# Patient Record
Sex: FEMALE | Race: WHITE | NOT HISPANIC OR LATINO | URBAN - METROPOLITAN AREA
[De-identification: names, ages, dates, MRNs, and addresses within clinical notes are randomized per-mention and may not be internally consistent; named-entity substitution may affect disease eponyms.]

---

## 2017-04-27 ENCOUNTER — INPATIENT (INPATIENT)
Facility: HOSPITAL | Age: 52
LOS: 2 days | Discharge: ANOTHER HOSPITAL | End: 2017-04-30
Attending: ORTHOPAEDIC SURGERY | Admitting: ORTHOPAEDIC SURGERY
Payer: MEDICAID

## 2017-04-27 VITALS
OXYGEN SATURATION: 98 % | TEMPERATURE: 98 F | RESPIRATION RATE: 16 BRPM | HEART RATE: 81 BPM | DIASTOLIC BLOOD PRESSURE: 87 MMHG | HEIGHT: 68 IN | WEIGHT: 177.91 LBS | SYSTOLIC BLOOD PRESSURE: 153 MMHG

## 2017-04-27 DIAGNOSIS — S32.9XXA FRACTURE OF UNSPECIFIED PARTS OF LUMBOSACRAL SPINE AND PELVIS, INITIAL ENCOUNTER FOR CLOSED FRACTURE: Chronic | ICD-10-CM

## 2017-04-27 LAB
ABO RH CONFIRMATION: SIGNIFICANT CHANGE UP
ALBUMIN SERPL ELPH-MCNC: 3.7 G/DL — SIGNIFICANT CHANGE UP (ref 3.3–5)
ALP SERPL-CCNC: 96 U/L — SIGNIFICANT CHANGE UP (ref 40–120)
ALT FLD-CCNC: 19 U/L — SIGNIFICANT CHANGE UP (ref 12–78)
ANION GAP SERPL CALC-SCNC: 8 MMOL/L — SIGNIFICANT CHANGE UP (ref 5–17)
APTT BLD: 30.8 SEC — SIGNIFICANT CHANGE UP (ref 27.5–37.4)
AST SERPL-CCNC: 13 U/L — LOW (ref 15–37)
BILIRUB SERPL-MCNC: 0.2 MG/DL — SIGNIFICANT CHANGE UP (ref 0.2–1.2)
BLD GP AB SCN SERPL QL: SIGNIFICANT CHANGE UP
BUN SERPL-MCNC: 12 MG/DL — SIGNIFICANT CHANGE UP (ref 7–23)
CALCIUM SERPL-MCNC: 8.9 MG/DL — SIGNIFICANT CHANGE UP (ref 8.5–10.1)
CHLORIDE SERPL-SCNC: 106 MMOL/L — SIGNIFICANT CHANGE UP (ref 96–108)
CO2 SERPL-SCNC: 27 MMOL/L — SIGNIFICANT CHANGE UP (ref 22–31)
CREAT SERPL-MCNC: 0.84 MG/DL — SIGNIFICANT CHANGE UP (ref 0.5–1.3)
GLUCOSE SERPL-MCNC: 79 MG/DL — SIGNIFICANT CHANGE UP (ref 70–99)
HCG SERPL-ACNC: 1 MIU/ML — SIGNIFICANT CHANGE UP
HCT VFR BLD CALC: 43.8 % — SIGNIFICANT CHANGE UP (ref 34.5–45)
HGB BLD-MCNC: 14.4 G/DL — SIGNIFICANT CHANGE UP (ref 11.5–15.5)
INR BLD: 0.95 RATIO — SIGNIFICANT CHANGE UP (ref 0.88–1.16)
MCHC RBC-ENTMCNC: 31.3 PG — SIGNIFICANT CHANGE UP (ref 27–34)
MCHC RBC-ENTMCNC: 32.9 GM/DL — SIGNIFICANT CHANGE UP (ref 32–36)
MCV RBC AUTO: 95.1 FL — SIGNIFICANT CHANGE UP (ref 80–100)
PLATELET # BLD AUTO: 254 K/UL — SIGNIFICANT CHANGE UP (ref 150–400)
POTASSIUM SERPL-MCNC: 3.8 MMOL/L — SIGNIFICANT CHANGE UP (ref 3.5–5.3)
POTASSIUM SERPL-SCNC: 3.8 MMOL/L — SIGNIFICANT CHANGE UP (ref 3.5–5.3)
PROT SERPL-MCNC: 7.6 GM/DL — SIGNIFICANT CHANGE UP (ref 6–8.3)
PROTHROM AB SERPL-ACNC: 10.3 SEC — SIGNIFICANT CHANGE UP (ref 9.8–12.7)
RBC # BLD: 4.61 M/UL — SIGNIFICANT CHANGE UP (ref 3.8–5.2)
RBC # FLD: 14.3 % — SIGNIFICANT CHANGE UP (ref 10.3–14.5)
SODIUM SERPL-SCNC: 141 MMOL/L — SIGNIFICANT CHANGE UP (ref 135–145)
TYPE + AB SCN PNL BLD: SIGNIFICANT CHANGE UP
WBC # BLD: 9.9 K/UL — SIGNIFICANT CHANGE UP (ref 3.8–10.5)
WBC # FLD AUTO: 9.9 K/UL — SIGNIFICANT CHANGE UP (ref 3.8–10.5)

## 2017-04-27 PROCEDURE — 72192 CT PELVIS W/O DYE: CPT | Mod: 26

## 2017-04-27 PROCEDURE — 71010: CPT | Mod: 26

## 2017-04-27 PROCEDURE — 73552 X-RAY EXAM OF FEMUR 2/>: CPT | Mod: 26

## 2017-04-27 PROCEDURE — 76377 3D RENDER W/INTRP POSTPROCES: CPT | Mod: 26

## 2017-04-27 PROCEDURE — 73502 X-RAY EXAM HIP UNI 2-3 VIEWS: CPT | Mod: 26

## 2017-04-27 PROCEDURE — 73700 CT LOWER EXTREMITY W/O DYE: CPT | Mod: 26,LT

## 2017-04-27 PROCEDURE — 93010 ELECTROCARDIOGRAM REPORT: CPT

## 2017-04-27 PROCEDURE — 99285 EMERGENCY DEPT VISIT HI MDM: CPT

## 2017-04-27 RX ORDER — HEPARIN SODIUM 5000 [USP'U]/ML
5000 INJECTION INTRAVENOUS; SUBCUTANEOUS ONCE
Qty: 0 | Refills: 0 | Status: COMPLETED | OUTPATIENT
Start: 2017-04-27 | End: 2017-04-27

## 2017-04-27 RX ORDER — HYDROMORPHONE HYDROCHLORIDE 2 MG/ML
1 INJECTION INTRAMUSCULAR; INTRAVENOUS; SUBCUTANEOUS ONCE
Qty: 0 | Refills: 0 | Status: DISCONTINUED | OUTPATIENT
Start: 2017-04-27 | End: 2017-04-27

## 2017-04-27 RX ORDER — OXYCODONE HYDROCHLORIDE 5 MG/1
10 TABLET ORAL EVERY 4 HOURS
Qty: 0 | Refills: 0 | Status: DISCONTINUED | OUTPATIENT
Start: 2017-04-27 | End: 2017-04-27

## 2017-04-27 RX ORDER — HYDROMORPHONE HYDROCHLORIDE 2 MG/ML
1 INJECTION INTRAMUSCULAR; INTRAVENOUS; SUBCUTANEOUS
Qty: 0 | Refills: 0 | Status: DISCONTINUED | OUTPATIENT
Start: 2017-04-27 | End: 2017-04-28

## 2017-04-27 RX ORDER — NICOTINE POLACRILEX 2 MG
1 GUM BUCCAL DAILY
Qty: 0 | Refills: 0 | Status: DISCONTINUED | OUTPATIENT
Start: 2017-04-27 | End: 2017-04-28

## 2017-04-27 RX ORDER — OXYCODONE HYDROCHLORIDE 5 MG/1
30 TABLET ORAL EVERY 4 HOURS
Qty: 0 | Refills: 0 | Status: DISCONTINUED | OUTPATIENT
Start: 2017-04-27 | End: 2017-04-30

## 2017-04-27 RX ORDER — ACETAMINOPHEN 500 MG
650 TABLET ORAL EVERY 6 HOURS
Qty: 0 | Refills: 0 | Status: DISCONTINUED | OUTPATIENT
Start: 2017-04-27 | End: 2017-04-30

## 2017-04-27 RX ORDER — METOCLOPRAMIDE HCL 10 MG
10 TABLET ORAL EVERY 6 HOURS
Qty: 0 | Refills: 0 | Status: DISCONTINUED | OUTPATIENT
Start: 2017-04-27 | End: 2017-04-30

## 2017-04-27 RX ORDER — SODIUM CHLORIDE 9 MG/ML
1000 INJECTION INTRAMUSCULAR; INTRAVENOUS; SUBCUTANEOUS
Qty: 0 | Refills: 0 | Status: DISCONTINUED | OUTPATIENT
Start: 2017-04-27 | End: 2017-04-28

## 2017-04-27 RX ORDER — OXYCODONE HYDROCHLORIDE 5 MG/1
5 TABLET ORAL EVERY 4 HOURS
Qty: 0 | Refills: 0 | Status: DISCONTINUED | OUTPATIENT
Start: 2017-04-27 | End: 2017-04-27

## 2017-04-27 RX ORDER — DOCUSATE SODIUM 100 MG
100 CAPSULE ORAL THREE TIMES A DAY
Qty: 0 | Refills: 0 | Status: DISCONTINUED | OUTPATIENT
Start: 2017-04-27 | End: 2017-04-30

## 2017-04-27 RX ORDER — SODIUM CHLORIDE 9 MG/ML
3 INJECTION INTRAMUSCULAR; INTRAVENOUS; SUBCUTANEOUS EVERY 8 HOURS
Qty: 0 | Refills: 0 | Status: DISCONTINUED | OUTPATIENT
Start: 2017-04-27 | End: 2017-04-30

## 2017-04-27 RX ORDER — OXYCODONE HYDROCHLORIDE 5 MG/1
30 TABLET ORAL ONCE
Qty: 0 | Refills: 0 | Status: DISCONTINUED | OUTPATIENT
Start: 2017-04-27 | End: 2017-04-27

## 2017-04-27 RX ORDER — DIPHENHYDRAMINE HCL 50 MG
25 CAPSULE ORAL AT BEDTIME
Qty: 0 | Refills: 0 | Status: DISCONTINUED | OUTPATIENT
Start: 2017-04-27 | End: 2017-04-30

## 2017-04-27 RX ADMIN — HYDROMORPHONE HYDROCHLORIDE 1 MILLIGRAM(S): 2 INJECTION INTRAMUSCULAR; INTRAVENOUS; SUBCUTANEOUS at 16:19

## 2017-04-27 RX ADMIN — HYDROMORPHONE HYDROCHLORIDE 1 MILLIGRAM(S): 2 INJECTION INTRAMUSCULAR; INTRAVENOUS; SUBCUTANEOUS at 23:40

## 2017-04-27 RX ADMIN — HYDROMORPHONE HYDROCHLORIDE 1 MILLIGRAM(S): 2 INJECTION INTRAMUSCULAR; INTRAVENOUS; SUBCUTANEOUS at 18:57

## 2017-04-27 RX ADMIN — HYDROMORPHONE HYDROCHLORIDE 1 MILLIGRAM(S): 2 INJECTION INTRAMUSCULAR; INTRAVENOUS; SUBCUTANEOUS at 19:24

## 2017-04-27 RX ADMIN — HYDROMORPHONE HYDROCHLORIDE 1 MILLIGRAM(S): 2 INJECTION INTRAMUSCULAR; INTRAVENOUS; SUBCUTANEOUS at 17:33

## 2017-04-27 RX ADMIN — OXYCODONE HYDROCHLORIDE 30 MILLIGRAM(S): 5 TABLET ORAL at 21:04

## 2017-04-27 RX ADMIN — Medication 1 PATCH: at 22:47

## 2017-04-27 RX ADMIN — HYDROMORPHONE HYDROCHLORIDE 1 MILLIGRAM(S): 2 INJECTION INTRAMUSCULAR; INTRAVENOUS; SUBCUTANEOUS at 17:49

## 2017-04-27 RX ADMIN — HYDROMORPHONE HYDROCHLORIDE 1 MILLIGRAM(S): 2 INJECTION INTRAMUSCULAR; INTRAVENOUS; SUBCUTANEOUS at 15:40

## 2017-04-27 RX ADMIN — HYDROMORPHONE HYDROCHLORIDE 1 MILLIGRAM(S): 2 INJECTION INTRAMUSCULAR; INTRAVENOUS; SUBCUTANEOUS at 15:42

## 2017-04-27 RX ADMIN — SODIUM CHLORIDE 3 MILLILITER(S): 9 INJECTION INTRAMUSCULAR; INTRAVENOUS; SUBCUTANEOUS at 17:49

## 2017-04-27 RX ADMIN — HEPARIN SODIUM 5000 UNIT(S): 5000 INJECTION INTRAVENOUS; SUBCUTANEOUS at 22:29

## 2017-04-27 RX ADMIN — HYDROMORPHONE HYDROCHLORIDE 1 MILLIGRAM(S): 2 INJECTION INTRAMUSCULAR; INTRAVENOUS; SUBCUTANEOUS at 16:48
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
## 2017-04-27 NOTE — CONSULT NOTE ADULT - ASSESSMENT
51F with left periprosthetic femur fracture  _  NWB affected extremity  Pain control  Discussed with patient need for surgical intervention  Will consult joint specialist  No orthopedic contraindication to transfer  Will discuss with attending and advise if change

## 2017-04-27 NOTE — ED PROVIDER NOTE - OBJECTIVE STATEMENT
52 y/o F with hx of multiple pelvic factures and reconstructive surgeries presents to the ED c/o left hip pain. Pt states she was swinging her legs off of her bed and felt a click in left hip, believes hip may be dislocated. No other complaints at this time.

## 2017-04-27 NOTE — CONSULT NOTE ADULT - SUBJECTIVE AND OBJECTIVE BOX
HPI  51F complains of left thigh pain for 1 day status post transferring motion while swinging legs. Pt denies numbness, paresthesias, headstrike, loss of consciousness, or any other signs/symptoms at this time. Patient is a community ambulator with a crutch at baseline.    Allergies: NKDA  PMH: Denies  PSH: R IMN, L KONG ~1980 with revision in 2016  Social: +smoking  FH: Noncontributory  Imaging: CT/XR left femur/ap pelvis: left periprosthetic femur fracture    Physical exam  VS: Afebrile, vital signs stable  Gen: NAD  left LE: Skin intact. No erythema/ecchymosis/warmth. TTP thigh. No TTP bony prominences at ankle/foot.+EHL/FHL/TA/GS. SILT L3-S1, +DP pulse, capillary refill brisk. Compartments soft and nontender.  Secondary survey: No TTP bony prominences with full painless AROM at baseline per patient. SILT. Capillary refill brisk. Able to SLR with contralateral leg. No TTP axial spine.

## 2017-04-28 LAB — ERYTHROCYTE [SEDIMENTATION RATE] IN BLOOD: 13 MM/HR — SIGNIFICANT CHANGE UP (ref 0–20)

## 2017-04-28 RX ORDER — HYDROMORPHONE HYDROCHLORIDE 2 MG/ML
2 INJECTION INTRAMUSCULAR; INTRAVENOUS; SUBCUTANEOUS
Qty: 0 | Refills: 0 | Status: DISCONTINUED | OUTPATIENT
Start: 2017-04-28 | End: 2017-04-30

## 2017-04-28 RX ORDER — NICOTINE POLACRILEX 2 MG
1 GUM BUCCAL DAILY
Qty: 0 | Refills: 0 | Status: DISCONTINUED | OUTPATIENT
Start: 2017-04-28 | End: 2017-04-30

## 2017-04-28 RX ORDER — HYDROMORPHONE HYDROCHLORIDE 2 MG/ML
4 INJECTION INTRAMUSCULAR; INTRAVENOUS; SUBCUTANEOUS EVERY 4 HOURS
Qty: 0 | Refills: 0 | Status: DISCONTINUED | OUTPATIENT
Start: 2017-04-28 | End: 2017-04-30

## 2017-04-28 RX ORDER — HEPARIN SODIUM 5000 [USP'U]/ML
5000 INJECTION INTRAVENOUS; SUBCUTANEOUS EVERY 8 HOURS
Qty: 0 | Refills: 0 | Status: DISCONTINUED | OUTPATIENT
Start: 2017-04-28 | End: 2017-04-30

## 2017-04-28 RX ADMIN — HYDROMORPHONE HYDROCHLORIDE 2 MILLIGRAM(S): 2 INJECTION INTRAMUSCULAR; INTRAVENOUS; SUBCUTANEOUS at 17:52

## 2017-04-28 RX ADMIN — OXYCODONE HYDROCHLORIDE 30 MILLIGRAM(S): 5 TABLET ORAL at 06:00

## 2017-04-28 RX ADMIN — SODIUM CHLORIDE 3 MILLILITER(S): 9 INJECTION INTRAMUSCULAR; INTRAVENOUS; SUBCUTANEOUS at 04:47

## 2017-04-28 RX ADMIN — HYDROMORPHONE HYDROCHLORIDE 2 MILLIGRAM(S): 2 INJECTION INTRAMUSCULAR; INTRAVENOUS; SUBCUTANEOUS at 21:13

## 2017-04-28 RX ADMIN — Medication 1 PATCH: at 11:51

## 2017-04-28 RX ADMIN — HYDROMORPHONE HYDROCHLORIDE 2 MILLIGRAM(S): 2 INJECTION INTRAMUSCULAR; INTRAVENOUS; SUBCUTANEOUS at 01:37

## 2017-04-28 RX ADMIN — OXYCODONE HYDROCHLORIDE 30 MILLIGRAM(S): 5 TABLET ORAL at 22:30

## 2017-04-28 RX ADMIN — OXYCODONE HYDROCHLORIDE 30 MILLIGRAM(S): 5 TABLET ORAL at 05:19

## 2017-04-28 RX ADMIN — HYDROMORPHONE HYDROCHLORIDE 2 MILLIGRAM(S): 2 INJECTION INTRAMUSCULAR; INTRAVENOUS; SUBCUTANEOUS at 04:45

## 2017-04-28 RX ADMIN — SODIUM CHLORIDE 3 MILLILITER(S): 9 INJECTION INTRAMUSCULAR; INTRAVENOUS; SUBCUTANEOUS at 22:09

## 2017-04-28 RX ADMIN — HYDROMORPHONE HYDROCHLORIDE 2 MILLIGRAM(S): 2 INJECTION INTRAMUSCULAR; INTRAVENOUS; SUBCUTANEOUS at 07:53

## 2017-04-28 RX ADMIN — HYDROMORPHONE HYDROCHLORIDE 2 MILLIGRAM(S): 2 INJECTION INTRAMUSCULAR; INTRAVENOUS; SUBCUTANEOUS at 05:12

## 2017-04-28 RX ADMIN — HYDROMORPHONE HYDROCHLORIDE 2 MILLIGRAM(S): 2 INJECTION INTRAMUSCULAR; INTRAVENOUS; SUBCUTANEOUS at 11:46

## 2017-04-28 RX ADMIN — HYDROMORPHONE HYDROCHLORIDE 4 MILLIGRAM(S): 2 INJECTION INTRAMUSCULAR; INTRAVENOUS; SUBCUTANEOUS at 16:43

## 2017-04-28 RX ADMIN — HYDROMORPHONE HYDROCHLORIDE 4 MILLIGRAM(S): 2 INJECTION INTRAMUSCULAR; INTRAVENOUS; SUBCUTANEOUS at 12:54

## 2017-04-28 RX ADMIN — HYDROMORPHONE HYDROCHLORIDE 4 MILLIGRAM(S): 2 INJECTION INTRAMUSCULAR; INTRAVENOUS; SUBCUTANEOUS at 20:46

## 2017-04-28 RX ADMIN — Medication 100 MILLIGRAM(S): at 04:50

## 2017-04-28 RX ADMIN — Medication 100 MILLIGRAM(S): at 22:00

## 2017-04-28 RX ADMIN — Medication 100 MILLIGRAM(S): at 14:33

## 2017-04-28 RX ADMIN — OXYCODONE HYDROCHLORIDE 30 MILLIGRAM(S): 5 TABLET ORAL at 22:00

## 2017-04-28 RX ADMIN — HYDROMORPHONE HYDROCHLORIDE 2 MILLIGRAM(S): 2 INJECTION INTRAMUSCULAR; INTRAVENOUS; SUBCUTANEOUS at 09:52

## 2017-04-28 RX ADMIN — HEPARIN SODIUM 5000 UNIT(S): 5000 INJECTION INTRAVENOUS; SUBCUTANEOUS at 22:00

## 2017-04-28 RX ADMIN — HYDROMORPHONE HYDROCHLORIDE 2 MILLIGRAM(S): 2 INJECTION INTRAMUSCULAR; INTRAVENOUS; SUBCUTANEOUS at 12:54

## 2017-04-28 RX ADMIN — HYDROMORPHONE HYDROCHLORIDE 2 MILLIGRAM(S): 2 INJECTION INTRAMUSCULAR; INTRAVENOUS; SUBCUTANEOUS at 14:42

## 2017-04-28 RX ADMIN — SODIUM CHLORIDE 3 MILLILITER(S): 9 INJECTION INTRAMUSCULAR; INTRAVENOUS; SUBCUTANEOUS at 14:34

## 2017-04-28 RX ADMIN — OXYCODONE HYDROCHLORIDE 30 MILLIGRAM(S): 5 TABLET ORAL at 09:23

## 2017-04-28 RX ADMIN — HYDROMORPHONE HYDROCHLORIDE 2 MILLIGRAM(S): 2 INJECTION INTRAMUSCULAR; INTRAVENOUS; SUBCUTANEOUS at 17:53

## 2017-04-28 RX ADMIN — HYDROMORPHONE HYDROCHLORIDE 2 MILLIGRAM(S): 2 INJECTION INTRAMUSCULAR; INTRAVENOUS; SUBCUTANEOUS at 14:49

## 2017-04-28 RX ADMIN — HYDROMORPHONE HYDROCHLORIDE 2 MILLIGRAM(S): 2 INJECTION INTRAMUSCULAR; INTRAVENOUS; SUBCUTANEOUS at 02:00

## 2017-04-28 RX ADMIN — Medication 1 PATCH: at 14:34

## 2017-04-28 RX ADMIN — HYDROMORPHONE HYDROCHLORIDE 4 MILLIGRAM(S): 2 INJECTION INTRAMUSCULAR; INTRAVENOUS; SUBCUTANEOUS at 20:16

## 2017-04-28 RX ADMIN — OXYCODONE HYDROCHLORIDE 30 MILLIGRAM(S): 5 TABLET ORAL at 01:03

## 2017-04-28 RX ADMIN — HYDROMORPHONE HYDROCHLORIDE 2 MILLIGRAM(S): 2 INJECTION INTRAMUSCULAR; INTRAVENOUS; SUBCUTANEOUS at 21:43

## 2017-04-28 RX ADMIN — HYDROMORPHONE HYDROCHLORIDE 2 MILLIGRAM(S): 2 INJECTION INTRAMUSCULAR; INTRAVENOUS; SUBCUTANEOUS at 23:59

## 2017-04-28 RX ADMIN — OXYCODONE HYDROCHLORIDE 30 MILLIGRAM(S): 5 TABLET ORAL at 09:52

## 2017-04-28 RX ADMIN — OXYCODONE HYDROCHLORIDE 30 MILLIGRAM(S): 5 TABLET ORAL at 00:26

## 2017-04-28 RX ADMIN — HYDROMORPHONE HYDROCHLORIDE 4 MILLIGRAM(S): 2 INJECTION INTRAMUSCULAR; INTRAVENOUS; SUBCUTANEOUS at 12:48

## 2017-04-28 RX ADMIN — HYDROMORPHONE HYDROCHLORIDE 1 MILLIGRAM(S): 2 INJECTION INTRAMUSCULAR; INTRAVENOUS; SUBCUTANEOUS at 00:10

## 2017-04-28 NOTE — PROVIDER CONTACT NOTE (OTHER) - RECOMMENDATIONS
md bee stated he would increase the dilaudid iv but not po meds beacause pt stated different doses in the ed

## 2017-04-29 LAB
ANION GAP SERPL CALC-SCNC: 11 MMOL/L — SIGNIFICANT CHANGE UP (ref 5–17)
APPEARANCE UR: CLEAR — SIGNIFICANT CHANGE UP
APTT BLD: 30 SEC — SIGNIFICANT CHANGE UP (ref 27.5–37.4)
BACTERIA # UR AUTO: (no result)
BILIRUB UR-MCNC: NEGATIVE — SIGNIFICANT CHANGE UP
BUN SERPL-MCNC: 11 MG/DL — SIGNIFICANT CHANGE UP (ref 7–23)
CALCIUM SERPL-MCNC: 9.6 MG/DL — SIGNIFICANT CHANGE UP (ref 8.5–10.1)
CHLORIDE SERPL-SCNC: 105 MMOL/L — SIGNIFICANT CHANGE UP (ref 96–108)
CO2 SERPL-SCNC: 21 MMOL/L — LOW (ref 22–31)
COLOR SPEC: YELLOW — SIGNIFICANT CHANGE UP
CREAT SERPL-MCNC: 0.84 MG/DL — SIGNIFICANT CHANGE UP (ref 0.5–1.3)
CRP SERPL-MCNC: 0.37 MG/DL — SIGNIFICANT CHANGE UP (ref 0–0.4)
DIFF PNL FLD: NEGATIVE — SIGNIFICANT CHANGE UP
EPI CELLS # UR: SIGNIFICANT CHANGE UP
GLUCOSE SERPL-MCNC: 76 MG/DL — SIGNIFICANT CHANGE UP (ref 70–99)
GLUCOSE UR QL: NEGATIVE MG/DL — SIGNIFICANT CHANGE UP
HCG UR QL: NEGATIVE — SIGNIFICANT CHANGE UP
HCT VFR BLD CALC: 49.8 % — HIGH (ref 34.5–45)
HGB BLD-MCNC: 16.2 G/DL — HIGH (ref 11.5–15.5)
INR BLD: 1.02 RATIO — SIGNIFICANT CHANGE UP (ref 0.88–1.16)
KETONES UR-MCNC: NEGATIVE — SIGNIFICANT CHANGE UP
LEUKOCYTE ESTERASE UR-ACNC: (no result)
MCHC RBC-ENTMCNC: 30.6 PG — SIGNIFICANT CHANGE UP (ref 27–34)
MCHC RBC-ENTMCNC: 32.5 GM/DL — SIGNIFICANT CHANGE UP (ref 32–36)
MCV RBC AUTO: 94.2 FL — SIGNIFICANT CHANGE UP (ref 80–100)
NITRITE UR-MCNC: NEGATIVE — SIGNIFICANT CHANGE UP
PH UR: 6 — SIGNIFICANT CHANGE UP (ref 5–8)
PLATELET # BLD AUTO: 201 K/UL — SIGNIFICANT CHANGE UP (ref 150–400)
POTASSIUM SERPL-MCNC: 4.4 MMOL/L — SIGNIFICANT CHANGE UP (ref 3.5–5.3)
POTASSIUM SERPL-SCNC: 4.4 MMOL/L — SIGNIFICANT CHANGE UP (ref 3.5–5.3)
PROT UR-MCNC: NEGATIVE MG/DL — SIGNIFICANT CHANGE UP
PROTHROM AB SERPL-ACNC: 11 SEC — SIGNIFICANT CHANGE UP (ref 9.8–12.7)
RBC # BLD: 5.28 M/UL — HIGH (ref 3.8–5.2)
RBC # FLD: 13.8 % — SIGNIFICANT CHANGE UP (ref 10.3–14.5)
RBC CASTS # UR COMP ASSIST: SIGNIFICANT CHANGE UP /HPF (ref 0–4)
SODIUM SERPL-SCNC: 137 MMOL/L — SIGNIFICANT CHANGE UP (ref 135–145)
SP GR SPEC: 1.02 — SIGNIFICANT CHANGE UP (ref 1.01–1.02)
UROBILINOGEN FLD QL: NEGATIVE MG/DL — SIGNIFICANT CHANGE UP
WBC # BLD: 8.4 K/UL — SIGNIFICANT CHANGE UP (ref 3.8–10.5)
WBC # FLD AUTO: 8.4 K/UL — SIGNIFICANT CHANGE UP (ref 3.8–10.5)
WBC UR QL: SIGNIFICANT CHANGE UP

## 2017-04-29 RX ORDER — ACETAMINOPHEN 500 MG
1000 TABLET ORAL ONCE
Qty: 0 | Refills: 0 | Status: COMPLETED | OUTPATIENT
Start: 2017-04-29 | End: 2017-04-29

## 2017-04-29 RX ORDER — OXYCODONE HYDROCHLORIDE 5 MG/1
10 TABLET ORAL EVERY 12 HOURS
Qty: 0 | Refills: 0 | Status: DISCONTINUED | OUTPATIENT
Start: 2017-04-29 | End: 2017-04-30

## 2017-04-29 RX ADMIN — Medication 100 MILLIGRAM(S): at 21:26

## 2017-04-29 RX ADMIN — HYDROMORPHONE HYDROCHLORIDE 4 MILLIGRAM(S): 2 INJECTION INTRAMUSCULAR; INTRAVENOUS; SUBCUTANEOUS at 00:52

## 2017-04-29 RX ADMIN — HYDROMORPHONE HYDROCHLORIDE 4 MILLIGRAM(S): 2 INJECTION INTRAMUSCULAR; INTRAVENOUS; SUBCUTANEOUS at 10:22

## 2017-04-29 RX ADMIN — HYDROMORPHONE HYDROCHLORIDE 2 MILLIGRAM(S): 2 INJECTION INTRAMUSCULAR; INTRAVENOUS; SUBCUTANEOUS at 00:29

## 2017-04-29 RX ADMIN — Medication 1 PATCH: at 13:32

## 2017-04-29 RX ADMIN — Medication 400 MILLIGRAM(S): at 19:49

## 2017-04-29 RX ADMIN — HYDROMORPHONE HYDROCHLORIDE 2 MILLIGRAM(S): 2 INJECTION INTRAMUSCULAR; INTRAVENOUS; SUBCUTANEOUS at 09:08

## 2017-04-29 RX ADMIN — OXYCODONE HYDROCHLORIDE 30 MILLIGRAM(S): 5 TABLET ORAL at 02:02

## 2017-04-29 RX ADMIN — HEPARIN SODIUM 5000 UNIT(S): 5000 INJECTION INTRAVENOUS; SUBCUTANEOUS at 22:07

## 2017-04-29 RX ADMIN — SODIUM CHLORIDE 3 MILLILITER(S): 9 INJECTION INTRAMUSCULAR; INTRAVENOUS; SUBCUTANEOUS at 21:22

## 2017-04-29 RX ADMIN — OXYCODONE HYDROCHLORIDE 10 MILLIGRAM(S): 5 TABLET ORAL at 18:27

## 2017-04-29 RX ADMIN — OXYCODONE HYDROCHLORIDE 30 MILLIGRAM(S): 5 TABLET ORAL at 21:24

## 2017-04-29 RX ADMIN — HYDROMORPHONE HYDROCHLORIDE 2 MILLIGRAM(S): 2 INJECTION INTRAMUSCULAR; INTRAVENOUS; SUBCUTANEOUS at 04:37

## 2017-04-29 RX ADMIN — HYDROMORPHONE HYDROCHLORIDE 4 MILLIGRAM(S): 2 INJECTION INTRAMUSCULAR; INTRAVENOUS; SUBCUTANEOUS at 01:22

## 2017-04-29 RX ADMIN — SODIUM CHLORIDE 3 MILLILITER(S): 9 INJECTION INTRAMUSCULAR; INTRAVENOUS; SUBCUTANEOUS at 05:17

## 2017-04-29 RX ADMIN — HYDROMORPHONE HYDROCHLORIDE 4 MILLIGRAM(S): 2 INJECTION INTRAMUSCULAR; INTRAVENOUS; SUBCUTANEOUS at 09:22

## 2017-04-29 RX ADMIN — HEPARIN SODIUM 5000 UNIT(S): 5000 INJECTION INTRAVENOUS; SUBCUTANEOUS at 13:30

## 2017-04-29 RX ADMIN — HYDROMORPHONE HYDROCHLORIDE 2 MILLIGRAM(S): 2 INJECTION INTRAMUSCULAR; INTRAVENOUS; SUBCUTANEOUS at 12:09

## 2017-04-29 RX ADMIN — HYDROMORPHONE HYDROCHLORIDE 2 MILLIGRAM(S): 2 INJECTION INTRAMUSCULAR; INTRAVENOUS; SUBCUTANEOUS at 13:09

## 2017-04-29 RX ADMIN — HYDROMORPHONE HYDROCHLORIDE 4 MILLIGRAM(S): 2 INJECTION INTRAMUSCULAR; INTRAVENOUS; SUBCUTANEOUS at 20:20

## 2017-04-29 RX ADMIN — Medication 100 MILLIGRAM(S): at 13:31

## 2017-04-29 RX ADMIN — Medication 100 MILLIGRAM(S): at 05:16

## 2017-04-29 RX ADMIN — HYDROMORPHONE HYDROCHLORIDE 4 MILLIGRAM(S): 2 INJECTION INTRAMUSCULAR; INTRAVENOUS; SUBCUTANEOUS at 19:58

## 2017-04-29 RX ADMIN — HYDROMORPHONE HYDROCHLORIDE 2 MILLIGRAM(S): 2 INJECTION INTRAMUSCULAR; INTRAVENOUS; SUBCUTANEOUS at 19:05

## 2017-04-29 RX ADMIN — HYDROMORPHONE HYDROCHLORIDE 2 MILLIGRAM(S): 2 INJECTION INTRAMUSCULAR; INTRAVENOUS; SUBCUTANEOUS at 05:17

## 2017-04-29 RX ADMIN — OXYCODONE HYDROCHLORIDE 30 MILLIGRAM(S): 5 TABLET ORAL at 16:26

## 2017-04-29 RX ADMIN — HYDROMORPHONE HYDROCHLORIDE 4 MILLIGRAM(S): 2 INJECTION INTRAMUSCULAR; INTRAVENOUS; SUBCUTANEOUS at 09:26

## 2017-04-29 RX ADMIN — HEPARIN SODIUM 5000 UNIT(S): 5000 INJECTION INTRAVENOUS; SUBCUTANEOUS at 05:16

## 2017-04-29 RX ADMIN — Medication 1000 MILLIGRAM(S): at 20:20

## 2017-04-29 RX ADMIN — OXYCODONE HYDROCHLORIDE 10 MILLIGRAM(S): 5 TABLET ORAL at 17:40

## 2017-04-29 RX ADMIN — HYDROMORPHONE HYDROCHLORIDE 4 MILLIGRAM(S): 2 INJECTION INTRAMUSCULAR; INTRAVENOUS; SUBCUTANEOUS at 13:36

## 2017-04-29 RX ADMIN — OXYCODONE HYDROCHLORIDE 30 MILLIGRAM(S): 5 TABLET ORAL at 22:00

## 2017-04-29 RX ADMIN — HYDROMORPHONE HYDROCHLORIDE 2 MILLIGRAM(S): 2 INJECTION INTRAMUSCULAR; INTRAVENOUS; SUBCUTANEOUS at 22:02

## 2017-04-29 RX ADMIN — HYDROMORPHONE HYDROCHLORIDE 2 MILLIGRAM(S): 2 INJECTION INTRAMUSCULAR; INTRAVENOUS; SUBCUTANEOUS at 08:09

## 2017-04-29 RX ADMIN — Medication 1 PATCH: at 13:30

## 2017-04-29 RX ADMIN — OXYCODONE HYDROCHLORIDE 30 MILLIGRAM(S): 5 TABLET ORAL at 15:26

## 2017-04-29 RX ADMIN — OXYCODONE HYDROCHLORIDE 30 MILLIGRAM(S): 5 TABLET ORAL at 07:00

## 2017-04-29 RX ADMIN — HYDROMORPHONE HYDROCHLORIDE 4 MILLIGRAM(S): 2 INJECTION INTRAMUSCULAR; INTRAVENOUS; SUBCUTANEOUS at 14:36

## 2017-04-29 RX ADMIN — OXYCODONE HYDROCHLORIDE 30 MILLIGRAM(S): 5 TABLET ORAL at 10:45

## 2017-04-29 RX ADMIN — HYDROMORPHONE HYDROCHLORIDE 2 MILLIGRAM(S): 2 INJECTION INTRAMUSCULAR; INTRAVENOUS; SUBCUTANEOUS at 18:28

## 2017-04-29 RX ADMIN — HYDROMORPHONE HYDROCHLORIDE 4 MILLIGRAM(S): 2 INJECTION INTRAMUSCULAR; INTRAVENOUS; SUBCUTANEOUS at 05:16

## 2017-04-29 RX ADMIN — OXYCODONE HYDROCHLORIDE 30 MILLIGRAM(S): 5 TABLET ORAL at 12:12

## 2017-04-29 RX ADMIN — SODIUM CHLORIDE 3 MILLILITER(S): 9 INJECTION INTRAMUSCULAR; INTRAVENOUS; SUBCUTANEOUS at 13:33

## 2017-04-29 RX ADMIN — HYDROMORPHONE HYDROCHLORIDE 2 MILLIGRAM(S): 2 INJECTION INTRAMUSCULAR; INTRAVENOUS; SUBCUTANEOUS at 22:30

## 2017-04-29 RX ADMIN — OXYCODONE HYDROCHLORIDE 30 MILLIGRAM(S): 5 TABLET ORAL at 06:11

## 2017-04-29 RX ADMIN — OXYCODONE HYDROCHLORIDE 30 MILLIGRAM(S): 5 TABLET ORAL at 02:32

## 2017-04-29 NOTE — PROGRESS NOTE ADULT - SUBJECTIVE AND OBJECTIVE BOX
Pt seen and examined. pain controlled. No acute events overnight  PE:  left LE: Skin intact. No erythema/ecchymosis/warmth. TTP thigh. No TTP bony prominences at ankle/foot.+EHL/FHL/TA/GS. SILT L3-S1, +DP pulse, capillary refill brisk. Compartments soft and nontender.  Secondary survey: No TTP bony prominences with full painless AROM at baseline per patient. SILT. Capillary refill brisk. Able to SLR with contralateral leg. No TTP axial spine.    A/P: 51F with left periprosthetic femur fracture  NWB affected extremity  Pain control  Discussed with patient need for surgical intervention  Pt to be transferred to [facility] to Dr Calero. Dr Calero aware  No orthopedic contraindication to transfer  Will discuss with attending and advise if change

## 2017-04-30 ENCOUNTER — INPATIENT (INPATIENT)
Facility: HOSPITAL | Age: 52
LOS: 8 days | Discharge: HOME | End: 2017-05-09
Attending: ORTHOPAEDIC SURGERY | Admitting: ORTHOPAEDIC SURGERY
Payer: MEDICAID

## 2017-04-30 VITALS
TEMPERATURE: 99 F | DIASTOLIC BLOOD PRESSURE: 81 MMHG | RESPIRATION RATE: 16 BRPM | HEART RATE: 89 BPM | SYSTOLIC BLOOD PRESSURE: 135 MMHG

## 2017-04-30 VITALS
OXYGEN SATURATION: 99 % | HEART RATE: 100 BPM | SYSTOLIC BLOOD PRESSURE: 119 MMHG | HEIGHT: 67 IN | RESPIRATION RATE: 18 BRPM | WEIGHT: 178.35 LBS | DIASTOLIC BLOOD PRESSURE: 85 MMHG | TEMPERATURE: 98 F

## 2017-04-30 DIAGNOSIS — M97.02XA PERIPROSTHETIC FRACTURE AROUND INTERNAL PROSTHETIC LEFT HIP JOINT, INITIAL ENCOUNTER: ICD-10-CM

## 2017-04-30 DIAGNOSIS — S32.9XXA FRACTURE OF UNSPECIFIED PARTS OF LUMBOSACRAL SPINE AND PELVIS, INITIAL ENCOUNTER FOR CLOSED FRACTURE: Chronic | ICD-10-CM

## 2017-04-30 LAB
ANION GAP SERPL CALC-SCNC: 9 MMOL/L — SIGNIFICANT CHANGE UP (ref 5–17)
APPEARANCE UR: CLEAR — SIGNIFICANT CHANGE UP
APTT BLD: 28.2 SEC — SIGNIFICANT CHANGE UP (ref 27.5–37.4)
APTT BLD: 31.2 SEC — SIGNIFICANT CHANGE UP (ref 27.5–37.4)
BILIRUB UR-MCNC: NEGATIVE — SIGNIFICANT CHANGE UP
BLD GP AB SCN SERPL QL: NEGATIVE — SIGNIFICANT CHANGE UP
BLD GP AB SCN SERPL QL: SIGNIFICANT CHANGE UP
BLOOD UR QL VISUAL: NEGATIVE — SIGNIFICANT CHANGE UP
BUN SERPL-MCNC: 13 MG/DL — SIGNIFICANT CHANGE UP (ref 7–23)
BUN SERPL-MCNC: 21 MG/DL — SIGNIFICANT CHANGE UP (ref 7–23)
CALCIUM SERPL-MCNC: 10 MG/DL — SIGNIFICANT CHANGE UP (ref 8.4–10.5)
CALCIUM SERPL-MCNC: 9.7 MG/DL — SIGNIFICANT CHANGE UP (ref 8.5–10.1)
CHLORIDE SERPL-SCNC: 104 MMOL/L — SIGNIFICANT CHANGE UP (ref 96–108)
CHLORIDE SERPL-SCNC: 99 MMOL/L — SIGNIFICANT CHANGE UP (ref 98–107)
CO2 SERPL-SCNC: 23 MMOL/L — SIGNIFICANT CHANGE UP (ref 22–31)
CO2 SERPL-SCNC: 28 MMOL/L — SIGNIFICANT CHANGE UP (ref 22–31)
COLOR SPEC: YELLOW — SIGNIFICANT CHANGE UP
CREAT SERPL-MCNC: 0.87 MG/DL — SIGNIFICANT CHANGE UP (ref 0.5–1.3)
CREAT SERPL-MCNC: 0.87 MG/DL — SIGNIFICANT CHANGE UP (ref 0.5–1.3)
GLUCOSE SERPL-MCNC: 104 MG/DL — HIGH (ref 70–99)
GLUCOSE SERPL-MCNC: 95 MG/DL — SIGNIFICANT CHANGE UP (ref 70–99)
GLUCOSE UR-MCNC: NEGATIVE — SIGNIFICANT CHANGE UP
HCG UR-SCNC: NEGATIVE — SIGNIFICANT CHANGE UP
HCT VFR BLD CALC: 47.2 % — HIGH (ref 34.5–45)
HCT VFR BLD CALC: 50.4 % — HIGH (ref 34.5–45)
HGB BLD-MCNC: 15.6 G/DL — HIGH (ref 11.5–15.5)
HGB BLD-MCNC: 16 G/DL — HIGH (ref 11.5–15.5)
INR BLD: 1 — SIGNIFICANT CHANGE UP (ref 0.88–1.17)
INR BLD: 1.03 RATIO — SIGNIFICANT CHANGE UP (ref 0.88–1.16)
KETONES UR-MCNC: NEGATIVE — SIGNIFICANT CHANGE UP
LEUKOCYTE ESTERASE UR-ACNC: SIGNIFICANT CHANGE UP
MCHC RBC-ENTMCNC: 30.1 PG — SIGNIFICANT CHANGE UP (ref 27–34)
MCHC RBC-ENTMCNC: 31.2 PG — SIGNIFICANT CHANGE UP (ref 27–34)
MCHC RBC-ENTMCNC: 31.7 GM/DL — LOW (ref 32–36)
MCHC RBC-ENTMCNC: 33.1 % — SIGNIFICANT CHANGE UP (ref 32–36)
MCV RBC AUTO: 94.4 FL — SIGNIFICANT CHANGE UP (ref 80–100)
MCV RBC AUTO: 95.1 FL — SIGNIFICANT CHANGE UP (ref 80–100)
MUCOUS THREADS # UR AUTO: SIGNIFICANT CHANGE UP
NITRITE UR-MCNC: NEGATIVE — SIGNIFICANT CHANGE UP
PH UR: 6.5 — SIGNIFICANT CHANGE UP (ref 4.6–8)
PLATELET # BLD AUTO: 223 K/UL — SIGNIFICANT CHANGE UP (ref 150–400)
PLATELET # BLD AUTO: 274 K/UL — SIGNIFICANT CHANGE UP (ref 150–400)
PMV BLD: 11.5 FL — SIGNIFICANT CHANGE UP (ref 7–13)
POTASSIUM SERPL-MCNC: 4 MMOL/L — SIGNIFICANT CHANGE UP (ref 3.5–5.3)
POTASSIUM SERPL-MCNC: 4.1 MMOL/L — SIGNIFICANT CHANGE UP (ref 3.5–5.3)
POTASSIUM SERPL-SCNC: 4 MMOL/L — SIGNIFICANT CHANGE UP (ref 3.5–5.3)
POTASSIUM SERPL-SCNC: 4.1 MMOL/L — SIGNIFICANT CHANGE UP (ref 3.5–5.3)
PROT UR-MCNC: 10 — SIGNIFICANT CHANGE UP
PROTHROM AB SERPL-ACNC: 11.1 SEC — SIGNIFICANT CHANGE UP (ref 9.8–12.7)
PROTHROM AB SERPL-ACNC: 11.2 SEC — SIGNIFICANT CHANGE UP (ref 9.8–13.1)
RBC # BLD: 5 M/UL — SIGNIFICANT CHANGE UP (ref 3.8–5.2)
RBC # BLD: 5.31 M/UL — HIGH (ref 3.8–5.2)
RBC # FLD: 13.8 % — SIGNIFICANT CHANGE UP (ref 10.3–14.5)
RBC # FLD: 14.9 % — HIGH (ref 10.3–14.5)
RBC CASTS # UR COMP ASSIST: SIGNIFICANT CHANGE UP (ref 0–?)
RH IG SCN BLD-IMP: NEGATIVE — SIGNIFICANT CHANGE UP
SODIUM SERPL-SCNC: 139 MMOL/L — SIGNIFICANT CHANGE UP (ref 135–145)
SODIUM SERPL-SCNC: 141 MMOL/L — SIGNIFICANT CHANGE UP (ref 135–145)
SP GR SPEC: 1.03 — SIGNIFICANT CHANGE UP (ref 1–1.03)
SP GR UR: 1.02 — SIGNIFICANT CHANGE UP (ref 1–1.03)
SQUAMOUS # UR AUTO: SIGNIFICANT CHANGE UP
TYPE + AB SCN PNL BLD: SIGNIFICANT CHANGE UP
UROBILINOGEN FLD QL: NORMAL E.U. — SIGNIFICANT CHANGE UP (ref 0.1–0.2)
WBC # BLD: 12.01 K/UL — HIGH (ref 3.8–10.5)
WBC # BLD: 9.6 K/UL — SIGNIFICANT CHANGE UP (ref 3.8–10.5)
WBC # FLD AUTO: 12.01 K/UL — HIGH (ref 3.8–10.5)
WBC # FLD AUTO: 9.6 K/UL — SIGNIFICANT CHANGE UP (ref 3.8–10.5)
WBC UR QL: HIGH (ref 0–?)

## 2017-04-30 PROCEDURE — 71010: CPT | Mod: 26

## 2017-04-30 RX ORDER — HYDROMORPHONE HYDROCHLORIDE 2 MG/ML
1 INJECTION INTRAMUSCULAR; INTRAVENOUS; SUBCUTANEOUS EVERY 4 HOURS
Qty: 0 | Refills: 0 | Status: DISCONTINUED | OUTPATIENT
Start: 2017-04-30 | End: 2017-04-30

## 2017-04-30 RX ORDER — HEPARIN SODIUM 5000 [USP'U]/ML
5000 INJECTION INTRAVENOUS; SUBCUTANEOUS
Qty: 0 | Refills: 0 | COMMUNITY
Start: 2017-04-30

## 2017-04-30 RX ORDER — HYDROMORPHONE HYDROCHLORIDE 2 MG/ML
1 INJECTION INTRAMUSCULAR; INTRAVENOUS; SUBCUTANEOUS
Qty: 0 | Refills: 0 | COMMUNITY
Start: 2017-04-30

## 2017-04-30 RX ORDER — HYDROMORPHONE HYDROCHLORIDE 2 MG/ML
2 INJECTION INTRAMUSCULAR; INTRAVENOUS; SUBCUTANEOUS EVERY 4 HOURS
Qty: 0 | Refills: 0 | Status: DISCONTINUED | OUTPATIENT
Start: 2017-04-30 | End: 2017-05-02

## 2017-04-30 RX ORDER — DIAZEPAM 5 MG
1 TABLET ORAL
Qty: 0 | Refills: 0 | COMMUNITY
Start: 2017-04-30

## 2017-04-30 RX ORDER — OXYCODONE HYDROCHLORIDE 5 MG/1
1 TABLET ORAL
Qty: 0 | Refills: 0 | COMMUNITY
Start: 2017-04-30

## 2017-04-30 RX ORDER — HYDROMORPHONE HYDROCHLORIDE 2 MG/ML
1 INJECTION INTRAMUSCULAR; INTRAVENOUS; SUBCUTANEOUS ONCE
Qty: 0 | Refills: 0 | Status: DISCONTINUED | OUTPATIENT
Start: 2017-04-30 | End: 2017-04-30

## 2017-04-30 RX ORDER — OXYCODONE HYDROCHLORIDE 5 MG/1
15 TABLET ORAL EVERY 4 HOURS
Qty: 0 | Refills: 0 | Status: DISCONTINUED | OUTPATIENT
Start: 2017-04-30 | End: 2017-05-04

## 2017-04-30 RX ORDER — HEPARIN SODIUM 5000 [USP'U]/ML
5000 INJECTION INTRAVENOUS; SUBCUTANEOUS EVERY 8 HOURS
Qty: 0 | Refills: 0 | Status: DISCONTINUED | OUTPATIENT
Start: 2017-04-30 | End: 2017-05-02

## 2017-04-30 RX ORDER — HYDROMORPHONE HYDROCHLORIDE 2 MG/ML
4 INJECTION INTRAMUSCULAR; INTRAVENOUS; SUBCUTANEOUS EVERY 4 HOURS
Qty: 0 | Refills: 0 | Status: DISCONTINUED | OUTPATIENT
Start: 2017-04-30 | End: 2017-05-02

## 2017-04-30 RX ORDER — ACETAMINOPHEN 500 MG
2 TABLET ORAL
Qty: 0 | Refills: 0 | COMMUNITY
Start: 2017-04-30

## 2017-04-30 RX ORDER — OXYCODONE HYDROCHLORIDE 5 MG/1
10 TABLET ORAL EVERY 4 HOURS
Qty: 0 | Refills: 0 | Status: DISCONTINUED | OUTPATIENT
Start: 2017-04-30 | End: 2017-05-02

## 2017-04-30 RX ORDER — HYDROMORPHONE HYDROCHLORIDE 2 MG/ML
2 INJECTION INTRAMUSCULAR; INTRAVENOUS; SUBCUTANEOUS EVERY 4 HOURS
Qty: 0 | Refills: 0 | Status: DISCONTINUED | OUTPATIENT
Start: 2017-04-30 | End: 2017-04-30

## 2017-04-30 RX ORDER — MAGNESIUM HYDROXIDE 400 MG/1
30 TABLET, CHEWABLE ORAL DAILY
Qty: 0 | Refills: 0 | Status: DISCONTINUED | OUTPATIENT
Start: 2017-04-30 | End: 2017-05-09

## 2017-04-30 RX ORDER — DIAZEPAM 5 MG
5 TABLET ORAL EVERY 8 HOURS
Qty: 0 | Refills: 0 | Status: DISCONTINUED | OUTPATIENT
Start: 2017-04-30 | End: 2017-05-07

## 2017-04-30 RX ORDER — OXYCODONE HYDROCHLORIDE 5 MG/1
10 TABLET ORAL EVERY 12 HOURS
Qty: 0 | Refills: 0 | Status: DISCONTINUED | OUTPATIENT
Start: 2017-04-30 | End: 2017-04-30

## 2017-04-30 RX ORDER — DOCUSATE SODIUM 100 MG
100 CAPSULE ORAL THREE TIMES A DAY
Qty: 0 | Refills: 0 | Status: DISCONTINUED | OUTPATIENT
Start: 2017-04-30 | End: 2017-05-03

## 2017-04-30 RX ORDER — ACETAMINOPHEN 500 MG
650 TABLET ORAL EVERY 6 HOURS
Qty: 0 | Refills: 0 | Status: DISCONTINUED | OUTPATIENT
Start: 2017-04-30 | End: 2017-05-03

## 2017-04-30 RX ADMIN — HYDROMORPHONE HYDROCHLORIDE 2 MILLIGRAM(S): 2 INJECTION INTRAMUSCULAR; INTRAVENOUS; SUBCUTANEOUS at 15:56

## 2017-04-30 RX ADMIN — Medication 100 MILLIGRAM(S): at 13:18

## 2017-04-30 RX ADMIN — OXYCODONE HYDROCHLORIDE 10 MILLIGRAM(S): 5 TABLET ORAL at 05:43

## 2017-04-30 RX ADMIN — OXYCODONE HYDROCHLORIDE 30 MILLIGRAM(S): 5 TABLET ORAL at 07:05

## 2017-04-30 RX ADMIN — OXYCODONE HYDROCHLORIDE 30 MILLIGRAM(S): 5 TABLET ORAL at 11:49

## 2017-04-30 RX ADMIN — HYDROMORPHONE HYDROCHLORIDE 2 MILLIGRAM(S): 2 INJECTION INTRAMUSCULAR; INTRAVENOUS; SUBCUTANEOUS at 05:41

## 2017-04-30 RX ADMIN — HYDROMORPHONE HYDROCHLORIDE 4 MILLIGRAM(S): 2 INJECTION INTRAMUSCULAR; INTRAVENOUS; SUBCUTANEOUS at 00:28

## 2017-04-30 RX ADMIN — HYDROMORPHONE HYDROCHLORIDE 4 MILLIGRAM(S): 2 INJECTION INTRAMUSCULAR; INTRAVENOUS; SUBCUTANEOUS at 00:50

## 2017-04-30 RX ADMIN — HEPARIN SODIUM 5000 UNIT(S): 5000 INJECTION INTRAVENOUS; SUBCUTANEOUS at 13:19

## 2017-04-30 RX ADMIN — HYDROMORPHONE HYDROCHLORIDE 1 MILLIGRAM(S): 2 INJECTION INTRAMUSCULAR; INTRAVENOUS; SUBCUTANEOUS at 23:27

## 2017-04-30 RX ADMIN — HYDROMORPHONE HYDROCHLORIDE 4 MILLIGRAM(S): 2 INJECTION INTRAMUSCULAR; INTRAVENOUS; SUBCUTANEOUS at 07:54

## 2017-04-30 RX ADMIN — HYDROMORPHONE HYDROCHLORIDE 4 MILLIGRAM(S): 2 INJECTION INTRAMUSCULAR; INTRAVENOUS; SUBCUTANEOUS at 14:33

## 2017-04-30 RX ADMIN — HYDROMORPHONE HYDROCHLORIDE 1 MILLIGRAM(S): 2 INJECTION INTRAMUSCULAR; INTRAVENOUS; SUBCUTANEOUS at 20:25

## 2017-04-30 RX ADMIN — HYDROMORPHONE HYDROCHLORIDE 2 MILLIGRAM(S): 2 INJECTION INTRAMUSCULAR; INTRAVENOUS; SUBCUTANEOUS at 18:59

## 2017-04-30 RX ADMIN — HYDROMORPHONE HYDROCHLORIDE 2 MILLIGRAM(S): 2 INJECTION INTRAMUSCULAR; INTRAVENOUS; SUBCUTANEOUS at 12:50

## 2017-04-30 RX ADMIN — HEPARIN SODIUM 5000 UNIT(S): 5000 INJECTION INTRAVENOUS; SUBCUTANEOUS at 05:43

## 2017-04-30 RX ADMIN — HEPARIN SODIUM 5000 UNIT(S): 5000 INJECTION INTRAVENOUS; SUBCUTANEOUS at 21:22

## 2017-04-30 RX ADMIN — Medication 1 PATCH: at 11:47

## 2017-04-30 RX ADMIN — HYDROMORPHONE HYDROCHLORIDE 4 MILLIGRAM(S): 2 INJECTION INTRAMUSCULAR; INTRAVENOUS; SUBCUTANEOUS at 08:54

## 2017-04-30 RX ADMIN — HYDROMORPHONE HYDROCHLORIDE 2 MILLIGRAM(S): 2 INJECTION INTRAMUSCULAR; INTRAVENOUS; SUBCUTANEOUS at 10:09

## 2017-04-30 RX ADMIN — HYDROMORPHONE HYDROCHLORIDE 2 MILLIGRAM(S): 2 INJECTION INTRAMUSCULAR; INTRAVENOUS; SUBCUTANEOUS at 02:20

## 2017-04-30 RX ADMIN — HYDROMORPHONE HYDROCHLORIDE 2 MILLIGRAM(S): 2 INJECTION INTRAMUSCULAR; INTRAVENOUS; SUBCUTANEOUS at 13:50

## 2017-04-30 RX ADMIN — Medication 1 PATCH: at 11:54

## 2017-04-30 RX ADMIN — Medication 100 MILLIGRAM(S): at 05:43

## 2017-04-30 RX ADMIN — HYDROMORPHONE HYDROCHLORIDE 2 MILLIGRAM(S): 2 INJECTION INTRAMUSCULAR; INTRAVENOUS; SUBCUTANEOUS at 06:00

## 2017-04-30 RX ADMIN — HYDROMORPHONE HYDROCHLORIDE 2 MILLIGRAM(S): 2 INJECTION INTRAMUSCULAR; INTRAVENOUS; SUBCUTANEOUS at 09:09

## 2017-04-30 RX ADMIN — OXYCODONE HYDROCHLORIDE 30 MILLIGRAM(S): 5 TABLET ORAL at 07:29

## 2017-04-30 RX ADMIN — Medication 100 MILLIGRAM(S): at 21:22

## 2017-04-30 RX ADMIN — HYDROMORPHONE HYDROCHLORIDE 2 MILLIGRAM(S): 2 INJECTION INTRAMUSCULAR; INTRAVENOUS; SUBCUTANEOUS at 01:47

## 2017-04-30 RX ADMIN — HYDROMORPHONE HYDROCHLORIDE 1 MILLIGRAM(S): 2 INJECTION INTRAMUSCULAR; INTRAVENOUS; SUBCUTANEOUS at 22:57

## 2017-04-30 RX ADMIN — HYDROMORPHONE HYDROCHLORIDE 1 MILLIGRAM(S): 2 INJECTION INTRAMUSCULAR; INTRAVENOUS; SUBCUTANEOUS at 20:55

## 2017-04-30 RX ADMIN — OXYCODONE HYDROCHLORIDE 10 MILLIGRAM(S): 5 TABLET ORAL at 06:00

## 2017-04-30 RX ADMIN — SODIUM CHLORIDE 3 MILLILITER(S): 9 INJECTION INTRAMUSCULAR; INTRAVENOUS; SUBCUTANEOUS at 05:09

## 2017-04-30 RX ADMIN — SODIUM CHLORIDE 3 MILLILITER(S): 9 INJECTION INTRAMUSCULAR; INTRAVENOUS; SUBCUTANEOUS at 13:16

## 2017-04-30 RX ADMIN — HYDROMORPHONE HYDROCHLORIDE 2 MILLIGRAM(S): 2 INJECTION INTRAMUSCULAR; INTRAVENOUS; SUBCUTANEOUS at 20:25

## 2017-04-30 RX ADMIN — Medication 5 MILLIGRAM(S): at 19:11

## 2017-04-30 NOTE — PROGRESS NOTE ADULT - ASSESSMENT
A/P: 51F with L periprosthetic femur fracture  Analgesia  DVT ppx  nwb LLE  PT/OT  DC planning, plan on transfer today

## 2017-04-30 NOTE — DISCHARGE NOTE ADULT - CARE PLAN
Principal Discharge DX:	Femur fracture, left  Goal:	Return to baseline ADLs  Instructions for follow-up, activity and diet:	1.	Pain Control  2.	Non-Weight Bearing  left lower Extremity  3.	DVT Prophylaxis  4.	PT as needed

## 2017-04-30 NOTE — DISCHARGE NOTE ADULT - PLAN OF CARE
Return to baseline ADLs 1.	Pain Control  2.	Non-Weight Bearing  left lower Extremity  3.	DVT Prophylaxis  4.	PT as needed

## 2017-04-30 NOTE — DISCHARGE NOTE ADULT - HOSPITAL COURSE
The patient is a 51F with left periprosthetic femur fracture. Patient admitted to surgical floor.  Patient was placed on heparin for anticoagulation.  All home medications were continued.  Daily labs were followed.  The patient was transferred in stable condition.

## 2017-04-30 NOTE — DISCHARGE NOTE ADULT - CARE PROVIDERS DIRECT ADDRESSES
[email or web address]

## 2017-04-30 NOTE — DISCHARGE NOTE ADULT - MEDICATION SUMMARY - MEDICATIONS TO TAKE
I will START or STAY ON the medications listed below when I get home from the hospital:    acetaminophen 325 mg oral tablet  -- 2 tab(s) by mouth every 6 hours, As needed, For Temp greater than 38 C (100.4 F)  -- Indication: For FEMUR FRACTURE    oxyCODONE 30 mg oral tablet  -- 1 tab(s) by mouth every 4 hours, As needed, Breakthrough pain  -- Indication: For FEMUR FRACTURE    oxyCODONE 10 mg oral tablet, extended release  -- 1 tab(s) by mouth every 12 hours  -- Indication: For FEMUR FRACTURE    HYDROmorphone 100 mg/50 mL-D5% intravenous solution  -- 1 milliliter(s) intravenous every 3 hours, As needed, Severe Pain (7 - 10)  -- Indication: For FEMUR FRACTURE    HYDROmorphone 4 mg oral tablet  -- 1 tab(s) by mouth every 4 hours, As needed, Severe Pain (7 - 10)  -- Indication: For FEMUR FRACTURE    heparin  -- 5000 unit(s) subcutaneous every 8 hours  -- Indication: For FEMUR FRACTURE    diazePAM 5 mg oral tablet  -- 1 tab(s) by mouth every 8 hours, As needed, muscle spasm  -- Indication: For FEMUR FRACTURE

## 2017-04-30 NOTE — DISCHARGE NOTE ADULT - CARE PROVIDER_API CALL
Kevin Calero), Orthopaedic Surgery  [address] [place] [address]  Phone: [phone]  Fax: [phone]

## 2017-04-30 NOTE — DISCHARGE NOTE ADULT - PATIENT PORTAL LINK FT
“You can access the FollowHealth Patient Portal, offered by [facility], by registering with the following website: [email or web address]”

## 2017-04-30 NOTE — PROGRESS NOTE ADULT - SUBJECTIVE AND OBJECTIVE BOX
Pt S&E. Pain controlled. No acute events overnight  AVSS  Gen: NAD  Left Lower Extremity:  Dsg c/d/i  SILT L2-S1  +EHL/FHL/TA/Gastroc  DP+  Soft compartments, - calf ttp Pt S&E. Pain controlled. No acute events overnight  AVSS  Gen: NAD  Left Lower Extremity:  skin intact  SILT L2-S1  +EHL/FHL/TA/Gastroc  DP+  Soft compartments, - calf ttp

## 2017-05-01 PROCEDURE — 73552 X-RAY EXAM OF FEMUR 2/>: CPT | Mod: 26,RT

## 2017-05-01 PROCEDURE — 73700 CT LOWER EXTREMITY W/O DYE: CPT | Mod: 26,50

## 2017-05-01 PROCEDURE — 99223 1ST HOSP IP/OBS HIGH 75: CPT

## 2017-05-01 PROCEDURE — 73590 X-RAY EXAM OF LOWER LEG: CPT | Mod: 26,LT

## 2017-05-01 PROCEDURE — 93010 ELECTROCARDIOGRAM REPORT: CPT

## 2017-05-01 RX ORDER — HYDROMORPHONE HYDROCHLORIDE 2 MG/ML
1 INJECTION INTRAMUSCULAR; INTRAVENOUS; SUBCUTANEOUS ONCE
Qty: 0 | Refills: 0 | Status: DISCONTINUED | OUTPATIENT
Start: 2017-05-01 | End: 2017-05-02

## 2017-05-01 RX ORDER — NICOTINE POLACRILEX 2 MG
1 GUM BUCCAL DAILY
Qty: 0 | Refills: 0 | Status: DISCONTINUED | OUTPATIENT
Start: 2017-05-01 | End: 2017-05-09

## 2017-05-01 RX ORDER — HYDROMORPHONE HYDROCHLORIDE 2 MG/ML
1 INJECTION INTRAMUSCULAR; INTRAVENOUS; SUBCUTANEOUS ONCE
Qty: 0 | Refills: 0 | Status: DISCONTINUED | OUTPATIENT
Start: 2017-05-01 | End: 2017-05-01

## 2017-05-01 RX ADMIN — HYDROMORPHONE HYDROCHLORIDE 4 MILLIGRAM(S): 2 INJECTION INTRAMUSCULAR; INTRAVENOUS; SUBCUTANEOUS at 14:34

## 2017-05-01 RX ADMIN — HYDROMORPHONE HYDROCHLORIDE 2 MILLIGRAM(S): 2 INJECTION INTRAMUSCULAR; INTRAVENOUS; SUBCUTANEOUS at 14:10

## 2017-05-01 RX ADMIN — HYDROMORPHONE HYDROCHLORIDE 4 MILLIGRAM(S): 2 INJECTION INTRAMUSCULAR; INTRAVENOUS; SUBCUTANEOUS at 11:45

## 2017-05-01 RX ADMIN — HYDROMORPHONE HYDROCHLORIDE 2 MILLIGRAM(S): 2 INJECTION INTRAMUSCULAR; INTRAVENOUS; SUBCUTANEOUS at 22:30

## 2017-05-01 RX ADMIN — HYDROMORPHONE HYDROCHLORIDE 2 MILLIGRAM(S): 2 INJECTION INTRAMUSCULAR; INTRAVENOUS; SUBCUTANEOUS at 03:43

## 2017-05-01 RX ADMIN — HYDROMORPHONE HYDROCHLORIDE 4 MILLIGRAM(S): 2 INJECTION INTRAMUSCULAR; INTRAVENOUS; SUBCUTANEOUS at 19:00

## 2017-05-01 RX ADMIN — HYDROMORPHONE HYDROCHLORIDE 4 MILLIGRAM(S): 2 INJECTION INTRAMUSCULAR; INTRAVENOUS; SUBCUTANEOUS at 02:27

## 2017-05-01 RX ADMIN — HEPARIN SODIUM 5000 UNIT(S): 5000 INJECTION INTRAVENOUS; SUBCUTANEOUS at 06:35

## 2017-05-01 RX ADMIN — HYDROMORPHONE HYDROCHLORIDE 1 MILLIGRAM(S): 2 INJECTION INTRAMUSCULAR; INTRAVENOUS; SUBCUTANEOUS at 16:16

## 2017-05-01 RX ADMIN — HYDROMORPHONE HYDROCHLORIDE 4 MILLIGRAM(S): 2 INJECTION INTRAMUSCULAR; INTRAVENOUS; SUBCUTANEOUS at 02:57

## 2017-05-01 RX ADMIN — HYDROMORPHONE HYDROCHLORIDE 4 MILLIGRAM(S): 2 INJECTION INTRAMUSCULAR; INTRAVENOUS; SUBCUTANEOUS at 10:58

## 2017-05-01 RX ADMIN — HEPARIN SODIUM 5000 UNIT(S): 5000 INJECTION INTRAVENOUS; SUBCUTANEOUS at 21:00

## 2017-05-01 RX ADMIN — Medication 100 MILLIGRAM(S): at 21:00

## 2017-05-01 RX ADMIN — HYDROMORPHONE HYDROCHLORIDE 2 MILLIGRAM(S): 2 INJECTION INTRAMUSCULAR; INTRAVENOUS; SUBCUTANEOUS at 04:13

## 2017-05-01 RX ADMIN — Medication 100 MILLIGRAM(S): at 13:59

## 2017-05-01 RX ADMIN — HYDROMORPHONE HYDROCHLORIDE 4 MILLIGRAM(S): 2 INJECTION INTRAMUSCULAR; INTRAVENOUS; SUBCUTANEOUS at 07:04

## 2017-05-01 RX ADMIN — HYDROMORPHONE HYDROCHLORIDE 4 MILLIGRAM(S): 2 INJECTION INTRAMUSCULAR; INTRAVENOUS; SUBCUTANEOUS at 20:00

## 2017-05-01 RX ADMIN — HYDROMORPHONE HYDROCHLORIDE 4 MILLIGRAM(S): 2 INJECTION INTRAMUSCULAR; INTRAVENOUS; SUBCUTANEOUS at 15:15

## 2017-05-01 RX ADMIN — HYDROMORPHONE HYDROCHLORIDE 2 MILLIGRAM(S): 2 INJECTION INTRAMUSCULAR; INTRAVENOUS; SUBCUTANEOUS at 13:57

## 2017-05-01 RX ADMIN — HEPARIN SODIUM 5000 UNIT(S): 5000 INJECTION INTRAVENOUS; SUBCUTANEOUS at 13:59

## 2017-05-01 RX ADMIN — HYDROMORPHONE HYDROCHLORIDE 4 MILLIGRAM(S): 2 INJECTION INTRAMUSCULAR; INTRAVENOUS; SUBCUTANEOUS at 06:34

## 2017-05-01 RX ADMIN — Medication 1 PATCH: at 11:31

## 2017-05-01 RX ADMIN — HYDROMORPHONE HYDROCHLORIDE 2 MILLIGRAM(S): 2 INJECTION INTRAMUSCULAR; INTRAVENOUS; SUBCUTANEOUS at 08:08

## 2017-05-01 RX ADMIN — Medication 5 MILLIGRAM(S): at 03:08

## 2017-05-01 RX ADMIN — HYDROMORPHONE HYDROCHLORIDE 2 MILLIGRAM(S): 2 INJECTION INTRAMUSCULAR; INTRAVENOUS; SUBCUTANEOUS at 08:26

## 2017-05-01 RX ADMIN — Medication 5 MILLIGRAM(S): at 19:00

## 2017-05-01 RX ADMIN — Medication 100 MILLIGRAM(S): at 06:34

## 2017-05-01 RX ADMIN — HYDROMORPHONE HYDROCHLORIDE 2 MILLIGRAM(S): 2 INJECTION INTRAMUSCULAR; INTRAVENOUS; SUBCUTANEOUS at 18:15

## 2017-05-01 RX ADMIN — HYDROMORPHONE HYDROCHLORIDE 1 MILLIGRAM(S): 2 INJECTION INTRAMUSCULAR; INTRAVENOUS; SUBCUTANEOUS at 16:01

## 2017-05-01 RX ADMIN — HYDROMORPHONE HYDROCHLORIDE 2 MILLIGRAM(S): 2 INJECTION INTRAMUSCULAR; INTRAVENOUS; SUBCUTANEOUS at 22:29

## 2017-05-01 NOTE — H&P ADULT. - ADDITIONAL PE
50 yo F in NAD, AAOx3.  LLE: operative incisions well healed, c/d/i, SILT DP/SP/S, motor intact TA/GS/EHL, foot WWP, skin intact.  Deformity to thigh, pain with hip/knee ROM
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
## 2017-05-02 PROBLEM — Z00.00 ENCOUNTER FOR PREVENTIVE HEALTH EXAMINATION: Status: ACTIVE | Noted: 2017-05-02

## 2017-05-02 LAB
APTT BLD: 32 SEC — SIGNIFICANT CHANGE UP (ref 27.5–37.4)
BLD GP AB SCN SERPL QL: NEGATIVE — SIGNIFICANT CHANGE UP
BUN SERPL-MCNC: 17 MG/DL — SIGNIFICANT CHANGE UP (ref 7–23)
CALCIUM SERPL-MCNC: 10.2 MG/DL — SIGNIFICANT CHANGE UP (ref 8.4–10.5)
CHLORIDE SERPL-SCNC: 100 MMOL/L — SIGNIFICANT CHANGE UP (ref 98–107)
CO2 SERPL-SCNC: 22 MMOL/L — SIGNIFICANT CHANGE UP (ref 22–31)
CREAT SERPL-MCNC: 0.74 MG/DL — SIGNIFICANT CHANGE UP (ref 0.5–1.3)
GLUCOSE SERPL-MCNC: 95 MG/DL — SIGNIFICANT CHANGE UP (ref 70–99)
HCT VFR BLD CALC: 48.1 % — HIGH (ref 34.5–45)
HGB BLD-MCNC: 15.6 G/DL — HIGH (ref 11.5–15.5)
INR BLD: 0.99 — SIGNIFICANT CHANGE UP (ref 0.88–1.17)
MCHC RBC-ENTMCNC: 30.4 PG — SIGNIFICANT CHANGE UP (ref 27–34)
MCHC RBC-ENTMCNC: 32.4 % — SIGNIFICANT CHANGE UP (ref 32–36)
MCV RBC AUTO: 93.8 FL — SIGNIFICANT CHANGE UP (ref 80–100)
PLATELET # BLD AUTO: 226 K/UL — SIGNIFICANT CHANGE UP (ref 150–400)
PMV BLD: 11.6 FL — SIGNIFICANT CHANGE UP (ref 7–13)
POTASSIUM SERPL-MCNC: 4.1 MMOL/L — SIGNIFICANT CHANGE UP (ref 3.5–5.3)
POTASSIUM SERPL-SCNC: 4.1 MMOL/L — SIGNIFICANT CHANGE UP (ref 3.5–5.3)
PROTHROM AB SERPL-ACNC: 11.1 SEC — SIGNIFICANT CHANGE UP (ref 9.8–13.1)
RBC # BLD: 5.13 M/UL — SIGNIFICANT CHANGE UP (ref 3.8–5.2)
RBC # FLD: 14.7 % — HIGH (ref 10.3–14.5)
RH IG SCN BLD-IMP: NEGATIVE — SIGNIFICANT CHANGE UP
SODIUM SERPL-SCNC: 140 MMOL/L — SIGNIFICANT CHANGE UP (ref 135–145)
WBC # BLD: 9.55 K/UL — SIGNIFICANT CHANGE UP (ref 3.8–10.5)
WBC # FLD AUTO: 9.55 K/UL — SIGNIFICANT CHANGE UP (ref 3.8–10.5)

## 2017-05-02 PROCEDURE — 99233 SBSQ HOSP IP/OBS HIGH 50: CPT

## 2017-05-02 RX ORDER — SODIUM CHLORIDE 9 MG/ML
1000 INJECTION, SOLUTION INTRAVENOUS
Qty: 0 | Refills: 0 | Status: DISCONTINUED | OUTPATIENT
Start: 2017-05-02 | End: 2017-05-03

## 2017-05-02 RX ORDER — CEFTRIAXONE 500 MG/1
1 INJECTION, POWDER, FOR SOLUTION INTRAMUSCULAR; INTRAVENOUS EVERY 24 HOURS
Qty: 0 | Refills: 0 | Status: DISCONTINUED | OUTPATIENT
Start: 2017-05-02 | End: 2017-05-06

## 2017-05-02 RX ORDER — OXYCODONE HYDROCHLORIDE 5 MG/1
10 TABLET ORAL EVERY 4 HOURS
Qty: 0 | Refills: 0 | Status: DISCONTINUED | OUTPATIENT
Start: 2017-05-02 | End: 2017-05-04

## 2017-05-02 RX ORDER — HEPARIN SODIUM 5000 [USP'U]/ML
5000 INJECTION INTRAVENOUS; SUBCUTANEOUS EVERY 8 HOURS
Qty: 0 | Refills: 0 | Status: COMPLETED | OUTPATIENT
Start: 2017-05-02 | End: 2017-05-02

## 2017-05-02 RX ORDER — OXYCODONE HYDROCHLORIDE 5 MG/1
60 TABLET ORAL EVERY 8 HOURS
Qty: 0 | Refills: 0 | Status: DISCONTINUED | OUTPATIENT
Start: 2017-05-02 | End: 2017-05-05

## 2017-05-02 RX ORDER — OXYCODONE HYDROCHLORIDE 5 MG/1
30 TABLET ORAL EVERY 4 HOURS
Qty: 0 | Refills: 0 | Status: DISCONTINUED | OUTPATIENT
Start: 2017-05-02 | End: 2017-05-04

## 2017-05-02 RX ORDER — PHENAZOPYRIDINE HCL 100 MG
100 TABLET ORAL EVERY 8 HOURS
Qty: 0 | Refills: 0 | Status: DISCONTINUED | OUTPATIENT
Start: 2017-05-02 | End: 2017-05-02

## 2017-05-02 RX ORDER — GABAPENTIN 400 MG/1
300 CAPSULE ORAL EVERY 8 HOURS
Qty: 0 | Refills: 0 | Status: DISCONTINUED | OUTPATIENT
Start: 2017-05-02 | End: 2017-05-09

## 2017-05-02 RX ORDER — PHENAZOPYRIDINE HCL 100 MG
100 TABLET ORAL EVERY 8 HOURS
Qty: 0 | Refills: 0 | Status: COMPLETED | OUTPATIENT
Start: 2017-05-02 | End: 2017-05-05

## 2017-05-02 RX ADMIN — Medication 100 MILLIGRAM(S): at 06:12

## 2017-05-02 RX ADMIN — GABAPENTIN 300 MILLIGRAM(S): 400 CAPSULE ORAL at 21:05

## 2017-05-02 RX ADMIN — HYDROMORPHONE HYDROCHLORIDE 4 MILLIGRAM(S): 2 INJECTION INTRAMUSCULAR; INTRAVENOUS; SUBCUTANEOUS at 16:35

## 2017-05-02 RX ADMIN — OXYCODONE HYDROCHLORIDE 60 MILLIGRAM(S): 5 TABLET ORAL at 18:07

## 2017-05-02 RX ADMIN — HYDROMORPHONE HYDROCHLORIDE 2 MILLIGRAM(S): 2 INJECTION INTRAMUSCULAR; INTRAVENOUS; SUBCUTANEOUS at 06:31

## 2017-05-02 RX ADMIN — HYDROMORPHONE HYDROCHLORIDE 2 MILLIGRAM(S): 2 INJECTION INTRAMUSCULAR; INTRAVENOUS; SUBCUTANEOUS at 02:39

## 2017-05-02 RX ADMIN — HYDROMORPHONE HYDROCHLORIDE 2 MILLIGRAM(S): 2 INJECTION INTRAMUSCULAR; INTRAVENOUS; SUBCUTANEOUS at 14:50

## 2017-05-02 RX ADMIN — HYDROMORPHONE HYDROCHLORIDE 2 MILLIGRAM(S): 2 INJECTION INTRAMUSCULAR; INTRAVENOUS; SUBCUTANEOUS at 14:27

## 2017-05-02 RX ADMIN — OXYCODONE HYDROCHLORIDE 30 MILLIGRAM(S): 5 TABLET ORAL at 22:06

## 2017-05-02 RX ADMIN — HYDROMORPHONE HYDROCHLORIDE 4 MILLIGRAM(S): 2 INJECTION INTRAMUSCULAR; INTRAVENOUS; SUBCUTANEOUS at 04:19

## 2017-05-02 RX ADMIN — Medication 1 PATCH: at 12:33

## 2017-05-02 RX ADMIN — HYDROMORPHONE HYDROCHLORIDE 4 MILLIGRAM(S): 2 INJECTION INTRAMUSCULAR; INTRAVENOUS; SUBCUTANEOUS at 09:43

## 2017-05-02 RX ADMIN — OXYCODONE HYDROCHLORIDE 30 MILLIGRAM(S): 5 TABLET ORAL at 23:00

## 2017-05-02 RX ADMIN — HYDROMORPHONE HYDROCHLORIDE 4 MILLIGRAM(S): 2 INJECTION INTRAMUSCULAR; INTRAVENOUS; SUBCUTANEOUS at 13:19

## 2017-05-02 RX ADMIN — HYDROMORPHONE HYDROCHLORIDE 1 MILLIGRAM(S): 2 INJECTION INTRAMUSCULAR; INTRAVENOUS; SUBCUTANEOUS at 00:13

## 2017-05-02 RX ADMIN — HYDROMORPHONE HYDROCHLORIDE 4 MILLIGRAM(S): 2 INJECTION INTRAMUSCULAR; INTRAVENOUS; SUBCUTANEOUS at 08:29

## 2017-05-02 RX ADMIN — HYDROMORPHONE HYDROCHLORIDE 2 MILLIGRAM(S): 2 INJECTION INTRAMUSCULAR; INTRAVENOUS; SUBCUTANEOUS at 11:23

## 2017-05-02 RX ADMIN — HEPARIN SODIUM 5000 UNIT(S): 5000 INJECTION INTRAVENOUS; SUBCUTANEOUS at 21:05

## 2017-05-02 RX ADMIN — OXYCODONE HYDROCHLORIDE 30 MILLIGRAM(S): 5 TABLET ORAL at 18:06

## 2017-05-02 RX ADMIN — CEFTRIAXONE 100 GRAM(S): 500 INJECTION, POWDER, FOR SOLUTION INTRAMUSCULAR; INTRAVENOUS at 19:35

## 2017-05-02 RX ADMIN — Medication 100 MILLIGRAM(S): at 22:00

## 2017-05-02 RX ADMIN — HYDROMORPHONE HYDROCHLORIDE 2 MILLIGRAM(S): 2 INJECTION INTRAMUSCULAR; INTRAVENOUS; SUBCUTANEOUS at 10:26

## 2017-05-02 RX ADMIN — HEPARIN SODIUM 5000 UNIT(S): 5000 INJECTION INTRAVENOUS; SUBCUTANEOUS at 14:13

## 2017-05-02 RX ADMIN — Medication 100 MILLIGRAM(S): at 21:05

## 2017-05-02 RX ADMIN — Medication 5 MILLIGRAM(S): at 12:39

## 2017-05-02 RX ADMIN — HEPARIN SODIUM 5000 UNIT(S): 5000 INJECTION INTRAVENOUS; SUBCUTANEOUS at 06:11

## 2017-05-02 RX ADMIN — HYDROMORPHONE HYDROCHLORIDE 4 MILLIGRAM(S): 2 INJECTION INTRAMUSCULAR; INTRAVENOUS; SUBCUTANEOUS at 04:20

## 2017-05-02 RX ADMIN — Medication 100 MILLIGRAM(S): at 14:12

## 2017-05-02 RX ADMIN — HYDROMORPHONE HYDROCHLORIDE 4 MILLIGRAM(S): 2 INJECTION INTRAMUSCULAR; INTRAVENOUS; SUBCUTANEOUS at 12:32

## 2017-05-02 RX ADMIN — Medication 1 PATCH: at 12:32

## 2017-05-02 RX ADMIN — OXYCODONE HYDROCHLORIDE 30 MILLIGRAM(S): 5 TABLET ORAL at 19:25

## 2017-05-02 RX ADMIN — HYDROMORPHONE HYDROCHLORIDE 4 MILLIGRAM(S): 2 INJECTION INTRAMUSCULAR; INTRAVENOUS; SUBCUTANEOUS at 17:31

## 2017-05-02 NOTE — PROVIDER CONTACT NOTE (OTHER) - ASSESSMENT
Pt was given dilaudid 4 mg PO @ 1635 for severe pain, states that pain cont to be severe. Dilaudid d/c'd as per Pain Service, Oxycodone IR 30 mg PRN and Oxycodone ER 60 mg Q8H ordered. Okay to give Oxy IR now? How long after giving Oxy IR can Oxy ER be given? VSS.

## 2017-05-02 NOTE — PRE-OP CHECKLIST - SELECT TESTS ORDERED
BMP/INR/CBC/PT/PTT/Type and Screen INR/Type and Screen/CBC/BMP/PT/PTT/Urinalysis EKG/Type and Screen/CBC/BMP/Urinalysis/INR/PT/PTT

## 2017-05-03 ENCOUNTER — APPOINTMENT (OUTPATIENT)
Dept: ORTHOPEDIC SURGERY | Facility: HOSPITAL | Age: 52
End: 2017-05-03

## 2017-05-03 ENCOUNTER — RESULT REVIEW (OUTPATIENT)
Age: 52
End: 2017-05-03

## 2017-05-03 DIAGNOSIS — M97.02XA PERIPROSTHETIC FRACTURE AROUND INTERNAL PROSTHETIC LEFT HIP JOINT, INITIAL ENCOUNTER: ICD-10-CM

## 2017-05-03 DIAGNOSIS — F17.210 NICOTINE DEPENDENCE, CIGARETTES, UNCOMPLICATED: ICD-10-CM

## 2017-05-03 DIAGNOSIS — M25.552 PAIN IN LEFT HIP: ICD-10-CM

## 2017-05-03 LAB
APTT BLD: 26.7 SEC — LOW (ref 27.5–37.4)
APTT BLD: 27.9 SEC — SIGNIFICANT CHANGE UP (ref 27.5–37.4)
BACTERIA UR CULT: SIGNIFICANT CHANGE UP
BASE EXCESS BLDA CALC-SCNC: 0.8 MMOL/L — SIGNIFICANT CHANGE UP
BODY FLUID TYPE: SIGNIFICANT CHANGE UP
BUN SERPL-MCNC: 14 MG/DL — SIGNIFICANT CHANGE UP (ref 7–23)
BUN SERPL-MCNC: 18 MG/DL — SIGNIFICANT CHANGE UP (ref 7–23)
CA-I BLDA-SCNC: 1.2 MMOL/L — SIGNIFICANT CHANGE UP (ref 1.15–1.29)
CALCIUM SERPL-MCNC: 8.6 MG/DL — SIGNIFICANT CHANGE UP (ref 8.4–10.5)
CALCIUM SERPL-MCNC: 9.5 MG/DL — SIGNIFICANT CHANGE UP (ref 8.4–10.5)
CHLORIDE SERPL-SCNC: 102 MMOL/L — SIGNIFICANT CHANGE UP (ref 98–107)
CHLORIDE SERPL-SCNC: 110 MMOL/L — HIGH (ref 98–107)
CLARITY SPEC: SIGNIFICANT CHANGE UP
CO2 SERPL-SCNC: 24 MMOL/L — SIGNIFICANT CHANGE UP (ref 22–31)
CO2 SERPL-SCNC: 27 MMOL/L — SIGNIFICANT CHANGE UP (ref 22–31)
COLOR FLD: SIGNIFICANT CHANGE UP
CREAT SERPL-MCNC: 0.73 MG/DL — SIGNIFICANT CHANGE UP (ref 0.5–1.3)
CREAT SERPL-MCNC: 0.89 MG/DL — SIGNIFICANT CHANGE UP (ref 0.5–1.3)
CRYSTALS FLD MICRO: SIGNIFICANT CHANGE UP
GLUCOSE BLDA-MCNC: 98 MG/DL — SIGNIFICANT CHANGE UP (ref 70–99)
GLUCOSE SERPL-MCNC: 104 MG/DL — HIGH (ref 70–99)
GLUCOSE SERPL-MCNC: 108 MG/DL — HIGH (ref 70–99)
GRAM STN WND: SIGNIFICANT CHANGE UP
GRAM STN WND: SIGNIFICANT CHANGE UP
HCO3 BLDA-SCNC: 25 MMOL/L — SIGNIFICANT CHANGE UP (ref 22–26)
HCT VFR BLD CALC: 33.6 % — LOW (ref 34.5–45)
HCT VFR BLD CALC: 45 % — SIGNIFICANT CHANGE UP (ref 34.5–45)
HCT VFR BLDA CALC: 38 % — SIGNIFICANT CHANGE UP (ref 34.5–46.5)
HGB BLD-MCNC: 10.8 G/DL — LOW (ref 11.5–15.5)
HGB BLD-MCNC: 14.4 G/DL — SIGNIFICANT CHANGE UP (ref 11.5–15.5)
HGB BLDA-MCNC: 12.4 G/DL — SIGNIFICANT CHANGE UP (ref 11.5–15.5)
INR BLD: 0.97 — SIGNIFICANT CHANGE UP (ref 0.88–1.17)
INR BLD: 1.13 — SIGNIFICANT CHANGE UP (ref 0.88–1.17)
LYMPHOCYTES NFR FLD: 83 % — SIGNIFICANT CHANGE UP
MAGNESIUM SERPL-MCNC: 1.4 MG/DL — LOW (ref 1.6–2.6)
MCHC RBC-ENTMCNC: 30.4 PG — SIGNIFICANT CHANGE UP (ref 27–34)
MCHC RBC-ENTMCNC: 30.4 PG — SIGNIFICANT CHANGE UP (ref 27–34)
MCHC RBC-ENTMCNC: 32 % — SIGNIFICANT CHANGE UP (ref 32–36)
MCHC RBC-ENTMCNC: 32.1 % — SIGNIFICANT CHANGE UP (ref 32–36)
MCV RBC AUTO: 94.6 FL — SIGNIFICANT CHANGE UP (ref 80–100)
MCV RBC AUTO: 94.9 FL — SIGNIFICANT CHANGE UP (ref 80–100)
MONOCYTES # FLD: 5 % — SIGNIFICANT CHANGE UP
NEUTS SEG NFR FLD MANUAL: 12 % — SIGNIFICANT CHANGE UP
PCO2 BLDA: 45 MMHG — SIGNIFICANT CHANGE UP (ref 32–48)
PH BLDA: 7.38 PH — SIGNIFICANT CHANGE UP (ref 7.35–7.45)
PLATELET # BLD AUTO: 201 K/UL — SIGNIFICANT CHANGE UP (ref 150–400)
PLATELET # BLD AUTO: 217 K/UL — SIGNIFICANT CHANGE UP (ref 150–400)
PMV BLD: 11.7 FL — SIGNIFICANT CHANGE UP (ref 7–13)
PMV BLD: 12 FL — SIGNIFICANT CHANGE UP (ref 7–13)
PO2 BLDA: 247 MMHG — HIGH (ref 83–108)
POTASSIUM BLDA-SCNC: 3.6 MMOL/L — SIGNIFICANT CHANGE UP (ref 3.4–4.5)
POTASSIUM SERPL-MCNC: 3.8 MMOL/L — SIGNIFICANT CHANGE UP (ref 3.5–5.3)
POTASSIUM SERPL-MCNC: 3.8 MMOL/L — SIGNIFICANT CHANGE UP (ref 3.5–5.3)
POTASSIUM SERPL-SCNC: 3.8 MMOL/L — SIGNIFICANT CHANGE UP (ref 3.5–5.3)
POTASSIUM SERPL-SCNC: 3.8 MMOL/L — SIGNIFICANT CHANGE UP (ref 3.5–5.3)
PROTHROM AB SERPL-ACNC: 10.9 SEC — SIGNIFICANT CHANGE UP (ref 9.8–13.1)
PROTHROM AB SERPL-ACNC: 12.7 SEC — SIGNIFICANT CHANGE UP (ref 9.8–13.1)
RBC # BLD: 3.55 M/UL — LOW (ref 3.8–5.2)
RBC # BLD: 4.74 M/UL — SIGNIFICANT CHANGE UP (ref 3.8–5.2)
RBC # FLD: 15 % — HIGH (ref 10.3–14.5)
RBC # FLD: 15.1 % — HIGH (ref 10.3–14.5)
RCV VOL RI: HIGH CELL/UL (ref 0–5)
SAO2 % BLDA: 99.4 % — HIGH (ref 95–99)
SODIUM BLDA-SCNC: 138 MMOL/L — SIGNIFICANT CHANGE UP (ref 136–146)
SODIUM SERPL-SCNC: 142 MMOL/L — SIGNIFICANT CHANGE UP (ref 135–145)
SODIUM SERPL-SCNC: 145 MMOL/L — SIGNIFICANT CHANGE UP (ref 135–145)
SPECIMEN SOURCE: SIGNIFICANT CHANGE UP
TOTAL CELLS COUNTED, BODY FLUID: 100 CELLS — SIGNIFICANT CHANGE UP
TOTAL NUCLEATED CELL COUNT, BODY FLUID: 314 CELL/UL — HIGH (ref 0–5)
WBC # BLD: 13.09 K/UL — HIGH (ref 3.8–10.5)
WBC # BLD: 8.94 K/UL — SIGNIFICANT CHANGE UP (ref 3.8–10.5)
WBC # FLD AUTO: 13.09 K/UL — HIGH (ref 3.8–10.5)
WBC # FLD AUTO: 8.94 K/UL — SIGNIFICANT CHANGE UP (ref 3.8–10.5)

## 2017-05-03 PROCEDURE — 88305 TISSUE EXAM BY PATHOLOGIST: CPT | Mod: 26

## 2017-05-03 PROCEDURE — 64708 REVISE ARM/LEG NERVE: CPT | Mod: 59

## 2017-05-03 PROCEDURE — 76000 FLUOROSCOPY <1 HR PHYS/QHP: CPT | Mod: 26

## 2017-05-03 PROCEDURE — 97605 NEG PRS WND THER DME<=50SQCM: CPT | Mod: 59

## 2017-05-03 PROCEDURE — 72170 X-RAY EXAM OF PELVIS: CPT | Mod: 26

## 2017-05-03 PROCEDURE — 73551 X-RAY EXAM OF FEMUR 1: CPT | Mod: 26,LT

## 2017-05-03 PROCEDURE — 73590 X-RAY EXAM OF LOWER LEG: CPT | Mod: 26,LT

## 2017-05-03 PROCEDURE — 27052 BIOPSY OF HIP JOINT: CPT | Mod: LT

## 2017-05-03 PROCEDURE — 88311 DECALCIFY TISSUE: CPT | Mod: 26

## 2017-05-03 PROCEDURE — 27365 RESECT FEMUR/KNEE TUMOR: CPT | Mod: LT

## 2017-05-03 PROCEDURE — 88331 PATH CONSLTJ SURG 1 BLK 1SPC: CPT | Mod: 26

## 2017-05-03 PROCEDURE — 27447 TOTAL KNEE ARTHROPLASTY: CPT | Mod: 22,LT

## 2017-05-03 PROCEDURE — 88304 TISSUE EXAM BY PATHOLOGIST: CPT | Mod: 26

## 2017-05-03 RX ORDER — CEFAZOLIN SODIUM 1 G
2000 VIAL (EA) INJECTION EVERY 8 HOURS
Qty: 0 | Refills: 0 | Status: COMPLETED | OUTPATIENT
Start: 2017-05-03 | End: 2017-05-04

## 2017-05-03 RX ORDER — SODIUM CHLORIDE 9 MG/ML
1000 INJECTION, SOLUTION INTRAVENOUS ONCE
Qty: 0 | Refills: 0 | Status: COMPLETED | OUTPATIENT
Start: 2017-05-03 | End: 2017-05-03

## 2017-05-03 RX ORDER — ACETAMINOPHEN 500 MG
650 TABLET ORAL EVERY 6 HOURS
Qty: 0 | Refills: 0 | Status: COMPLETED | OUTPATIENT
Start: 2017-05-03 | End: 2017-05-06

## 2017-05-03 RX ORDER — ACETAMINOPHEN 500 MG
1000 TABLET ORAL ONCE
Qty: 0 | Refills: 0 | Status: COMPLETED | OUTPATIENT
Start: 2017-05-03 | End: 2017-05-03

## 2017-05-03 RX ORDER — MIDAZOLAM HYDROCHLORIDE 1 MG/ML
1 INJECTION, SOLUTION INTRAMUSCULAR; INTRAVENOUS ONCE
Qty: 0 | Refills: 0 | Status: DISCONTINUED | OUTPATIENT
Start: 2017-05-03 | End: 2017-05-03

## 2017-05-03 RX ORDER — ACETAMINOPHEN 500 MG
650 TABLET ORAL EVERY 6 HOURS
Qty: 0 | Refills: 0 | Status: DISCONTINUED | OUTPATIENT
Start: 2017-05-03 | End: 2017-05-05

## 2017-05-03 RX ORDER — DOCUSATE SODIUM 100 MG
100 CAPSULE ORAL THREE TIMES A DAY
Qty: 0 | Refills: 0 | Status: DISCONTINUED | OUTPATIENT
Start: 2017-05-03 | End: 2017-05-09

## 2017-05-03 RX ORDER — ASCORBIC ACID 60 MG
500 TABLET,CHEWABLE ORAL DAILY
Qty: 0 | Refills: 0 | Status: DISCONTINUED | OUTPATIENT
Start: 2017-05-03 | End: 2017-05-09

## 2017-05-03 RX ORDER — HYDROMORPHONE HYDROCHLORIDE 2 MG/ML
2 INJECTION INTRAMUSCULAR; INTRAVENOUS; SUBCUTANEOUS ONCE
Qty: 0 | Refills: 0 | Status: DISCONTINUED | OUTPATIENT
Start: 2017-05-03 | End: 2017-05-03

## 2017-05-03 RX ORDER — PANTOPRAZOLE SODIUM 20 MG/1
40 TABLET, DELAYED RELEASE ORAL DAILY
Qty: 0 | Refills: 0 | Status: DISCONTINUED | OUTPATIENT
Start: 2017-05-03 | End: 2017-05-09

## 2017-05-03 RX ORDER — SENNA PLUS 8.6 MG/1
2 TABLET ORAL AT BEDTIME
Qty: 0 | Refills: 0 | Status: DISCONTINUED | OUTPATIENT
Start: 2017-05-03 | End: 2017-05-09

## 2017-05-03 RX ORDER — SODIUM CHLORIDE 9 MG/ML
1000 INJECTION, SOLUTION INTRAVENOUS
Qty: 0 | Refills: 0 | Status: DISCONTINUED | OUTPATIENT
Start: 2017-05-03 | End: 2017-05-05

## 2017-05-03 RX ORDER — HYDROMORPHONE HYDROCHLORIDE 2 MG/ML
1 INJECTION INTRAMUSCULAR; INTRAVENOUS; SUBCUTANEOUS ONCE
Qty: 0 | Refills: 0 | Status: DISCONTINUED | OUTPATIENT
Start: 2017-05-03 | End: 2017-05-03

## 2017-05-03 RX ORDER — NALOXONE HYDROCHLORIDE 4 MG/.1ML
0.1 SPRAY NASAL
Qty: 0 | Refills: 0 | Status: DISCONTINUED | OUTPATIENT
Start: 2017-05-03 | End: 2017-05-09

## 2017-05-03 RX ORDER — ZINC GLUCONATE 30 MG
50 TABLET ORAL DAILY
Qty: 0 | Refills: 0 | Status: DISCONTINUED | OUTPATIENT
Start: 2017-05-03 | End: 2017-05-09

## 2017-05-03 RX ORDER — ONDANSETRON 8 MG/1
4 TABLET, FILM COATED ORAL EVERY 6 HOURS
Qty: 0 | Refills: 0 | Status: DISCONTINUED | OUTPATIENT
Start: 2017-05-03 | End: 2017-05-09

## 2017-05-03 RX ORDER — ONDANSETRON 8 MG/1
4 TABLET, FILM COATED ORAL EVERY 6 HOURS
Qty: 0 | Refills: 0 | Status: DISCONTINUED | OUTPATIENT
Start: 2017-05-03 | End: 2017-05-03

## 2017-05-03 RX ORDER — OXYCODONE HYDROCHLORIDE 5 MG/1
15 TABLET ORAL ONCE
Qty: 0 | Refills: 0 | Status: DISCONTINUED | OUTPATIENT
Start: 2017-05-03 | End: 2017-05-03

## 2017-05-03 RX ORDER — KETOROLAC TROMETHAMINE 30 MG/ML
15 SYRINGE (ML) INJECTION EVERY 8 HOURS
Qty: 0 | Refills: 0 | Status: DISCONTINUED | OUTPATIENT
Start: 2017-05-03 | End: 2017-05-04

## 2017-05-03 RX ORDER — CELECOXIB 200 MG/1
200 CAPSULE ORAL
Qty: 0 | Refills: 0 | Status: DISCONTINUED | OUTPATIENT
Start: 2017-05-05 | End: 2017-05-09

## 2017-05-03 RX ADMIN — HYDROMORPHONE HYDROCHLORIDE 1 MILLIGRAM(S): 2 INJECTION INTRAMUSCULAR; INTRAVENOUS; SUBCUTANEOUS at 20:30

## 2017-05-03 RX ADMIN — HYDROMORPHONE HYDROCHLORIDE 2 MILLIGRAM(S): 2 INJECTION INTRAMUSCULAR; INTRAVENOUS; SUBCUTANEOUS at 18:25

## 2017-05-03 RX ADMIN — CEFTRIAXONE 100 GRAM(S): 500 INJECTION, POWDER, FOR SOLUTION INTRAMUSCULAR; INTRAVENOUS at 20:20

## 2017-05-03 RX ADMIN — Medication 1000 MILLIGRAM(S): at 18:45

## 2017-05-03 RX ADMIN — Medication 650 MILLIGRAM(S): at 23:38

## 2017-05-03 RX ADMIN — Medication 100 MILLIGRAM(S): at 06:04

## 2017-05-03 RX ADMIN — OXYCODONE HYDROCHLORIDE 30 MILLIGRAM(S): 5 TABLET ORAL at 22:34

## 2017-05-03 RX ADMIN — HYDROMORPHONE HYDROCHLORIDE 2 MILLIGRAM(S): 2 INJECTION INTRAMUSCULAR; INTRAVENOUS; SUBCUTANEOUS at 18:40

## 2017-05-03 RX ADMIN — Medication 100 MILLIGRAM(S): at 20:54

## 2017-05-03 RX ADMIN — OXYCODONE HYDROCHLORIDE 30 MILLIGRAM(S): 5 TABLET ORAL at 03:06

## 2017-05-03 RX ADMIN — OXYCODONE HYDROCHLORIDE 30 MILLIGRAM(S): 5 TABLET ORAL at 02:54

## 2017-05-03 RX ADMIN — Medication 5 MILLIGRAM(S): at 03:14

## 2017-05-03 RX ADMIN — SODIUM CHLORIDE 2000 MILLILITER(S): 9 INJECTION, SOLUTION INTRAVENOUS at 19:15

## 2017-05-03 RX ADMIN — OXYCODONE HYDROCHLORIDE 60 MILLIGRAM(S): 5 TABLET ORAL at 02:08

## 2017-05-03 RX ADMIN — Medication 400 MILLIGRAM(S): at 18:30

## 2017-05-03 RX ADMIN — OXYCODONE HYDROCHLORIDE 60 MILLIGRAM(S): 5 TABLET ORAL at 19:52

## 2017-05-03 RX ADMIN — SODIUM CHLORIDE 75 MILLILITER(S): 9 INJECTION, SOLUTION INTRAVENOUS at 01:16

## 2017-05-03 RX ADMIN — Medication 15 MILLIGRAM(S): at 21:00

## 2017-05-03 RX ADMIN — OXYCODONE HYDROCHLORIDE 30 MILLIGRAM(S): 5 TABLET ORAL at 07:21

## 2017-05-03 RX ADMIN — OXYCODONE HYDROCHLORIDE 60 MILLIGRAM(S): 5 TABLET ORAL at 20:23

## 2017-05-03 RX ADMIN — GABAPENTIN 300 MILLIGRAM(S): 400 CAPSULE ORAL at 06:04

## 2017-05-03 RX ADMIN — MIDAZOLAM HYDROCHLORIDE 1 MILLIGRAM(S): 1 INJECTION, SOLUTION INTRAMUSCULAR; INTRAVENOUS at 20:15

## 2017-05-03 RX ADMIN — SODIUM CHLORIDE 75 MILLILITER(S): 9 INJECTION, SOLUTION INTRAVENOUS at 07:32

## 2017-05-03 RX ADMIN — OXYCODONE HYDROCHLORIDE 30 MILLIGRAM(S): 5 TABLET ORAL at 23:30

## 2017-05-03 RX ADMIN — Medication 400 MILLIGRAM(S): at 06:04

## 2017-05-03 RX ADMIN — HYDROMORPHONE HYDROCHLORIDE 1 MILLIGRAM(S): 2 INJECTION INTRAMUSCULAR; INTRAVENOUS; SUBCUTANEOUS at 20:45

## 2017-05-03 RX ADMIN — GABAPENTIN 300 MILLIGRAM(S): 400 CAPSULE ORAL at 20:54

## 2017-05-03 RX ADMIN — SODIUM CHLORIDE 100 MILLILITER(S): 9 INJECTION, SOLUTION INTRAVENOUS at 17:05

## 2017-05-04 LAB
APTT BLD: 27.1 SEC — LOW (ref 27.5–37.4)
BUN SERPL-MCNC: 12 MG/DL — SIGNIFICANT CHANGE UP (ref 7–23)
CALCIUM SERPL-MCNC: 8.7 MG/DL — SIGNIFICANT CHANGE UP (ref 8.4–10.5)
CHLORIDE SERPL-SCNC: 107 MMOL/L — SIGNIFICANT CHANGE UP (ref 98–107)
CO2 SERPL-SCNC: 28 MMOL/L — SIGNIFICANT CHANGE UP (ref 22–31)
CREAT SERPL-MCNC: 0.9 MG/DL — SIGNIFICANT CHANGE UP (ref 0.5–1.3)
GLUCOSE SERPL-MCNC: 90 MG/DL — SIGNIFICANT CHANGE UP (ref 70–99)
HCT VFR BLD CALC: 29.5 % — LOW (ref 34.5–45)
HCT VFR BLD CALC: 31.6 % — LOW (ref 34.5–45)
HGB BLD-MCNC: 10 G/DL — LOW (ref 11.5–15.5)
HGB BLD-MCNC: 9.6 G/DL — LOW (ref 11.5–15.5)
INR BLD: 1.17 — SIGNIFICANT CHANGE UP (ref 0.88–1.17)
INR BLD: 1.17 — SIGNIFICANT CHANGE UP (ref 0.88–1.17)
MCHC RBC-ENTMCNC: 30.4 PG — SIGNIFICANT CHANGE UP (ref 27–34)
MCHC RBC-ENTMCNC: 31 PG — SIGNIFICANT CHANGE UP (ref 27–34)
MCHC RBC-ENTMCNC: 31.6 % — LOW (ref 32–36)
MCHC RBC-ENTMCNC: 32.5 % — SIGNIFICANT CHANGE UP (ref 32–36)
MCV RBC AUTO: 95.2 FL — SIGNIFICANT CHANGE UP (ref 80–100)
MCV RBC AUTO: 96 FL — SIGNIFICANT CHANGE UP (ref 80–100)
PLATELET # BLD AUTO: 173 K/UL — SIGNIFICANT CHANGE UP (ref 150–400)
PLATELET # BLD AUTO: 177 K/UL — SIGNIFICANT CHANGE UP (ref 150–400)
PMV BLD: 11.7 FL — SIGNIFICANT CHANGE UP (ref 7–13)
PMV BLD: 11.9 FL — SIGNIFICANT CHANGE UP (ref 7–13)
POTASSIUM SERPL-MCNC: 4.9 MMOL/L — SIGNIFICANT CHANGE UP (ref 3.5–5.3)
POTASSIUM SERPL-SCNC: 4.9 MMOL/L — SIGNIFICANT CHANGE UP (ref 3.5–5.3)
PROTHROM AB SERPL-ACNC: 13.2 SEC — HIGH (ref 9.8–13.1)
PROTHROM AB SERPL-ACNC: 13.2 SEC — HIGH (ref 9.8–13.1)
RBC # BLD: 3.1 M/UL — LOW (ref 3.8–5.2)
RBC # BLD: 3.29 M/UL — LOW (ref 3.8–5.2)
RBC # FLD: 15.1 % — HIGH (ref 10.3–14.5)
RBC # FLD: 15.1 % — HIGH (ref 10.3–14.5)
SODIUM SERPL-SCNC: 143 MMOL/L — SIGNIFICANT CHANGE UP (ref 135–145)
WBC # BLD: 11.84 K/UL — HIGH (ref 3.8–10.5)
WBC # BLD: 8.84 K/UL — SIGNIFICANT CHANGE UP (ref 3.8–10.5)
WBC # FLD AUTO: 11.84 K/UL — HIGH (ref 3.8–10.5)
WBC # FLD AUTO: 8.84 K/UL — SIGNIFICANT CHANGE UP (ref 3.8–10.5)

## 2017-05-04 PROCEDURE — 99232 SBSQ HOSP IP/OBS MODERATE 35: CPT

## 2017-05-04 RX ORDER — SODIUM CHLORIDE 9 MG/ML
1000 INJECTION INTRAMUSCULAR; INTRAVENOUS; SUBCUTANEOUS ONCE
Qty: 0 | Refills: 0 | Status: COMPLETED | OUTPATIENT
Start: 2017-05-04 | End: 2017-05-04

## 2017-05-04 RX ORDER — OXYCODONE HYDROCHLORIDE 5 MG/1
30 TABLET ORAL
Qty: 0 | Refills: 0 | Status: DISCONTINUED | OUTPATIENT
Start: 2017-05-04 | End: 2017-05-05

## 2017-05-04 RX ADMIN — OXYCODONE HYDROCHLORIDE 30 MILLIGRAM(S): 5 TABLET ORAL at 17:30

## 2017-05-04 RX ADMIN — Medication 100 MILLIGRAM(S): at 05:33

## 2017-05-04 RX ADMIN — OXYCODONE HYDROCHLORIDE 30 MILLIGRAM(S): 5 TABLET ORAL at 20:20

## 2017-05-04 RX ADMIN — OXYCODONE HYDROCHLORIDE 30 MILLIGRAM(S): 5 TABLET ORAL at 23:26

## 2017-05-04 RX ADMIN — SODIUM CHLORIDE 100 MILLILITER(S): 9 INJECTION, SOLUTION INTRAVENOUS at 02:39

## 2017-05-04 RX ADMIN — CEFTRIAXONE 100 GRAM(S): 500 INJECTION, POWDER, FOR SOLUTION INTRAMUSCULAR; INTRAVENOUS at 19:51

## 2017-05-04 RX ADMIN — OXYCODONE HYDROCHLORIDE 30 MILLIGRAM(S): 5 TABLET ORAL at 12:43

## 2017-05-04 RX ADMIN — GABAPENTIN 300 MILLIGRAM(S): 400 CAPSULE ORAL at 14:16

## 2017-05-04 RX ADMIN — OXYCODONE HYDROCHLORIDE 30 MILLIGRAM(S): 5 TABLET ORAL at 02:39

## 2017-05-04 RX ADMIN — OXYCODONE HYDROCHLORIDE 30 MILLIGRAM(S): 5 TABLET ORAL at 19:51

## 2017-05-04 RX ADMIN — SODIUM CHLORIDE 1000 MILLILITER(S): 9 INJECTION INTRAMUSCULAR; INTRAVENOUS; SUBCUTANEOUS at 10:27

## 2017-05-04 RX ADMIN — OXYCODONE HYDROCHLORIDE 60 MILLIGRAM(S): 5 TABLET ORAL at 17:51

## 2017-05-04 RX ADMIN — Medication 500 MILLIGRAM(S): at 12:47

## 2017-05-04 RX ADMIN — OXYCODONE HYDROCHLORIDE 30 MILLIGRAM(S): 5 TABLET ORAL at 16:34

## 2017-05-04 RX ADMIN — OXYCODONE HYDROCHLORIDE 30 MILLIGRAM(S): 5 TABLET ORAL at 13:40

## 2017-05-04 RX ADMIN — Medication 1 PATCH: at 12:46

## 2017-05-04 RX ADMIN — Medication 100 MILLIGRAM(S): at 22:44

## 2017-05-04 RX ADMIN — Medication 1 TABLET(S): at 12:46

## 2017-05-04 RX ADMIN — OXYCODONE HYDROCHLORIDE 30 MILLIGRAM(S): 5 TABLET ORAL at 07:30

## 2017-05-04 RX ADMIN — GABAPENTIN 300 MILLIGRAM(S): 400 CAPSULE ORAL at 22:44

## 2017-05-04 RX ADMIN — OXYCODONE HYDROCHLORIDE 30 MILLIGRAM(S): 5 TABLET ORAL at 03:30

## 2017-05-04 RX ADMIN — GABAPENTIN 300 MILLIGRAM(S): 400 CAPSULE ORAL at 05:33

## 2017-05-04 RX ADMIN — OXYCODONE HYDROCHLORIDE 30 MILLIGRAM(S): 5 TABLET ORAL at 06:46

## 2017-05-04 RX ADMIN — OXYCODONE HYDROCHLORIDE 60 MILLIGRAM(S): 5 TABLET ORAL at 02:39

## 2017-05-04 RX ADMIN — Medication 650 MILLIGRAM(S): at 17:50

## 2017-05-04 RX ADMIN — Medication 100 MILLIGRAM(S): at 06:46

## 2017-05-04 RX ADMIN — Medication 15 MILLIGRAM(S): at 14:15

## 2017-05-04 RX ADMIN — OXYCODONE HYDROCHLORIDE 60 MILLIGRAM(S): 5 TABLET ORAL at 09:07

## 2017-05-04 RX ADMIN — OXYCODONE HYDROCHLORIDE 30 MILLIGRAM(S): 5 TABLET ORAL at 22:56

## 2017-05-04 RX ADMIN — Medication 100 MILLIGRAM(S): at 14:15

## 2017-05-04 RX ADMIN — Medication 650 MILLIGRAM(S): at 05:33

## 2017-05-04 RX ADMIN — PANTOPRAZOLE SODIUM 40 MILLIGRAM(S): 20 TABLET, DELAYED RELEASE ORAL at 12:46

## 2017-05-04 RX ADMIN — Medication 650 MILLIGRAM(S): at 12:47

## 2017-05-04 RX ADMIN — Medication 100 MILLIGRAM(S): at 14:16

## 2017-05-04 RX ADMIN — Medication 50 MILLIGRAM(S): at 12:46
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
## 2017-05-04 NOTE — PHYSICAL THERAPY INITIAL EVALUATION ADULT - RANGE OF MOTION EXAMINATION, REHAB EVAL
Right LE ROM was WFL (within functional limits)/bilateral upper extremity ROM was WFL (within functional limits)

## 2017-05-04 NOTE — OCCUPATIONAL THERAPY INITIAL EVALUATION ADULT - MD ORDER
Occupational Therapy to evaluate and treat. Occupational Therapy to evaluate and treat.  OOB to chair.

## 2017-05-04 NOTE — PHYSICAL THERAPY INITIAL EVALUATION ADULT - ADDITIONAL COMMENTS
Patient lives with  in a home; "completely handicap accessible". Patient does not own a rolling walker

## 2017-05-04 NOTE — OCCUPATIONAL THERAPY INITIAL EVALUATION ADULT - LIVES WITH, PROFILE
spouse/Pt lives in house with no steps to manage. Pt has stall shower with shower chair, grab bars. Per pt, "my house is completely handicapped accessible"

## 2017-05-04 NOTE — OCCUPATIONAL THERAPY INITIAL EVALUATION ADULT - GENERAL OBSERVATIONS, REHAB EVAL
Pt received in semisupine position. + PCEA pump. + accordion drain. + Left knee immobilizer. Left LLE dressing clean/ dry/ intact. Pt reporting 10/10 pain at this time. LOU Napoles aware and per RN Pain service consult pending at this time. Unable to assess functional mobility at this time 2/2 reported pain.
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
## 2017-05-05 ENCOUNTER — TRANSCRIPTION ENCOUNTER (OUTPATIENT)
Age: 52
End: 2017-05-05

## 2017-05-05 LAB
APPEARANCE UR: CLEAR — SIGNIFICANT CHANGE UP
APTT BLD: 28.5 SEC — SIGNIFICANT CHANGE UP (ref 27.5–37.4)
BACTERIA # UR AUTO: SIGNIFICANT CHANGE UP
BILIRUB UR-MCNC: SIGNIFICANT CHANGE UP
BLOOD UR QL VISUAL: HIGH
COLOR SPEC: YELLOW — SIGNIFICANT CHANGE UP
GLUCOSE UR-MCNC: NEGATIVE — SIGNIFICANT CHANGE UP
HCT VFR BLD CALC: 30.5 % — LOW (ref 34.5–45)
HCT VFR BLD CALC: 30.8 % — LOW (ref 34.5–45)
HGB BLD-MCNC: 9.8 G/DL — LOW (ref 11.5–15.5)
HGB BLD-MCNC: 9.8 G/DL — LOW (ref 11.5–15.5)
INR BLD: 1.15 — SIGNIFICANT CHANGE UP (ref 0.88–1.17)
KETONES UR-MCNC: NEGATIVE — SIGNIFICANT CHANGE UP
LEUKOCYTE ESTERASE UR-ACNC: NEGATIVE — SIGNIFICANT CHANGE UP
MCHC RBC-ENTMCNC: 30.5 PG — SIGNIFICANT CHANGE UP (ref 27–34)
MCHC RBC-ENTMCNC: 30.6 PG — SIGNIFICANT CHANGE UP (ref 27–34)
MCHC RBC-ENTMCNC: 31.8 % — LOW (ref 32–36)
MCHC RBC-ENTMCNC: 32.1 % — SIGNIFICANT CHANGE UP (ref 32–36)
MCV RBC AUTO: 95.3 FL — SIGNIFICANT CHANGE UP (ref 80–100)
MCV RBC AUTO: 96 FL — SIGNIFICANT CHANGE UP (ref 80–100)
MUCOUS THREADS # UR AUTO: SIGNIFICANT CHANGE UP
NITRITE UR-MCNC: POSITIVE — HIGH
PH UR: 6.5 — SIGNIFICANT CHANGE UP (ref 4.6–8)
PLATELET # BLD AUTO: 165 K/UL — SIGNIFICANT CHANGE UP (ref 150–400)
PLATELET # BLD AUTO: 168 K/UL — SIGNIFICANT CHANGE UP (ref 150–400)
PMV BLD: 12.2 FL — SIGNIFICANT CHANGE UP (ref 7–13)
PMV BLD: 12.4 FL — SIGNIFICANT CHANGE UP (ref 7–13)
PROT UR-MCNC: 10 — SIGNIFICANT CHANGE UP
PROTHROM AB SERPL-ACNC: 12.9 SEC — SIGNIFICANT CHANGE UP (ref 9.8–13.1)
RBC # BLD: 3.2 M/UL — LOW (ref 3.8–5.2)
RBC # BLD: 3.21 M/UL — LOW (ref 3.8–5.2)
RBC # FLD: 15.2 % — HIGH (ref 10.3–14.5)
RBC # FLD: 15.2 % — HIGH (ref 10.3–14.5)
RBC CASTS # UR COMP ASSIST: SIGNIFICANT CHANGE UP (ref 0–?)
SP GR SPEC: 1.01 — SIGNIFICANT CHANGE UP (ref 1–1.03)
SQUAMOUS # UR AUTO: SIGNIFICANT CHANGE UP
UROBILINOGEN FLD QL: 1 E.U. — SIGNIFICANT CHANGE UP (ref 0.1–0.2)
WBC # BLD: 10.73 K/UL — HIGH (ref 3.8–10.5)
WBC # BLD: 10.96 K/UL — HIGH (ref 3.8–10.5)
WBC # FLD AUTO: 10.73 K/UL — HIGH (ref 3.8–10.5)
WBC # FLD AUTO: 10.96 K/UL — HIGH (ref 3.8–10.5)
WBC UR QL: SIGNIFICANT CHANGE UP (ref 0–?)

## 2017-05-05 PROCEDURE — 99232 SBSQ HOSP IP/OBS MODERATE 35: CPT

## 2017-05-05 RX ORDER — OXYCODONE HYDROCHLORIDE 5 MG/1
45 TABLET ORAL
Qty: 0 | Refills: 0 | Status: DISCONTINUED | OUTPATIENT
Start: 2017-05-05 | End: 2017-05-09

## 2017-05-05 RX ORDER — HYDROMORPHONE HYDROCHLORIDE 2 MG/ML
2 INJECTION INTRAMUSCULAR; INTRAVENOUS; SUBCUTANEOUS EVERY 6 HOURS
Qty: 0 | Refills: 0 | Status: DISCONTINUED | OUTPATIENT
Start: 2017-05-05 | End: 2017-05-09

## 2017-05-05 RX ORDER — OXYCODONE HYDROCHLORIDE 5 MG/1
80 TABLET ORAL EVERY 8 HOURS
Qty: 0 | Refills: 0 | Status: DISCONTINUED | OUTPATIENT
Start: 2017-05-05 | End: 2017-05-09

## 2017-05-05 RX ORDER — TIZANIDINE 4 MG/1
4 TABLET ORAL THREE TIMES A DAY
Qty: 0 | Refills: 0 | Status: DISCONTINUED | OUTPATIENT
Start: 2017-05-05 | End: 2017-05-09

## 2017-05-05 RX ORDER — OXYCODONE HYDROCHLORIDE 5 MG/1
30 TABLET ORAL
Qty: 0 | Refills: 0 | Status: DISCONTINUED | OUTPATIENT
Start: 2017-05-05 | End: 2017-05-09

## 2017-05-05 RX ORDER — RIVAROXABAN 15 MG-20MG
10 KIT ORAL DAILY
Qty: 0 | Refills: 0 | Status: DISCONTINUED | OUTPATIENT
Start: 2017-05-06 | End: 2017-05-09

## 2017-05-05 RX ADMIN — Medication 1 PATCH: at 11:47

## 2017-05-05 RX ADMIN — OXYCODONE HYDROCHLORIDE 30 MILLIGRAM(S): 5 TABLET ORAL at 05:56

## 2017-05-05 RX ADMIN — OXYCODONE HYDROCHLORIDE 45 MILLIGRAM(S): 5 TABLET ORAL at 16:00

## 2017-05-05 RX ADMIN — OXYCODONE HYDROCHLORIDE 30 MILLIGRAM(S): 5 TABLET ORAL at 08:35

## 2017-05-05 RX ADMIN — OXYCODONE HYDROCHLORIDE 30 MILLIGRAM(S): 5 TABLET ORAL at 00:00

## 2017-05-05 RX ADMIN — Medication 1 PATCH: at 11:44

## 2017-05-05 RX ADMIN — OXYCODONE HYDROCHLORIDE 60 MILLIGRAM(S): 5 TABLET ORAL at 01:12

## 2017-05-05 RX ADMIN — GABAPENTIN 300 MILLIGRAM(S): 400 CAPSULE ORAL at 13:15

## 2017-05-05 RX ADMIN — Medication 100 MILLIGRAM(S): at 05:31

## 2017-05-05 RX ADMIN — Medication 100 MILLIGRAM(S): at 13:16

## 2017-05-05 RX ADMIN — PANTOPRAZOLE SODIUM 40 MILLIGRAM(S): 20 TABLET, DELAYED RELEASE ORAL at 11:44

## 2017-05-05 RX ADMIN — HYDROMORPHONE HYDROCHLORIDE 2 MILLIGRAM(S): 2 INJECTION INTRAMUSCULAR; INTRAVENOUS; SUBCUTANEOUS at 19:35

## 2017-05-05 RX ADMIN — Medication 500 MILLIGRAM(S): at 11:45

## 2017-05-05 RX ADMIN — OXYCODONE HYDROCHLORIDE 45 MILLIGRAM(S): 5 TABLET ORAL at 19:00

## 2017-05-05 RX ADMIN — TIZANIDINE 4 MILLIGRAM(S): 4 TABLET ORAL at 23:00

## 2017-05-05 RX ADMIN — OXYCODONE HYDROCHLORIDE 60 MILLIGRAM(S): 5 TABLET ORAL at 09:26

## 2017-05-05 RX ADMIN — Medication 100 MILLIGRAM(S): at 13:15

## 2017-05-05 RX ADMIN — OXYCODONE HYDROCHLORIDE 30 MILLIGRAM(S): 5 TABLET ORAL at 05:26

## 2017-05-05 RX ADMIN — OXYCODONE HYDROCHLORIDE 30 MILLIGRAM(S): 5 TABLET ORAL at 09:21

## 2017-05-05 RX ADMIN — TIZANIDINE 4 MILLIGRAM(S): 4 TABLET ORAL at 14:03

## 2017-05-05 RX ADMIN — OXYCODONE HYDROCHLORIDE 30 MILLIGRAM(S): 5 TABLET ORAL at 02:43

## 2017-05-05 RX ADMIN — OXYCODONE HYDROCHLORIDE 30 MILLIGRAM(S): 5 TABLET ORAL at 02:13

## 2017-05-05 RX ADMIN — Medication 650 MILLIGRAM(S): at 00:24

## 2017-05-05 RX ADMIN — HYDROMORPHONE HYDROCHLORIDE 2 MILLIGRAM(S): 2 INJECTION INTRAMUSCULAR; INTRAVENOUS; SUBCUTANEOUS at 19:20

## 2017-05-05 RX ADMIN — CELECOXIB 200 MILLIGRAM(S): 200 CAPSULE ORAL at 08:17

## 2017-05-05 RX ADMIN — CEFTRIAXONE 100 GRAM(S): 500 INJECTION, POWDER, FOR SOLUTION INTRAMUSCULAR; INTRAVENOUS at 19:19

## 2017-05-05 RX ADMIN — GABAPENTIN 300 MILLIGRAM(S): 400 CAPSULE ORAL at 05:31

## 2017-05-05 RX ADMIN — OXYCODONE HYDROCHLORIDE 30 MILLIGRAM(S): 5 TABLET ORAL at 11:45

## 2017-05-05 RX ADMIN — Medication 100 MILLIGRAM(S): at 23:00

## 2017-05-05 RX ADMIN — OXYCODONE HYDROCHLORIDE 45 MILLIGRAM(S): 5 TABLET ORAL at 15:17

## 2017-05-05 RX ADMIN — Medication 1 TABLET(S): at 11:45

## 2017-05-05 RX ADMIN — GABAPENTIN 300 MILLIGRAM(S): 400 CAPSULE ORAL at 23:00

## 2017-05-05 RX ADMIN — OXYCODONE HYDROCHLORIDE 45 MILLIGRAM(S): 5 TABLET ORAL at 18:22

## 2017-05-05 RX ADMIN — OXYCODONE HYDROCHLORIDE 80 MILLIGRAM(S): 5 TABLET ORAL at 13:15

## 2017-05-05 RX ADMIN — Medication 650 MILLIGRAM(S): at 05:30

## 2017-05-05 RX ADMIN — OXYCODONE HYDROCHLORIDE 80 MILLIGRAM(S): 5 TABLET ORAL at 23:00

## 2017-05-05 RX ADMIN — Medication 50 MILLIGRAM(S): at 11:44

## 2017-05-05 NOTE — PROVIDER CONTACT NOTE (OTHER) - ACTION/TREATMENT ORDERED:
MD notified, IVF d/c'd. Will continue to monitor.
As per NP, okay to give BOTH Oxy IR and Oxy ER now. IR will begin to work quickly, whereas ER will have slower onset of action.

## 2017-05-06 LAB
HCT VFR BLD CALC: 31.5 % — LOW (ref 34.5–45)
HGB BLD-MCNC: 9.8 G/DL — LOW (ref 11.5–15.5)
MCHC RBC-ENTMCNC: 29.7 PG — SIGNIFICANT CHANGE UP (ref 27–34)
MCHC RBC-ENTMCNC: 31.1 % — LOW (ref 32–36)
MCV RBC AUTO: 95.5 FL — SIGNIFICANT CHANGE UP (ref 80–100)
PLATELET # BLD AUTO: 184 K/UL — SIGNIFICANT CHANGE UP (ref 150–400)
PMV BLD: 12.2 FL — SIGNIFICANT CHANGE UP (ref 7–13)
RBC # BLD: 3.3 M/UL — LOW (ref 3.8–5.2)
RBC # FLD: 15.1 % — HIGH (ref 10.3–14.5)
WBC # BLD: 10.78 K/UL — HIGH (ref 3.8–10.5)
WBC # FLD AUTO: 10.78 K/UL — HIGH (ref 3.8–10.5)

## 2017-05-06 PROCEDURE — 99232 SBSQ HOSP IP/OBS MODERATE 35: CPT

## 2017-05-06 RX ORDER — HYDROCORTISONE 1 %
1 OINTMENT (GRAM) TOPICAL
Qty: 0 | Refills: 0 | Status: DISCONTINUED | OUTPATIENT
Start: 2017-05-06 | End: 2017-05-06

## 2017-05-06 RX ORDER — HYDROCORTISONE 1 %
1 OINTMENT (GRAM) TOPICAL
Qty: 0 | Refills: 0 | Status: DISCONTINUED | OUTPATIENT
Start: 2017-05-06 | End: 2017-05-09

## 2017-05-06 RX ORDER — CEFAZOLIN SODIUM 1 G
1000 VIAL (EA) INJECTION EVERY 8 HOURS
Qty: 0 | Refills: 0 | Status: DISCONTINUED | OUTPATIENT
Start: 2017-05-06 | End: 2017-05-09

## 2017-05-06 RX ADMIN — GABAPENTIN 300 MILLIGRAM(S): 400 CAPSULE ORAL at 06:25

## 2017-05-06 RX ADMIN — Medication 650 MILLIGRAM(S): at 06:24

## 2017-05-06 RX ADMIN — Medication 100 MILLIGRAM(S): at 06:25

## 2017-05-06 RX ADMIN — Medication 1 PATCH: at 12:38

## 2017-05-06 RX ADMIN — HYDROMORPHONE HYDROCHLORIDE 2 MILLIGRAM(S): 2 INJECTION INTRAMUSCULAR; INTRAVENOUS; SUBCUTANEOUS at 17:27

## 2017-05-06 RX ADMIN — OXYCODONE HYDROCHLORIDE 45 MILLIGRAM(S): 5 TABLET ORAL at 19:45

## 2017-05-06 RX ADMIN — OXYCODONE HYDROCHLORIDE 45 MILLIGRAM(S): 5 TABLET ORAL at 15:02

## 2017-05-06 RX ADMIN — OXYCODONE HYDROCHLORIDE 45 MILLIGRAM(S): 5 TABLET ORAL at 22:39

## 2017-05-06 RX ADMIN — RIVAROXABAN 10 MILLIGRAM(S): KIT at 12:40

## 2017-05-06 RX ADMIN — HYDROMORPHONE HYDROCHLORIDE 2 MILLIGRAM(S): 2 INJECTION INTRAMUSCULAR; INTRAVENOUS; SUBCUTANEOUS at 17:43

## 2017-05-06 RX ADMIN — TIZANIDINE 4 MILLIGRAM(S): 4 TABLET ORAL at 21:29

## 2017-05-06 RX ADMIN — HYDROMORPHONE HYDROCHLORIDE 2 MILLIGRAM(S): 2 INJECTION INTRAMUSCULAR; INTRAVENOUS; SUBCUTANEOUS at 01:57

## 2017-05-06 RX ADMIN — TIZANIDINE 4 MILLIGRAM(S): 4 TABLET ORAL at 13:09

## 2017-05-06 RX ADMIN — GABAPENTIN 300 MILLIGRAM(S): 400 CAPSULE ORAL at 21:28

## 2017-05-06 RX ADMIN — Medication 1 TABLET(S): at 12:40

## 2017-05-06 RX ADMIN — Medication 500 MILLIGRAM(S): at 12:40

## 2017-05-06 RX ADMIN — OXYCODONE HYDROCHLORIDE 45 MILLIGRAM(S): 5 TABLET ORAL at 23:09

## 2017-05-06 RX ADMIN — Medication 50 MILLIGRAM(S): at 12:40

## 2017-05-06 RX ADMIN — OXYCODONE HYDROCHLORIDE 45 MILLIGRAM(S): 5 TABLET ORAL at 19:09

## 2017-05-06 RX ADMIN — Medication 100 MILLIGRAM(S): at 21:28

## 2017-05-06 RX ADMIN — OXYCODONE HYDROCHLORIDE 45 MILLIGRAM(S): 5 TABLET ORAL at 10:45

## 2017-05-06 RX ADMIN — OXYCODONE HYDROCHLORIDE 45 MILLIGRAM(S): 5 TABLET ORAL at 03:50

## 2017-05-06 RX ADMIN — OXYCODONE HYDROCHLORIDE 45 MILLIGRAM(S): 5 TABLET ORAL at 00:05

## 2017-05-06 RX ADMIN — OXYCODONE HYDROCHLORIDE 45 MILLIGRAM(S): 5 TABLET ORAL at 15:45

## 2017-05-06 RX ADMIN — HYDROMORPHONE HYDROCHLORIDE 2 MILLIGRAM(S): 2 INJECTION INTRAMUSCULAR; INTRAVENOUS; SUBCUTANEOUS at 02:15

## 2017-05-06 RX ADMIN — OXYCODONE HYDROCHLORIDE 45 MILLIGRAM(S): 5 TABLET ORAL at 06:52

## 2017-05-06 RX ADMIN — OXYCODONE HYDROCHLORIDE 45 MILLIGRAM(S): 5 TABLET ORAL at 04:20

## 2017-05-06 RX ADMIN — OXYCODONE HYDROCHLORIDE 45 MILLIGRAM(S): 5 TABLET ORAL at 00:35

## 2017-05-06 RX ADMIN — OXYCODONE HYDROCHLORIDE 80 MILLIGRAM(S): 5 TABLET ORAL at 06:25

## 2017-05-06 RX ADMIN — HYDROMORPHONE HYDROCHLORIDE 2 MILLIGRAM(S): 2 INJECTION INTRAMUSCULAR; INTRAVENOUS; SUBCUTANEOUS at 08:52

## 2017-05-06 RX ADMIN — HYDROMORPHONE HYDROCHLORIDE 2 MILLIGRAM(S): 2 INJECTION INTRAMUSCULAR; INTRAVENOUS; SUBCUTANEOUS at 08:37

## 2017-05-06 RX ADMIN — PANTOPRAZOLE SODIUM 40 MILLIGRAM(S): 20 TABLET, DELAYED RELEASE ORAL at 12:40

## 2017-05-06 RX ADMIN — Medication 100 MILLIGRAM(S): at 13:09

## 2017-05-06 RX ADMIN — GABAPENTIN 300 MILLIGRAM(S): 400 CAPSULE ORAL at 13:09

## 2017-05-06 RX ADMIN — OXYCODONE HYDROCHLORIDE 45 MILLIGRAM(S): 5 TABLET ORAL at 07:22

## 2017-05-06 RX ADMIN — HYDROMORPHONE HYDROCHLORIDE 2 MILLIGRAM(S): 2 INJECTION INTRAMUSCULAR; INTRAVENOUS; SUBCUTANEOUS at 23:53

## 2017-05-06 RX ADMIN — OXYCODONE HYDROCHLORIDE 45 MILLIGRAM(S): 5 TABLET ORAL at 10:03

## 2017-05-06 RX ADMIN — TIZANIDINE 4 MILLIGRAM(S): 4 TABLET ORAL at 06:25

## 2017-05-06 RX ADMIN — CELECOXIB 200 MILLIGRAM(S): 200 CAPSULE ORAL at 09:55

## 2017-05-06 RX ADMIN — Medication 650 MILLIGRAM(S): at 12:39

## 2017-05-06 RX ADMIN — OXYCODONE HYDROCHLORIDE 80 MILLIGRAM(S): 5 TABLET ORAL at 21:28

## 2017-05-06 RX ADMIN — OXYCODONE HYDROCHLORIDE 80 MILLIGRAM(S): 5 TABLET ORAL at 13:09

## 2017-05-06 RX ADMIN — Medication 100 MILLIGRAM(S): at 17:30

## 2017-05-06 RX ADMIN — Medication 1 APPLICATION(S): at 21:29

## 2017-05-07 LAB
HCT VFR BLD CALC: 30.1 % — LOW (ref 34.5–45)
HGB BLD-MCNC: 9.5 G/DL — LOW (ref 11.5–15.5)
MCHC RBC-ENTMCNC: 30 PG — SIGNIFICANT CHANGE UP (ref 27–34)
MCHC RBC-ENTMCNC: 31.6 % — LOW (ref 32–36)
MCV RBC AUTO: 95 FL — SIGNIFICANT CHANGE UP (ref 80–100)
PLATELET # BLD AUTO: 196 K/UL — SIGNIFICANT CHANGE UP (ref 150–400)
PMV BLD: 11.8 FL — SIGNIFICANT CHANGE UP (ref 7–13)
RBC # BLD: 3.17 M/UL — LOW (ref 3.8–5.2)
RBC # FLD: 15 % — HIGH (ref 10.3–14.5)
WBC # BLD: 9.58 K/UL — SIGNIFICANT CHANGE UP (ref 3.8–10.5)
WBC # FLD AUTO: 9.58 K/UL — SIGNIFICANT CHANGE UP (ref 3.8–10.5)

## 2017-05-07 PROCEDURE — 99232 SBSQ HOSP IP/OBS MODERATE 35: CPT

## 2017-05-07 RX ORDER — HYDROMORPHONE HYDROCHLORIDE 2 MG/ML
1 INJECTION INTRAMUSCULAR; INTRAVENOUS; SUBCUTANEOUS ONCE
Qty: 0 | Refills: 0 | Status: DISCONTINUED | OUTPATIENT
Start: 2017-05-07 | End: 2017-05-07

## 2017-05-07 RX ADMIN — HYDROMORPHONE HYDROCHLORIDE 2 MILLIGRAM(S): 2 INJECTION INTRAMUSCULAR; INTRAVENOUS; SUBCUTANEOUS at 20:46

## 2017-05-07 RX ADMIN — HYDROMORPHONE HYDROCHLORIDE 2 MILLIGRAM(S): 2 INJECTION INTRAMUSCULAR; INTRAVENOUS; SUBCUTANEOUS at 14:32

## 2017-05-07 RX ADMIN — Medication 100 MILLIGRAM(S): at 02:11

## 2017-05-07 RX ADMIN — OXYCODONE HYDROCHLORIDE 80 MILLIGRAM(S): 5 TABLET ORAL at 14:33

## 2017-05-07 RX ADMIN — OXYCODONE HYDROCHLORIDE 45 MILLIGRAM(S): 5 TABLET ORAL at 22:25

## 2017-05-07 RX ADMIN — OXYCODONE HYDROCHLORIDE 45 MILLIGRAM(S): 5 TABLET ORAL at 12:08

## 2017-05-07 RX ADMIN — Medication 100 MILLIGRAM(S): at 06:13

## 2017-05-07 RX ADMIN — OXYCODONE HYDROCHLORIDE 80 MILLIGRAM(S): 5 TABLET ORAL at 22:24

## 2017-05-07 RX ADMIN — OXYCODONE HYDROCHLORIDE 80 MILLIGRAM(S): 5 TABLET ORAL at 21:54

## 2017-05-07 RX ADMIN — OXYCODONE HYDROCHLORIDE 45 MILLIGRAM(S): 5 TABLET ORAL at 09:40

## 2017-05-07 RX ADMIN — Medication 1 APPLICATION(S): at 18:06

## 2017-05-07 RX ADMIN — Medication 1 PATCH: at 11:56

## 2017-05-07 RX ADMIN — OXYCODONE HYDROCHLORIDE 45 MILLIGRAM(S): 5 TABLET ORAL at 15:00

## 2017-05-07 RX ADMIN — Medication 500 MILLIGRAM(S): at 11:56

## 2017-05-07 RX ADMIN — Medication 100 MILLIGRAM(S): at 21:54

## 2017-05-07 RX ADMIN — OXYCODONE HYDROCHLORIDE 45 MILLIGRAM(S): 5 TABLET ORAL at 05:25

## 2017-05-07 RX ADMIN — Medication 100 MILLIGRAM(S): at 08:53

## 2017-05-07 RX ADMIN — HYDROMORPHONE HYDROCHLORIDE 2 MILLIGRAM(S): 2 INJECTION INTRAMUSCULAR; INTRAVENOUS; SUBCUTANEOUS at 07:15

## 2017-05-07 RX ADMIN — GABAPENTIN 300 MILLIGRAM(S): 400 CAPSULE ORAL at 14:32

## 2017-05-07 RX ADMIN — Medication 50 MILLIGRAM(S): at 11:56

## 2017-05-07 RX ADMIN — TIZANIDINE 4 MILLIGRAM(S): 4 TABLET ORAL at 14:32

## 2017-05-07 RX ADMIN — CELECOXIB 200 MILLIGRAM(S): 200 CAPSULE ORAL at 09:30

## 2017-05-07 RX ADMIN — HYDROMORPHONE HYDROCHLORIDE 2 MILLIGRAM(S): 2 INJECTION INTRAMUSCULAR; INTRAVENOUS; SUBCUTANEOUS at 14:45

## 2017-05-07 RX ADMIN — HYDROMORPHONE HYDROCHLORIDE 1 MILLIGRAM(S): 2 INJECTION INTRAMUSCULAR; INTRAVENOUS; SUBCUTANEOUS at 20:12

## 2017-05-07 RX ADMIN — HYDROMORPHONE HYDROCHLORIDE 2 MILLIGRAM(S): 2 INJECTION INTRAMUSCULAR; INTRAVENOUS; SUBCUTANEOUS at 00:23

## 2017-05-07 RX ADMIN — OXYCODONE HYDROCHLORIDE 80 MILLIGRAM(S): 5 TABLET ORAL at 06:13

## 2017-05-07 RX ADMIN — CELECOXIB 200 MILLIGRAM(S): 200 CAPSULE ORAL at 08:53

## 2017-05-07 RX ADMIN — Medication 1 PATCH: at 11:59

## 2017-05-07 RX ADMIN — RIVAROXABAN 10 MILLIGRAM(S): KIT at 11:56

## 2017-05-07 RX ADMIN — OXYCODONE HYDROCHLORIDE 45 MILLIGRAM(S): 5 TABLET ORAL at 22:02

## 2017-05-07 RX ADMIN — OXYCODONE HYDROCHLORIDE 45 MILLIGRAM(S): 5 TABLET ORAL at 02:20

## 2017-05-07 RX ADMIN — HYDROMORPHONE HYDROCHLORIDE 2 MILLIGRAM(S): 2 INJECTION INTRAMUSCULAR; INTRAVENOUS; SUBCUTANEOUS at 06:47

## 2017-05-07 RX ADMIN — OXYCODONE HYDROCHLORIDE 45 MILLIGRAM(S): 5 TABLET ORAL at 19:00

## 2017-05-07 RX ADMIN — OXYCODONE HYDROCHLORIDE 45 MILLIGRAM(S): 5 TABLET ORAL at 04:55

## 2017-05-07 RX ADMIN — TIZANIDINE 4 MILLIGRAM(S): 4 TABLET ORAL at 06:12

## 2017-05-07 RX ADMIN — Medication 100 MILLIGRAM(S): at 20:01

## 2017-05-07 RX ADMIN — HYDROMORPHONE HYDROCHLORIDE 1 MILLIGRAM(S): 2 INJECTION INTRAMUSCULAR; INTRAVENOUS; SUBCUTANEOUS at 19:57

## 2017-05-07 RX ADMIN — Medication 1 APPLICATION(S): at 06:13

## 2017-05-07 RX ADMIN — HYDROMORPHONE HYDROCHLORIDE 2 MILLIGRAM(S): 2 INJECTION INTRAMUSCULAR; INTRAVENOUS; SUBCUTANEOUS at 21:52

## 2017-05-07 RX ADMIN — Medication 1 TABLET(S): at 11:56

## 2017-05-07 RX ADMIN — OXYCODONE HYDROCHLORIDE 45 MILLIGRAM(S): 5 TABLET ORAL at 01:50

## 2017-05-07 RX ADMIN — OXYCODONE HYDROCHLORIDE 45 MILLIGRAM(S): 5 TABLET ORAL at 11:53

## 2017-05-07 RX ADMIN — GABAPENTIN 300 MILLIGRAM(S): 400 CAPSULE ORAL at 21:54

## 2017-05-07 RX ADMIN — OXYCODONE HYDROCHLORIDE 45 MILLIGRAM(S): 5 TABLET ORAL at 18:04

## 2017-05-07 RX ADMIN — OXYCODONE HYDROCHLORIDE 45 MILLIGRAM(S): 5 TABLET ORAL at 15:45

## 2017-05-07 RX ADMIN — PANTOPRAZOLE SODIUM 40 MILLIGRAM(S): 20 TABLET, DELAYED RELEASE ORAL at 11:56

## 2017-05-07 RX ADMIN — TIZANIDINE 4 MILLIGRAM(S): 4 TABLET ORAL at 21:54

## 2017-05-07 RX ADMIN — Medication 100 MILLIGRAM(S): at 14:32

## 2017-05-07 RX ADMIN — GABAPENTIN 300 MILLIGRAM(S): 400 CAPSULE ORAL at 06:13

## 2017-05-07 RX ADMIN — OXYCODONE HYDROCHLORIDE 45 MILLIGRAM(S): 5 TABLET ORAL at 08:53

## 2017-05-08 LAB
CULTURE - SURGICAL SITE: SIGNIFICANT CHANGE UP
CULTURE - SURGICAL SITE: SIGNIFICANT CHANGE UP

## 2017-05-08 PROCEDURE — 99232 SBSQ HOSP IP/OBS MODERATE 35: CPT

## 2017-05-08 RX ORDER — HYDROMORPHONE HYDROCHLORIDE 2 MG/ML
2 INJECTION INTRAMUSCULAR; INTRAVENOUS; SUBCUTANEOUS ONCE
Qty: 0 | Refills: 0 | Status: DISCONTINUED | OUTPATIENT
Start: 2017-05-08 | End: 2017-05-08

## 2017-05-08 RX ADMIN — OXYCODONE HYDROCHLORIDE 45 MILLIGRAM(S): 5 TABLET ORAL at 15:05

## 2017-05-08 RX ADMIN — PANTOPRAZOLE SODIUM 40 MILLIGRAM(S): 20 TABLET, DELAYED RELEASE ORAL at 12:48

## 2017-05-08 RX ADMIN — Medication 500 MILLIGRAM(S): at 12:49

## 2017-05-08 RX ADMIN — Medication 1 PATCH: at 12:50

## 2017-05-08 RX ADMIN — OXYCODONE HYDROCHLORIDE 45 MILLIGRAM(S): 5 TABLET ORAL at 14:34

## 2017-05-08 RX ADMIN — Medication 50 MILLIGRAM(S): at 12:49

## 2017-05-08 RX ADMIN — GABAPENTIN 300 MILLIGRAM(S): 400 CAPSULE ORAL at 21:03

## 2017-05-08 RX ADMIN — Medication 1 APPLICATION(S): at 12:49

## 2017-05-08 RX ADMIN — OXYCODONE HYDROCHLORIDE 45 MILLIGRAM(S): 5 TABLET ORAL at 17:40

## 2017-05-08 RX ADMIN — Medication 100 MILLIGRAM(S): at 20:12

## 2017-05-08 RX ADMIN — HYDROMORPHONE HYDROCHLORIDE 2 MILLIGRAM(S): 2 INJECTION INTRAMUSCULAR; INTRAVENOUS; SUBCUTANEOUS at 04:17

## 2017-05-08 RX ADMIN — TIZANIDINE 4 MILLIGRAM(S): 4 TABLET ORAL at 06:38

## 2017-05-08 RX ADMIN — Medication 1 APPLICATION(S): at 21:02

## 2017-05-08 RX ADMIN — OXYCODONE HYDROCHLORIDE 45 MILLIGRAM(S): 5 TABLET ORAL at 11:13

## 2017-05-08 RX ADMIN — HYDROMORPHONE HYDROCHLORIDE 2 MILLIGRAM(S): 2 INJECTION INTRAMUSCULAR; INTRAVENOUS; SUBCUTANEOUS at 09:30

## 2017-05-08 RX ADMIN — HYDROMORPHONE HYDROCHLORIDE 2 MILLIGRAM(S): 2 INJECTION INTRAMUSCULAR; INTRAVENOUS; SUBCUTANEOUS at 19:20

## 2017-05-08 RX ADMIN — HYDROMORPHONE HYDROCHLORIDE 2 MILLIGRAM(S): 2 INJECTION INTRAMUSCULAR; INTRAVENOUS; SUBCUTANEOUS at 18:50

## 2017-05-08 RX ADMIN — Medication 100 MILLIGRAM(S): at 12:50

## 2017-05-08 RX ADMIN — CELECOXIB 200 MILLIGRAM(S): 200 CAPSULE ORAL at 09:22

## 2017-05-08 RX ADMIN — OXYCODONE HYDROCHLORIDE 45 MILLIGRAM(S): 5 TABLET ORAL at 18:10

## 2017-05-08 RX ADMIN — OXYCODONE HYDROCHLORIDE 45 MILLIGRAM(S): 5 TABLET ORAL at 06:37

## 2017-05-08 RX ADMIN — Medication 100 MILLIGRAM(S): at 21:03

## 2017-05-08 RX ADMIN — TIZANIDINE 4 MILLIGRAM(S): 4 TABLET ORAL at 13:51

## 2017-05-08 RX ADMIN — OXYCODONE HYDROCHLORIDE 45 MILLIGRAM(S): 5 TABLET ORAL at 02:30

## 2017-05-08 RX ADMIN — GABAPENTIN 300 MILLIGRAM(S): 400 CAPSULE ORAL at 06:38

## 2017-05-08 RX ADMIN — HYDROMORPHONE HYDROCHLORIDE 2 MILLIGRAM(S): 2 INJECTION INTRAMUSCULAR; INTRAVENOUS; SUBCUTANEOUS at 09:22

## 2017-05-08 RX ADMIN — RIVAROXABAN 10 MILLIGRAM(S): KIT at 12:48

## 2017-05-08 RX ADMIN — Medication 1 TABLET(S): at 12:48

## 2017-05-08 RX ADMIN — OXYCODONE HYDROCHLORIDE 80 MILLIGRAM(S): 5 TABLET ORAL at 06:42

## 2017-05-08 RX ADMIN — OXYCODONE HYDROCHLORIDE 45 MILLIGRAM(S): 5 TABLET ORAL at 02:00

## 2017-05-08 RX ADMIN — OXYCODONE HYDROCHLORIDE 80 MILLIGRAM(S): 5 TABLET ORAL at 21:02

## 2017-05-08 RX ADMIN — OXYCODONE HYDROCHLORIDE 45 MILLIGRAM(S): 5 TABLET ORAL at 11:45

## 2017-05-08 RX ADMIN — TIZANIDINE 4 MILLIGRAM(S): 4 TABLET ORAL at 21:03

## 2017-05-08 RX ADMIN — OXYCODONE HYDROCHLORIDE 80 MILLIGRAM(S): 5 TABLET ORAL at 06:37

## 2017-05-08 RX ADMIN — OXYCODONE HYDROCHLORIDE 45 MILLIGRAM(S): 5 TABLET ORAL at 06:41

## 2017-05-08 RX ADMIN — Medication 100 MILLIGRAM(S): at 04:28

## 2017-05-08 RX ADMIN — GABAPENTIN 300 MILLIGRAM(S): 400 CAPSULE ORAL at 13:51

## 2017-05-08 RX ADMIN — Medication 100 MILLIGRAM(S): at 06:38

## 2017-05-08 RX ADMIN — Medication 1 PATCH: at 12:49

## 2017-05-08 RX ADMIN — HYDROMORPHONE HYDROCHLORIDE 2 MILLIGRAM(S): 2 INJECTION INTRAMUSCULAR; INTRAVENOUS; SUBCUTANEOUS at 04:30

## 2017-05-08 RX ADMIN — Medication 100 MILLIGRAM(S): at 13:51

## 2017-05-08 RX ADMIN — OXYCODONE HYDROCHLORIDE 80 MILLIGRAM(S): 5 TABLET ORAL at 13:51

## 2017-05-08 NOTE — DIETITIAN INITIAL EVALUATION ADULT. - OTHER INFO
Pt seen for LOS. Pt admitted for periprosthetic fracture, reports good po and appetite with no GI issues or weight loss  at this time. LABS reviewed. General healthy lifestyle encouraged . continue with current diet. RD remain appropriate.

## 2017-05-09 ENCOUNTER — TRANSCRIPTION ENCOUNTER (OUTPATIENT)
Age: 52
End: 2017-05-09

## 2017-05-09 VITALS
DIASTOLIC BLOOD PRESSURE: 71 MMHG | SYSTOLIC BLOOD PRESSURE: 156 MMHG | RESPIRATION RATE: 18 BRPM | HEART RATE: 95 BPM | OXYGEN SATURATION: 100 % | TEMPERATURE: 98 F

## 2017-05-09 PROCEDURE — 99232 SBSQ HOSP IP/OBS MODERATE 35: CPT

## 2017-05-09 RX ORDER — DOCUSATE SODIUM 100 MG
1 CAPSULE ORAL
Qty: 0 | Refills: 0 | DISCHARGE
Start: 2017-05-09

## 2017-05-09 RX ORDER — DIAZEPAM 5 MG
1 TABLET ORAL
Qty: 21 | Refills: 0
Start: 2017-05-09 | End: 2017-05-16

## 2017-05-09 RX ORDER — TIZANIDINE 4 MG/1
1 TABLET ORAL
Qty: 21 | Refills: 0
Start: 2017-05-09 | End: 2017-05-16

## 2017-05-09 RX ORDER — RIVAROXABAN 15 MG-20MG
1 KIT ORAL
Qty: 30 | Refills: 0
Start: 2017-05-09 | End: 2017-06-08

## 2017-05-09 RX ORDER — HYDROMORPHONE HYDROCHLORIDE 2 MG/ML
1 INJECTION INTRAMUSCULAR; INTRAVENOUS; SUBCUTANEOUS
Qty: 42 | Refills: 0
Start: 2017-05-09 | End: 2017-05-16

## 2017-05-09 RX ORDER — SENNA PLUS 8.6 MG/1
2 TABLET ORAL
Qty: 10 | Refills: 0
Start: 2017-05-09 | End: 2017-05-14

## 2017-05-09 RX ORDER — OXYCODONE HYDROCHLORIDE 5 MG/1
3 TABLET ORAL
Qty: 168 | Refills: 0
Start: 2017-05-09 | End: 2017-05-16

## 2017-05-09 RX ORDER — NICOTINE POLACRILEX 2 MG
1 GUM BUCCAL
Qty: 1 | Refills: 0
Start: 2017-05-09 | End: 2017-06-08

## 2017-05-09 RX ORDER — MELOXICAM 15 MG/1
1 TABLET ORAL
Qty: 30 | Refills: 0
Start: 2017-05-09 | End: 2017-06-08

## 2017-05-09 RX ORDER — OXYCODONE HYDROCHLORIDE 5 MG/1
1 TABLET ORAL
Qty: 21 | Refills: 0
Start: 2017-05-09 | End: 2017-05-16

## 2017-05-09 RX ORDER — GABAPENTIN 400 MG/1
1 CAPSULE ORAL
Qty: 21 | Refills: 0
Start: 2017-05-09 | End: 2017-05-16

## 2017-05-09 RX ADMIN — Medication 100 MILLIGRAM(S): at 05:37

## 2017-05-09 RX ADMIN — OXYCODONE HYDROCHLORIDE 45 MILLIGRAM(S): 5 TABLET ORAL at 12:46

## 2017-05-09 RX ADMIN — RIVAROXABAN 10 MILLIGRAM(S): KIT at 12:06

## 2017-05-09 RX ADMIN — OXYCODONE HYDROCHLORIDE 45 MILLIGRAM(S): 5 TABLET ORAL at 09:33

## 2017-05-09 RX ADMIN — Medication 100 MILLIGRAM(S): at 13:53

## 2017-05-09 RX ADMIN — OXYCODONE HYDROCHLORIDE 45 MILLIGRAM(S): 5 TABLET ORAL at 02:53

## 2017-05-09 RX ADMIN — OXYCODONE HYDROCHLORIDE 80 MILLIGRAM(S): 5 TABLET ORAL at 13:53

## 2017-05-09 RX ADMIN — Medication 1 TABLET(S): at 12:06

## 2017-05-09 RX ADMIN — GABAPENTIN 300 MILLIGRAM(S): 400 CAPSULE ORAL at 05:37

## 2017-05-09 RX ADMIN — OXYCODONE HYDROCHLORIDE 45 MILLIGRAM(S): 5 TABLET ORAL at 05:40

## 2017-05-09 RX ADMIN — Medication 50 MILLIGRAM(S): at 12:06

## 2017-05-09 RX ADMIN — CELECOXIB 200 MILLIGRAM(S): 200 CAPSULE ORAL at 08:48

## 2017-05-09 RX ADMIN — HYDROMORPHONE HYDROCHLORIDE 2 MILLIGRAM(S): 2 INJECTION INTRAMUSCULAR; INTRAVENOUS; SUBCUTANEOUS at 03:29

## 2017-05-09 RX ADMIN — Medication 500 MILLIGRAM(S): at 12:06

## 2017-05-09 RX ADMIN — OXYCODONE HYDROCHLORIDE 45 MILLIGRAM(S): 5 TABLET ORAL at 06:10

## 2017-05-09 RX ADMIN — OXYCODONE HYDROCHLORIDE 45 MILLIGRAM(S): 5 TABLET ORAL at 02:26

## 2017-05-09 RX ADMIN — OXYCODONE HYDROCHLORIDE 45 MILLIGRAM(S): 5 TABLET ORAL at 08:48

## 2017-05-09 RX ADMIN — Medication 1 PATCH: at 12:08

## 2017-05-09 RX ADMIN — TIZANIDINE 4 MILLIGRAM(S): 4 TABLET ORAL at 05:37

## 2017-05-09 RX ADMIN — HYDROMORPHONE HYDROCHLORIDE 2 MILLIGRAM(S): 2 INJECTION INTRAMUSCULAR; INTRAVENOUS; SUBCUTANEOUS at 04:00

## 2017-05-09 RX ADMIN — OXYCODONE HYDROCHLORIDE 80 MILLIGRAM(S): 5 TABLET ORAL at 05:36

## 2017-05-09 RX ADMIN — Medication 1 APPLICATION(S): at 09:08

## 2017-05-09 RX ADMIN — GABAPENTIN 300 MILLIGRAM(S): 400 CAPSULE ORAL at 13:53

## 2017-05-09 RX ADMIN — Medication 1 PATCH: at 12:06

## 2017-05-09 RX ADMIN — TIZANIDINE 4 MILLIGRAM(S): 4 TABLET ORAL at 13:53

## 2017-05-09 RX ADMIN — OXYCODONE HYDROCHLORIDE 45 MILLIGRAM(S): 5 TABLET ORAL at 12:06

## 2017-05-09 RX ADMIN — PANTOPRAZOLE SODIUM 40 MILLIGRAM(S): 20 TABLET, DELAYED RELEASE ORAL at 12:06

## 2017-05-09 NOTE — DISCHARGE NOTE ADULT - INSTRUCTIONS
may resume diet as prior to surgery Make a follow up appointment with Dr. Calero. Call him if you develop a fever, or if there is redness, swelling, drainage or pain not relieved by medication. Do your exercises as instructed. No heavy lifting, bending, or straining to move your bowels. Take over the counter stool softeners as needed to prevent constipation which may be caused by pain medication.

## 2017-05-09 NOTE — DISCHARGE NOTE ADULT - MEDICATION SUMMARY - MEDICATIONS TO TAKE
I will START or STAY ON the medications listed below when I get home from the hospital:    oxyCODONE 30 mg oral tablet  -- 1 tab(s) by mouth every 4 hours, As needed, Breakthrough pain  -- Indication: For home med     oxyCODONE 10 mg oral tablet, extended release  -- 1 tab(s) by mouth every 12 hours  -- Indication: For home med     Mobic 15 mg oral tablet  -- 1 tab(s) by mouth once a day MDD:1  -- Indication: For Pain med     HYDROmorphone 4 mg oral tablet  -- 1 tab(s) by mouth every 4 hours, As needed, Severe Pain (7 - 10) MDD:6  -- Indication: For Pain med     rivaroxaban 10 mg oral tablet  -- 1 tab(s) by mouth once a day MDD:1  -- Indication: For dvt prophylaxis    diazePAM 5 mg oral tablet  -- 1 tab(s) by mouth every 8 hours, As needed, muscle spasm MDD:3  -- Indication: For spasms    gabapentin 300 mg oral capsule  -- 1 cap(s) by mouth every 8 hours MDD:3  -- Indication: For Pain med    senna oral tablet  -- 2 tab(s) by mouth once a day (at bedtime), As needed, Constipation MDD:2  -- Indication: For stool softener     docusate sodium 100 mg oral capsule  -- 1 cap(s) by mouth 3 times a day  -- Indication: For stool softener    tiZANidine 4 mg oral tablet  -- 1 tab(s) by mouth 3 times a day MDD:3  -- Indication: For spasms    nicotine 21 mg/24 hr transdermal film, extended release  -- 1 patch by transdermal patch once a day MDD:1  -- Indication: For smoking cessation I will START or STAY ON the medications listed below when I get home from the hospital:    Mobic 15 mg oral tablet  -- 1 tab(s) by mouth once a day MDD:1  -- Indication: For Pain med     HYDROmorphone 4 mg oral tablet  -- 1 tab(s) by mouth every 4 hours, As needed, Severe Pain (7 - 10) MDD:6  -- Indication: For Pain med     oxyCODONE 80 mg oral tablet, extended release  -- 1 tab(s) by mouth every 8 hours MDD:3  -- Indication: For Pain med     oxyCODONE 15 mg oral tablet  -- 3 tab(s) by mouth every 3 hours, As needed, Severe Pain (7 - 10) MDD:18  -- Indication: For Pain med     rivaroxaban 10 mg oral tablet  -- 1 tab(s) by mouth once a day MDD:1  -- Indication: For dvt prophylaxis    diazePAM 5 mg oral tablet  -- 1 tab(s) by mouth every 8 hours, As needed, muscle spasm MDD:3  -- Indication: For spasms    gabapentin 300 mg oral capsule  -- 1 cap(s) by mouth every 8 hours MDD:3  -- Indication: For Pain med    docusate sodium 100 mg oral capsule  -- 1 cap(s) by mouth 3 times a day  -- Indication: For stool softener    senna oral tablet  -- 2 tab(s) by mouth once a day (at bedtime), As needed, Constipation MDD:2  -- Indication: For stool softener     tiZANidine 4 mg oral tablet  -- 1 tab(s) by mouth 3 times a day MDD:3  -- Indication: For spasms    nicotine 21 mg/24 hr transdermal film, extended release  -- 1 patch by transdermal patch once a day MDD:1  -- Indication: For smoking cessation

## 2017-05-09 NOTE — DISCHARGE NOTE ADULT - CARE PLAN
Principal Discharge DX:	Closed pelvic fracture  Goal:	Pain reduction, improve ambulation and ADLs  Instructions for follow-up, activity and diet:	Please follow up with Dr Calero in his office in 1-2 weeks. Call office to make an appointment. Please follow up with your PMD for continued care and management. Please keep dressing and incision clean, dry, intact. Any suture or staples to be removed on post op day day # 14 at office visit. Continue Hip and Knee Brace. Weight bear as tolerated in the brace. Notify your orthopaedic surgeon with any questions or concerns.

## 2017-05-09 NOTE — DISCHARGE NOTE ADULT - PLAN OF CARE
Pain reduction, improve ambulation and ADLs Please follow up with Dr Calero in his office in 1-2 weeks. Call office to make an appointment. Please follow up with your PMD for continued care and management. Please keep dressing and incision clean, dry, intact. Any suture or staples to be removed on post op day day # 14 at office visit. Continue Hip and Knee Brace. Weight bear as tolerated in the brace. Notify your orthopaedic surgeon with any questions or concerns.

## 2017-05-09 NOTE — DISCHARGE NOTE ADULT - CONDITIONS AT DISCHARGE
Pt is afebrile and offers no complaints. In no acute distress. Left leg ace wrap: clean, dry and intact. Pt is ambulating with assistance, voiding in adequate amounts and tolerating diet well.

## 2017-05-09 NOTE — DISCHARGE NOTE ADULT - CARE PROVIDER_API CALL
Kevin Calero), Orthopaedic Surgery  [address] [place] [address]  Phone: [phone]  Fax: [phone]

## 2017-05-09 NOTE — DISCHARGE NOTE ADULT - PATIENT PORTAL LINK FT
“You can access the FollowHealth Patient Portal, offered by [facility], by registering with the following website: [email or web address]”

## 2017-05-09 NOTE — DISCHARGE NOTE ADULT - MEDICATION SUMMARY - MEDICATIONS TO STOP TAKING
I will STOP taking the medications listed below when I get home from the hospital:    acetaminophen 325 mg oral tablet  -- 2 tab(s) by mouth every 6 hours, As needed, For Temp greater than 38 C (100.4 F)    HYDROmorphone 100 mg/50 mL-D5% intravenous solution  -- 1 milliliter(s) intravenous every 3 hours, As needed, Severe Pain (7 - 10)    heparin  -- 5000 unit(s) subcutaneous every 8 hours

## 2017-05-09 NOTE — DISCHARGE NOTE ADULT - NS AS DC FOLLOWUP STROKE INST
Smoking Cessation/total hip, mobic, gabapentin, nicotine patch, rivaroxaban, senna, tizanidine, colace

## 2017-05-09 NOTE — DISCHARGE NOTE ADULT - HOSPITAL COURSE
Please follow up with Dr Calero in his office in 1-2 weeks. Call office to make an appointment. Please follow up with your PMD for continued care and management. Please keep dressing and incision clean, dry, intact. Any suture or staples to be removed on post op day # 14 at office visit. Continue Hip and Knee Brace. Weight bear as tolerated in the brace. Notify your orthopaedic surgeon with any questions or concerns. Take Xarelto for DVT Prophylaxis.  Patient is a 51 year old female s/p revision to total femur with Dr Calero. Patient tolerated the procedure well without any intraoperative complications. Patient states pain is controlled. During admission patient placed on Ancef while the drain was in, discontinued upon discharge as per attending, Tolerated physical therapy well. Pain controlled prior to discharge.

## 2017-06-05 ENCOUNTER — APPOINTMENT (OUTPATIENT)
Dept: ORTHOPEDIC SURGERY | Facility: CLINIC | Age: 52
End: 2017-06-05

## 2017-06-05 DIAGNOSIS — Z82.62 FAMILY HISTORY OF OSTEOPOROSIS: ICD-10-CM

## 2017-06-05 DIAGNOSIS — Z78.9 OTHER SPECIFIED HEALTH STATUS: ICD-10-CM

## 2017-06-05 DIAGNOSIS — Z80.9 FAMILY HISTORY OF MALIGNANT NEOPLASM, UNSPECIFIED: ICD-10-CM

## 2017-06-05 DIAGNOSIS — Z82.61 FAMILY HISTORY OF ARTHRITIS: ICD-10-CM

## 2019-09-06 ENCOUNTER — APPOINTMENT (OUTPATIENT)
Dept: ORTHOPEDIC SURGERY | Facility: CLINIC | Age: 54
End: 2019-09-06
Payer: MEDICAID

## 2019-09-06 PROCEDURE — 99214 OFFICE O/P EST MOD 30 MIN: CPT

## 2019-09-06 PROCEDURE — 72170 X-RAY EXAM OF PELVIS: CPT

## 2019-09-06 PROCEDURE — 73552 X-RAY EXAM OF FEMUR 2/>: CPT | Mod: LT

## 2019-09-06 PROCEDURE — 73590 X-RAY EXAM OF LOWER LEG: CPT | Mod: LT

## 2019-09-11 ENCOUNTER — OTHER (OUTPATIENT)
Age: 54
End: 2019-09-11

## 2019-09-12 NOTE — DATA REVIEWED
[Imaging Present] : Present [de-identified] : X-rays AP pelvis and femur and tibia show the total femur and position including in the tibia. The acetabulum her previous cage which was put in the form at her to have pulled out. The femoral head is riding high in the cup. There is only one view because she would not tolerate anything else. It is unclear whether this is truly dislocated however it is constrained so I think this is more likely that there is a problem with a plastic either one way or fractured with the cage now pulled out laterally.

## 2019-09-12 NOTE — REVIEW OF SYSTEMS
[Chills] : no chills [Feeling Tired] : not feeling tired [Joint Pain] : joint pain [Joint Stiffness] : joint stiffness [Anxiety] : anxiety [Nl] : Hematologic/Lymphatic

## 2019-09-12 NOTE — PHYSICAL EXAM
[General Appearance - Well-Appearing] : Well appearing [General Appearance - Well Nourished] : well nourished [Extraocular Movements] : Extraocular movements were intact [FreeTextEntry1] : Extremely anxious [Neck Cervical Mass (___cm)] : no neck mass was observed [Heart Rate And Rhythm] : heart rate was normal and rhythm regular [] : No respiratory distress [Abdomen Soft] : Soft [Limping On The Left] : limping on the left [Crutches] : Uses crutches for ambulation. [Tenderness] : tenderness [Swelling] : no swelling [Masses] : no masses [Skin Changes - Describe changes:] : No skin changes noted [Full ROM Unless otherwise noted:] : Full range of motion unless otherwise noted: [LE  Motor Strength Normal unless otherwise noted:] : 5/5 strength in bilateral lower extemities unless otherwise noted. [Normal] : Sensation intact to light touch.
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
## 2019-09-12 NOTE — DISCUSSION/SUMMARY
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 She still has broken screws in the area. This was done after an infection with multiple surgeries are on her hip. My recommendations are to #1 rule out infection. We'll do this with a CBC sedimentation rate and CRP. Should this be high she was need aspiration. Furthermore I would want to get a CT scan to possibly plan for future custom implants. She may benefit from a Cage reconstruction with constrained liner. I do not think she is currently dislocated rather just hasn't acetabular failure. Follow up again after imaging is done.\par \par If imaging was ordered, the patient was told to make an appointment to review findings right after all imaging is completed.\par \par We discussed risks, benefits and alternatives. Rationale of care was reviewed and all questions were answered. Patient (and family) had all questions answered to her degree of the level of satisfaction. Patient (and family) expressed understanding and interest in proceeding with the plan as outlined.\par \par \par \par \par This note was done with a voice recognition transcription software and any typos are related to this rather than medical error. [Interested in Proceeding] : Patient (and family) expressed understanding and interest in proceeding with the plan as outlined
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
## 2019-09-27 PROBLEM — Z96.652 S/P REVISION OF TOTAL KNEE, LEFT: Status: ACTIVE | Noted: 2017-05-12

## 2019-10-02 PROBLEM — Z00.00 ENCOUNTER FOR PREVENTIVE HEALTH EXAMINATION: Noted: 2019-10-02

## 2019-10-03 ENCOUNTER — FORM ENCOUNTER (OUTPATIENT)
Age: 54
End: 2019-10-03

## 2019-10-03 ENCOUNTER — APPOINTMENT (OUTPATIENT)
Dept: ORTHOPEDIC SURGERY | Facility: CLINIC | Age: 54
End: 2019-10-03
Payer: MEDICAID

## 2019-10-03 DIAGNOSIS — Z96.652 PRESENCE OF LEFT ARTIFICIAL KNEE JOINT: ICD-10-CM

## 2019-10-04 ENCOUNTER — RESULT REVIEW (OUTPATIENT)
Age: 54
End: 2019-10-04

## 2019-10-04 ENCOUNTER — OUTPATIENT (OUTPATIENT)
Dept: OUTPATIENT SERVICES | Facility: HOSPITAL | Age: 54
LOS: 1 days | End: 2019-10-04
Payer: COMMERCIAL

## 2019-10-04 ENCOUNTER — APPOINTMENT (OUTPATIENT)
Dept: CT IMAGING | Facility: HOSPITAL | Age: 54
End: 2019-10-04

## 2019-10-04 DIAGNOSIS — Z96.649 PRESENCE OF UNSPECIFIED ARTIFICIAL HIP JOINT: ICD-10-CM

## 2019-10-04 DIAGNOSIS — S32.9XXA FRACTURE OF UNSPECIFIED PARTS OF LUMBOSACRAL SPINE AND PELVIS, INITIAL ENCOUNTER FOR CLOSED FRACTURE: Chronic | ICD-10-CM

## 2019-10-04 DIAGNOSIS — Z00.00 ENCOUNTER FOR GENERAL ADULT MEDICAL EXAMINATION WITHOUT ABNORMAL FINDINGS: ICD-10-CM

## 2019-10-04 PROCEDURE — 88172 CYTP DX EVAL FNA 1ST EA SITE: CPT

## 2019-10-04 PROCEDURE — 88312 SPECIAL STAINS GROUP 1: CPT

## 2019-10-04 PROCEDURE — 87102 FUNGUS ISOLATION CULTURE: CPT

## 2019-10-04 PROCEDURE — 87075 CULTR BACTERIA EXCEPT BLOOD: CPT

## 2019-10-04 PROCEDURE — 20225 BONE BIOPSY TROCAR/NDL DEEP: CPT

## 2019-10-04 PROCEDURE — 88305 TISSUE EXAM BY PATHOLOGIST: CPT | Mod: 26

## 2019-10-04 PROCEDURE — 80053 COMPREHEN METABOLIC PANEL: CPT

## 2019-10-04 PROCEDURE — 88312 SPECIAL STAINS GROUP 1: CPT | Mod: 26

## 2019-10-04 PROCEDURE — 88173 CYTOPATH EVAL FNA REPORT: CPT

## 2019-10-04 PROCEDURE — 85610 PROTHROMBIN TIME: CPT

## 2019-10-04 PROCEDURE — 77012 CT SCAN FOR NEEDLE BIOPSY: CPT | Mod: 26

## 2019-10-04 PROCEDURE — 87205 SMEAR GRAM STAIN: CPT

## 2019-10-04 PROCEDURE — 85730 THROMBOPLASTIN TIME PARTIAL: CPT

## 2019-10-04 PROCEDURE — 87070 CULTURE OTHR SPECIMN AEROBIC: CPT

## 2019-10-04 PROCEDURE — 88305 TISSUE EXAM BY PATHOLOGIST: CPT

## 2019-10-04 PROCEDURE — 88173 CYTOPATH EVAL FNA REPORT: CPT | Mod: 26

## 2019-10-04 PROCEDURE — 77012 CT SCAN FOR NEEDLE BIOPSY: CPT

## 2019-10-10 ENCOUNTER — APPOINTMENT (OUTPATIENT)
Dept: ORTHOPEDIC SURGERY | Facility: CLINIC | Age: 54
End: 2019-10-10
Payer: MEDICAID

## 2019-10-10 VITALS
SYSTOLIC BLOOD PRESSURE: 118 MMHG | WEIGHT: 149 LBS | DIASTOLIC BLOOD PRESSURE: 76 MMHG | HEIGHT: 67 IN | HEART RATE: 99 BPM | BODY MASS INDEX: 23.39 KG/M2

## 2019-10-10 DIAGNOSIS — T84.021A DISLOCATION OF INTERNAL LEFT HIP PROSTHESIS, INITIAL ENCOUNTER: ICD-10-CM

## 2019-10-10 PROCEDURE — 99214 OFFICE O/P EST MOD 30 MIN: CPT

## 2019-10-10 NOTE — DISCUSSION/SUMMARY
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 In any event her cage and a liner going to be taken out. I will plan for a cup cage with her without augments and a large iliac support. This will also need to connect into the inferior portion. This will also need a constrained liner.\par \par I discussed with her that because of her shortening she may need to be lengthened which may cause pressure on the nerve which may cause of retroperitoneum or she may need to have her total femur shortened in order to fit this. This may in turn eventually lead to limb length discrepancy. Our goal is to have a stable reduced hip. Furthermore she understands that this is significant risk on her body and may include bleeding further infection. If there is further infection to limb salvage options decreased significantly. She understands very large surgery with high risks. She still wants to move forward. We will plan for this in the near future.\par \par If imaging was ordered, the patient was told to make an appointment to review findings right after all imaging is completed.\par \par We discussed risks, benefits and alternatives. Rationale of care was reviewed and all questions were answered. Patient (and family) had all questions answered to her degree of the level of satisfaction. Patient (and family) expressed understanding and interest in proceeding with the plan as outlined.\par \par \par \par \par This note was done with a voice recognition transcription software and any typos are related to this rather than medical error.

## 2019-10-10 NOTE — REVIEW OF SYSTEMS
[Chills] : no chills [Feeling Tired] : not feeling tired [Joint Pain] : joint pain [Anxiety] : anxiety [Joint Stiffness] : joint stiffness [Nl] : Hematologic/Lymphatic

## 2019-10-10 NOTE — PHYSICAL EXAM
[General Appearance - Well-Appearing] : Well appearing [FreeTextEntry1] : In significant pain [General Appearance - Well Nourished] : well nourished [Antalgic Gait Left] : antalgic on the left [Cane] : Uses cane for ambulation [Limping On The Left] : limping on the left [Crutches] : Uses crutches for ambulation. [Tenderness] : tenderness [Swelling] : no swelling [Masses] : no masses [Skin Changes - Describe changes:] : No skin changes noted [LE  Motor Strength Normal unless otherwise noted:] : 5/5 strength in bilateral lower extemities unless otherwise noted. [Full ROM Unless otherwise noted:] : Full range of motion unless otherwise noted: [Normal] : Sensation intact to light touch.
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
## 2019-10-10 NOTE — HISTORY OF PRESENT ILLNESS
[FreeTextEntry1] : Patient is here for followup her LEFT hip implant for surgery. She had elevated markers so she went for aspiration and biopsy and culture. She is still having significant pain. She is using a cane because of her LEFT hip dislocation. [Stable] : stable [___ mths] : [unfilled] month(s) ago [4] : currently ~his/her~ pain is 4 out of 10

## 2019-11-03 ENCOUNTER — INPATIENT (INPATIENT)
Facility: HOSPITAL | Age: 54
LOS: 6 days | Discharge: HOME | End: 2019-11-10
Attending: ORTHOPAEDIC SURGERY | Admitting: ORTHOPAEDIC SURGERY
Payer: MEDICAID

## 2019-11-03 VITALS
HEART RATE: 123 BPM | DIASTOLIC BLOOD PRESSURE: 136 MMHG | OXYGEN SATURATION: 99 % | RESPIRATION RATE: 21 BRPM | SYSTOLIC BLOOD PRESSURE: 153 MMHG

## 2019-11-03 DIAGNOSIS — S73.005A UNSPECIFIED DISLOCATION OF LEFT HIP, INITIAL ENCOUNTER: ICD-10-CM

## 2019-11-03 DIAGNOSIS — S32.9XXA FRACTURE OF UNSPECIFIED PARTS OF LUMBOSACRAL SPINE AND PELVIS, INITIAL ENCOUNTER FOR CLOSED FRACTURE: Chronic | ICD-10-CM

## 2019-11-03 LAB
ALBUMIN SERPL ELPH-MCNC: 4.2 G/DL — SIGNIFICANT CHANGE UP (ref 3.3–5)
ALP SERPL-CCNC: 95 U/L — SIGNIFICANT CHANGE UP (ref 40–120)
ALT FLD-CCNC: 12 U/L — SIGNIFICANT CHANGE UP (ref 4–33)
ANION GAP SERPL CALC-SCNC: 15 MMO/L — HIGH (ref 7–14)
APPEARANCE UR: CLEAR — SIGNIFICANT CHANGE UP
APTT BLD: 27 SEC — LOW (ref 27.5–36.3)
AST SERPL-CCNC: 28 U/L — SIGNIFICANT CHANGE UP (ref 4–32)
BASOPHILS # BLD AUTO: 0.08 K/UL — SIGNIFICANT CHANGE UP (ref 0–0.2)
BASOPHILS NFR BLD AUTO: 0.5 % — SIGNIFICANT CHANGE UP (ref 0–2)
BILIRUB SERPL-MCNC: 0.2 MG/DL — SIGNIFICANT CHANGE UP (ref 0.2–1.2)
BILIRUB UR-MCNC: NEGATIVE — SIGNIFICANT CHANGE UP
BLD GP AB SCN SERPL QL: NEGATIVE — SIGNIFICANT CHANGE UP
BLOOD UR QL VISUAL: NEGATIVE — SIGNIFICANT CHANGE UP
BUN SERPL-MCNC: 17 MG/DL — SIGNIFICANT CHANGE UP (ref 7–23)
CALCIUM SERPL-MCNC: 9.9 MG/DL — SIGNIFICANT CHANGE UP (ref 8.4–10.5)
CHLORIDE SERPL-SCNC: 99 MMOL/L — SIGNIFICANT CHANGE UP (ref 98–107)
CO2 SERPL-SCNC: 21 MMOL/L — LOW (ref 22–31)
COLOR SPEC: SIGNIFICANT CHANGE UP
CREAT SERPL-MCNC: 0.91 MG/DL — SIGNIFICANT CHANGE UP (ref 0.5–1.3)
EOSINOPHIL # BLD AUTO: 0.03 K/UL — SIGNIFICANT CHANGE UP (ref 0–0.5)
EOSINOPHIL NFR BLD AUTO: 0.2 % — SIGNIFICANT CHANGE UP (ref 0–6)
GLUCOSE SERPL-MCNC: 135 MG/DL — HIGH (ref 70–99)
GLUCOSE UR-MCNC: NEGATIVE — SIGNIFICANT CHANGE UP
HCT VFR BLD CALC: 45.5 % — HIGH (ref 34.5–45)
HGB BLD-MCNC: 14 G/DL — SIGNIFICANT CHANGE UP (ref 11.5–15.5)
IMM GRANULOCYTES NFR BLD AUTO: 0.5 % — SIGNIFICANT CHANGE UP (ref 0–1.5)
INR BLD: 0.97 — SIGNIFICANT CHANGE UP (ref 0.88–1.17)
KETONES UR-MCNC: NEGATIVE — SIGNIFICANT CHANGE UP
LEUKOCYTE ESTERASE UR-ACNC: NEGATIVE — SIGNIFICANT CHANGE UP
LYMPHOCYTES # BLD AUTO: 16.5 % — SIGNIFICANT CHANGE UP (ref 13–44)
LYMPHOCYTES # BLD AUTO: 2.67 K/UL — SIGNIFICANT CHANGE UP (ref 1–3.3)
MCHC RBC-ENTMCNC: 29 PG — SIGNIFICANT CHANGE UP (ref 27–34)
MCHC RBC-ENTMCNC: 30.8 % — LOW (ref 32–36)
MCV RBC AUTO: 94.4 FL — SIGNIFICANT CHANGE UP (ref 80–100)
MONOCYTES # BLD AUTO: 0.71 K/UL — SIGNIFICANT CHANGE UP (ref 0–0.9)
MONOCYTES NFR BLD AUTO: 4.4 % — SIGNIFICANT CHANGE UP (ref 2–14)
NEUTROPHILS # BLD AUTO: 12.58 K/UL — HIGH (ref 1.8–7.4)
NEUTROPHILS NFR BLD AUTO: 77.9 % — HIGH (ref 43–77)
NITRITE UR-MCNC: NEGATIVE — SIGNIFICANT CHANGE UP
NRBC # FLD: 0 K/UL — SIGNIFICANT CHANGE UP (ref 0–0)
PH UR: 6.5 — SIGNIFICANT CHANGE UP (ref 5–8)
PLATELET # BLD AUTO: 275 K/UL — SIGNIFICANT CHANGE UP (ref 150–400)
PMV BLD: 10.9 FL — SIGNIFICANT CHANGE UP (ref 7–13)
POTASSIUM SERPL-MCNC: 4.1 MMOL/L — SIGNIFICANT CHANGE UP (ref 3.5–5.3)
POTASSIUM SERPL-SCNC: 4.1 MMOL/L — SIGNIFICANT CHANGE UP (ref 3.5–5.3)
PROT SERPL-MCNC: 8.4 G/DL — HIGH (ref 6–8.3)
PROT UR-MCNC: NEGATIVE — SIGNIFICANT CHANGE UP
PROTHROM AB SERPL-ACNC: 11.1 SEC — SIGNIFICANT CHANGE UP (ref 9.8–13.1)
RBC # BLD: 4.82 M/UL — SIGNIFICANT CHANGE UP (ref 3.8–5.2)
RBC # FLD: 15.9 % — HIGH (ref 10.3–14.5)
RH IG SCN BLD-IMP: NEGATIVE — SIGNIFICANT CHANGE UP
RH IG SCN BLD-IMP: NEGATIVE — SIGNIFICANT CHANGE UP
SODIUM SERPL-SCNC: 135 MMOL/L — SIGNIFICANT CHANGE UP (ref 135–145)
SP GR SPEC: 1.01 — SIGNIFICANT CHANGE UP (ref 1–1.04)
UROBILINOGEN FLD QL: NORMAL — SIGNIFICANT CHANGE UP
WBC # BLD: 16.15 K/UL — HIGH (ref 3.8–10.5)
WBC # FLD AUTO: 16.15 K/UL — HIGH (ref 3.8–10.5)

## 2019-11-03 PROCEDURE — 73503 X-RAY EXAM HIP UNI 4/> VIEWS: CPT | Mod: 26,LT

## 2019-11-03 PROCEDURE — 73552 X-RAY EXAM OF FEMUR 2/>: CPT | Mod: 26,LT

## 2019-11-03 RX ORDER — HYDROMORPHONE HYDROCHLORIDE 2 MG/ML
4 INJECTION INTRAMUSCULAR; INTRAVENOUS; SUBCUTANEOUS EVERY 4 HOURS
Refills: 0 | Status: DISCONTINUED | OUTPATIENT
Start: 2019-11-03 | End: 2019-11-03

## 2019-11-03 RX ORDER — HYDROMORPHONE HYDROCHLORIDE 2 MG/ML
2 INJECTION INTRAMUSCULAR; INTRAVENOUS; SUBCUTANEOUS EVERY 4 HOURS
Refills: 0 | Status: DISCONTINUED | OUTPATIENT
Start: 2019-11-03 | End: 2019-11-03

## 2019-11-03 RX ORDER — OXYCODONE HYDROCHLORIDE 5 MG/1
10 TABLET ORAL EVERY 12 HOURS
Refills: 0 | Status: DISCONTINUED | OUTPATIENT
Start: 2019-11-03 | End: 2019-11-03

## 2019-11-03 RX ORDER — HYDROMORPHONE HYDROCHLORIDE 2 MG/ML
1 INJECTION INTRAMUSCULAR; INTRAVENOUS; SUBCUTANEOUS ONCE
Refills: 0 | Status: DISCONTINUED | OUTPATIENT
Start: 2019-11-03 | End: 2019-11-03

## 2019-11-03 RX ORDER — HYDROMORPHONE HYDROCHLORIDE 2 MG/ML
0.5 INJECTION INTRAMUSCULAR; INTRAVENOUS; SUBCUTANEOUS ONCE
Refills: 0 | Status: DISCONTINUED | OUTPATIENT
Start: 2019-11-03 | End: 2019-11-04

## 2019-11-03 RX ORDER — DIAZEPAM 5 MG
5 TABLET ORAL EVERY 8 HOURS
Refills: 0 | Status: DISCONTINUED | OUTPATIENT
Start: 2019-11-03 | End: 2019-11-04

## 2019-11-03 RX ORDER — LANOLIN ALCOHOL/MO/W.PET/CERES
3 CREAM (GRAM) TOPICAL AT BEDTIME
Refills: 0 | Status: DISCONTINUED | OUTPATIENT
Start: 2019-11-03 | End: 2019-11-10

## 2019-11-03 RX ORDER — ACETAMINOPHEN 500 MG
975 TABLET ORAL EVERY 8 HOURS
Refills: 0 | Status: DISCONTINUED | OUTPATIENT
Start: 2019-11-03 | End: 2019-11-04

## 2019-11-03 RX ORDER — OXYCODONE HYDROCHLORIDE 5 MG/1
15 TABLET ORAL EVERY 4 HOURS
Refills: 0 | Status: DISCONTINUED | OUTPATIENT
Start: 2019-11-03 | End: 2019-11-04

## 2019-11-03 RX ORDER — HYDROMORPHONE HYDROCHLORIDE 2 MG/ML
1 INJECTION INTRAMUSCULAR; INTRAVENOUS; SUBCUTANEOUS EVERY 4 HOURS
Refills: 0 | Status: DISCONTINUED | OUTPATIENT
Start: 2019-11-03 | End: 2019-11-03

## 2019-11-03 RX ORDER — GABAPENTIN 400 MG/1
300 CAPSULE ORAL EVERY 8 HOURS
Refills: 0 | Status: DISCONTINUED | OUTPATIENT
Start: 2019-11-03 | End: 2019-11-04

## 2019-11-03 RX ORDER — ENOXAPARIN SODIUM 100 MG/ML
40 INJECTION SUBCUTANEOUS EVERY 24 HOURS
Refills: 0 | Status: DISCONTINUED | OUTPATIENT
Start: 2019-11-03 | End: 2019-11-03

## 2019-11-03 RX ORDER — OXYCODONE HYDROCHLORIDE 5 MG/1
80 TABLET ORAL EVERY 12 HOURS
Refills: 0 | Status: DISCONTINUED | OUTPATIENT
Start: 2019-11-03 | End: 2019-11-09

## 2019-11-03 RX ORDER — HYDROMORPHONE HYDROCHLORIDE 2 MG/ML
2 INJECTION INTRAMUSCULAR; INTRAVENOUS; SUBCUTANEOUS ONCE
Refills: 0 | Status: DISCONTINUED | OUTPATIENT
Start: 2019-11-03 | End: 2019-11-03

## 2019-11-03 RX ORDER — FENTANYL CITRATE 50 UG/ML
50 INJECTION INTRAVENOUS ONCE
Refills: 0 | Status: DISCONTINUED | OUTPATIENT
Start: 2019-11-03 | End: 2019-11-03

## 2019-11-03 RX ORDER — INFLUENZA VIRUS VACCINE 15; 15; 15; 15 UG/.5ML; UG/.5ML; UG/.5ML; UG/.5ML
0.5 SUSPENSION INTRAMUSCULAR ONCE
Refills: 0 | Status: COMPLETED | OUTPATIENT
Start: 2019-11-03 | End: 2019-11-03

## 2019-11-03 RX ORDER — OXYCODONE HYDROCHLORIDE 5 MG/1
10 TABLET ORAL EVERY 4 HOURS
Refills: 0 | Status: DISCONTINUED | OUTPATIENT
Start: 2019-11-03 | End: 2019-11-04

## 2019-11-03 RX ADMIN — HYDROMORPHONE HYDROCHLORIDE 1 MILLIGRAM(S): 2 INJECTION INTRAMUSCULAR; INTRAVENOUS; SUBCUTANEOUS at 17:16

## 2019-11-03 RX ADMIN — HYDROMORPHONE HYDROCHLORIDE 2 MILLIGRAM(S): 2 INJECTION INTRAMUSCULAR; INTRAVENOUS; SUBCUTANEOUS at 17:16

## 2019-11-03 RX ADMIN — FENTANYL CITRATE 50 MICROGRAM(S): 50 INJECTION INTRAVENOUS at 17:16

## 2019-11-03 RX ADMIN — GABAPENTIN 300 MILLIGRAM(S): 400 CAPSULE ORAL at 23:10

## 2019-11-03 RX ADMIN — OXYCODONE HYDROCHLORIDE 15 MILLIGRAM(S): 5 TABLET ORAL at 23:28

## 2019-11-03 RX ADMIN — HYDROMORPHONE HYDROCHLORIDE 1 MILLIGRAM(S): 2 INJECTION INTRAMUSCULAR; INTRAVENOUS; SUBCUTANEOUS at 14:29

## 2019-11-03 RX ADMIN — HYDROMORPHONE HYDROCHLORIDE 1 MILLIGRAM(S): 2 INJECTION INTRAMUSCULAR; INTRAVENOUS; SUBCUTANEOUS at 18:50

## 2019-11-03 RX ADMIN — FENTANYL CITRATE 50 MICROGRAM(S): 50 INJECTION INTRAVENOUS at 16:12

## 2019-11-03 RX ADMIN — HYDROMORPHONE HYDROCHLORIDE 2 MILLIGRAM(S): 2 INJECTION INTRAMUSCULAR; INTRAVENOUS; SUBCUTANEOUS at 18:51

## 2019-11-03 RX ADMIN — HYDROMORPHONE HYDROCHLORIDE 1 MILLIGRAM(S): 2 INJECTION INTRAMUSCULAR; INTRAVENOUS; SUBCUTANEOUS at 15:13

## 2019-11-03 RX ADMIN — OXYCODONE HYDROCHLORIDE 15 MILLIGRAM(S): 5 TABLET ORAL at 23:10

## 2019-11-03 RX ADMIN — OXYCODONE HYDROCHLORIDE 80 MILLIGRAM(S): 5 TABLET ORAL at 20:59
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
## 2019-11-03 NOTE — ED PROVIDER NOTE - ATTENDING CONTRIBUTION TO CARE
I, Jennifer Cabot, MD, have performed a history and physical exam of the patient and discussed their management with the resident. I reviewed the resident's note and agree with the documented findings and plan of care. My medical decision making and observations are found above.    Cabot: 53F with PMH of L femur replacement and hip replacement, here with a L hip dislocation.  Had been loosening over time and dislocated today.  On exam, tachycardic, in significant pain, MMM, eyes clear, lungs CTAB, heart sounds normal, abd soft, NT, ND, no CVAT, LLE shorter than the RLE and internally rotated with tenting of skin over upper buttock area, SILT, neurovascularly intact, RLEs without edema, wwp, skin normal temperature and color, neuro: alert and oriented, no focal deficits.  XR shows dislocation of the femur prosthesis from the replaced acetabulum.  Ortho is aware and coming to evaluate.  Will control pain and send preop labs.

## 2019-11-03 NOTE — ED PROVIDER NOTE - SEVERE SEPSIS ALERT DETAILS
Elizabeth Goldberger PGY-3: pt afebrile w/o s/s infxn. HR and wbc likely elevated as part of inflammatory response. No concern for sepsis at this time

## 2019-11-03 NOTE — ED ADULT NURSE REASSESSMENT NOTE - NS ED NURSE REASSESS COMMENT FT1
pt in extreme pain. no IV pain meds ordered at this time. MD Smalls aware of pts pain level
pt aox3. pt pain not improved at all. ed attending RAMOS Newman and ED Resident E Goldberg aware of pts pain level
pt in a lot of pain. pt tearful and appears extremely uncomfortable

## 2019-11-03 NOTE — H&P ADULT - HISTORY OF PRESENT ILLNESS
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 She has been minimally ambulatory with crutches since her previous visit with Dr. Calero on 10/10/2019. She has been waiting for an elective procedure but her pain acutely worsened today. She denies recent trauma. Denies HS/LOC. Denies numbness/tingling. Denies fever/chills. Denies pain/injury elsewhere.     PAST MEDICAL & SURGICAL HISTORY:  No pertinent past medical history  Closed pelvic fracture    MEDICATIONS  (STANDING):    Allergies    No Known Allergies    Intolerances                              14.0   16.15 )-----------( 275      ( 03 Nov 2019 14:30 )             45.5     03 Nov 2019 14:30    135    |  99     |  17     ----------------------------<  135    4.1     |  21     |  0.91     Ca    9.9        03 Nov 2019 14:30    TPro  8.4    /  Alb  4.2    /  TBili  0.2    /  DBili  x      /  AST  28     /  ALT  12     /  AlkPhos  95     03 Nov 2019 14:30    PT/INR - ( 03 Nov 2019 14:30 )   PT: 11.1 SEC;   INR: 0.97          PTT - ( 03 Nov 2019 14:30 )  PTT:27.0 SEC  Vital Signs Last 24 Hrs  T(C): 36.7 (11-03-19 @ 15:38), Max: 36.7 (11-03-19 @ 15:38)  T(F): 98.1 (11-03-19 @ 15:38), Max: 98.1 (11-03-19 @ 15:38)  HR: 94 (11-03-19 @ 17:15) (93 - 123)  BP: 173/80 (11-03-19 @ 17:15) (119/85 - 173/80)  BP(mean): --  RR: 16 (11-03-19 @ 17:15) (16 - 21)  SpO2: 96% (11-03-19 @ 17:15) (96% - 100%)    Imaging: XR shows Left prosthetic hip dislocation    Physical Exam  Gen: NAD  LLE: skin intact but palpable protruding prosthetic, unable to SLR, + log roll, +ttp hip/groin, no ttp elsewhere, +ehl/fhl/ta/gs function, no calf ttp, dp/pt pulse intact, compartments soft  Secondary survey: benign, nv intact, able to SLR contralateral leg, negative log roll contralateral leg, no bony ttp elsewhere, bilateral upper extremity skin intact with no gross deformity, non tender to palpation over bony prominences, neurovascularly intact    A/P: 53y Female with 

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
## 2019-11-03 NOTE — ED ADULT NURSE NOTE - OBJECTIVE STATEMENT
c/o left hip dislocation, pt has a known prosthetic hip and states that lately it has been becoming displaced, pt arrived to room 23 with a bulge noted to top of hip, pt tearful uncomfortable and in pain, denies numbness tingling, report endorsed to primary rn Chantelle

## 2019-11-03 NOTE — ED ADULT TRIAGE NOTE - CHIEF COMPLAINT QUOTE
Arrives to ED for left hip dislocation, visible through skin. Pt in severe pain, unable to stand. Brought directly to rm 23.

## 2019-11-03 NOTE — ED PROVIDER NOTE - PROGRESS NOTE DETAILS
ALEJANDRO:  Patient still with pain.  States that she takes chronic opioid therapy.  Will give 2 mg IV dilaudid.

## 2019-11-04 DIAGNOSIS — F43.22 ADJUSTMENT DISORDER WITH ANXIETY: ICD-10-CM

## 2019-11-04 LAB
ANION GAP SERPL CALC-SCNC: 12 MMO/L — SIGNIFICANT CHANGE UP (ref 7–14)
BUN SERPL-MCNC: 14 MG/DL — SIGNIFICANT CHANGE UP (ref 7–23)
CALCIUM SERPL-MCNC: 9.9 MG/DL — SIGNIFICANT CHANGE UP (ref 8.4–10.5)
CHLORIDE SERPL-SCNC: 105 MMOL/L — SIGNIFICANT CHANGE UP (ref 98–107)
CO2 SERPL-SCNC: 24 MMOL/L — SIGNIFICANT CHANGE UP (ref 22–31)
CREAT SERPL-MCNC: 0.9 MG/DL — SIGNIFICANT CHANGE UP (ref 0.5–1.3)
GLUCOSE SERPL-MCNC: 109 MG/DL — HIGH (ref 70–99)
HCT VFR BLD CALC: 44.5 % — SIGNIFICANT CHANGE UP (ref 34.5–45)
HCV AB S/CO SERPL IA: 0.26 S/CO — SIGNIFICANT CHANGE UP (ref 0–0.99)
HCV AB SERPL-IMP: SIGNIFICANT CHANGE UP
HGB BLD-MCNC: 13.8 G/DL — SIGNIFICANT CHANGE UP (ref 11.5–15.5)
MCHC RBC-ENTMCNC: 29 PG — SIGNIFICANT CHANGE UP (ref 27–34)
MCHC RBC-ENTMCNC: 31 % — LOW (ref 32–36)
MCV RBC AUTO: 93.5 FL — SIGNIFICANT CHANGE UP (ref 80–100)
NRBC # FLD: 0 K/UL — SIGNIFICANT CHANGE UP (ref 0–0)
PLATELET # BLD AUTO: 181 K/UL — SIGNIFICANT CHANGE UP (ref 150–400)
PMV BLD: 12.1 FL — SIGNIFICANT CHANGE UP (ref 7–13)
POTASSIUM SERPL-MCNC: 3.7 MMOL/L — SIGNIFICANT CHANGE UP (ref 3.5–5.3)
POTASSIUM SERPL-SCNC: 3.7 MMOL/L — SIGNIFICANT CHANGE UP (ref 3.5–5.3)
RBC # BLD: 4.76 M/UL — SIGNIFICANT CHANGE UP (ref 3.8–5.2)
RBC # FLD: 15.7 % — HIGH (ref 10.3–14.5)
SODIUM SERPL-SCNC: 141 MMOL/L — SIGNIFICANT CHANGE UP (ref 135–145)
WBC # BLD: 10.91 K/UL — HIGH (ref 3.8–10.5)
WBC # FLD AUTO: 10.91 K/UL — HIGH (ref 3.8–10.5)

## 2019-11-04 PROCEDURE — 93281 PM DEVICE PROGR EVAL MULTI: CPT | Mod: 26

## 2019-11-04 PROCEDURE — 99222 1ST HOSP IP/OBS MODERATE 55: CPT

## 2019-11-04 PROCEDURE — 90792 PSYCH DIAG EVAL W/MED SRVCS: CPT

## 2019-11-04 PROCEDURE — 71045 X-RAY EXAM CHEST 1 VIEW: CPT | Mod: 26

## 2019-11-04 RX ORDER — OLANZAPINE 15 MG/1
2.5 TABLET, FILM COATED ORAL EVERY 6 HOURS
Refills: 0 | Status: DISCONTINUED | OUTPATIENT
Start: 2019-11-04 | End: 2019-11-04

## 2019-11-04 RX ORDER — OXYCODONE HYDROCHLORIDE 5 MG/1
25 TABLET ORAL EVERY 4 HOURS
Refills: 0 | Status: DISCONTINUED | OUTPATIENT
Start: 2019-11-04 | End: 2019-11-05

## 2019-11-04 RX ORDER — ENOXAPARIN SODIUM 100 MG/ML
40 INJECTION SUBCUTANEOUS DAILY
Refills: 0 | Status: COMPLETED | OUTPATIENT
Start: 2019-11-04 | End: 2019-11-05

## 2019-11-04 RX ORDER — ACETAMINOPHEN 500 MG
1000 TABLET ORAL ONCE
Refills: 0 | Status: COMPLETED | OUTPATIENT
Start: 2019-11-04 | End: 2019-11-04

## 2019-11-04 RX ORDER — LIDOCAINE 4 G/100G
1 CREAM TOPICAL DAILY
Refills: 0 | Status: DISCONTINUED | OUTPATIENT
Start: 2019-11-04 | End: 2019-11-10

## 2019-11-04 RX ORDER — ACETAMINOPHEN 500 MG
975 TABLET ORAL EVERY 6 HOURS
Refills: 0 | Status: DISCONTINUED | OUTPATIENT
Start: 2019-11-04 | End: 2019-11-04

## 2019-11-04 RX ORDER — ACETAMINOPHEN 500 MG
1000 TABLET ORAL ONCE
Refills: 0 | Status: COMPLETED | OUTPATIENT
Start: 2019-11-05 | End: 2019-11-05

## 2019-11-04 RX ORDER — GABAPENTIN 400 MG/1
400 CAPSULE ORAL EVERY 8 HOURS
Refills: 0 | Status: DISCONTINUED | OUTPATIENT
Start: 2019-11-04 | End: 2019-11-10

## 2019-11-04 RX ORDER — ONDANSETRON 8 MG/1
4 TABLET, FILM COATED ORAL EVERY 6 HOURS
Refills: 0 | Status: DISCONTINUED | OUTPATIENT
Start: 2019-11-04 | End: 2019-11-10

## 2019-11-04 RX ORDER — KETOROLAC TROMETHAMINE 30 MG/ML
30 SYRINGE (ML) INJECTION ONCE
Refills: 0 | Status: DISCONTINUED | OUTPATIENT
Start: 2019-11-04 | End: 2019-11-04

## 2019-11-04 RX ORDER — HYDROMORPHONE HYDROCHLORIDE 2 MG/ML
1 INJECTION INTRAMUSCULAR; INTRAVENOUS; SUBCUTANEOUS ONCE
Refills: 0 | Status: DISCONTINUED | OUTPATIENT
Start: 2019-11-04 | End: 2019-11-04

## 2019-11-04 RX ORDER — PANTOPRAZOLE SODIUM 20 MG/1
40 TABLET, DELAYED RELEASE ORAL ONCE
Refills: 0 | Status: COMPLETED | OUTPATIENT
Start: 2019-11-04 | End: 2019-11-04

## 2019-11-04 RX ORDER — OXYCODONE HYDROCHLORIDE 5 MG/1
30 TABLET ORAL EVERY 4 HOURS
Refills: 0 | Status: DISCONTINUED | OUTPATIENT
Start: 2019-11-04 | End: 2019-11-05

## 2019-11-04 RX ORDER — PANTOPRAZOLE SODIUM 20 MG/1
40 TABLET, DELAYED RELEASE ORAL
Refills: 0 | Status: DISCONTINUED | OUTPATIENT
Start: 2019-11-05 | End: 2019-11-10

## 2019-11-04 RX ORDER — KETOROLAC TROMETHAMINE 30 MG/ML
15 SYRINGE (ML) INJECTION EVERY 6 HOURS
Refills: 0 | Status: DISCONTINUED | OUTPATIENT
Start: 2019-11-04 | End: 2019-11-05

## 2019-11-04 RX ORDER — GABAPENTIN 400 MG/1
400 CAPSULE ORAL EVERY 8 HOURS
Refills: 0 | Status: DISCONTINUED | OUTPATIENT
Start: 2019-11-04 | End: 2019-11-04

## 2019-11-04 RX ORDER — ZOLPIDEM TARTRATE 10 MG/1
5 TABLET ORAL AT BEDTIME
Refills: 0 | Status: DISCONTINUED | OUTPATIENT
Start: 2019-11-04 | End: 2019-11-10

## 2019-11-04 RX ORDER — SENNA PLUS 8.6 MG/1
2 TABLET ORAL AT BEDTIME
Refills: 0 | Status: DISCONTINUED | OUTPATIENT
Start: 2019-11-04 | End: 2019-11-10

## 2019-11-04 RX ORDER — TIZANIDINE 4 MG/1
2 TABLET ORAL EVERY 6 HOURS
Refills: 0 | Status: DISCONTINUED | OUTPATIENT
Start: 2019-11-04 | End: 2019-11-05

## 2019-11-04 RX ORDER — HYDROMORPHONE HYDROCHLORIDE 2 MG/ML
1 INJECTION INTRAMUSCULAR; INTRAVENOUS; SUBCUTANEOUS EVERY 4 HOURS
Refills: 0 | Status: DISCONTINUED | OUTPATIENT
Start: 2019-11-04 | End: 2019-11-04

## 2019-11-04 RX ORDER — QUETIAPINE FUMARATE 200 MG/1
25 TABLET, FILM COATED ORAL EVERY 6 HOURS
Refills: 0 | Status: DISCONTINUED | OUTPATIENT
Start: 2019-11-04 | End: 2019-11-04

## 2019-11-04 RX ADMIN — OXYCODONE HYDROCHLORIDE 15 MILLIGRAM(S): 5 TABLET ORAL at 07:09

## 2019-11-04 RX ADMIN — ZOLPIDEM TARTRATE 5 MILLIGRAM(S): 10 TABLET ORAL at 00:37

## 2019-11-04 RX ADMIN — OXYCODONE HYDROCHLORIDE 15 MILLIGRAM(S): 5 TABLET ORAL at 10:45

## 2019-11-04 RX ADMIN — OXYCODONE HYDROCHLORIDE 30 MILLIGRAM(S): 5 TABLET ORAL at 14:55

## 2019-11-04 RX ADMIN — OXYCODONE HYDROCHLORIDE 30 MILLIGRAM(S): 5 TABLET ORAL at 23:59

## 2019-11-04 RX ADMIN — TIZANIDINE 2 MILLIGRAM(S): 4 TABLET ORAL at 23:14

## 2019-11-04 RX ADMIN — GABAPENTIN 300 MILLIGRAM(S): 400 CAPSULE ORAL at 06:36

## 2019-11-04 RX ADMIN — OXYCODONE HYDROCHLORIDE 80 MILLIGRAM(S): 5 TABLET ORAL at 19:57

## 2019-11-04 RX ADMIN — OXYCODONE HYDROCHLORIDE 15 MILLIGRAM(S): 5 TABLET ORAL at 06:36

## 2019-11-04 RX ADMIN — OXYCODONE HYDROCHLORIDE 15 MILLIGRAM(S): 5 TABLET ORAL at 03:05

## 2019-11-04 RX ADMIN — OXYCODONE HYDROCHLORIDE 15 MILLIGRAM(S): 5 TABLET ORAL at 11:45

## 2019-11-04 RX ADMIN — HYDROMORPHONE HYDROCHLORIDE 1 MILLIGRAM(S): 2 INJECTION INTRAMUSCULAR; INTRAVENOUS; SUBCUTANEOUS at 12:44

## 2019-11-04 RX ADMIN — Medication 15 MILLIGRAM(S): at 18:22

## 2019-11-04 RX ADMIN — GABAPENTIN 400 MILLIGRAM(S): 400 CAPSULE ORAL at 14:56

## 2019-11-04 RX ADMIN — Medication 400 MILLIGRAM(S): at 12:44

## 2019-11-04 RX ADMIN — HYDROMORPHONE HYDROCHLORIDE 1 MILLIGRAM(S): 2 INJECTION INTRAMUSCULAR; INTRAVENOUS; SUBCUTANEOUS at 13:00

## 2019-11-04 RX ADMIN — ZOLPIDEM TARTRATE 5 MILLIGRAM(S): 10 TABLET ORAL at 23:14

## 2019-11-04 RX ADMIN — OXYCODONE HYDROCHLORIDE 30 MILLIGRAM(S): 5 TABLET ORAL at 19:58

## 2019-11-04 RX ADMIN — HYDROMORPHONE HYDROCHLORIDE 0.5 MILLIGRAM(S): 2 INJECTION INTRAMUSCULAR; INTRAVENOUS; SUBCUTANEOUS at 00:08

## 2019-11-04 RX ADMIN — OXYCODONE HYDROCHLORIDE 30 MILLIGRAM(S): 5 TABLET ORAL at 15:55

## 2019-11-04 RX ADMIN — Medication 400 MILLIGRAM(S): at 23:30

## 2019-11-04 RX ADMIN — TIZANIDINE 2 MILLIGRAM(S): 4 TABLET ORAL at 18:22

## 2019-11-04 RX ADMIN — OXYCODONE HYDROCHLORIDE 80 MILLIGRAM(S): 5 TABLET ORAL at 08:04

## 2019-11-04 RX ADMIN — HYDROMORPHONE HYDROCHLORIDE 1 MILLIGRAM(S): 2 INJECTION INTRAMUSCULAR; INTRAVENOUS; SUBCUTANEOUS at 00:37

## 2019-11-04 RX ADMIN — TIZANIDINE 2 MILLIGRAM(S): 4 TABLET ORAL at 12:44

## 2019-11-04 RX ADMIN — GABAPENTIN 400 MILLIGRAM(S): 400 CAPSULE ORAL at 23:14

## 2019-11-04 RX ADMIN — OXYCODONE HYDROCHLORIDE 15 MILLIGRAM(S): 5 TABLET ORAL at 03:22

## 2019-11-04 RX ADMIN — HYDROMORPHONE HYDROCHLORIDE 0.5 MILLIGRAM(S): 2 INJECTION INTRAMUSCULAR; INTRAVENOUS; SUBCUTANEOUS at 00:25

## 2019-11-04 RX ADMIN — Medication 400 MILLIGRAM(S): at 18:22

## 2019-11-04 RX ADMIN — PANTOPRAZOLE SODIUM 40 MILLIGRAM(S): 20 TABLET, DELAYED RELEASE ORAL at 12:44

## 2019-11-04 RX ADMIN — ENOXAPARIN SODIUM 40 MILLIGRAM(S): 100 INJECTION SUBCUTANEOUS at 12:44

## 2019-11-04 RX ADMIN — SENNA PLUS 2 TABLET(S): 8.6 TABLET ORAL at 23:14

## 2019-11-04 RX ADMIN — OXYCODONE HYDROCHLORIDE 30 MILLIGRAM(S): 5 TABLET ORAL at 19:15

## 2019-11-04 RX ADMIN — Medication 15 MILLIGRAM(S): at 23:14

## 2019-11-04 NOTE — PROCEDURE NOTE - INTERROGATION NOTE: COMMENTS
normal sensing pacing via iterative testing; excellent threshold capture; no reprogramming; transient PAT lasted 3 minutes recorded on Nov 3,2019

## 2019-11-04 NOTE — BEHAVIORAL HEALTH ASSESSMENT NOTE - CASE SUMMARY
Patient seen and evaluated, agree with above assessment and plan, pt. AAOX3, cooperative and polite,  pt. presenting with anxiety sxs in the context of pain, she denies feeling depressed, denies SI and HI, denies acute psychotic sxs.  Recommend to repeat EKG for qtc monitoring as qtc is prolonged, recommend PRNs as written above if qtc <500. [place]aina at [id number], recs. given, will follow
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
## 2019-11-04 NOTE — BEHAVIORAL HEALTH ASSESSMENT NOTE - NSBHCHARTREVIEWINVESTIGATE_PSY_A_CORE FT
< from: 12 Lead ECG (11.03.19 @ 16:32) >    Ventricular Rate 82 BPM    Atrial Rate 82 BPM    P-R Interval 118 ms    QRS Duration 134 ms    Q-T Interval 448 ms    QTC Calculation(Bezet) 523 ms    P Axis 76 degrees    R Axis 265 degrees    T Axis 70 degrees    Diagnosis Line Atrial-sensed ventricular-paced rhythm  Abnormal ECG    < end of copied text >

## 2019-11-04 NOTE — CONSULT NOTE ADULT - SUBJECTIVE AND OBJECTIVE BOX
Acute Pain Management Consult requested for pain not well controlled. Evaluation also done earlier by NP and note read.    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 She stated that she has been taking Oxycontin and OxyIR as street drug as per NP note. She is scheduled for surgery with Dr Goodman rea.    PAST MEDICAL & SURGICAL HISTORY:  No pertinent past medical history  Closed pelvic fracture    MEDICATIONS  (STANDING):    Allergies    No Known Allergies    Intolerances                              14.0   16.15 )-----------( 275      ( 2019 14:30 )             45.5     2019 14:30    135    |  99     |  17     ----------------------------<  135    4.1     |  21     |  0.91     Ca    9.9        2019 14:30    TPro  8.4    /  Alb  4.2    /  TBili  0.2    /  DBili  x      /  AST  28     /  ALT  12     /  AlkPhos  95     2019 14:30    PT/INR - ( 2019 14:30 )   PT: 11.1 SEC;   INR: 0.97          PTT - ( 2019 14:30 )  PTT:27.0 SEC  Vital Signs Last 24 Hrs  T(C): 36.7 (19 @ 15:38), Max: 36.7 (19 @ 15:38)  T(F): 98.1 (19 @ 15:38), Max: 98.1 (19 @ 15:38)  HR: 94 (19 @ 17:15) (93 - 123)  BP: 173/80 (19 @ 17:15) (119/85 - 173/80)  BP(mean): --  RR: 16 (19 @ 17:15) (16 - 21)  SpO2: 96% (19 @ 17:15) (96% - 100%)    Imaging: XR shows Left prosthetic hip dislocation    Physical Exam  A&O x 3 with pain in left buttock/hip area as per notes.    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 1000 milliGRAM(s) IV Intermittent once  gabapentin 400 milliGRAM(s) Oral every 8 hours  ketorolac   Injectable 15 milliGRAM(s) IV Push every 6 hours  melatonin 3 milliGRAM(s) Oral at bedtime PRN  ondansetron Injectable 4 milliGRAM(s) IV Push every 6 hours PRN  oxyCODONE    IR 30 milliGRAM(s) Oral every 4 hours PRN  oxyCODONE    IR 25 milliGRAM(s) Oral every 4 hours PRN  oxyCODONE  ER Tablet 80 milliGRAM(s) Oral every 12 hours  tiZANidine 2 milliGRAM(s) Oral every 6 hours  zolpidem 5 milliGRAM(s) Oral at bedtime    Heme:  enoxaparin Injectable 40 milliGRAM(s) SubCutaneous daily    Antibiotics:    Cardiovascular:    GI:  senna 2 Tablet(s) Oral at bedtime    Endocrine:    All Other Medications:  influenza   Vaccine 0.5 milliLiter(s) IntraMuscular once  lidocaine   Patch 1 Patch Transdermal daily      Vital Signs Last 24 Hrs  T(C): 36.9 (2019 18:22), Max: 36.9 (2019 14:26)  T(F): 98.4 (2019 18:22), Max: 98.4 (2019 14:26)  HR: 71 (2019 18:22) (70 - 86)  BP: 133/69 (2019 18:22) (119/67 - 153/86)  BP(mean): --  RR: 19 (2019 18:22) (18 - 20)  SpO2: 99% (2019 18:22) (97% - 100%)    PAIN SCORE:        see chart           Physical Exam: A & O x 3 in some discomfort    LABS:                          13.8   10.91 )-----------( 181      ( 2019 06:36 )             44.5     11-04    141  |  105  |  14  ----------------------------<  109<H>  3.7   |  24  |  0.90    Ca    9.9      2019 06:36    TPro  8.4<H>  /  Alb  4.2  /  TBili  0.2  /  DBili  x   /  AST  28  /  ALT  12  /  AlkPhos  95  11-03    PT/INR - ( 2019 14:30 )   PT: 11.1 SEC;   INR: 0.97          PTT - ( 2019 14:30 )  PTT:27.0 SEC  Urinalysis Basic - ( 2019 15:30 )    Color: LIGHT YELLOW / Appearance: CLEAR / S.011 / pH: 6.5  Gluc: NEGATIVE / Ketone: NEGATIVE  / Bili: NEGATIVE / Urobili: NORMAL   Blood: NEGATIVE / Protein: NEGATIVE / Nitrite: NEGATIVE   Leuk Esterase: NEGATIVE / RBC: x / WBC x   Sq Epi: x / Non Sq Epi: x / Bacteria: x        Drug Screen:        [ X]  [place]  Reviewed as per NP note    Impression/Plan: Pain not adequately relieved with current opioid treatment regimen. Agree with plan to try Ketamine along with any non-opioid adjuvant analgesic medication that can be added and reevaluate by Pain Service tomorrow. Pt agreed with plan and got relief as per NP note.
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
## 2019-11-04 NOTE — BEHAVIORAL HEALTH ASSESSMENT NOTE - RISK ASSESSMENT
Low Acute Suicide Risk Patient appears to be a low risk of harm to self or others at this time.    Risk factors: anxiety, severe pain,  impulsivity, substance misuse, functional decline, poor reactivity to stressors    Protective factors: denies any active suicidal ideation/intent/plan, no hx of prior attempts, no self-harm behaviors, identifies reasons for living, future oriented, supportive social network, no access to firearms, no legal issues, engaged in treatment
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
## 2019-11-04 NOTE — BEHAVIORAL HEALTH ASSESSMENT NOTE - NSBHCHARTREVIEWVS_PSY_A_CORE FT
Vital Signs Last 24 Hrs  T(C): 36.9 (04 Nov 2019 14:26), Max: 36.9 (04 Nov 2019 14:26)  T(F): 98.4 (04 Nov 2019 14:26), Max: 98.4 (04 Nov 2019 14:26)  HR: 70 (04 Nov 2019 14:53) (70 - 94)  BP: 153/79 (04 Nov 2019 14:53) (119/67 - 173/80)  BP(mean): --  RR: 20 (04 Nov 2019 14:53) (16 - 20)  SpO2: 97% (04 Nov 2019 14:53) (96% - 100%)

## 2019-11-04 NOTE — CHART NOTE - NSCHARTNOTEFT_GEN_A_CORE
Patient was complaining of 11/10, left hip pain, and weepy.  Patient brought down to ambulatory surgery #2.  Dr. Botello at the bedside with Dr. Cordova.  IV Ketamine bolus 10mg x1 given.  Patient felt relief initially with a 8/10 pain score, now currently it is 4/10.  Patient vomited x1.  IV Zofran 4mg x1 given.  Patient is comfortable.  [facility]an[facility] called to bring patient back to  928.  Vitals prior to Ketamine: BP: 172/88 mmHg/ HR:95/ RR:20/ O2 sat with 2L NC 98%   Vitals after Ketamine and prior to leaving to go back to floor: BP: 153/84 mmHg/ HR:70/ RR:14/ O2 sat with 2L NC 98%

## 2019-11-04 NOTE — BEHAVIORAL HEALTH ASSESSMENT NOTE - NSBHCHARTREVIEWLAB_PSY_A_CORE FT
13.8   10.91 )-----------( 181      ( 2019 06:36 )             44.5     11-04    141  |  105  |  14  ----------------------------<  109<H>  3.7   |  24  |  0.90    Ca    9.9      2019 06:36    TPro  8.4<H>  /  Alb  4.2  /  TBili  0.2  /  DBili  x   /  AST  28  /  ALT  12  /  AlkPhos  95  11-03    Urinalysis Basic - ( 2019 15:30 )    Color: LIGHT YELLOW / Appearance: CLEAR / S.011 / pH: 6.5  Gluc: NEGATIVE / Ketone: NEGATIVE  / Bili: NEGATIVE / Urobili: NORMAL   Blood: NEGATIVE / Protein: NEGATIVE / Nitrite: NEGATIVE   Leuk Esterase: NEGATIVE / RBC: x / WBC x   Sq Epi: x / Non Sq Epi: x / Bacteria: x

## 2019-11-04 NOTE — PROGRESS NOTE ADULT - SUBJECTIVE AND OBJECTIVE BOX
Pt seen and examined.   Patient in significant L hip pain. Minimally relieved with pain medications. Pain does not radiate down her leg, isolated at her hip. Worsened with movement.   No acute events overnight.     Vital Signs Last 24 Hrs  T(C): 36.7 (04 Nov 2019 01:50), Max: 36.7 (03 Nov 2019 15:38)  T(F): 98 (04 Nov 2019 01:50), Max: 98.1 (03 Nov 2019 15:38)  HR: 79 (04 Nov 2019 01:50) (79 - 123)  BP: 119/67 (04 Nov 2019 01:50) (119/67 - 173/80)  BP(mean): --  RR: 19 (04 Nov 2019 01:50) (16 - 21)  SpO2: 99% (04 Nov 2019 01:50) (96% - 100%)    PE:  NAD  L hip skin intact slight blanching over hip without ecchymosis or skin breakdown  LLE shortened and externally rotated, in Badillo's traction with no signs of skin irritation or breakdown, 3 kg of traction  EHL/FHL/TA/EHL intact  SILT throughout  WWP brisk cap refill                          14.0   16.15 )-----------( 275      ( 03 Nov 2019 14:30 )             45.5     11-03    135  |  99  |  17  ----------------------------<  135<H>  4.1   |  21<L>  |  0.91    Ca    9.9      03 Nov 2019 14:30    TPro  8.4<H>  /  Alb  4.2  /  TBili  0.2  /  DBili  x   /  AST  28  /  ALT  12  /  AlkPhos  95  11-03      53yFemale with a dislocated L total femur  - NWB LLE  - Badillo's traction  - Pain control  - Planning for OR 11/6  - Needs med clearance  - FU PPM interrogation

## 2019-11-05 ENCOUNTER — TRANSCRIPTION ENCOUNTER (OUTPATIENT)
Age: 54
End: 2019-11-05

## 2019-11-05 LAB
ANION GAP SERPL CALC-SCNC: 9 MMO/L — SIGNIFICANT CHANGE UP (ref 7–14)
BUN SERPL-MCNC: 21 MG/DL — SIGNIFICANT CHANGE UP (ref 7–23)
CALCIUM SERPL-MCNC: 9.8 MG/DL — SIGNIFICANT CHANGE UP (ref 8.4–10.5)
CHLORIDE SERPL-SCNC: 101 MMOL/L — SIGNIFICANT CHANGE UP (ref 98–107)
CO2 SERPL-SCNC: 27 MMOL/L — SIGNIFICANT CHANGE UP (ref 22–31)
CREAT SERPL-MCNC: 1.09 MG/DL — SIGNIFICANT CHANGE UP (ref 0.5–1.3)
GLUCOSE SERPL-MCNC: 108 MG/DL — HIGH (ref 70–99)
HCT VFR BLD CALC: 43 % — SIGNIFICANT CHANGE UP (ref 34.5–45)
HGB BLD-MCNC: 13.6 G/DL — SIGNIFICANT CHANGE UP (ref 11.5–15.5)
MCHC RBC-ENTMCNC: 29.5 PG — SIGNIFICANT CHANGE UP (ref 27–34)
MCHC RBC-ENTMCNC: 31.6 % — LOW (ref 32–36)
MCV RBC AUTO: 93.3 FL — SIGNIFICANT CHANGE UP (ref 80–100)
NRBC # FLD: 0 K/UL — SIGNIFICANT CHANGE UP (ref 0–0)
PLATELET # BLD AUTO: 176 K/UL — SIGNIFICANT CHANGE UP (ref 150–400)
PMV BLD: 12.3 FL — SIGNIFICANT CHANGE UP (ref 7–13)
POTASSIUM SERPL-MCNC: 4.1 MMOL/L — SIGNIFICANT CHANGE UP (ref 3.5–5.3)
POTASSIUM SERPL-SCNC: 4.1 MMOL/L — SIGNIFICANT CHANGE UP (ref 3.5–5.3)
RBC # BLD: 4.61 M/UL — SIGNIFICANT CHANGE UP (ref 3.8–5.2)
RBC # FLD: 15.6 % — HIGH (ref 10.3–14.5)
SODIUM SERPL-SCNC: 137 MMOL/L — SIGNIFICANT CHANGE UP (ref 135–145)
WBC # BLD: 11.14 K/UL — HIGH (ref 3.8–10.5)
WBC # FLD AUTO: 11.14 K/UL — HIGH (ref 3.8–10.5)

## 2019-11-05 PROCEDURE — 99221 1ST HOSP IP/OBS SF/LOW 40: CPT | Mod: GC

## 2019-11-05 PROCEDURE — 99232 SBSQ HOSP IP/OBS MODERATE 35: CPT

## 2019-11-05 PROCEDURE — 93306 TTE W/DOPPLER COMPLETE: CPT | Mod: 26

## 2019-11-05 RX ORDER — OXYCODONE HYDROCHLORIDE 5 MG/1
30 TABLET ORAL
Refills: 0 | Status: DISCONTINUED | OUTPATIENT
Start: 2019-11-05 | End: 2019-11-06

## 2019-11-05 RX ORDER — OXYCODONE HYDROCHLORIDE 5 MG/1
25 TABLET ORAL
Refills: 0 | Status: DISCONTINUED | OUTPATIENT
Start: 2019-11-05 | End: 2019-11-06

## 2019-11-05 RX ORDER — TIZANIDINE 4 MG/1
4 TABLET ORAL EVERY 6 HOURS
Refills: 0 | Status: DISCONTINUED | OUTPATIENT
Start: 2019-11-05 | End: 2019-11-05

## 2019-11-05 RX ORDER — SODIUM CHLORIDE 9 MG/ML
1000 INJECTION INTRAMUSCULAR; INTRAVENOUS; SUBCUTANEOUS
Refills: 0 | Status: DISCONTINUED | OUTPATIENT
Start: 2019-11-05 | End: 2019-11-10

## 2019-11-05 RX ORDER — CHLORHEXIDINE GLUCONATE 213 G/1000ML
1 SOLUTION TOPICAL EVERY 12 HOURS
Refills: 0 | Status: DISCONTINUED | OUTPATIENT
Start: 2019-11-05 | End: 2019-11-10

## 2019-11-05 RX ORDER — POVIDONE-IODINE 5 %
1 AEROSOL (ML) TOPICAL ONCE
Refills: 0 | Status: COMPLETED | OUTPATIENT
Start: 2019-11-05 | End: 2019-11-06

## 2019-11-05 RX ORDER — TIZANIDINE 4 MG/1
4 TABLET ORAL EVERY 6 HOURS
Refills: 0 | Status: COMPLETED | OUTPATIENT
Start: 2019-11-05 | End: 2019-11-06

## 2019-11-05 RX ORDER — TIZANIDINE 4 MG/1
4 TABLET ORAL EVERY 6 HOURS
Refills: 0 | Status: DISCONTINUED | OUTPATIENT
Start: 2019-11-06 | End: 2019-11-10

## 2019-11-05 RX ADMIN — OXYCODONE HYDROCHLORIDE 30 MILLIGRAM(S): 5 TABLET ORAL at 15:43

## 2019-11-05 RX ADMIN — OXYCODONE HYDROCHLORIDE 30 MILLIGRAM(S): 5 TABLET ORAL at 11:35

## 2019-11-05 RX ADMIN — Medication 3 MILLIGRAM(S): at 21:46

## 2019-11-05 RX ADMIN — ENOXAPARIN SODIUM 40 MILLIGRAM(S): 100 INJECTION SUBCUTANEOUS at 12:52

## 2019-11-05 RX ADMIN — GABAPENTIN 400 MILLIGRAM(S): 400 CAPSULE ORAL at 15:01

## 2019-11-05 RX ADMIN — GABAPENTIN 400 MILLIGRAM(S): 400 CAPSULE ORAL at 21:44

## 2019-11-05 RX ADMIN — GABAPENTIN 400 MILLIGRAM(S): 400 CAPSULE ORAL at 07:03

## 2019-11-05 RX ADMIN — OXYCODONE HYDROCHLORIDE 30 MILLIGRAM(S): 5 TABLET ORAL at 18:28

## 2019-11-05 RX ADMIN — OXYCODONE HYDROCHLORIDE 30 MILLIGRAM(S): 5 TABLET ORAL at 19:00

## 2019-11-05 RX ADMIN — OXYCODONE HYDROCHLORIDE 30 MILLIGRAM(S): 5 TABLET ORAL at 03:50

## 2019-11-05 RX ADMIN — OXYCODONE HYDROCHLORIDE 80 MILLIGRAM(S): 5 TABLET ORAL at 08:03

## 2019-11-05 RX ADMIN — OXYCODONE HYDROCHLORIDE 30 MILLIGRAM(S): 5 TABLET ORAL at 22:30

## 2019-11-05 RX ADMIN — ZOLPIDEM TARTRATE 5 MILLIGRAM(S): 10 TABLET ORAL at 21:44

## 2019-11-05 RX ADMIN — TIZANIDINE 4 MILLIGRAM(S): 4 TABLET ORAL at 12:53

## 2019-11-05 RX ADMIN — PANTOPRAZOLE SODIUM 40 MILLIGRAM(S): 20 TABLET, DELAYED RELEASE ORAL at 07:04

## 2019-11-05 RX ADMIN — OXYCODONE HYDROCHLORIDE 80 MILLIGRAM(S): 5 TABLET ORAL at 19:28

## 2019-11-05 RX ADMIN — Medication 400 MILLIGRAM(S): at 07:04

## 2019-11-05 RX ADMIN — OXYCODONE HYDROCHLORIDE 30 MILLIGRAM(S): 5 TABLET ORAL at 21:44

## 2019-11-05 RX ADMIN — Medication 15 MILLIGRAM(S): at 12:53

## 2019-11-05 RX ADMIN — Medication 15 MILLIGRAM(S): at 07:04

## 2019-11-05 RX ADMIN — TIZANIDINE 4 MILLIGRAM(S): 4 TABLET ORAL at 19:29

## 2019-11-05 RX ADMIN — TIZANIDINE 2 MILLIGRAM(S): 4 TABLET ORAL at 07:03

## 2019-11-05 RX ADMIN — OXYCODONE HYDROCHLORIDE 30 MILLIGRAM(S): 5 TABLET ORAL at 07:03

## 2019-11-05 RX ADMIN — OXYCODONE HYDROCHLORIDE 30 MILLIGRAM(S): 5 TABLET ORAL at 15:00

## 2019-11-05 RX ADMIN — OXYCODONE HYDROCHLORIDE 30 MILLIGRAM(S): 5 TABLET ORAL at 12:30

## 2019-11-05 RX ADMIN — OXYCODONE HYDROCHLORIDE 30 MILLIGRAM(S): 5 TABLET ORAL at 08:03

## 2019-11-05 RX ADMIN — OXYCODONE HYDROCHLORIDE 30 MILLIGRAM(S): 5 TABLET ORAL at 03:16
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
## 2019-11-05 NOTE — PROGRESS NOTE ADULT - ASSESSMENT
52 yo F w/ BiVICD, ?CAD,  h/o femur fx ~ 30 years ago c/b frequent prosthetic dislocations and fractures admitted w/ complete dislocation of the L tibial component of the femoral endoprosthesis, planned for OR repair on 11/6    #CAD  -pt was prescribed ASA/Plavix/metoprolol/lisinopril, which she states she stopped all of them 3 weeks ago due to impending Ortho surgery, Cards consult pending, f/u recs  BP stable off antihypertensives  pt denies any current or recent CP/SOB    #L femur dislocation   -plan for OR tomorrow per Ortho, pt has h/o chronic pain on chronic narcotics,   pain has not been controlled during hospitalization, appreciate Pain Management f/u     #DVT ppx  -Lovenox

## 2019-11-05 NOTE — PRE-OP CHECKLIST - DNR CLARIFICATION FORM COMPLETED
pt refusing to provide urine for UA & UC. Pt ambulated to restroom with steady gait without assistance. n/a

## 2019-11-05 NOTE — PROGRESS NOTE ADULT - ASSESSMENT
53yFemale with a dislocated L total femur. in Badillo's traction  - NWB LLE  - Badillo's traction  - Pain control- FU pain management this AM  - Planning for OR 11/6  - NPO pm, IVF  - AM labs  - FU cardiology consult for clearance  - FU PPM interrogation: Call EP preop to re-program
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
## 2019-11-05 NOTE — PROGRESS NOTE ADULT - SUBJECTIVE AND OBJECTIVE BOX
Patient is a 53y old  Female who presents with a chief complaint of Hip pain (2019 10:44)        SUBJECTIVE / OVERNIGHT EVENTS:    pt received dose of IV ketamine yesterday  this morning, still in significant pain, rating it 15/10  pt tearful and distraught  denies all other complaints      MEDICATIONS  (STANDING):  enoxaparin Injectable 40 milliGRAM(s) SubCutaneous daily  gabapentin 400 milliGRAM(s) Oral every 8 hours  ketorolac   Injectable 15 milliGRAM(s) IV Push every 6 hours  lidocaine   Patch 1 Patch Transdermal daily  oxyCODONE  ER Tablet 80 milliGRAM(s) Oral every 12 hours  pantoprazole    Tablet 40 milliGRAM(s) Oral before breakfast  senna 2 Tablet(s) Oral at bedtime  sodium chloride 0.9%. 1000 milliLiter(s) (75 mL/Hr) IV Continuous <Continuous>  tiZANidine 4 milliGRAM(s) Oral every 6 hours  zolpidem 5 milliGRAM(s) Oral at bedtime    MEDICATIONS  (PRN):  melatonin 3 milliGRAM(s) Oral at bedtime PRN Insomnia  ondansetron Injectable 4 milliGRAM(s) IV Push every 6 hours PRN Nausea and/or Vomiting  oxyCODONE    IR 30 milliGRAM(s) Oral every 3 hours PRN Severe Pain (7 - 10)  oxyCODONE    IR 25 milliGRAM(s) Oral every 3 hours PRN Moderate Pain (4 - 6)      Vital Signs Last 24 Hrs  T(C): 36.7 (2019 10:15), Max: 36.9 (2019 14:26)  T(F): 98 (2019 10:15), Max: 98.4 (2019 14:26)  HR: 77 (2019 10:15) (70 - 80)  BP: 136/77 (2019 10:15) (131/68 - 153/79)  BP(mean): --  RR: 17 (2019 10:15) (17 - 21)  SpO2: 100% (2019 10:15) (97% - 100%)  CAPILLARY BLOOD GLUCOSE        I&O's Summary    2019 07:01  -  2019 07:00  --------------------------------------------------------  IN: 0 mL / OUT: 1445 mL / NET: -1445 mL    2019 07:01  -  2019 12:26  --------------------------------------------------------  IN: 0 mL / OUT: 400 mL / NET: -400 mL          PHYSICAL EXAM  GENERAL: in severe distress 2/2 pain   HEAD:  Atraumatic, Normocephalic  EYES: EOMI, PERRLA, conjunctiva and sclera clear  ENMT: Moist mucous membranes  NECK: Supple, No JVD  RESPIRATORY: Clear to auscultation bilaterally; No rales, rhonchi, wheezing, or rubs  CARDIOVASCULAR: Regular rate and rhythm; No murmurs, rubs, or gallops  GASTROINTESTINAL: Soft, Nontender, Nondistended; Bowel sounds present  EXTREMITIES:  not examined due to pain  NERVOUS SYSTEM:  Alert & Oriented X3        LABS:                        13.6   11.14 )-----------( 176      ( 2019 04:40 )             43.0     11-05    137  |  101  |  21  ----------------------------<  108<H>  4.1   |  27  |  1.09    Ca    9.8      2019 04:40    TPro  8.4<H>  /  Alb  4.2  /  TBili  0.2  /  DBili  x   /  AST  28  /  ALT  12  /  AlkPhos  95  11-03    PT/INR - ( 2019 14:30 )   PT: 11.1 SEC;   INR: 0.97          PTT - ( 2019 14:30 )  PTT:27.0 SEC      Urinalysis Basic - ( 2019 15:30 )    Color: LIGHT YELLOW / Appearance: CLEAR / S.011 / pH: 6.5  Gluc: NEGATIVE / Ketone: NEGATIVE  / Bili: NEGATIVE / Urobili: NORMAL   Blood: NEGATIVE / Protein: NEGATIVE / Nitrite: NEGATIVE   Leuk Esterase: NEGATIVE / RBC: x / WBC x   Sq Epi: x / Non Sq Epi: x / Bacteria: x        RADIOLOGY & ADDITIONAL TESTS:    Imaging Personally Reviewed:  Consultant(s) Notes Reviewed:    Care Discussed with Consultants/Other Providers: Ortho PA
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
## 2019-11-05 NOTE — PROGRESS NOTE ADULT - SUBJECTIVE AND OBJECTIVE BOX
Orthopedic Surgery Progress Note     S: Patient seen and examined today. No acute events overnight. In Badillo's traction. States that she is in significant pain    MEDICATIONS  (STANDING):  acetaminophen  IVPB .. 1000 milliGRAM(s) IV Intermittent once  enoxaparin Injectable 40 milliGRAM(s) SubCutaneous daily  gabapentin 400 milliGRAM(s) Oral every 8 hours  ketorolac   Injectable 15 milliGRAM(s) IV Push every 6 hours  lidocaine   Patch 1 Patch Transdermal daily  oxyCODONE  ER Tablet 80 milliGRAM(s) Oral every 12 hours  pantoprazole    Tablet 40 milliGRAM(s) Oral before breakfast  senna 2 Tablet(s) Oral at bedtime  sodium chloride 0.9%. 1000 milliLiter(s) (75 mL/Hr) IV Continuous <Continuous>  tiZANidine 2 milliGRAM(s) Oral every 6 hours  zolpidem 5 milliGRAM(s) Oral at bedtime    MEDICATIONS  (PRN):  melatonin 3 milliGRAM(s) Oral at bedtime PRN Insomnia  ondansetron Injectable 4 milliGRAM(s) IV Push every 6 hours PRN Nausea and/or Vomiting  oxyCODONE    IR 30 milliGRAM(s) Oral every 4 hours PRN Severe Pain (7 - 10)  oxyCODONE    IR 25 milliGRAM(s) Oral every 4 hours PRN Moderate Pain (4 - 6)      Physical Exam:    Vital Signs Last 24 Hrs  T(C): 36.6 (05 Nov 2019 01:41), Max: 36.9 (04 Nov 2019 14:26)  T(F): 97.9 (05 Nov 2019 01:41), Max: 98.4 (04 Nov 2019 14:26)  HR: 80 (05 Nov 2019 01:41) (70 - 84)  BP: 139/73 (05 Nov 2019 01:41) (131/68 - 153/86)  BP(mean): --  RR: 17 (05 Nov 2019 01:41) (17 - 21)  SpO2: 100% (05 Nov 2019 01:41) (97% - 100%)    11-03-19 @ 07:01  -  11-04-19 @ 07:00  --------------------------------------------------------  IN: 0 mL / OUT: 825 mL / NET: -825 mL    11-04-19 @ 07:01  -  11-05-19 @ 06:23  --------------------------------------------------------  IN: 0 mL / OUT: 1445 mL / NET: -1445 mL          Gen: awake, alert, mild distress for severe pain  Resp: no increased work of breathing  RLE:  - skin intact in badillo's traction  +EHL/FHL/TA/GS  SILT L3-S1  WWP  Compartments soft  No calf TTP           LABS:                        13.6   11.14 )-----------( 176      ( 05 Nov 2019 04:40 )             43.0     11-05    137  |  101  |  21  ----------------------------<  108<H>  4.1   |  27  |  1.09    Ca    9.8      05 Nov 2019 04:40    TPro  8.4<H>  /  Alb  4.2  /  TBili  0.2  /  DBili  x   /  AST  28  /  ALT  12  /  AlkPhos  95  11-03

## 2019-11-05 NOTE — PROGRESS NOTE ADULT - SUBJECTIVE AND OBJECTIVE BOX
Follow-up consult on this patient for pain management.    HPI: 53yFemaleUnspecified dislocation of left hip, initial encounter  No pertinent family history in first degree relatives  Handoff  MEWS Score  No pertinent past medical history  Hip dislocation, left  Adjustment disorder with anxiety  Closed pelvic fracture  LEG PAIN  90+      enoxaparin Injectable 40 milliGRAM(s) SubCutaneous daily  gabapentin 400 milliGRAM(s) Oral every 8 hours  ketorolac   Injectable 15 milliGRAM(s) IV Push every 6 hours  lidocaine   Patch 1 Patch Transdermal daily  melatonin 3 milliGRAM(s) Oral at bedtime PRN  ondansetron Injectable 4 milliGRAM(s) IV Push every 6 hours PRN  oxyCODONE    IR 30 milliGRAM(s) Oral every 3 hours PRN  oxyCODONE    IR 25 milliGRAM(s) Oral every 3 hours PRN  oxyCODONE  ER Tablet 80 milliGRAM(s) Oral every 12 hours  pantoprazole    Tablet 40 milliGRAM(s) Oral before breakfast  senna 2 Tablet(s) Oral at bedtime  sodium chloride 0.9%. 1000 milliLiter(s) IV Continuous <Continuous>  zolpidem 5 milliGRAM(s) Oral at bedtime      No Known Allergies      acetaminophen  IVPB ..: [Known as OFIRMEV.]  1000 milliGRAM(s) in IV Solution 100 milliLiter(s), IV Intermittent, once, infuse over 15 Minute(s), Stop After 1 Doses  Special Instructions: Do not give within 6 hours after last dose of Acetaminophen.  Administration Instructions: MAX DAILY DOSE:  ADULT = 4,000 mG/Day  This is a Look-alike/Sound-alike Medication  Provider's Contact #: [phone] (11-04-19 @ 11:21)  acetaminophen  IVPB ..: [Known as OFIRMEV.]  1000 milliGRAM(s) in IV Solution 100 milliLiter(s), IV Intermittent, once, infuse over 15 Minute(s), Stop After 1 Doses  Special Instructions: Do not give within 6 hours after last dose of Acetaminophen.  Administration Instructions: MAX DAILY DOSE:  ADULT = 4,000 mG/Day  This is a Look-alike/Sound-alike Medication  Provider's Contact #: [phone] (11-04-19 @ 11:21)  acetaminophen  IVPB ..: [Known as OFIRMEV.]  1000 milliGRAM(s) in IV Solution 100 milliLiter(s), IV Intermittent, once, infuse over 15 Minute(s), Stop After 1 Doses  Special Instructions: Do not give within 6 hours after last dose of Acetaminophen.  Administration Instructions: MAX DAILY DOSE:  ADULT = 4,000 mG/Day  This is a Look-alike/Sound-alike Medication  Provider's Contact #: [phone] (11-04-19 @ 11:21)  acetaminophen  IVPB ..: [Known as OFIRMEV.]  1000 milliGRAM(s) in IV Solution 100 milliLiter(s), IV Intermittent, once, infuse over 15 Minute(s), Stop After 1 Doses  Special Instructions: Do not give within 6 hours after last dose of Acetaminophen.  Administration Instructions: MAX DAILY DOSE:  ADULT = 4,000 mG/Day  This is a Look-alike/Sound-alike Medication  Provider's Contact #: [phone] (11-04-19 @ 11:21)  Pulse Oximetry:   Frequency: <Continuous>    Additional Instructions:  high opioid use with benzodiazepines (11-04-19 @ 11:23)  gabapentin: [Known as NEURONTIN]  400 milliGRAM(s), Oral, every 8 hours  Special Instructions: Hold for oversedation.  Provider's Contact #: [phone] (11-04-19 @ 11:41)  HYDROmorphone  Injectable: [Ordered as DILAUDID Injectable]  1 milliGRAM(s), IV Push, every 4 hours, PRN for Severe breakthrough Pain (7 - 10)  Special Instructions: Hold for oversedation.  Administration Instructions: This is a Look-alike/Sound-alike Medication  Provider's Contact #: [phone] (11-04-19 @ 11:44)  gabapentin: [Known as NEURONTIN]  400 milliGRAM(s), Oral, every 8 hours  Special Instructions: Hold for oversedation.  Provider's Contact #: [phone] (11-04-19 @ 12:02)  oxyCODONE    IR:   30 milliGRAM(s), Oral, every 4 hours, PRN for Severe Pain (7 - 10)  Special Instructions: Hold for oversedatioin. Not to be given within 4 hours of any dose of oxycodone  Administration Instructions: This is a Look-alike/Sound-alike Medication  Provider's Contact #: [phone] (11-04-19 @ 12:04)  oxyCODONE    IR:   25 milliGRAM(s), Oral, every 4 hours, PRN for Moderate Pain (4 - 6)  Special Instructions: Hold for oversedatioin. Not to be given within 4 hours of any dose of oxycodone  Administration Instructions: This is a Look-alike/Sound-alike Medication  Provider's Contact #: [phone] (11-04-19 @ 12:04)  lidocaine   Patch: [Known as LIDODERM]  1 Patch, Transdermal, daily  Administration Instructions: Patch may remain in place for up to 12 hours in any 24-hour period.  * External Use Only *  Provider's Contact #: [phone] (11-04-19 @ 12:07)  ketorolac   Injectable: [Ordered as TORADOL Injectable]  15 milliGRAM(s), IV Push, every 6 hours, Stop After 4 Doses  Administration Instructions: IF IV PUSH, ADMINISTER OVER 1 MINUTE  Dispose unused medication in BLACK bin. (11-04-19 @ 12:35)  QUEtiapine: [Ordered as SEROquel]  25 milliGRAM(s), Oral, every 6 hours, PRN for anxiety/mild agitation  Special Instructions: hold for sedation  Administration Instructions: This is a Look-alike/Sound-alike Medication  Provider's Contact #: [phone] (11-04-19 @ 16:12)  OLANZapine Injectable: [Ordered as ZyPREXA Injectable]  2.5 milliGRAM(s), IntraMuscular, every 6 hours, PRN for severe agitation  Special Instructions: hold for sedation  Administration Instructions: Reconstitute 10 mG vial with 2.1 mL sterile water  Final concentration 5mG/mL  This is a Look-alike/Sound-alike Medication  Provider's Contact #: [phone] (11-04-19 @ 16:12)  (Floorstock):   Qty Removed: 1 each (11-04-19 @ 16:37)  Chart Check (11-04-19 @ 19:48)  Diet, NPO after Midnight:      NPO Start Date: 05-Nov-2019,   NPO Start Time: 23:59 (11-05-19 @ 00:15)  Complete Blood Count: 04:00 (11-05-19 @ 00:15)  Basic Metabolic Panel: 04:00 (11-05-19 @ 00:15)  Prothrombin Time and INR, Plasma:  Start Date:06-Nov-2019. 04:00 (11-05-19 @ 00:15)  Activated Partial Thromboplastin Time:  Start Date:06-Nov-2019. 04:00 (11-05-19 @ 00:15)  Type + Screen: 04:00 (11-05-19 @ 00:15)  sodium chloride 0.9%.: Solution, 1000 milliLiter(s) infuse at 75 mL/Hr  Special Instructions: For NPO Period (11-05-19 @ 02:09)  Provider to RN:       Chlorohex wipes and iodine nasal swabs for preop (11-05-19 @ 02:10)  Diet, NPO after Midnight:      NPO Start Date: 05-Nov-2019,   NPO Start Time: 23:59  Except Medications (11-05-19 @ 06:22)  Chart Check (11-05-19 @ 08:43)  oxyCODONE    IR:   30 milliGRAM(s), Oral, every 3 hours, PRN for Severe Pain (7 - 10)  Special Instructions: Hold for oversedatioin. Not to be given within 3 hours of any dose of oxycodone  Administration Instructions: This is a Look-alike/Sound-alike Medication  Provider's Contact #: [phone] (11-05-19 @ 11:01)  oxyCODONE    IR:   25 milliGRAM(s), Oral, every 3 hours, PRN for Moderate Pain (4 - 6)  Special Instructions: Hold for oversedatioin. Not to be given within 3  hours of any dose of oxycodone  Administration Instructions: This is a Look-alike/Sound-alike Medication  Provider's Contact #: [phone] (11-05-19 @ 11:01)  tiZANidine: [Known as ZANAFLEX]  4 milliGRAM(s), Oral, every 6 hours, Stop After 4 Doses  Special Instructions: hold for sedation  Administration Instructions: This is a Look-alike/Sound-alike Medication  NON-FORMULARY REQUEST: spasm  Provider's Contact #: [phone] (11-05-19 @ 11:08)  tiZANidine: [Known as ZANAFLEX]  4 milliGRAM(s), Oral, every 6 hours  Special Instructions: Hold for sedation  Administration Instructions: This is a Look-alike/Sound-alike Medication  NON-FORMULARY REQUEST: spasm  Provider's Contact #: [phone] (11-05-19 @ 11:10)    Summary:  Patient is sitting up in bed, leaning on her right side, weeping in bed, and complaining of pain at her left hip.  Patient requesting if she can have ketamine again.  Also, she is requesting that her dosage of Oxycodone for breakthrough be increased because sometimes she takes 1-2 tablets of Oxycodone 30 mg every 3 hours and the Oxycodone only lasts 3 hours for her.    PHYSICAL EXAM:  GENERAL: Alert & Oriented x 3 in NAD, well-groomed, well-developed     Impression/Plan: Discussed patient with Anesthesia attending and ok to change Oxycodone for breakthrough pain to every 3 hours prn, instead of every 4 hours.  Also to increase Tizanidine dosage.  8T nurse manager called about changing patient's bed for comfort, and an Envella bed already ordered.  If patient does not feel relief with Envella bed, a possible red mattress sport connect bed that inflates that we have in house can also be a possible option.  Patient was seen 11-05-19 @ 11:20

## 2019-11-06 ENCOUNTER — RESULT REVIEW (OUTPATIENT)
Age: 54
End: 2019-11-06

## 2019-11-06 ENCOUNTER — APPOINTMENT (OUTPATIENT)
Dept: ORTHOPEDIC SURGERY | Facility: HOSPITAL | Age: 54
End: 2019-11-06

## 2019-11-06 LAB
AMPHET UR-MCNC: NEGATIVE — SIGNIFICANT CHANGE UP
ANION GAP SERPL CALC-SCNC: 13 MMO/L — SIGNIFICANT CHANGE UP (ref 7–14)
ANION GAP SERPL CALC-SCNC: 9 MMO/L — SIGNIFICANT CHANGE UP (ref 7–14)
APTT BLD: 29.6 SEC — SIGNIFICANT CHANGE UP (ref 27.5–36.3)
BARBITURATES UR SCN-MCNC: NEGATIVE — SIGNIFICANT CHANGE UP
BASE EXCESS BLDA CALC-SCNC: -0.3 MMOL/L — SIGNIFICANT CHANGE UP
BASE EXCESS BLDA CALC-SCNC: -0.9 MMOL/L — SIGNIFICANT CHANGE UP
BENZODIAZ UR-MCNC: NEGATIVE — SIGNIFICANT CHANGE UP
BLD GP AB SCN SERPL QL: NEGATIVE — SIGNIFICANT CHANGE UP
BUN SERPL-MCNC: 15 MG/DL — SIGNIFICANT CHANGE UP (ref 7–23)
BUN SERPL-MCNC: 23 MG/DL — SIGNIFICANT CHANGE UP (ref 7–23)
CA-I BLDA-SCNC: 1.2 MMOL/L — SIGNIFICANT CHANGE UP (ref 1.15–1.29)
CA-I BLDA-SCNC: 1.22 MMOL/L — SIGNIFICANT CHANGE UP (ref 1.15–1.29)
CALCIUM SERPL-MCNC: 8.5 MG/DL — SIGNIFICANT CHANGE UP (ref 8.4–10.5)
CALCIUM SERPL-MCNC: 9.5 MG/DL — SIGNIFICANT CHANGE UP (ref 8.4–10.5)
CANNABINOIDS UR-MCNC: POSITIVE — SIGNIFICANT CHANGE UP
CHLORIDE SERPL-SCNC: 105 MMOL/L — SIGNIFICANT CHANGE UP (ref 98–107)
CHLORIDE SERPL-SCNC: 106 MMOL/L — SIGNIFICANT CHANGE UP (ref 98–107)
CO2 SERPL-SCNC: 21 MMOL/L — LOW (ref 22–31)
CO2 SERPL-SCNC: 24 MMOL/L — SIGNIFICANT CHANGE UP (ref 22–31)
COCAINE METAB.OTHER UR-MCNC: NEGATIVE — SIGNIFICANT CHANGE UP
CREAT SERPL-MCNC: 0.79 MG/DL — SIGNIFICANT CHANGE UP (ref 0.5–1.3)
CREAT SERPL-MCNC: 1.09 MG/DL — SIGNIFICANT CHANGE UP (ref 0.5–1.3)
GLUCOSE BLDA-MCNC: 100 MG/DL — HIGH (ref 70–99)
GLUCOSE BLDA-MCNC: 90 MG/DL — SIGNIFICANT CHANGE UP (ref 70–99)
GLUCOSE SERPL-MCNC: 114 MG/DL — HIGH (ref 70–99)
GLUCOSE SERPL-MCNC: 93 MG/DL — SIGNIFICANT CHANGE UP (ref 70–99)
GRAM STN WND: SIGNIFICANT CHANGE UP
HCG SERPL-ACNC: < 5 MIU/ML — SIGNIFICANT CHANGE UP
HCO3 BLDA-SCNC: 23 MMOL/L — SIGNIFICANT CHANGE UP (ref 22–26)
HCO3 BLDA-SCNC: 24 MMOL/L — SIGNIFICANT CHANGE UP (ref 22–26)
HCT VFR BLD CALC: 30.2 % — LOW (ref 34.5–45)
HCT VFR BLD CALC: 42.9 % — SIGNIFICANT CHANGE UP (ref 34.5–45)
HCT VFR BLDA CALC: 30.4 % — LOW (ref 34.5–46.5)
HCT VFR BLDA CALC: 30.9 % — LOW (ref 34.5–46.5)
HGB BLD-MCNC: 13.8 G/DL — SIGNIFICANT CHANGE UP (ref 11.5–15.5)
HGB BLD-MCNC: 9.6 G/DL — LOW (ref 11.5–15.5)
HGB BLDA-MCNC: 10 G/DL — LOW (ref 11.5–15.5)
HGB BLDA-MCNC: 9.8 G/DL — LOW (ref 11.5–15.5)
INR BLD: 1.02 — SIGNIFICANT CHANGE UP (ref 0.88–1.17)
MCHC RBC-ENTMCNC: 29.7 PG — SIGNIFICANT CHANGE UP (ref 27–34)
MCHC RBC-ENTMCNC: 29.8 PG — SIGNIFICANT CHANGE UP (ref 27–34)
MCHC RBC-ENTMCNC: 31.8 % — LOW (ref 32–36)
MCHC RBC-ENTMCNC: 32.2 % — SIGNIFICANT CHANGE UP (ref 32–36)
MCV RBC AUTO: 92.3 FL — SIGNIFICANT CHANGE UP (ref 80–100)
MCV RBC AUTO: 93.8 FL — SIGNIFICANT CHANGE UP (ref 80–100)
METHADONE UR-MCNC: NEGATIVE — SIGNIFICANT CHANGE UP
NRBC # FLD: 0 K/UL — SIGNIFICANT CHANGE UP (ref 0–0)
NRBC # FLD: 0 K/UL — SIGNIFICANT CHANGE UP (ref 0–0)
OPIATES UR-MCNC: NEGATIVE — SIGNIFICANT CHANGE UP
OXYCODONE UR-MCNC: POSITIVE — HIGH
PCO2 BLDA: 45 MMHG — SIGNIFICANT CHANGE UP (ref 32–48)
PCO2 BLDA: 51 MMHG — HIGH (ref 32–48)
PCP UR-MCNC: NEGATIVE — SIGNIFICANT CHANGE UP
PH BLDA: 7.3 PH — LOW (ref 7.35–7.45)
PH BLDA: 7.36 PH — SIGNIFICANT CHANGE UP (ref 7.35–7.45)
PLATELET # BLD AUTO: 130 K/UL — LOW (ref 150–400)
PLATELET # BLD AUTO: 176 K/UL — SIGNIFICANT CHANGE UP (ref 150–400)
PMV BLD: 12.5 FL — SIGNIFICANT CHANGE UP (ref 7–13)
PMV BLD: 12.6 FL — SIGNIFICANT CHANGE UP (ref 7–13)
PO2 BLDA: 148 MMHG — HIGH (ref 83–108)
PO2 BLDA: 242 MMHG — HIGH (ref 83–108)
POTASSIUM BLDA-SCNC: 4 MMOL/L — SIGNIFICANT CHANGE UP (ref 3.4–4.5)
POTASSIUM BLDA-SCNC: 4.1 MMOL/L — SIGNIFICANT CHANGE UP (ref 3.4–4.5)
POTASSIUM SERPL-MCNC: 4.2 MMOL/L — SIGNIFICANT CHANGE UP (ref 3.5–5.3)
POTASSIUM SERPL-MCNC: 4.5 MMOL/L — SIGNIFICANT CHANGE UP (ref 3.5–5.3)
POTASSIUM SERPL-SCNC: 4.2 MMOL/L — SIGNIFICANT CHANGE UP (ref 3.5–5.3)
POTASSIUM SERPL-SCNC: 4.5 MMOL/L — SIGNIFICANT CHANGE UP (ref 3.5–5.3)
PROTHROM AB SERPL-ACNC: 11.6 SEC — SIGNIFICANT CHANGE UP (ref 9.8–13.1)
RBC # BLD: 3.22 M/UL — LOW (ref 3.8–5.2)
RBC # BLD: 4.65 M/UL — SIGNIFICANT CHANGE UP (ref 3.8–5.2)
RBC # FLD: 15.2 % — HIGH (ref 10.3–14.5)
RBC # FLD: 15.4 % — HIGH (ref 10.3–14.5)
RH IG SCN BLD-IMP: NEGATIVE — SIGNIFICANT CHANGE UP
SAO2 % BLDA: 98.9 % — SIGNIFICANT CHANGE UP (ref 95–99)
SAO2 % BLDA: 99.2 % — HIGH (ref 95–99)
SODIUM BLDA-SCNC: 138 MMOL/L — SIGNIFICANT CHANGE UP (ref 136–146)
SODIUM BLDA-SCNC: 140 MMOL/L — SIGNIFICANT CHANGE UP (ref 136–146)
SODIUM SERPL-SCNC: 139 MMOL/L — SIGNIFICANT CHANGE UP (ref 135–145)
SODIUM SERPL-SCNC: 139 MMOL/L — SIGNIFICANT CHANGE UP (ref 135–145)
SPECIMEN SOURCE: SIGNIFICANT CHANGE UP
WBC # BLD: 18.91 K/UL — HIGH (ref 3.8–10.5)
WBC # BLD: 9.53 K/UL — SIGNIFICANT CHANGE UP (ref 3.8–10.5)
WBC # FLD AUTO: 18.91 K/UL — HIGH (ref 3.8–10.5)
WBC # FLD AUTO: 9.53 K/UL — SIGNIFICANT CHANGE UP (ref 3.8–10.5)

## 2019-11-06 PROCEDURE — 27299 UNLISTED PX PELVIS/HIP JOINT: CPT | Mod: LT

## 2019-11-06 PROCEDURE — 76000 FLUOROSCOPY <1 HR PHYS/QHP: CPT | Mod: 26

## 2019-11-06 PROCEDURE — 27134 REVISE HIP JOINT REPLACEMENT: CPT | Mod: LT

## 2019-11-06 PROCEDURE — 97605 NEG PRS WND THER DME<=50SQCM: CPT | Mod: 59

## 2019-11-06 PROCEDURE — 88331 PATH CONSLTJ SURG 1 BLK 1SPC: CPT | Mod: 26

## 2019-11-06 PROCEDURE — 86079 PHYS BLOOD BANK SERV AUTHRJ: CPT

## 2019-11-06 PROCEDURE — 27134 REVISE HIP JOINT REPLACEMENT: CPT | Mod: 82,LT

## 2019-11-06 PROCEDURE — 99231 SBSQ HOSP IP/OBS SF/LOW 25: CPT | Mod: GC

## 2019-11-06 PROCEDURE — 73501 X-RAY EXAM HIP UNI 1 VIEW: CPT | Mod: 26,LT

## 2019-11-06 PROCEDURE — 88305 TISSUE EXAM BY PATHOLOGIST: CPT | Mod: 26

## 2019-11-06 RX ORDER — ASCORBIC ACID 60 MG
500 TABLET,CHEWABLE ORAL
Refills: 0 | Status: DISCONTINUED | OUTPATIENT
Start: 2019-11-06 | End: 2019-11-10

## 2019-11-06 RX ORDER — DIPHENHYDRAMINE HCL 50 MG
25 CAPSULE ORAL EVERY 4 HOURS
Refills: 0 | Status: DISCONTINUED | OUTPATIENT
Start: 2019-11-06 | End: 2019-11-07

## 2019-11-06 RX ORDER — HEPARIN SODIUM 5000 [USP'U]/ML
5000 INJECTION INTRAVENOUS; SUBCUTANEOUS EVERY 8 HOURS
Refills: 0 | Status: DISCONTINUED | OUTPATIENT
Start: 2019-11-07 | End: 2019-11-07

## 2019-11-06 RX ORDER — NALBUPHINE HYDROCHLORIDE 10 MG/ML
2.5 INJECTION, SOLUTION INTRAMUSCULAR; INTRAVENOUS; SUBCUTANEOUS EVERY 6 HOURS
Refills: 0 | Status: DISCONTINUED | OUTPATIENT
Start: 2019-11-06 | End: 2019-11-07

## 2019-11-06 RX ORDER — HYDROMORPHONE HYDROCHLORIDE 2 MG/ML
0.5 INJECTION INTRAMUSCULAR; INTRAVENOUS; SUBCUTANEOUS
Refills: 0 | Status: DISCONTINUED | OUTPATIENT
Start: 2019-11-06 | End: 2019-11-07

## 2019-11-06 RX ORDER — OXYCODONE HYDROCHLORIDE 5 MG/1
30 TABLET ORAL
Refills: 0 | Status: DISCONTINUED | OUTPATIENT
Start: 2019-11-06 | End: 2019-11-09

## 2019-11-06 RX ORDER — KETAMINE HYDROCHLORIDE 100 MG/ML
5 INJECTION INTRAMUSCULAR; INTRAVENOUS ONCE
Refills: 0 | Status: DISCONTINUED | OUTPATIENT
Start: 2019-11-06 | End: 2019-11-06

## 2019-11-06 RX ORDER — HYDROMORPHONE HYDROCHLORIDE 2 MG/ML
1 INJECTION INTRAMUSCULAR; INTRAVENOUS; SUBCUTANEOUS
Refills: 0 | Status: DISCONTINUED | OUTPATIENT
Start: 2019-11-06 | End: 2019-11-07

## 2019-11-06 RX ORDER — ONDANSETRON 8 MG/1
4 TABLET, FILM COATED ORAL EVERY 6 HOURS
Refills: 0 | Status: DISCONTINUED | OUTPATIENT
Start: 2019-11-06 | End: 2019-11-07

## 2019-11-06 RX ORDER — CEFAZOLIN SODIUM 1 G
2000 VIAL (EA) INJECTION EVERY 8 HOURS
Refills: 0 | Status: COMPLETED | OUTPATIENT
Start: 2019-11-06 | End: 2019-11-07

## 2019-11-06 RX ORDER — FOLIC ACID 0.8 MG
1 TABLET ORAL DAILY
Refills: 0 | Status: DISCONTINUED | OUTPATIENT
Start: 2019-11-06 | End: 2019-11-10

## 2019-11-06 RX ORDER — ROPIVACAINE HCL/PF 5 MG/ML
200 AMPUL (ML) INJECTION
Refills: 0 | Status: DISCONTINUED | OUTPATIENT
Start: 2019-11-06 | End: 2019-11-07

## 2019-11-06 RX ORDER — FERROUS SULFATE 325(65) MG
325 TABLET ORAL
Refills: 0 | Status: DISCONTINUED | OUTPATIENT
Start: 2019-11-06 | End: 2019-11-10

## 2019-11-06 RX ORDER — ACETAMINOPHEN 500 MG
1000 TABLET ORAL ONCE
Refills: 0 | Status: COMPLETED | OUTPATIENT
Start: 2019-11-06 | End: 2019-11-06

## 2019-11-06 RX ORDER — KETAMINE HYDROCHLORIDE 100 MG/ML
10 INJECTION INTRAMUSCULAR; INTRAVENOUS ONCE
Refills: 0 | Status: DISCONTINUED | OUTPATIENT
Start: 2019-11-06 | End: 2019-11-08

## 2019-11-06 RX ORDER — SODIUM CHLORIDE 9 MG/ML
1000 INJECTION, SOLUTION INTRAVENOUS
Refills: 0 | Status: DISCONTINUED | OUTPATIENT
Start: 2019-11-06 | End: 2019-11-10

## 2019-11-06 RX ORDER — HYDROMORPHONE HYDROCHLORIDE 2 MG/ML
1.5 INJECTION INTRAMUSCULAR; INTRAVENOUS; SUBCUTANEOUS
Refills: 0 | Status: DISCONTINUED | OUTPATIENT
Start: 2019-11-06 | End: 2019-11-09

## 2019-11-06 RX ORDER — ONDANSETRON 8 MG/1
4 TABLET, FILM COATED ORAL ONCE
Refills: 0 | Status: DISCONTINUED | OUTPATIENT
Start: 2019-11-06 | End: 2019-11-07

## 2019-11-06 RX ORDER — ACETAMINOPHEN 500 MG
650 TABLET ORAL EVERY 6 HOURS
Refills: 0 | Status: DISCONTINUED | OUTPATIENT
Start: 2019-11-06 | End: 2019-11-08

## 2019-11-06 RX ORDER — NALOXONE HYDROCHLORIDE 4 MG/.1ML
0.1 SPRAY NASAL
Refills: 0 | Status: DISCONTINUED | OUTPATIENT
Start: 2019-11-06 | End: 2019-11-07

## 2019-11-06 RX ORDER — OXYCODONE HYDROCHLORIDE 5 MG/1
45 TABLET ORAL
Refills: 0 | Status: DISCONTINUED | OUTPATIENT
Start: 2019-11-06 | End: 2019-11-09

## 2019-11-06 RX ORDER — ROPIVACAINE HCL/PF 5 MG/ML
5 AMPUL (ML) INJECTION
Refills: 0 | Status: DISCONTINUED | OUTPATIENT
Start: 2019-11-06 | End: 2019-11-07

## 2019-11-06 RX ADMIN — OXYCODONE HYDROCHLORIDE 30 MILLIGRAM(S): 5 TABLET ORAL at 02:00

## 2019-11-06 RX ADMIN — HYDROMORPHONE HYDROCHLORIDE 1 MILLIGRAM(S): 2 INJECTION INTRAMUSCULAR; INTRAVENOUS; SUBCUTANEOUS at 19:20

## 2019-11-06 RX ADMIN — HYDROMORPHONE HYDROCHLORIDE 1 MILLIGRAM(S): 2 INJECTION INTRAMUSCULAR; INTRAVENOUS; SUBCUTANEOUS at 20:00

## 2019-11-06 RX ADMIN — HYDROMORPHONE HYDROCHLORIDE 1 MILLIGRAM(S): 2 INJECTION INTRAMUSCULAR; INTRAVENOUS; SUBCUTANEOUS at 19:30

## 2019-11-06 RX ADMIN — HYDROMORPHONE HYDROCHLORIDE 1 MILLIGRAM(S): 2 INJECTION INTRAMUSCULAR; INTRAVENOUS; SUBCUTANEOUS at 20:10

## 2019-11-06 RX ADMIN — SODIUM CHLORIDE 75 MILLILITER(S): 9 INJECTION INTRAMUSCULAR; INTRAVENOUS; SUBCUTANEOUS at 00:01

## 2019-11-06 RX ADMIN — GABAPENTIN 400 MILLIGRAM(S): 400 CAPSULE ORAL at 05:01

## 2019-11-06 RX ADMIN — OXYCODONE HYDROCHLORIDE 80 MILLIGRAM(S): 5 TABLET ORAL at 08:04

## 2019-11-06 RX ADMIN — Medication 1 APPLICATION(S): at 06:01

## 2019-11-06 RX ADMIN — OXYCODONE HYDROCHLORIDE 30 MILLIGRAM(S): 5 TABLET ORAL at 01:02

## 2019-11-06 RX ADMIN — Medication 100 MILLIGRAM(S): at 22:03

## 2019-11-06 RX ADMIN — Medication 200 MILLILITER(S): at 18:26

## 2019-11-06 RX ADMIN — OXYCODONE HYDROCHLORIDE 30 MILLIGRAM(S): 5 TABLET ORAL at 05:15

## 2019-11-06 RX ADMIN — HYDROMORPHONE HYDROCHLORIDE 1 MILLIGRAM(S): 2 INJECTION INTRAMUSCULAR; INTRAVENOUS; SUBCUTANEOUS at 19:12

## 2019-11-06 RX ADMIN — HYDROMORPHONE HYDROCHLORIDE 1 MILLIGRAM(S): 2 INJECTION INTRAMUSCULAR; INTRAVENOUS; SUBCUTANEOUS at 20:25

## 2019-11-06 RX ADMIN — CHLORHEXIDINE GLUCONATE 1 APPLICATION(S): 213 SOLUTION TOPICAL at 05:00

## 2019-11-06 RX ADMIN — TIZANIDINE 4 MILLIGRAM(S): 4 TABLET ORAL at 06:15

## 2019-11-06 RX ADMIN — GABAPENTIN 400 MILLIGRAM(S): 400 CAPSULE ORAL at 22:04

## 2019-11-06 RX ADMIN — HYDROMORPHONE HYDROCHLORIDE 1 MILLIGRAM(S): 2 INJECTION INTRAMUSCULAR; INTRAVENOUS; SUBCUTANEOUS at 19:38

## 2019-11-06 RX ADMIN — OXYCODONE HYDROCHLORIDE 30 MILLIGRAM(S): 5 TABLET ORAL at 04:25

## 2019-11-06 RX ADMIN — Medication 400 MILLIGRAM(S): at 23:34

## 2019-11-06 RX ADMIN — OXYCODONE HYDROCHLORIDE 80 MILLIGRAM(S): 5 TABLET ORAL at 22:00

## 2019-11-06 RX ADMIN — Medication 200 MILLILITER(S): at 19:08

## 2019-11-06 RX ADMIN — OXYCODONE HYDROCHLORIDE 30 MILLIGRAM(S): 5 TABLET ORAL at 08:58
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
## 2019-11-06 NOTE — PROGRESS NOTE ADULT - SUBJECTIVE AND OBJECTIVE BOX
Orthopedic Surgery Progress Note     S: Patient seen and examined today. No acute events overnight. Pain is moderately controlled. NPO for OR    MEDICATIONS  (STANDING):  chlorhexidine 2% Cloths 1 Application(s) Topical every 12 hours  gabapentin 400 milliGRAM(s) Oral every 8 hours  lidocaine   Patch 1 Patch Transdermal daily  oxyCODONE  ER Tablet 80 milliGRAM(s) Oral every 12 hours  pantoprazole    Tablet 40 milliGRAM(s) Oral before breakfast  povidone iodine 5% Nasal Swab 1 Application(s) Both Nostrils once  senna 2 Tablet(s) Oral at bedtime  sodium chloride 0.9%. 1000 milliLiter(s) (75 mL/Hr) IV Continuous <Continuous>  zolpidem 5 milliGRAM(s) Oral at bedtime    MEDICATIONS  (PRN):  melatonin 3 milliGRAM(s) Oral at bedtime PRN Insomnia  ondansetron Injectable 4 milliGRAM(s) IV Push every 6 hours PRN Nausea and/or Vomiting  oxyCODONE    IR 30 milliGRAM(s) Oral every 3 hours PRN Severe Pain (7 - 10)  oxyCODONE    IR 25 milliGRAM(s) Oral every 3 hours PRN Moderate Pain (4 - 6)  tiZANidine 4 milliGRAM(s) Oral every 6 hours PRN Muscle Spasm      Physical Exam:    Vital Signs Last 24 Hrs  T(C): 36.6 (06 Nov 2019 05:00), Max: 37 (05 Nov 2019 14:01)  T(F): 97.8 (06 Nov 2019 05:00), Max: 98.6 (05 Nov 2019 14:01)  HR: 68 (06 Nov 2019 05:00) (68 - 77)  BP: 132/77 (06 Nov 2019 05:00) (119/82 - 139/100)  BP(mean): --  RR: 18 (06 Nov 2019 05:00) (17 - 20)  SpO2: 100% (06 Nov 2019 05:00) (96% - 100%)    11-04-19 @ 07:01  -  11-05-19 @ 07:00  --------------------------------------------------------  IN: 0 mL / OUT: 1445 mL / NET: -1445 mL    11-05-19 @ 07:01  -  11-06-19 @ 06:19  --------------------------------------------------------  IN: 0 mL / OUT: 550 mL / NET: -550 mL        Gen: awake, alert, mild distress for severe pain  Resp: no increased work of breathing  RLE:  - skin intact in aguirre's traction  +EHL/FHL/TA/GS  SILT L3-S1  WWP  Compartments soft  No calf TTP               LABS:                        13.8   9.53  )-----------( 176      ( 06 Nov 2019 04:30 )             42.9     11-06    139  |  105  |  23  ----------------------------<  93  4.5   |  21<L>  |  1.09    Ca    9.5      06 Nov 2019 04:30

## 2019-11-06 NOTE — PROGRESS NOTE ADULT - ASSESSMENT
53yFemale with a dislocated L total femur. in Badillo's traction. OR today  - NWB LLE  - Badillo's traction  - Pain control- FU pain management postop  - Planning for OR today  - NPO pm  - cardiology cleared  - FU PPM interrogation: Call EP preop to re-program this morning prior to OR

## 2019-11-06 NOTE — PROGRESS NOTE ADULT - SUBJECTIVE AND OBJECTIVE BOX
Chris Chen, PGY-1  [phone]    Patient is a 53y old  Female who presents with a chief complaint of Hip pain (05 Nov 2019 10:44)      SUBJECTIVE/OVERNIGHT EVENTS: Received TTE yesterday with preserved EF.  Awaiting OR.     MEDICATIONS  (STANDING):  chlorhexidine 2% Cloths 1 Application(s) Topical every 12 hours  gabapentin 400 milliGRAM(s) Oral every 8 hours  lidocaine   Patch 1 Patch Transdermal daily  oxyCODONE  ER Tablet 80 milliGRAM(s) Oral every 12 hours  pantoprazole    Tablet 40 milliGRAM(s) Oral before breakfast  povidone iodine 5% Nasal Swab 1 Application(s) Both Nostrils once  senna 2 Tablet(s) Oral at bedtime  sodium chloride 0.9%. 1000 milliLiter(s) (75 mL/Hr) IV Continuous <Continuous>  zolpidem 5 milliGRAM(s) Oral at bedtime    MEDICATIONS  (PRN):  melatonin 3 milliGRAM(s) Oral at bedtime PRN Insomnia  ondansetron Injectable 4 milliGRAM(s) IV Push every 6 hours PRN Nausea and/or Vomiting  oxyCODONE    IR 30 milliGRAM(s) Oral every 3 hours PRN Severe Pain (7 - 10)  oxyCODONE    IR 25 milliGRAM(s) Oral every 3 hours PRN Moderate Pain (4 - 6)  tiZANidine 4 milliGRAM(s) Oral every 6 hours PRN Muscle Spasm    CAPILLARY BLOOD GLUCOSE        I&O's Summary    05 Nov 2019 07:01  -  06 Nov 2019 07:00  --------------------------------------------------------  IN: 0 mL / OUT: 550 mL / NET: -550 mL          Vital Signs Last 24 Hrs  T(C): 36.6 (06 Nov 2019 05:00), Max: 37 (05 Nov 2019 14:01)  T(F): 97.8 (06 Nov 2019 05:00), Max: 98.6 (05 Nov 2019 14:01)  HR: 68 (06 Nov 2019 05:00) (68 - 77)  BP: 132/77 (06 Nov 2019 05:00) (119/82 - 139/100)  BP(mean): --  RR: 18 (06 Nov 2019 05:00) (17 - 18)  SpO2: 100% (06 Nov 2019 05:00) (96% - 100%)    PHYSICAL EXAM:  GENERAL: moderate distress, well-developed  HEAD:  Atraumatic, Normocephalic  EYES: EOMI, PERRLA, conjunctiva and sclera clear  NECK: Supple, No JVD  CHEST/LUNG: Clear to auscultation bilaterally; No wheeze  HEART: Regular rate and rhythm; No murmurs, rubs, or gallops  ABDOMEN: Soft, Nontender, Nondistended; Bowel sounds present  EXTREMITIES:  2+ Peripheral Pulses, No clubbing, cyanosis, or edema. L hip dislocation/pain.   PSYCH: AAOx3  NEUROLOGY: non-focal  SKIN: No rashes or lesions    LABS                        13.8   9.53  )-----------( 176      ( 06 Nov 2019 04:30 )             42.9                         13.6   11.14 )-----------( 176      ( 05 Nov 2019 04:40 )             43.0     11-06    139  |  105  |  23  ----------------------------<  93  4.5   |  21<L>  |  1.09  11-05    137  |  101  |  21  ----------------------------<  108<H>  4.1   |  27  |  1.09    Ca    9.5      06 Nov 2019 04:30  Ca    9.8      05 Nov 2019 04:40      PT/INR - ( 06 Nov 2019 04:30 )   PT: 11.6 SEC;   INR: 1.02          PTT - ( 06 Nov 2019 04:30 )  PTT:29.6 SEC                RADIOLOGY & ADDITIONAL TESTS:    Imaging Personally Reviewed:    Consultant(s) Notes Reviewed:      Care Discussed with Consultants/Other Providers: Chris Chen, PGY-1  [phone]    Patient is a 53y old  Female who presents with a chief complaint of Hip pain (05 Nov 2019 10:44)      SUBJECTIVE/OVERNIGHT EVENTS: Received TTE yesterday with preserved EF.  Patient not in room (presumably in OR) upon arrival to room. Will attempt to f/u once patient back from OR.     MEDICATIONS  (STANDING):  chlorhexidine 2% Cloths 1 Application(s) Topical every 12 hours  gabapentin 400 milliGRAM(s) Oral every 8 hours  lidocaine   Patch 1 Patch Transdermal daily  oxyCODONE  ER Tablet 80 milliGRAM(s) Oral every 12 hours  pantoprazole    Tablet 40 milliGRAM(s) Oral before breakfast  povidone iodine 5% Nasal Swab 1 Application(s) Both Nostrils once  senna 2 Tablet(s) Oral at bedtime  sodium chloride 0.9%. 1000 milliLiter(s) (75 mL/Hr) IV Continuous <Continuous>  zolpidem 5 milliGRAM(s) Oral at bedtime    MEDICATIONS  (PRN):  melatonin 3 milliGRAM(s) Oral at bedtime PRN Insomnia  ondansetron Injectable 4 milliGRAM(s) IV Push every 6 hours PRN Nausea and/or Vomiting  oxyCODONE    IR 30 milliGRAM(s) Oral every 3 hours PRN Severe Pain (7 - 10)  oxyCODONE    IR 25 milliGRAM(s) Oral every 3 hours PRN Moderate Pain (4 - 6)  tiZANidine 4 milliGRAM(s) Oral every 6 hours PRN Muscle Spasm    CAPILLARY BLOOD GLUCOSE        I&O's Summary    05 Nov 2019 07:01  -  06 Nov 2019 07:00  --------------------------------------------------------  IN: 0 mL / OUT: 550 mL / NET: -550 mL          Vital Signs Last 24 Hrs  T(C): 36.6 (06 Nov 2019 05:00), Max: 37 (05 Nov 2019 14:01)  T(F): 97.8 (06 Nov 2019 05:00), Max: 98.6 (05 Nov 2019 14:01)  HR: 68 (06 Nov 2019 05:00) (68 - 77)  BP: 132/77 (06 Nov 2019 05:00) (119/82 - 139/100)  BP(mean): --  RR: 18 (06 Nov 2019 05:00) (17 - 18)  SpO2: 100% (06 Nov 2019 05:00) (96% - 100%)    PHYSICAL EXAM:  GENERAL: moderate distress, well-developed  HEAD:  Atraumatic, Normocephalic  EYES: EOMI, PERRLA, conjunctiva and sclera clear  NECK: Supple, No JVD  CHEST/LUNG: Clear to auscultation bilaterally; No wheeze  HEART: Regular rate and rhythm; No murmurs, rubs, or gallops  ABDOMEN: Soft, Nontender, Nondistended; Bowel sounds present  EXTREMITIES:  2+ Peripheral Pulses, No clubbing, cyanosis, or edema. L hip dislocation/pain.   PSYCH: AAOx3  NEUROLOGY: non-focal  SKIN: No rashes or lesions    LABS                        13.8   9.53  )-----------( 176      ( 06 Nov 2019 04:30 )             42.9                         13.6   11.14 )-----------( 176      ( 05 Nov 2019 04:40 )             43.0     11-06    139  |  105  |  23  ----------------------------<  93  4.5   |  21<L>  |  1.09  11-05    137  |  101  |  21  ----------------------------<  108<H>  4.1   |  27  |  1.09    Ca    9.5      06 Nov 2019 04:30  Ca    9.8      05 Nov 2019 04:40      PT/INR - ( 06 Nov 2019 04:30 )   PT: 11.6 SEC;   INR: 1.02          PTT - ( 06 Nov 2019 04:30 )  PTT:29.6 SEC                RADIOLOGY & ADDITIONAL TESTS:    Imaging Personally Reviewed:    Consultant(s) Notes Reviewed:      Care Discussed with Consultants/Other Providers: Chris Chen, PGY-1  [phone]    Patient is a 53y old  Female who presents with a chief complaint of Hip pain (05 Nov 2019 10:44)      SUBJECTIVE/OVERNIGHT EVENTS: Received TTE yesterday with preserved EF.  Patient not in room (presumably in OR) upon arrival to room. Will attempt to f/u once patient back from OR.     MEDICATIONS  (STANDING):  chlorhexidine 2% Cloths 1 Application(s) Topical every 12 hours  gabapentin 400 milliGRAM(s) Oral every 8 hours  lidocaine   Patch 1 Patch Transdermal daily  oxyCODONE  ER Tablet 80 milliGRAM(s) Oral every 12 hours  pantoprazole    Tablet 40 milliGRAM(s) Oral before breakfast  povidone iodine 5% Nasal Swab 1 Application(s) Both Nostrils once  senna 2 Tablet(s) Oral at bedtime  sodium chloride 0.9%. 1000 milliLiter(s) (75 mL/Hr) IV Continuous <Continuous>  zolpidem 5 milliGRAM(s) Oral at bedtime    MEDICATIONS  (PRN):  melatonin 3 milliGRAM(s) Oral at bedtime PRN Insomnia  ondansetron Injectable 4 milliGRAM(s) IV Push every 6 hours PRN Nausea and/or Vomiting  oxyCODONE    IR 30 milliGRAM(s) Oral every 3 hours PRN Severe Pain (7 - 10)  oxyCODONE    IR 25 milliGRAM(s) Oral every 3 hours PRN Moderate Pain (4 - 6)  tiZANidine 4 milliGRAM(s) Oral every 6 hours PRN Muscle Spasm    CAPILLARY BLOOD GLUCOSE        I&O's Summary    05 Nov 2019 07:01  -  06 Nov 2019 07:00  --------------------------------------------------------  IN: 0 mL / OUT: 550 mL / NET: -550 mL          Vital Signs Last 24 Hrs  T(C): 36.6 (06 Nov 2019 05:00), Max: 37 (05 Nov 2019 14:01)  T(F): 97.8 (06 Nov 2019 05:00), Max: 98.6 (05 Nov 2019 14:01)  HR: 68 (06 Nov 2019 05:00) (68 - 77)  BP: 132/77 (06 Nov 2019 05:00) (119/82 - 139/100)  BP(mean): --  RR: 18 (06 Nov 2019 05:00) (17 - 18)  SpO2: 100% (06 Nov 2019 05:00) (96% - 100%)      LABS                        13.8   9.53  )-----------( 176      ( 06 Nov 2019 04:30 )             42.9                         13.6   11.14 )-----------( 176      ( 05 Nov 2019 04:40 )             43.0     11-06    139  |  105  |  23  ----------------------------<  93  4.5   |  21<L>  |  1.09  11-05    137  |  101  |  21  ----------------------------<  108<H>  4.1   |  27  |  1.09    Ca    9.5      06 Nov 2019 04:30  Ca    9.8      05 Nov 2019 04:40      PT/INR - ( 06 Nov 2019 04:30 )   PT: 11.6 SEC;   INR: 1.02          PTT - ( 06 Nov 2019 04:30 )  PTT:29.6 SEC                RADIOLOGY & ADDITIONAL TESTS:    Imaging Personally Reviewed:    Consultant(s) Notes Reviewed:      Care Discussed with Consultants/Other Providers:

## 2019-11-06 NOTE — CHART NOTE - NSCHARTNOTEFT_GEN_A_CORE
Pt cleared by Cardiology for OR.  No additional medical testing necessary prior to OR.  Will f/u post surgery
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
## 2019-11-06 NOTE — BRIEF OPERATIVE NOTE - OPERATION/FINDINGS
left total hip acetabular cage failure of hardware  revision left total hip arthroplasty  placement of cage and cup, shortening of total femur  epidural anesthesia  posterior approach

## 2019-11-07 ENCOUNTER — TRANSCRIPTION ENCOUNTER (OUTPATIENT)
Age: 54
End: 2019-11-07

## 2019-11-07 LAB
ANION GAP SERPL CALC-SCNC: 13 MMO/L — SIGNIFICANT CHANGE UP (ref 7–14)
APTT BLD: 27 SEC — LOW (ref 27.5–36.3)
BUN SERPL-MCNC: 14 MG/DL — SIGNIFICANT CHANGE UP (ref 7–23)
CALCIUM SERPL-MCNC: 8.9 MG/DL — SIGNIFICANT CHANGE UP (ref 8.4–10.5)
CHLORIDE SERPL-SCNC: 103 MMOL/L — SIGNIFICANT CHANGE UP (ref 98–107)
CO2 SERPL-SCNC: 25 MMOL/L — SIGNIFICANT CHANGE UP (ref 22–31)
CREAT SERPL-MCNC: 0.92 MG/DL — SIGNIFICANT CHANGE UP (ref 0.5–1.3)
GLUCOSE SERPL-MCNC: 94 MG/DL — SIGNIFICANT CHANGE UP (ref 70–99)
HCT VFR BLD CALC: 29.1 % — LOW (ref 34.5–45)
HGB BLD-MCNC: 9.3 G/DL — LOW (ref 11.5–15.5)
INR BLD: 1.17 — SIGNIFICANT CHANGE UP (ref 0.88–1.17)
MCHC RBC-ENTMCNC: 29.6 PG — SIGNIFICANT CHANGE UP (ref 27–34)
MCHC RBC-ENTMCNC: 32 % — SIGNIFICANT CHANGE UP (ref 32–36)
MCV RBC AUTO: 92.7 FL — SIGNIFICANT CHANGE UP (ref 80–100)
NRBC # FLD: 0 K/UL — SIGNIFICANT CHANGE UP (ref 0–0)
PLATELET # BLD AUTO: 154 K/UL — SIGNIFICANT CHANGE UP (ref 150–400)
PMV BLD: 12.7 FL — SIGNIFICANT CHANGE UP (ref 7–13)
POTASSIUM SERPL-MCNC: 4.1 MMOL/L — SIGNIFICANT CHANGE UP (ref 3.5–5.3)
POTASSIUM SERPL-SCNC: 4.1 MMOL/L — SIGNIFICANT CHANGE UP (ref 3.5–5.3)
PROTHROM AB SERPL-ACNC: 13 SEC — SIGNIFICANT CHANGE UP (ref 9.8–13.1)
RBC # BLD: 3.14 M/UL — LOW (ref 3.8–5.2)
RBC # FLD: 15.1 % — HIGH (ref 10.3–14.5)
SODIUM SERPL-SCNC: 141 MMOL/L — SIGNIFICANT CHANGE UP (ref 135–145)
WBC # BLD: 14.48 K/UL — HIGH (ref 3.8–10.5)
WBC # FLD AUTO: 14.48 K/UL — HIGH (ref 3.8–10.5)

## 2019-11-07 PROCEDURE — 99231 SBSQ HOSP IP/OBS SF/LOW 25: CPT | Mod: GC

## 2019-11-07 PROCEDURE — 99232 SBSQ HOSP IP/OBS MODERATE 35: CPT

## 2019-11-07 RX ORDER — ROPIVACAINE HCL/PF 5 MG/ML
5 AMPUL (ML) INJECTION
Refills: 0 | Status: DISCONTINUED | OUTPATIENT
Start: 2019-11-07 | End: 2019-11-08

## 2019-11-07 RX ORDER — CEFAZOLIN SODIUM 1 G
2000 VIAL (EA) INJECTION EVERY 8 HOURS
Refills: 0 | Status: DISCONTINUED | OUTPATIENT
Start: 2019-11-07 | End: 2019-11-10

## 2019-11-07 RX ORDER — DULOXETINE HYDROCHLORIDE 30 MG/1
30 CAPSULE, DELAYED RELEASE ORAL ONCE
Refills: 0 | Status: COMPLETED | OUTPATIENT
Start: 2019-11-07 | End: 2019-11-07

## 2019-11-07 RX ORDER — NALOXONE HYDROCHLORIDE 4 MG/.1ML
0.1 SPRAY NASAL
Refills: 0 | Status: DISCONTINUED | OUTPATIENT
Start: 2019-11-07 | End: 2019-11-08

## 2019-11-07 RX ORDER — DULOXETINE HYDROCHLORIDE 30 MG/1
30 CAPSULE, DELAYED RELEASE ORAL DAILY
Refills: 0 | Status: DISCONTINUED | OUTPATIENT
Start: 2019-11-08 | End: 2019-11-08

## 2019-11-07 RX ORDER — ROPIVACAINE HCL/PF 5 MG/ML
200 AMPUL (ML) INJECTION
Refills: 0 | Status: DISCONTINUED | OUTPATIENT
Start: 2019-11-07 | End: 2019-11-08

## 2019-11-07 RX ADMIN — Medication 325 MILLIGRAM(S): at 12:26

## 2019-11-07 RX ADMIN — OXYCODONE HYDROCHLORIDE 45 MILLIGRAM(S): 5 TABLET ORAL at 21:51

## 2019-11-07 RX ADMIN — HEPARIN SODIUM 5000 UNIT(S): 5000 INJECTION INTRAVENOUS; SUBCUTANEOUS at 07:51

## 2019-11-07 RX ADMIN — Medication 200 MILLILITER(S): at 08:39

## 2019-11-07 RX ADMIN — ZOLPIDEM TARTRATE 5 MILLIGRAM(S): 10 TABLET ORAL at 00:32

## 2019-11-07 RX ADMIN — ZOLPIDEM TARTRATE 5 MILLIGRAM(S): 10 TABLET ORAL at 21:07

## 2019-11-07 RX ADMIN — OXYCODONE HYDROCHLORIDE 45 MILLIGRAM(S): 5 TABLET ORAL at 12:25

## 2019-11-07 RX ADMIN — Medication 200 MILLILITER(S): at 00:35

## 2019-11-07 RX ADMIN — Medication 1 MILLIGRAM(S): at 12:26

## 2019-11-07 RX ADMIN — Medication 1 TABLET(S): at 12:26

## 2019-11-07 RX ADMIN — OXYCODONE HYDROCHLORIDE 45 MILLIGRAM(S): 5 TABLET ORAL at 08:56

## 2019-11-07 RX ADMIN — Medication 100 MILLIGRAM(S): at 21:53

## 2019-11-07 RX ADMIN — HYDROMORPHONE HYDROCHLORIDE 1.5 MILLIGRAM(S): 2 INJECTION INTRAMUSCULAR; INTRAVENOUS; SUBCUTANEOUS at 13:42

## 2019-11-07 RX ADMIN — Medication 500 MILLIGRAM(S): at 07:52

## 2019-11-07 RX ADMIN — GABAPENTIN 400 MILLIGRAM(S): 400 CAPSULE ORAL at 13:29

## 2019-11-07 RX ADMIN — OXYCODONE HYDROCHLORIDE 45 MILLIGRAM(S): 5 TABLET ORAL at 19:25

## 2019-11-07 RX ADMIN — HYDROMORPHONE HYDROCHLORIDE 1.5 MILLIGRAM(S): 2 INJECTION INTRAMUSCULAR; INTRAVENOUS; SUBCUTANEOUS at 10:21

## 2019-11-07 RX ADMIN — Medication 3 MILLIGRAM(S): at 21:07

## 2019-11-07 RX ADMIN — OXYCODONE HYDROCHLORIDE 30 MILLIGRAM(S): 5 TABLET ORAL at 06:00

## 2019-11-07 RX ADMIN — SENNA PLUS 2 TABLET(S): 8.6 TABLET ORAL at 21:06

## 2019-11-07 RX ADMIN — TIZANIDINE 4 MILLIGRAM(S): 4 TABLET ORAL at 20:00

## 2019-11-07 RX ADMIN — OXYCODONE HYDROCHLORIDE 45 MILLIGRAM(S): 5 TABLET ORAL at 22:39

## 2019-11-07 RX ADMIN — OXYCODONE HYDROCHLORIDE 80 MILLIGRAM(S): 5 TABLET ORAL at 10:21

## 2019-11-07 RX ADMIN — Medication 100 MILLIGRAM(S): at 13:28

## 2019-11-07 RX ADMIN — OXYCODONE HYDROCHLORIDE 30 MILLIGRAM(S): 5 TABLET ORAL at 01:30

## 2019-11-07 RX ADMIN — OXYCODONE HYDROCHLORIDE 80 MILLIGRAM(S): 5 TABLET ORAL at 21:07

## 2019-11-07 RX ADMIN — OXYCODONE HYDROCHLORIDE 45 MILLIGRAM(S): 5 TABLET ORAL at 15:40

## 2019-11-07 RX ADMIN — HYDROMORPHONE HYDROCHLORIDE 1.5 MILLIGRAM(S): 2 INJECTION INTRAMUSCULAR; INTRAVENOUS; SUBCUTANEOUS at 17:16

## 2019-11-07 RX ADMIN — OXYCODONE HYDROCHLORIDE 45 MILLIGRAM(S): 5 TABLET ORAL at 09:30

## 2019-11-07 RX ADMIN — Medication 200 MILLILITER(S): at 17:04

## 2019-11-07 RX ADMIN — Medication 100 MILLIGRAM(S): at 05:10

## 2019-11-07 RX ADMIN — SODIUM CHLORIDE 75 MILLILITER(S): 9 INJECTION, SOLUTION INTRAVENOUS at 00:39

## 2019-11-07 RX ADMIN — HYDROMORPHONE HYDROCHLORIDE 1.5 MILLIGRAM(S): 2 INJECTION INTRAMUSCULAR; INTRAVENOUS; SUBCUTANEOUS at 10:35

## 2019-11-07 RX ADMIN — OXYCODONE HYDROCHLORIDE 30 MILLIGRAM(S): 5 TABLET ORAL at 05:12

## 2019-11-07 RX ADMIN — GABAPENTIN 400 MILLIGRAM(S): 400 CAPSULE ORAL at 21:52

## 2019-11-07 RX ADMIN — TIZANIDINE 4 MILLIGRAM(S): 4 TABLET ORAL at 13:27

## 2019-11-07 RX ADMIN — HYDROMORPHONE HYDROCHLORIDE 1.5 MILLIGRAM(S): 2 INJECTION INTRAMUSCULAR; INTRAVENOUS; SUBCUTANEOUS at 14:39

## 2019-11-07 RX ADMIN — Medication 650 MILLIGRAM(S): at 12:27

## 2019-11-07 RX ADMIN — Medication 325 MILLIGRAM(S): at 07:51

## 2019-11-07 RX ADMIN — HYDROMORPHONE HYDROCHLORIDE 1.5 MILLIGRAM(S): 2 INJECTION INTRAMUSCULAR; INTRAVENOUS; SUBCUTANEOUS at 17:03

## 2019-11-07 RX ADMIN — GABAPENTIN 400 MILLIGRAM(S): 400 CAPSULE ORAL at 05:14

## 2019-11-07 RX ADMIN — OXYCODONE HYDROCHLORIDE 45 MILLIGRAM(S): 5 TABLET ORAL at 16:32

## 2019-11-07 RX ADMIN — OXYCODONE HYDROCHLORIDE 45 MILLIGRAM(S): 5 TABLET ORAL at 13:00

## 2019-11-07 RX ADMIN — Medication 500 MILLIGRAM(S): at 17:31

## 2019-11-07 RX ADMIN — Medication 200 MILLILITER(S): at 20:42

## 2019-11-07 RX ADMIN — HYDROMORPHONE HYDROCHLORIDE 1.5 MILLIGRAM(S): 2 INJECTION INTRAMUSCULAR; INTRAVENOUS; SUBCUTANEOUS at 21:30

## 2019-11-07 RX ADMIN — OXYCODONE HYDROCHLORIDE 30 MILLIGRAM(S): 5 TABLET ORAL at 00:33

## 2019-11-07 RX ADMIN — Medication 325 MILLIGRAM(S): at 17:31

## 2019-11-07 RX ADMIN — HYDROMORPHONE HYDROCHLORIDE 1.5 MILLIGRAM(S): 2 INJECTION INTRAMUSCULAR; INTRAVENOUS; SUBCUTANEOUS at 20:30

## 2019-11-07 RX ADMIN — OXYCODONE HYDROCHLORIDE 45 MILLIGRAM(S): 5 TABLET ORAL at 18:42

## 2019-11-07 NOTE — DISCHARGE NOTE NURSING/CASE MANAGEMENT/SOCIAL WORK - PATIENT PORTAL LINK FT
You can access the FollowMyHealth Patient Portal offered by [facility] by registering at the following website: [email or web address]. By joining [employer]’s FollowMyHealth portal, you will also be able to view your health information using other applications (apps) compatible with our system.

## 2019-11-07 NOTE — PHYSICAL THERAPY INITIAL EVALUATION ADULT - PERTINENT HX OF CURRENT PROBLEM, REHAB EVAL
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 She has been minimally ambulatory with crutches since her previous visit with Dr. Calero on 10/10/2019.

## 2019-11-07 NOTE — PROGRESS NOTE BEHAVIORAL HEALTH - RISK ASSESSMENT
Patient appears to be a low risk of harm to self or others at this time.    Risk factors: anxiety, severe pain,  impulsivity, substance misuse, functional decline, poor reactivity to stressors    Protective factors: denies any active suicidal ideation/intent/plan, no hx of prior attempts, no self-harm behaviors, identifies reasons for living, future oriented, supportive social network, no access to firearms, no legal issues, engaged in treatment Patient appears to be a low risk of harm to self or others at this time.    Risk factors: anxiety, severe pain,  impulsivity, substance misuse, functional decline, poor reactivity to stressors    Protective factors: denies any active suicidal ideation/intent/plan, no hx of prior attempts, no self-harm behaviors, identifies reasons for living, future oriented, supportive social network, no access to firearms, no legal issues, engaged in treatment    Pt. is not at acute risk of harm to self or others at this time and does not need an inpatient admission.
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
## 2019-11-07 NOTE — PROGRESS NOTE ADULT - SUBJECTIVE AND OBJECTIVE BOX
Chaparrita Burks MD  Cardiology Fellow  All Cardiology service information can be found 24/7 on [email or web address], password: [email or web address]    Patient seen and examined at bedside.    Subjective:        Current Meds:  acetaminophen   Tablet .. 650 milliGRAM(s) Oral every 6 hours  ascorbic acid 500 milliGRAM(s) Oral two times a day  ceFAZolin   IVPB 2000 milliGRAM(s) IV Intermittent every 8 hours  chlorhexidine 2% Cloths 1 Application(s) Topical every 12 hours  diphenhydrAMINE   Injectable 25 milliGRAM(s) IV Push every 4 hours PRN  ferrous    sulfate 325 milliGRAM(s) Oral three times a day with meals  folic acid 1 milliGRAM(s) Oral daily  gabapentin 400 milliGRAM(s) Oral every 8 hours  HYDROmorphone  Injectable 1.5 milliGRAM(s) IV Push every 3 hours PRN  ketamine Injectable 10 milliGRAM(s) IV Push once  lactated ringers. 1000 milliLiter(s) IV Continuous <Continuous>  lidocaine   Patch 1 Patch Transdermal daily  melatonin 3 milliGRAM(s) Oral at bedtime PRN  multivitamin 1 Tablet(s) Oral daily  nalbuphine Injectable 2.5 milliGRAM(s) IV Push every 6 hours PRN  naloxone Injectable 0.1 milliGRAM(s) IV Push every 3 minutes PRN  ondansetron Injectable 4 milliGRAM(s) IV Push every 6 hours PRN  ondansetron Injectable 4 milliGRAM(s) IV Push every 6 hours PRN  oxyCODONE    IR 30 milliGRAM(s) Oral every 3 hours PRN  oxyCODONE    IR 45 milliGRAM(s) Oral every 3 hours PRN  oxyCODONE  ER Tablet 80 milliGRAM(s) Oral every 12 hours  pantoprazole    Tablet 40 milliGRAM(s) Oral before breakfast  ropivacaine 0.2% in 0.9% Sodium Chloride PCEA 200 milliLiter(s) Epidural PCA Continuous  ropivacaine 0.2% in 0.9% Sodium Chloride PCEA Rescue Clinician Bolus 5 milliLiter(s) Epidural every 15 minutes PRN  senna 2 Tablet(s) Oral at bedtime  sodium chloride 0.9%. 1000 milliLiter(s) IV Continuous <Continuous>  tiZANidine 4 milliGRAM(s) Oral every 6 hours PRN  zolpidem 5 milliGRAM(s) Oral at bedtime      Vitals:  T(F): 99.2 (11-07), Max: 99.2 (11-07)  HR: 93 (11-07) (70 - 103)  BP: 121/91 (11-07) (106/60 - 163/114)  RR: 18 (11-07)  SpO2: 99% (11-07)  I&O's Summary    06 Nov 2019 07:01  -  07 Nov 2019 07:00  --------------------------------------------------------  IN: 300 mL / OUT: 2815 mL / NET: -2515 mL    07 Nov 2019 07:01  -  07 Nov 2019 10:04  --------------------------------------------------------  IN: 0 mL / OUT: 90 mL / NET: -90 mL        Physical Exam:  Appearance: No acute distress; well appearing  Eyes: PERRL, EOMI, pink conjunctiva  HEENT: Normal oral mucosa  Cardiovascular: RRR, S1, S2, no murmurs, rubs, or gallops; no edema; no JVD  Respiratory: Clear to auscultation bilaterally  Gastrointestinal: soft, non-tender, non-distended with normal bowel sounds  Musculoskeletal: No clubbing; no joint deformity   Neurologic: Non-focal  Lymphatic: No lymphadenopathy  Psychiatry: AAOx3, mood & affect appropriate  Skin: No rashes, ecchymoses, or cyanosis                          9.3    14.48 )-----------( 154      ( 07 Nov 2019 03:11 )             29.1     11-07    141  |  103  |  14  ----------------------------<  94  4.1   |  25  |  0.92    Ca    8.9      07 Nov 2019 03:11      PT/INR - ( 07 Nov 2019 03:11 )   PT: 13.0 SEC;   INR: 1.17          PTT - ( 07 Nov 2019 03:11 )  PTT:27.0 SEC              New ECG(s): Personally reviewed    Interpretation of Telemetry: Chaparrita Burks MD  Cardiology Fellow  All Cardiology service information can be found 24/7 on [email or web address], password: [email or web address]    Patient seen and examined at bedside.    Subjective:      -Was in severe pain at the time of my interview with her.  -Refused to answer questions; was asking for better pain control.    Current Meds:  acetaminophen   Tablet .. 650 milliGRAM(s) Oral every 6 hours  ascorbic acid 500 milliGRAM(s) Oral two times a day  ceFAZolin   IVPB 2000 milliGRAM(s) IV Intermittent every 8 hours  chlorhexidine 2% Cloths 1 Application(s) Topical every 12 hours  diphenhydrAMINE   Injectable 25 milliGRAM(s) IV Push every 4 hours PRN  ferrous    sulfate 325 milliGRAM(s) Oral three times a day with meals  folic acid 1 milliGRAM(s) Oral daily  gabapentin 400 milliGRAM(s) Oral every 8 hours  HYDROmorphone  Injectable 1.5 milliGRAM(s) IV Push every 3 hours PRN  ketamine Injectable 10 milliGRAM(s) IV Push once  lactated ringers. 1000 milliLiter(s) IV Continuous <Continuous>  lidocaine   Patch 1 Patch Transdermal daily  melatonin 3 milliGRAM(s) Oral at bedtime PRN  multivitamin 1 Tablet(s) Oral daily  nalbuphine Injectable 2.5 milliGRAM(s) IV Push every 6 hours PRN  naloxone Injectable 0.1 milliGRAM(s) IV Push every 3 minutes PRN  ondansetron Injectable 4 milliGRAM(s) IV Push every 6 hours PRN  ondansetron Injectable 4 milliGRAM(s) IV Push every 6 hours PRN  oxyCODONE    IR 30 milliGRAM(s) Oral every 3 hours PRN  oxyCODONE    IR 45 milliGRAM(s) Oral every 3 hours PRN  oxyCODONE  ER Tablet 80 milliGRAM(s) Oral every 12 hours  pantoprazole    Tablet 40 milliGRAM(s) Oral before breakfast  ropivacaine 0.2% in 0.9% Sodium Chloride PCEA 200 milliLiter(s) Epidural PCA Continuous  ropivacaine 0.2% in 0.9% Sodium Chloride PCEA Rescue Clinician Bolus 5 milliLiter(s) Epidural every 15 minutes PRN  senna 2 Tablet(s) Oral at bedtime  sodium chloride 0.9%. 1000 milliLiter(s) IV Continuous <Continuous>  tiZANidine 4 milliGRAM(s) Oral every 6 hours PRN  zolpidem 5 milliGRAM(s) Oral at bedtime      Vitals:  T(F): 99.2 (11-07), Max: 99.2 (11-07)  HR: 93 (11-07) (70 - 103)  BP: 121/91 (11-07) (106/60 - 163/114)  RR: 18 (11-07)  SpO2: 99% (11-07)  I&O's Summary    06 Nov 2019 07:01  -  07 Nov 2019 07:00  --------------------------------------------------------  IN: 300 mL / OUT: 2815 mL / NET: -2515 mL    07 Nov 2019 07:01  -  07 Nov 2019 10:04  --------------------------------------------------------  IN: 0 mL / OUT: 90 mL / NET: -90 mL    Physical Exam: Patient did not allow for full examination  Appearance: In severe pain, tearful and sad; otherwise well nourished, well formed  Psychiatry: AAOx3, mood & affect appropriate                            9.3    14.48 )-----------( 154      ( 07 Nov 2019 03:11 )             29.1     11-07    141  |  103  |  14  ----------------------------<  94  4.1   |  25  |  0.92    Ca    8.9      07 Nov 2019 03:11      PT/INR - ( 07 Nov 2019 03:11 )   PT: 13.0 SEC;   INR: 1.17          PTT - ( 07 Nov 2019 03:11 )  PTT:27.0 SEC              New ECG(s): Personally reviewed    Interpretation of Telemetry:

## 2019-11-07 NOTE — PHYSICAL THERAPY INITIAL EVALUATION ADULT - PLANNED THERAPY INTERVENTIONS, PT EVAL
transfer training/bed mobility training/strengthening/Patient left sitting in chair, in NAD, call bell in reach, all lines intact. LOU Plummer present./gait training/ROM

## 2019-11-07 NOTE — OCCUPATIONAL THERAPY INITIAL EVALUATION ADULT - PERTINENT HX OF CURRENT PROBLEM, REHAB EVAL
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 Pt now s/p Total revision of hip replacement posterior approach 11/6/19.

## 2019-11-07 NOTE — OCCUPATIONAL THERAPY INITIAL EVALUATION ADULT - MD ORDER
Occupational Therapy to evaluate and treat. Abduction pillow to be worn while lying down/sitting. Ambulate as tolerated. Out of bed to chair.

## 2019-11-07 NOTE — OCCUPATIONAL THERAPY INITIAL EVALUATION ADULT - PRECAUTIONS/LIMITATIONS, REHAB EVAL
aspiration precautions/left hip precautions/posterior approach/fall precautions/surgical precautions

## 2019-11-07 NOTE — PHYSICAL THERAPY INITIAL EVALUATION ADULT - ACTIVE RANGE OF MOTION EXAMINATION, REHAB EVAL
left knee extension and ankle df/pf intact; left hip flexion 0-65 degrees/bilateral upper extremity Active ROM was WFL (within functional limits)/Right LE Active ROM was WFL (within functional limits)

## 2019-11-07 NOTE — DISCHARGE NOTE NURSING/CASE MANAGEMENT/SOCIAL WORK - NSDCPNINST_GEN_ALL_CORE
Maintain dressing clean, dry and intact. Change daily and as needed. Call MD office for fever greater than 100.4, redness, swelling, increased drainage/bleeding, or pain unrelieved by pain medication. You may bear weight 100% as tolerated. Avoid heavy lifting or straining to move your bowels, this may be a side effect of your pain medication. Take over the counter stool softeners to prevent constipation. Follow-up with MD as directed.

## 2019-11-07 NOTE — PROGRESS NOTE ADULT - ASSESSMENT
A/P: Patient is a 53y y/o Female s/p L acetabular revision, POD #1    -Pain control/analgesia- PCEA for pain control in addition to oral/IV narcotics to be managed by anesthesia/pain service  -Inc spirometry  -Venodynes  -F/U AM Labs  -PT/OT/WBAT  -+SHANELL drain, incisional vac  - Continue ancef while drain is in  -Anticoagulation-SQH while on PCEA. Plan to switch to lovenox tomorrow after PCEA is discontinued. Do not start lovenox until 6 hours after PCEA is discontinued.  -Hip precautions-posterior
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
## 2019-11-07 NOTE — PROGRESS NOTE BEHAVIORAL HEALTH - NSBHCONSULTFOLLOWAFTERCARE_PSY_A_CORE FT
[facility] walk in clinic : [phone] (9AM-7PM)  [facility] Outpatient clinic: [phone]

## 2019-11-07 NOTE — PROGRESS NOTE ADULT - SUBJECTIVE AND OBJECTIVE BOX
Anesthesia Pain Management Service: Day _2_ of Epidural    SUBJECTIVE: Patient doing well with PCEA and no problems.  Pain Scale Score:   Refer to charted pain scores    THERAPY:  [ ] Epidural Bupivacaine 0.0625% and Hydromorphone  		[ X] 10 micrograms/mL	[ ] 5 micrograms/mL  [ ] Epidural Bupivacaine 0.0625% and Fentanyl - 2 micrograms/mL  [ ] Epidural Ropivacaine 0.1% plain – 1 mg/mL  [X ] Patient Controlled Regional Anesthesia (PCRA) Ropivacaine  		[X ] 0.2%			[ ] 0.1%    Demand dose __0_ lockout __0_ (minutes) Continuous Rate _8__ Total: _115.7___ ml used (in past 24 hours)      MEDICATIONS  (STANDING):  acetaminophen   Tablet .. 650 milliGRAM(s) Oral every 6 hours  ascorbic acid 500 milliGRAM(s) Oral two times a day  ceFAZolin   IVPB 2000 milliGRAM(s) IV Intermittent every 8 hours  chlorhexidine 2% Cloths 1 Application(s) Topical every 12 hours  ferrous    sulfate 325 milliGRAM(s) Oral three times a day with meals  folic acid 1 milliGRAM(s) Oral daily  gabapentin 400 milliGRAM(s) Oral every 8 hours  ketamine Injectable 10 milliGRAM(s) IV Push once  lactated ringers. 1000 milliLiter(s) (75 mL/Hr) IV Continuous <Continuous>  lidocaine   Patch 1 Patch Transdermal daily  multivitamin 1 Tablet(s) Oral daily  oxyCODONE  ER Tablet 80 milliGRAM(s) Oral every 12 hours  pantoprazole    Tablet 40 milliGRAM(s) Oral before breakfast  ropivacaine 0.2% in 0.9% Sodium Chloride PCEA 200 milliLiter(s) Epidural PCA Continuous  senna 2 Tablet(s) Oral at bedtime  sodium chloride 0.9%. 1000 milliLiter(s) (75 mL/Hr) IV Continuous <Continuous>  zolpidem 5 milliGRAM(s) Oral at bedtime    MEDICATIONS  (PRN):  diphenhydrAMINE   Injectable 25 milliGRAM(s) IV Push every 4 hours PRN Pruritus  HYDROmorphone  Injectable 1.5 milliGRAM(s) IV Push every 3 hours PRN Breakthrough Pain  melatonin 3 milliGRAM(s) Oral at bedtime PRN Insomnia  nalbuphine Injectable 2.5 milliGRAM(s) IV Push every 6 hours PRN Pruritus  naloxone Injectable 0.1 milliGRAM(s) IV Push every 3 minutes PRN For ANY of the following changes in patient status:  A. RR LESS THAN 10 breaths per minute, B. Oxygen saturation LESS THAN 90%, C. Sedation score of 6  ondansetron Injectable 4 milliGRAM(s) IV Push every 6 hours PRN Nausea  ondansetron Injectable 4 milliGRAM(s) IV Push every 6 hours PRN Nausea and/or Vomiting  oxyCODONE    IR 30 milliGRAM(s) Oral every 3 hours PRN Mild Pain (1 - 3) to Moderate Pain  oxyCODONE    IR 45 milliGRAM(s) Oral every 3 hours PRN Severe Pain (7 - 10)  ropivacaine 0.2% in 0.9% Sodium Chloride PCEA Rescue Clinician Bolus 5 milliLiter(s) Epidural every 15 minutes PRN for Pain Score greater than 6  tiZANidine 4 milliGRAM(s) Oral every 6 hours PRN Muscle Spasm      OBJECTIVE: sitting up in bed     Assessment of Catheter Site:	[ ] Left	[ ] Right  [x ] Epidural 	[ ] Femoral	      [ ] Saphenous   [ ] Supraclavicular   [ ] Other:    [x ] Dressing intact	[x ] Site non-tender	[ x] Site without erythema, discharge, edema  [x ] Epidural tubing and connection checked	[x] Gross neurological exam within normal limits  [ ] Catheter removed – tip intact		[ ] Afebrile  	[ ] Febrile: ___   [ X] see Temp under VS below)    PT/INR - ( 07 Nov 2019 03:11 )   PT: 13.0 SEC;   INR: 1.17          PTT - ( 07 Nov 2019 03:11 )  PTT:27.0 SEC                      9.3    14.48 )-----------( 154      ( 07 Nov 2019 03:11 )             29.1     Vital Signs Last 24 Hrs  T(C): 37.3 (11-07-19 @ 09:24), Max: 37.3 (11-07-19 @ 09:24)  T(F): 99.2 (11-07-19 @ 09:24), Max: 99.2 (11-07-19 @ 09:24)  HR: 93 (11-07-19 @ 09:24) (70 - 103)  BP: 121/91 (11-07-19 @ 09:24) (106/60 - 163/114)  BP(mean): 88 (11-06-19 @ 23:30) (72 - 147)  RR: 18 (11-07-19 @ 09:24) (12 - 26)  SpO2: 99% (11-07-19 @ 09:24) (68% - 100%)      Sedation Score:	[x ] Alert	[ ] Drowsy	[ ] Arousable	[ ] Asleep	[ ] Unresponsive    Side Effects:	[x ] None	[ ] Nausea	[ ] Vomiting	[ ] Pruritus  		[ ] Weakness		[ ] Numbness	[ ] Other:    ASSESSMENT/ PLAN:    Therapy to  be:	[x ] Continue   [ ] Discontinued   [ ] Change to prn Analgesics    Documentation and Verification of current medications:  [ X ] Done	[ ] Not done, not eligible, reason:    Comments: Doing OK with epidural and may continue. Discussed with team, plan is to keep epidural in place for today and d/c tomorrow for better pain control.     Progress Note written now but Patient was seen earlier.

## 2019-11-07 NOTE — DISCHARGE NOTE NURSING/CASE MANAGEMENT/SOCIAL WORK - NSDCPEXARELTO_GEN_ALL_CORE
Rivaroxaban/Xarelto - Compliance/Rivaroxaban/Xarelto - Dietary Advice/Rivaroxaban/Xarelto - Potential for adverse drug reactions and interactions/Rivaroxaban/Xarelto - Follow up monitoring

## 2019-11-07 NOTE — PROGRESS NOTE BEHAVIORAL HEALTH - NSBHCHARTREVIEWLAB_PSY_A_CORE FT
9.3    14.48 )-----------( 154      ( 07 Nov 2019 03:11 )             29.1     11-07    141  |  103  |  14  ----------------------------<  94  4.1   |  25  |  0.92    Ca    8.9      07 Nov 2019 03:11

## 2019-11-07 NOTE — OCCUPATIONAL THERAPY INITIAL EVALUATION ADULT - PLANNED THERAPY INTERVENTIONS, OT EVAL
ADL retraining/balance training/bed mobility training/strengthening/transfer training/neuromuscular re-education

## 2019-11-07 NOTE — CHART NOTE - NSCHARTNOTEFT_GEN_A_CORE
Pt. is a 53/F s/p dislocated femur repair with epidural catheter. Primary team notified pain service that epidural catheter came out. Upon arrival to patients room pain service noticed catheter was disconnected from filter. Pain service reconnected catheter sterilely and gave 5ml bolus and increased basal to 10ml per hour and 60ml 4hr limit. Vitals signs stable notified RN to check BP again in 15mins, continue with current pain regimen.

## 2019-11-07 NOTE — PROGRESS NOTE ADULT - SUBJECTIVE AND OBJECTIVE BOX
Orthopedic Surgery Progress Note     S: Patient seen and examined today. No acute events overnight. Pain controlled on PCA, complaining of left foot numbness and weakness    MEDICATIONS  (STANDING):  acetaminophen   Tablet .. 650 milliGRAM(s) Oral every 6 hours  ascorbic acid 500 milliGRAM(s) Oral two times a day  ceFAZolin   IVPB 2000 milliGRAM(s) IV Intermittent every 8 hours  chlorhexidine 2% Cloths 1 Application(s) Topical every 12 hours  ferrous    sulfate 325 milliGRAM(s) Oral three times a day with meals  folic acid 1 milliGRAM(s) Oral daily  gabapentin 400 milliGRAM(s) Oral every 8 hours  heparin  Injectable 5000 Unit(s) SubCutaneous every 8 hours  ketamine Injectable 10 milliGRAM(s) IV Push once  lactated ringers. 1000 milliLiter(s) (75 mL/Hr) IV Continuous <Continuous>  lidocaine   Patch 1 Patch Transdermal daily  multivitamin 1 Tablet(s) Oral daily  oxyCODONE  ER Tablet 80 milliGRAM(s) Oral every 12 hours  pantoprazole    Tablet 40 milliGRAM(s) Oral before breakfast  ropivacaine 0.2% in 0.9% Sodium Chloride PCEA 200 milliLiter(s) Epidural PCA Continuous  senna 2 Tablet(s) Oral at bedtime  sodium chloride 0.9%. 1000 milliLiter(s) (75 mL/Hr) IV Continuous <Continuous>  zolpidem 5 milliGRAM(s) Oral at bedtime    MEDICATIONS  (PRN):  diphenhydrAMINE   Injectable 25 milliGRAM(s) IV Push every 4 hours PRN Pruritus  HYDROmorphone  Injectable 1.5 milliGRAM(s) IV Push every 3 hours PRN Breakthrough Pain  melatonin 3 milliGRAM(s) Oral at bedtime PRN Insomnia  nalbuphine Injectable 2.5 milliGRAM(s) IV Push every 6 hours PRN Pruritus  naloxone Injectable 0.1 milliGRAM(s) IV Push every 3 minutes PRN For ANY of the following changes in patient status:  A. RR LESS THAN 10 breaths per minute, B. Oxygen saturation LESS THAN 90%, C. Sedation score of 6  ondansetron Injectable 4 milliGRAM(s) IV Push every 6 hours PRN Nausea  ondansetron Injectable 4 milliGRAM(s) IV Push every 6 hours PRN Nausea and/or Vomiting  oxyCODONE    IR 30 milliGRAM(s) Oral every 3 hours PRN Mild Pain (1 - 3) to Moderate Pain  oxyCODONE    IR 45 milliGRAM(s) Oral every 3 hours PRN Severe Pain (7 - 10)  ropivacaine 0.2% in 0.9% Sodium Chloride PCEA Rescue Clinician Bolus 5 milliLiter(s) Epidural every 15 minutes PRN for Pain Score greater than 6  tiZANidine 4 milliGRAM(s) Oral every 6 hours PRN Muscle Spasm      Physical Exam:    Vital Signs Last 24 Hrs  T(C): 36.8 (07 Nov 2019 05:08), Max: 37 (06 Nov 2019 21:00)  T(F): 98.3 (07 Nov 2019 05:08), Max: 98.6 (06 Nov 2019 21:00)  HR: 89 (07 Nov 2019 05:08) (70 - 103)  BP: 121/86 (07 Nov 2019 05:08) (106/60 - 163/114)  BP(mean): 88 (06 Nov 2019 23:30) (72 - 147)  RR: 17 (07 Nov 2019 05:08) (12 - 26)  SpO2: 99% (07 Nov 2019 05:08) (68% - 100%)    11-05-19 @ 07:01  -  11-06-19 @ 07:00  --------------------------------------------------------  IN: 0 mL / OUT: 550 mL / NET: -550 mL    11-06-19 @ 07:01  -  11-07-19 @ 06:14  --------------------------------------------------------  IN: 300 mL / OUT: 2815 mL / NET: -2515 mL          Gen: awake, alert, NAD  Resp: no increased work of breathing  LLE:  5/5 strength to FHL, GSC  2/5 strength to EHL  0/5 strength to TA  Sensation decreased superficial peroneal distribution  +DP/PT Pulses  Compartments soft  No calf TTP           LABS:                        9.3    14.48 )-----------( 154      ( 07 Nov 2019 03:11 )             29.1     11-06    139  |  106  |  15  ----------------------------<  114<H>  4.2   |  24  |  0.79    Ca    8.5      06 Nov 2019 18:55

## 2019-11-07 NOTE — CHART NOTE - NSCHARTNOTEFT_GEN_A_CORE
spoke to Dr. Calero  as patient has PCEA and only anticoagulation allowed is SQH  per Dr. Calero patient not to get SQH due to potential wound issues.  will start Lovenox once PCEA is out Fri 11/8 am

## 2019-11-07 NOTE — PROGRESS NOTE ADULT - SUBJECTIVE AND OBJECTIVE BOX
Patient is a 53y old  Female who presents with a chief complaint of L acetabular revision, femur shortening (06 Nov 2019 21:14)        SUBJECTIVE / OVERNIGHT EVENTS:    pt had revision of L KONG yesterday  now w/ ropivacaine PCA  rates pain 7/10 currently  denies cp/sob  +dry cough   wants to use incentive spirometer      MEDICATIONS  (STANDING):  acetaminophen   Tablet .. 650 milliGRAM(s) Oral every 6 hours  ascorbic acid 500 milliGRAM(s) Oral two times a day  ceFAZolin   IVPB 2000 milliGRAM(s) IV Intermittent every 8 hours  chlorhexidine 2% Cloths 1 Application(s) Topical every 12 hours  ferrous    sulfate 325 milliGRAM(s) Oral three times a day with meals  folic acid 1 milliGRAM(s) Oral daily  gabapentin 400 milliGRAM(s) Oral every 8 hours  ketamine Injectable 10 milliGRAM(s) IV Push once  lactated ringers. 1000 milliLiter(s) (75 mL/Hr) IV Continuous <Continuous>  lidocaine   Patch 1 Patch Transdermal daily  multivitamin 1 Tablet(s) Oral daily  oxyCODONE  ER Tablet 80 milliGRAM(s) Oral every 12 hours  pantoprazole    Tablet 40 milliGRAM(s) Oral before breakfast  ropivacaine 0.2% in 0.9% Sodium Chloride PCEA 200 milliLiter(s) Epidural PCA Continuous  senna 2 Tablet(s) Oral at bedtime  sodium chloride 0.9%. 1000 milliLiter(s) (75 mL/Hr) IV Continuous <Continuous>  zolpidem 5 milliGRAM(s) Oral at bedtime    MEDICATIONS  (PRN):  diphenhydrAMINE   Injectable 25 milliGRAM(s) IV Push every 4 hours PRN Pruritus  HYDROmorphone  Injectable 1.5 milliGRAM(s) IV Push every 3 hours PRN Breakthrough Pain  melatonin 3 milliGRAM(s) Oral at bedtime PRN Insomnia  nalbuphine Injectable 2.5 milliGRAM(s) IV Push every 6 hours PRN Pruritus  naloxone Injectable 0.1 milliGRAM(s) IV Push every 3 minutes PRN For ANY of the following changes in patient status:  A. RR LESS THAN 10 breaths per minute, B. Oxygen saturation LESS THAN 90%, C. Sedation score of 6  ondansetron Injectable 4 milliGRAM(s) IV Push every 6 hours PRN Nausea  ondansetron Injectable 4 milliGRAM(s) IV Push every 6 hours PRN Nausea and/or Vomiting  oxyCODONE    IR 30 milliGRAM(s) Oral every 3 hours PRN Mild Pain (1 - 3) to Moderate Pain  oxyCODONE    IR 45 milliGRAM(s) Oral every 3 hours PRN Severe Pain (7 - 10)  ropivacaine 0.2% in 0.9% Sodium Chloride PCEA Rescue Clinician Bolus 5 milliLiter(s) Epidural every 15 minutes PRN for Pain Score greater than 6  tiZANidine 4 milliGRAM(s) Oral every 6 hours PRN Muscle Spasm      Vital Signs Last 24 Hrs  T(C): 37.4 (07 Nov 2019 10:29), Max: 37.4 (07 Nov 2019 10:29)  T(F): 99.4 (07 Nov 2019 10:29), Max: 99.4 (07 Nov 2019 10:29)  HR: 89 (07 Nov 2019 10:29) (70 - 103)  BP: 122/89 (07 Nov 2019 10:29) (106/60 - 163/114)  BP(mean): 88 (06 Nov 2019 23:30) (72 - 147)  RR: 17 (07 Nov 2019 10:29) (12 - 26)  SpO2: 99% (07 Nov 2019 10:29) (68% - 100%)  CAPILLARY BLOOD GLUCOSE        I&O's Summary    06 Nov 2019 07:01  -  07 Nov 2019 07:00  --------------------------------------------------------  IN: 300 mL / OUT: 2815 mL / NET: -2515 mL    07 Nov 2019 07:01  -  07 Nov 2019 11:15  --------------------------------------------------------  IN: 0 mL / OUT: 565 mL / NET: -565 mL      PHYSICAL EXAM  GENERAL: in moderate distress 2/2 pain   HEAD:  Atraumatic, Normocephalic  EYES: EOMI, PERRLA, conjunctiva and sclera clear  ENMT: Moist mucous membranes  NECK: Supple, No JVD  RESPIRATORY: Clear to auscultation bilaterally; No rales, rhonchi, wheezing, or rubs  CARDIOVASCULAR: Regular rate and rhythm; No murmurs, rubs, or gallops  GASTROINTESTINAL: Soft, Nontender, Nondistended; Bowel sounds present  EXTREMITIES:  LLE dressing c/d/i   NERVOUS SYSTEM:  Alert & Oriented X3    LABS:                        9.3    14.48 )-----------( 154      ( 07 Nov 2019 03:11 )             29.1     11-07    141  |  103  |  14  ----------------------------<  94  4.1   |  25  |  0.92    Ca    8.9      07 Nov 2019 03:11      PT/INR - ( 07 Nov 2019 03:11 )   PT: 13.0 SEC;   INR: 1.17          PTT - ( 07 Nov 2019 03:11 )  PTT:27.0 SEC          RADIOLOGY & ADDITIONAL TESTS:    Imaging Personally Reviewed:  Consultant(s) Notes Reviewed:    Care Discussed with Consultants/Other Providers: Ortho PA

## 2019-11-07 NOTE — PROGRESS NOTE ADULT - ASSESSMENT
52 yo F w/ BiVICD, ?CAD,  h/o femur fx ~ 30 years ago c/b frequent prosthetic dislocations and fractures admitted w/ complete dislocation of the L tibial component of the femoral endoprosthesis, s/p OR repair on 11/6      #L femur dislocation   -s/p OR revision 11/6/19  -pt has h/o chronic pain on chronic narcotics, currently on PCA  -management per Anesthesia/Ortho       #CAD  -pt was prescribed ASA/Plavix/metoprolol/lisinopril, which she states she stopped all of them 3 weeks ago due to impending Ortho surgery  -Cards following  -2D echo unremarkable, with normal LV function   -BP stable off antihypertensives  -pt denies any current or recent CP/SOB      #DVT ppx  -holding while on PCEA, to resume Lovenox tomorrow as per Ortho

## 2019-11-07 NOTE — PHYSICAL THERAPY INITIAL EVALUATION ADULT - ADDITIONAL COMMENTS
patient reported  recently built a ramp to entrance of home and then patient can remain on first floor; patient owns old pair bilateral axillary crutches, electric wheelchair, commode, raised toilet seat, too low-looking to get a higher seat.

## 2019-11-07 NOTE — PROGRESS NOTE ADULT - SUBJECTIVE AND OBJECTIVE BOX
Anesthesia Pain Management Service    SUBJECTIVE: Pt doing well with PCEA without problems reported.    Therapy:	  [ ] IV PCA	   [ X] Epidural           [ ] s/p Spinal Opoid              [ ] Postpartum infusion	  [ ] Patient controlled regional anesthesia (PCRA)    [ ] prn Analgesics    Allergies    No Known Allergies    Intolerances      MEDICATIONS  (STANDING):  acetaminophen   Tablet .. 650 milliGRAM(s) Oral every 6 hours  ascorbic acid 500 milliGRAM(s) Oral two times a day  ceFAZolin   IVPB 2000 milliGRAM(s) IV Intermittent every 8 hours  chlorhexidine 2% Cloths 1 Application(s) Topical every 12 hours  ferrous    sulfate 325 milliGRAM(s) Oral three times a day with meals  folic acid 1 milliGRAM(s) Oral daily  gabapentin 400 milliGRAM(s) Oral every 8 hours  ketamine Injectable 10 milliGRAM(s) IV Push once  lactated ringers. 1000 milliLiter(s) (75 mL/Hr) IV Continuous <Continuous>  lidocaine   Patch 1 Patch Transdermal daily  multivitamin 1 Tablet(s) Oral daily  oxyCODONE  ER Tablet 80 milliGRAM(s) Oral every 12 hours  pantoprazole    Tablet 40 milliGRAM(s) Oral before breakfast  ropivacaine 0.2% in 0.9% Sodium Chloride PCEA 200 milliLiter(s) Epidural PCA Continuous  senna 2 Tablet(s) Oral at bedtime  sodium chloride 0.9%. 1000 milliLiter(s) (75 mL/Hr) IV Continuous <Continuous>  zolpidem 5 milliGRAM(s) Oral at bedtime    MEDICATIONS  (PRN):  HYDROmorphone  Injectable 1.5 milliGRAM(s) IV Push every 3 hours PRN Breakthrough Pain  melatonin 3 milliGRAM(s) Oral at bedtime PRN Insomnia  naloxone Injectable 0.1 milliGRAM(s) IV Push every 3 minutes PRN For ANY of the following changes in patient status:  A. RR LESS THAN 10 breaths per minute, B. Oxygen saturation LESS THAN 90%, C. Sedation score of 6  ondansetron Injectable 4 milliGRAM(s) IV Push every 6 hours PRN Nausea and/or Vomiting  oxyCODONE    IR 30 milliGRAM(s) Oral every 3 hours PRN Mild Pain (1 - 3) to Moderate Pain  oxyCODONE    IR 45 milliGRAM(s) Oral every 3 hours PRN Severe Pain (7 - 10)  ropivacaine 0.2% in 0.9% Sodium Chloride PCEA Rescue Clinician Bolus 5 milliLiter(s) Epidural every 15 minutes PRN for Pain Score greater than 6  tiZANidine 4 milliGRAM(s) Oral every 6 hours PRN Muscle Spasm      OBJECTIVE:   [X] No new signs     [ ] Other:    Side Effects:  [X ] None			[ ] Other:    Assessment of Catheter Site:		[ X] Intact		[ ] Other:    ASSESSMENT/PLAN  [ X] Continue current therapy    [ ] Therapy changed to:    [ ] IV PCA       [ ] Epidural     [ ] prn Analgesics     Comments: Continue current PCEA settings.

## 2019-11-07 NOTE — PROGRESS NOTE BEHAVIORAL HEALTH - NSBHFUPINTERVALCCFT_PSY_A_CORE
"I feel better but I'm still having panic attacks." "I feel better but I'm still having pain and anxiety."

## 2019-11-07 NOTE — PROGRESS NOTE BEHAVIORAL HEALTH - NSBHFUPINTERVALHXFT_PSY_A_CORE
Patient seen for follow up of anxiety. No overnight events. Reports improvement in pain s/p 2 ketamine infusions, most recently last night. Patient reports receiving Xanax last night which she says helped, however no record of this in [place] (she may be confusing this for a different medication). She says her surgery went well yesterday and that she plans to go home ASAP. She would like to follow up at [facility] for pain management. Reports ongoing anxiety with daily panic attacks. She is interesting in starting Cymbalta for pain, anxiety, and panic attacks. Denies SI/HI. Patient seen for follow up of anxiety. No overnight events. Reports improvement in pain s/p 2 ketamine infusions, most recently last night. Patient reports receiving Xanax last night which she says helped, however no record of this in [place] (she may be confusing this for a different medication). She says her surgery went well yesterday and that she plans to go home ASAP. She would like to follow up at [facility] for pain management. Pt. denies feeling depressed. Reports ongoing anxiety. She is interesting in starting Cymbalta for anxiety, and panic attacks. Denies SI/HI. Denies acute psychotic sxs.

## 2019-11-08 LAB
ANION GAP SERPL CALC-SCNC: 13 MMO/L — SIGNIFICANT CHANGE UP (ref 7–14)
APTT BLD: 25.7 SEC — LOW (ref 27.5–36.3)
BUN SERPL-MCNC: 13 MG/DL — SIGNIFICANT CHANGE UP (ref 7–23)
CALCIUM SERPL-MCNC: 9 MG/DL — SIGNIFICANT CHANGE UP (ref 8.4–10.5)
CHLORIDE SERPL-SCNC: 98 MMOL/L — SIGNIFICANT CHANGE UP (ref 98–107)
CO2 SERPL-SCNC: 26 MMOL/L — SIGNIFICANT CHANGE UP (ref 22–31)
CREAT SERPL-MCNC: 0.9 MG/DL — SIGNIFICANT CHANGE UP (ref 0.5–1.3)
GLUCOSE SERPL-MCNC: 104 MG/DL — HIGH (ref 70–99)
HCT VFR BLD CALC: 28.4 % — LOW (ref 34.5–45)
HGB BLD-MCNC: 9.2 G/DL — LOW (ref 11.5–15.5)
INR BLD: 1.1 — SIGNIFICANT CHANGE UP (ref 0.88–1.17)
MCHC RBC-ENTMCNC: 31 PG — SIGNIFICANT CHANGE UP (ref 27–34)
MCHC RBC-ENTMCNC: 32.4 % — SIGNIFICANT CHANGE UP (ref 32–36)
MCV RBC AUTO: 95.6 FL — SIGNIFICANT CHANGE UP (ref 80–100)
NRBC # FLD: 0 K/UL — SIGNIFICANT CHANGE UP (ref 0–0)
PLATELET # BLD AUTO: 143 K/UL — LOW (ref 150–400)
PMV BLD: 12.8 FL — SIGNIFICANT CHANGE UP (ref 7–13)
POTASSIUM SERPL-MCNC: 3.2 MMOL/L — LOW (ref 3.5–5.3)
POTASSIUM SERPL-SCNC: 3.2 MMOL/L — LOW (ref 3.5–5.3)
PROTHROM AB SERPL-ACNC: 12.6 SEC — SIGNIFICANT CHANGE UP (ref 9.8–13.1)
RBC # BLD: 2.97 M/UL — LOW (ref 3.8–5.2)
RBC # FLD: 15.9 % — HIGH (ref 10.3–14.5)
SODIUM SERPL-SCNC: 137 MMOL/L — SIGNIFICANT CHANGE UP (ref 135–145)
WBC # BLD: 12.8 K/UL — HIGH (ref 3.8–10.5)
WBC # FLD AUTO: 12.8 K/UL — HIGH (ref 3.8–10.5)

## 2019-11-08 PROCEDURE — 99232 SBSQ HOSP IP/OBS MODERATE 35: CPT

## 2019-11-08 RX ORDER — ACETAMINOPHEN 500 MG
1000 TABLET ORAL ONCE
Refills: 0 | Status: DISCONTINUED | OUTPATIENT
Start: 2019-11-08 | End: 2019-11-10

## 2019-11-08 RX ORDER — RIVAROXABAN 15 MG-20MG
10 KIT ORAL DAILY
Refills: 0 | Status: DISCONTINUED | OUTPATIENT
Start: 2019-11-08 | End: 2019-11-10

## 2019-11-08 RX ORDER — POTASSIUM CHLORIDE 20 MEQ
40 PACKET (EA) ORAL EVERY 4 HOURS
Refills: 0 | Status: COMPLETED | OUTPATIENT
Start: 2019-11-08 | End: 2019-11-08

## 2019-11-08 RX ADMIN — HYDROMORPHONE HYDROCHLORIDE 1.5 MILLIGRAM(S): 2 INJECTION INTRAMUSCULAR; INTRAVENOUS; SUBCUTANEOUS at 15:26

## 2019-11-08 RX ADMIN — OXYCODONE HYDROCHLORIDE 45 MILLIGRAM(S): 5 TABLET ORAL at 01:32

## 2019-11-08 RX ADMIN — Medication 3 MILLIGRAM(S): at 22:01

## 2019-11-08 RX ADMIN — OXYCODONE HYDROCHLORIDE 45 MILLIGRAM(S): 5 TABLET ORAL at 09:21

## 2019-11-08 RX ADMIN — OXYCODONE HYDROCHLORIDE 45 MILLIGRAM(S): 5 TABLET ORAL at 10:21

## 2019-11-08 RX ADMIN — HYDROMORPHONE HYDROCHLORIDE 1.5 MILLIGRAM(S): 2 INJECTION INTRAMUSCULAR; INTRAVENOUS; SUBCUTANEOUS at 18:40

## 2019-11-08 RX ADMIN — SENNA PLUS 2 TABLET(S): 8.6 TABLET ORAL at 22:01

## 2019-11-08 RX ADMIN — HYDROMORPHONE HYDROCHLORIDE 1.5 MILLIGRAM(S): 2 INJECTION INTRAMUSCULAR; INTRAVENOUS; SUBCUTANEOUS at 07:59

## 2019-11-08 RX ADMIN — Medication 200 MILLILITER(S): at 08:43

## 2019-11-08 RX ADMIN — Medication 325 MILLIGRAM(S): at 07:59

## 2019-11-08 RX ADMIN — HYDROMORPHONE HYDROCHLORIDE 1.5 MILLIGRAM(S): 2 INJECTION INTRAMUSCULAR; INTRAVENOUS; SUBCUTANEOUS at 08:30

## 2019-11-08 RX ADMIN — HYDROMORPHONE HYDROCHLORIDE 1.5 MILLIGRAM(S): 2 INJECTION INTRAMUSCULAR; INTRAVENOUS; SUBCUTANEOUS at 03:17

## 2019-11-08 RX ADMIN — HYDROMORPHONE HYDROCHLORIDE 1.5 MILLIGRAM(S): 2 INJECTION INTRAMUSCULAR; INTRAVENOUS; SUBCUTANEOUS at 01:09

## 2019-11-08 RX ADMIN — SODIUM CHLORIDE 75 MILLILITER(S): 9 INJECTION, SOLUTION INTRAVENOUS at 01:33

## 2019-11-08 RX ADMIN — TIZANIDINE 4 MILLIGRAM(S): 4 TABLET ORAL at 23:43

## 2019-11-08 RX ADMIN — OXYCODONE HYDROCHLORIDE 45 MILLIGRAM(S): 5 TABLET ORAL at 06:55

## 2019-11-08 RX ADMIN — Medication 40 MILLIEQUIVALENT(S): at 09:22

## 2019-11-08 RX ADMIN — HYDROMORPHONE HYDROCHLORIDE 1.5 MILLIGRAM(S): 2 INJECTION INTRAMUSCULAR; INTRAVENOUS; SUBCUTANEOUS at 22:50

## 2019-11-08 RX ADMIN — HYDROMORPHONE HYDROCHLORIDE 1.5 MILLIGRAM(S): 2 INJECTION INTRAMUSCULAR; INTRAVENOUS; SUBCUTANEOUS at 15:39

## 2019-11-08 RX ADMIN — OXYCODONE HYDROCHLORIDE 45 MILLIGRAM(S): 5 TABLET ORAL at 14:31

## 2019-11-08 RX ADMIN — OXYCODONE HYDROCHLORIDE 45 MILLIGRAM(S): 5 TABLET ORAL at 05:55

## 2019-11-08 RX ADMIN — GABAPENTIN 400 MILLIGRAM(S): 400 CAPSULE ORAL at 14:49

## 2019-11-08 RX ADMIN — HYDROMORPHONE HYDROCHLORIDE 1.5 MILLIGRAM(S): 2 INJECTION INTRAMUSCULAR; INTRAVENOUS; SUBCUTANEOUS at 18:55

## 2019-11-08 RX ADMIN — Medication 100 MILLIGRAM(S): at 21:53

## 2019-11-08 RX ADMIN — TIZANIDINE 4 MILLIGRAM(S): 4 TABLET ORAL at 05:57

## 2019-11-08 RX ADMIN — Medication 100 MILLIGRAM(S): at 05:57

## 2019-11-08 RX ADMIN — OXYCODONE HYDROCHLORIDE 45 MILLIGRAM(S): 5 TABLET ORAL at 21:20

## 2019-11-08 RX ADMIN — OXYCODONE HYDROCHLORIDE 45 MILLIGRAM(S): 5 TABLET ORAL at 20:21

## 2019-11-08 RX ADMIN — HYDROMORPHONE HYDROCHLORIDE 1.5 MILLIGRAM(S): 2 INJECTION INTRAMUSCULAR; INTRAVENOUS; SUBCUTANEOUS at 00:07

## 2019-11-08 RX ADMIN — ZOLPIDEM TARTRATE 5 MILLIGRAM(S): 10 TABLET ORAL at 22:01

## 2019-11-08 RX ADMIN — OXYCODONE HYDROCHLORIDE 45 MILLIGRAM(S): 5 TABLET ORAL at 02:20

## 2019-11-08 RX ADMIN — Medication 40 MILLIEQUIVALENT(S): at 13:33

## 2019-11-08 RX ADMIN — Medication 100 MILLIGRAM(S): at 13:34

## 2019-11-08 RX ADMIN — Medication 1 MILLIGRAM(S): at 11:14

## 2019-11-08 RX ADMIN — PANTOPRAZOLE SODIUM 40 MILLIGRAM(S): 20 TABLET, DELAYED RELEASE ORAL at 05:57

## 2019-11-08 RX ADMIN — HYDROMORPHONE HYDROCHLORIDE 1.5 MILLIGRAM(S): 2 INJECTION INTRAMUSCULAR; INTRAVENOUS; SUBCUTANEOUS at 12:12

## 2019-11-08 RX ADMIN — OXYCODONE HYDROCHLORIDE 80 MILLIGRAM(S): 5 TABLET ORAL at 22:01

## 2019-11-08 RX ADMIN — Medication 1 TABLET(S): at 11:18

## 2019-11-08 RX ADMIN — OXYCODONE HYDROCHLORIDE 45 MILLIGRAM(S): 5 TABLET ORAL at 17:15

## 2019-11-08 RX ADMIN — GABAPENTIN 400 MILLIGRAM(S): 400 CAPSULE ORAL at 05:54

## 2019-11-08 RX ADMIN — OXYCODONE HYDROCHLORIDE 45 MILLIGRAM(S): 5 TABLET ORAL at 23:41

## 2019-11-08 RX ADMIN — RIVAROXABAN 10 MILLIGRAM(S): KIT at 14:49

## 2019-11-08 RX ADMIN — OXYCODONE HYDROCHLORIDE 80 MILLIGRAM(S): 5 TABLET ORAL at 09:20

## 2019-11-08 RX ADMIN — HYDROMORPHONE HYDROCHLORIDE 1.5 MILLIGRAM(S): 2 INJECTION INTRAMUSCULAR; INTRAVENOUS; SUBCUTANEOUS at 04:00

## 2019-11-08 RX ADMIN — Medication 325 MILLIGRAM(S): at 18:51

## 2019-11-08 RX ADMIN — TIZANIDINE 4 MILLIGRAM(S): 4 TABLET ORAL at 14:49

## 2019-11-08 RX ADMIN — Medication 500 MILLIGRAM(S): at 18:51

## 2019-11-08 RX ADMIN — OXYCODONE HYDROCHLORIDE 45 MILLIGRAM(S): 5 TABLET ORAL at 13:31

## 2019-11-08 RX ADMIN — Medication 500 MILLIGRAM(S): at 05:57

## 2019-11-08 RX ADMIN — Medication 325 MILLIGRAM(S): at 11:14

## 2019-11-08 RX ADMIN — HYDROMORPHONE HYDROCHLORIDE 1.5 MILLIGRAM(S): 2 INJECTION INTRAMUSCULAR; INTRAVENOUS; SUBCUTANEOUS at 12:28

## 2019-11-08 RX ADMIN — HYDROMORPHONE HYDROCHLORIDE 1.5 MILLIGRAM(S): 2 INJECTION INTRAMUSCULAR; INTRAVENOUS; SUBCUTANEOUS at 21:53

## 2019-11-08 NOTE — PROGRESS NOTE ADULT - SUBJECTIVE AND OBJECTIVE BOX
Patient is a 53y old  Female who presents with a chief complaint of L acetabular revision, femur shortening (06 Nov 2019 21:14)        SUBJECTIVE / OVERNIGHT EVENTS:  PCA d/c'ed this AM  pt rates pain 6/10, but feeling much better and in better spirits  looking fwd to going home   denies cp/sob  ambulated with PT    MEDICATIONS  (STANDING):  acetaminophen   Tablet .. 650 milliGRAM(s) Oral every 6 hours  ascorbic acid 500 milliGRAM(s) Oral two times a day  ceFAZolin   IVPB 2000 milliGRAM(s) IV Intermittent every 8 hours  chlorhexidine 2% Cloths 1 Application(s) Topical every 12 hours  ferrous    sulfate 325 milliGRAM(s) Oral three times a day with meals  folic acid 1 milliGRAM(s) Oral daily  gabapentin 400 milliGRAM(s) Oral every 8 hours  ketamine Injectable 10 milliGRAM(s) IV Push once  lactated ringers. 1000 milliLiter(s) (75 mL/Hr) IV Continuous <Continuous>  lidocaine   Patch 1 Patch Transdermal daily  multivitamin 1 Tablet(s) Oral daily  oxyCODONE  ER Tablet 80 milliGRAM(s) Oral every 12 hours  pantoprazole    Tablet 40 milliGRAM(s) Oral before breakfast  potassium chloride    Tablet ER 40 milliEquivalent(s) Oral every 4 hours  rivaroxaban 10 milliGRAM(s) Oral daily  senna 2 Tablet(s) Oral at bedtime  sodium chloride 0.9%. 1000 milliLiter(s) (75 mL/Hr) IV Continuous <Continuous>  zolpidem 5 milliGRAM(s) Oral at bedtime    MEDICATIONS  (PRN):  HYDROmorphone  Injectable 1.5 milliGRAM(s) IV Push every 3 hours PRN Breakthrough Pain  melatonin 3 milliGRAM(s) Oral at bedtime PRN Insomnia  ondansetron Injectable 4 milliGRAM(s) IV Push every 6 hours PRN Nausea and/or Vomiting  oxyCODONE    IR 30 milliGRAM(s) Oral every 3 hours PRN Mild Pain (1 - 3) to Moderate Pain  oxyCODONE    IR 45 milliGRAM(s) Oral every 3 hours PRN Severe Pain (7 - 10)  tiZANidine 4 milliGRAM(s) Oral every 6 hours PRN Muscle Spasm      Vital Signs Last 24 Hrs  T(C): 36.9 (08 Nov 2019 09:55), Max: 37.7 (07 Nov 2019 21:13)  T(F): 98.4 (08 Nov 2019 09:55), Max: 99.9 (07 Nov 2019 21:13)  HR: 89 (08 Nov 2019 09:55) (83 - 104)  BP: 121/66 (08 Nov 2019 09:55) (106/71 - 131/50)  BP(mean): --  RR: 17 (08 Nov 2019 09:55) (17 - 18)  SpO2: 98% (08 Nov 2019 09:55) (96% - 100%)  CAPILLARY BLOOD GLUCOSE        I&O's Summary    07 Nov 2019 07:01  -  08 Nov 2019 07:00  --------------------------------------------------------  IN: 0 mL / OUT: 3795 mL / NET: -3795 mL    08 Nov 2019 07:01  -  08 Nov 2019 11:51  --------------------------------------------------------  IN: 0 mL / OUT: 375 mL / NET: -375 mL      PHYSICAL EXAM  GENERAL: NAD  HEAD:  Atraumatic, Normocephalic  EYES: EOMI, PERRLA, conjunctiva and sclera clear  ENMT: Moist mucous membranes  NECK: Supple, No JVD  RESPIRATORY: Clear to auscultation bilaterally; No rales, rhonchi, wheezing, or rubs  CARDIOVASCULAR: Regular rate and rhythm; No murmurs, rubs, or gallops  GASTROINTESTINAL: Soft, Nontender, Nondistended; Bowel sounds present  EXTREMITIES:  LLE dressing c/d/i, SHANELL w/ sanguinous drainage   NERVOUS SYSTEM:  Alert & Oriented X3        LABS:                        9.2    12.80 )-----------( 143      ( 08 Nov 2019 06:22 )             28.4     11-08    137  |  98  |  13  ----------------------------<  104<H>  3.2<L>   |  26  |  0.90    Ca    9.0      08 Nov 2019 06:28      PT/INR - ( 08 Nov 2019 06:28 )   PT: 12.6 SEC;   INR: 1.10          PTT - ( 08 Nov 2019 06:28 )  PTT:25.7 SEC          RADIOLOGY & ADDITIONAL TESTS:    Imaging Personally Reviewed:  Consultant(s) Notes Reviewed:    Care Discussed with Consultants/Other Providers: Ortho PA

## 2019-11-08 NOTE — PROGRESS NOTE ADULT - ATTENDING COMMENTS
d/c
The patient's care was discussed with the consulting cardiology fellow. I independently evaluated her.  She tolerated her surgery without complication.   The patient denies chest pain or dyspnea.    Please note pre-operative risk stratification was performed in the addendum to the cardiology NP note.  Re-consult the cardiology service is additional questions arise.    Adrian Crump MD  Cardiology
The patient was seen on the night prior. She was taken for her operation prior to my evaluation.  We will follow-up with the patient in the post-operative setting.    Adrian Crump MD  Cardiology

## 2019-11-08 NOTE — PROGRESS NOTE BEHAVIORAL HEALTH - CASE SUMMARY
Patient seen and evaluated, agree with above assessment and plan, pt. AAOX3, calm, cooperative, linear and coherent, reported that pain and mood is better, denies acute psychotic sxs, denies SI and HI. Pt. remains future oriented. Pt. declined Cymbalta. Recommend to discontinue Cymbalta, PRNs as written above, will follow
Patient seen and evaluated, agree with above assessment and plan, pt. AAOX4, cooperative and polite. Patient expressed frustration in the context of acute medical issues , however today she reports some improvement in pain, report ongoing anxiety, denies feeling depressed, denies SI and HI. Pt. remains hopeful, future oriented and willing to start Cymbalta. Recommend meds. as written above, will follow

## 2019-11-08 NOTE — PROGRESS NOTE ADULT - SUBJECTIVE AND OBJECTIVE BOX
ORTHO PROGRESS NOTE     Pt seen and examined at bedside, denies SOB, CP, Dizziness. N/V/D /HA.  No significant overnight events. Pain well controlled. Patient ambulated  with PT     Vital Signs Last 24 Hrs  T(C): 37.1 (08 Nov 2019 05:43), Max: 37.7 (07 Nov 2019 21:13)  T(F): 98.7 (08 Nov 2019 05:43), Max: 99.9 (07 Nov 2019 21:13)  HR: 93 (08 Nov 2019 05:43) (83 - 104)  BP: 106/71 (08 Nov 2019 05:43) (106/71 - 131/50)  BP(mean): --  RR: 17 (08 Nov 2019 05:43) (17 - 18)  SpO2: 97% (08 Nov 2019 05:43) (96% - 100%)    Gen: No apparent distress    left LE:  Dressing C/D I  SHANELL in place 135/420   BLE: motor intact EHL/FHL/TA/GS .  Sensation is grossly intact to light touch in the distal extremities.  Compartments are soft bilaterally.  Extremities are warm .  DP 2+ BLE     Labs:  CBC Full  -  ( 07 Nov 2019 03:11 )  WBC Count : 14.48 K/uL  RBC Count : 3.14 M/uL  Hemoglobin : 9.3 g/dL  Hematocrit : 29.1 %  Platelet Count - Automated : 154 K/uL  Mean Cell Volume : 92.7 fL  Mean Cell Hemoglobin : 29.6 pg  Mean Cell Hemoglobin Concentration : 32.0 %  Auto Neutrophil # : x  Auto Lymphocyte # : x  Auto Monocyte # : x  Auto Eosinophil # : x  Auto Basophil # : x  Auto Neutrophil % : x  Auto Lymphocyte % : x  Auto Monocyte % : x  Auto Eosinophil % : x  Auto Basophil % : x        11-07    141  |  103  |  14  ----------------------------<  94  4.1   |  25  |  0.92    Ca    8.9      07 Nov 2019 03:11           A/P  s/p left hip revision Arthroplasty, POD #2    - Pain control/ Analgesia/ PCEA to be DC'd today   - will DC cruz after DC PCEA   - will begin Xarelto 6 hr P DC PCEA   - DVT ppx venodynes, foot pumps  - PT/OT - wbat/oob    - Incentive Spirometer  - Monitor SHANELL output   - notify Ortho for questions

## 2019-11-08 NOTE — PROGRESS NOTE BEHAVIORAL HEALTH - NSBHFUPINTERVALHXFT_PSY_A_CORE
Patient seen for follow up of anxiety. No overnight events. Reports improvement in pain overall. She says her surgery went well and that she plans to go home ASAP. She would like to follow up at [facility] for pain management. Pt. denies feeling depressed. Reports ongoing anxiety. Patient refused Cymbalta yesterday and today expressed significant ambivalence, ultimately saying she would like to decline the medication. Denies SI/HI. Denies acute psychotic sxs. Patient seen for follow up of anxiety. No overnight events. Reports improvement in pain overall. She says her surgery went well and that she plans to go home ASAP. She would like to follow up at [facility] for pain management. Pt. denies feeling depressed. Reports some ongoing anxiety but reports improvement in anxiety as her pain is better now. . Patient refused Cymbalta yesterday and today expressed significant ambivalence, ultimately saying she would like to decline the medication. Denies SI/HI. Denies acute psychotic sxs.

## 2019-11-08 NOTE — PROGRESS NOTE ADULT - SUBJECTIVE AND OBJECTIVE BOX
Anesthesia Pain Management Service    SUBJECTIVE: Pt doing well with PCEA without problems reported.    Therapy:	  [ ] IV PCA	   [ X] Epidural           [ ] s/p Spinal Opoid              [ ] Postpartum infusion	  [ ] Patient controlled regional anesthesia (PCRA)    [ ] prn Analgesics    Allergies    No Known Allergies    Intolerances      MEDICATIONS  (STANDING):  acetaminophen   Tablet .. 650 milliGRAM(s) Oral every 6 hours  ascorbic acid 500 milliGRAM(s) Oral two times a day  ceFAZolin   IVPB 2000 milliGRAM(s) IV Intermittent every 8 hours  chlorhexidine 2% Cloths 1 Application(s) Topical every 12 hours  ferrous    sulfate 325 milliGRAM(s) Oral three times a day with meals  folic acid 1 milliGRAM(s) Oral daily  gabapentin 400 milliGRAM(s) Oral every 8 hours  ketamine Injectable 10 milliGRAM(s) IV Push once  lactated ringers. 1000 milliLiter(s) (75 mL/Hr) IV Continuous <Continuous>  lidocaine   Patch 1 Patch Transdermal daily  multivitamin 1 Tablet(s) Oral daily  oxyCODONE  ER Tablet 80 milliGRAM(s) Oral every 12 hours  pantoprazole    Tablet 40 milliGRAM(s) Oral before breakfast  potassium chloride    Tablet ER 40 milliEquivalent(s) Oral every 4 hours  rivaroxaban 10 milliGRAM(s) Oral daily  senna 2 Tablet(s) Oral at bedtime  sodium chloride 0.9%. 1000 milliLiter(s) (75 mL/Hr) IV Continuous <Continuous>  zolpidem 5 milliGRAM(s) Oral at bedtime    MEDICATIONS  (PRN):  HYDROmorphone  Injectable 1.5 milliGRAM(s) IV Push every 3 hours PRN Breakthrough Pain  melatonin 3 milliGRAM(s) Oral at bedtime PRN Insomnia  ondansetron Injectable 4 milliGRAM(s) IV Push every 6 hours PRN Nausea and/or Vomiting  oxyCODONE    IR 30 milliGRAM(s) Oral every 3 hours PRN Mild Pain (1 - 3) to Moderate Pain  oxyCODONE    IR 45 milliGRAM(s) Oral every 3 hours PRN Severe Pain (7 - 10)  tiZANidine 4 milliGRAM(s) Oral every 6 hours PRN Muscle Spasm      OBJECTIVE:   [X] No new signs     [ ] Other:    Side Effects:  [X ] None			[ ] Other:    Assessment of Catheter Site:		[ X] Intact		[ ] Other:    ASSESSMENT/PLAN  [ X] Continue current therapy    [ ] Therapy changed to:    [ ] IV PCA       [ ] Epidural     [ ] prn Analgesics     Comments: Continue current PCEA settings.

## 2019-11-08 NOTE — PROGRESS NOTE BEHAVIORAL HEALTH - NSBHCONSULTFOLLOWAFTERCARE_PSY_A_CORE FT
[facility]. walk in clinic : [phone] (9AM-7PM)  [facility] Outpatient clinic: [phone] Pt. lives in [place], stated that she will follow up with the psychiatrist in [place] if needed,  however can provide these referrals   [facility] outpt. walk in clinic : [phone] (9AM-7PM)  [facility] Outpatient clinic: [phone]  [facility] Substance Abuse Outpatient Program ([facility]): [phone]

## 2019-11-08 NOTE — PROGRESS NOTE ADULT - ASSESSMENT
54 yo F w/ BiVICD, ?CAD,  h/o femur fx ~ 30 years ago c/b frequent prosthetic dislocations and fractures admitted w/ complete dislocation of the L tibial component of the femoral endoprosthesis, s/p OR repair on 11/6      #L femur dislocation   -s/p OR revision 11/6/19  -pt has h/o chronic pain on chronic narcotics, was on PCA post op, d/c'ed this AM  -c/w patient's home pain regimen      #CAD  -pt was prescribed ASA/Plavix/metoprolol/lisinopril, which she states she stopped all of them 3 weeks ago due to impending Ortho surgery  -Cards following  -2D echo unremarkable, with normal LV function   -BP stable off antihypertensives  -pt denies any current or recent CP/SOB      #DVT ppx  -starting Xarelto

## 2019-11-08 NOTE — PROGRESS NOTE BEHAVIORAL HEALTH - RISK ASSESSMENT
Patient appears to be a low risk of harm to self or others at this time.    Risk factors: anxiety, severe pain,  impulsivity, substance misuse, functional decline, poor reactivity to stressors    Protective factors: denies any active suicidal ideation/intent/plan, no hx of prior attempts, no self-harm behaviors, identifies reasons for living, future oriented, supportive social network, no access to firearms, no legal issues, engaged in treatment    Pt. is not at acute risk of harm to self or others at this time and does not need an inpatient admission.
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
## 2019-11-08 NOTE — PROGRESS NOTE BEHAVIORAL HEALTH - NSBHCHARTREVIEWLAB_PSY_A_CORE FT
9.2    12.80 )-----------( 143      ( 08 Nov 2019 06:22 )             28.4     11-08    137  |  98  |  13  ----------------------------<  104<H>  3.2<L>   |  26  |  0.90    Ca    9.0      08 Nov 2019 06:28

## 2019-11-08 NOTE — PROGRESS NOTE ADULT - SUBJECTIVE AND OBJECTIVE BOX
Anesthesia Pain Management Service: Day 3__ of Epidural    SUBJECTIVE: Patient doing well with PCEA and no problems.  Pain Scale Score:   Refer to charted pain scores    THERAPY:  [] Epidural Bupivacaine 0.0625% and Hydromorphone  		 ] 10 micrograms/mL	[ ] 5 micrograms/mL  [ ] Epidural Bupivacaine 0.0625% and Fentanyl - 2 micrograms/mL  [X ] Epidural Ropivacaine 0.2% plain – 1 mg/mL  [ ] Patient Controlled Regional Anesthesia (PCRA) Ropivacaine  		[ ] 0.2%			[ ] 0.1%    Demand dose __3_ lockout __15_ (minutes) Continuous Rate _10__ Total: _237.8ml__ Daily      MEDICATIONS  (STANDING):  acetaminophen   Tablet .. 650 milliGRAM(s) Oral every 6 hours  ascorbic acid 500 milliGRAM(s) Oral two times a day  ceFAZolin   IVPB 2000 milliGRAM(s) IV Intermittent every 8 hours  chlorhexidine 2% Cloths 1 Application(s) Topical every 12 hours  ferrous    sulfate 325 milliGRAM(s) Oral three times a day with meals  folic acid 1 milliGRAM(s) Oral daily  gabapentin 400 milliGRAM(s) Oral every 8 hours  ketamine Injectable 10 milliGRAM(s) IV Push once  lactated ringers. 1000 milliLiter(s) (75 mL/Hr) IV Continuous <Continuous>  lidocaine   Patch 1 Patch Transdermal daily  multivitamin 1 Tablet(s) Oral daily  oxyCODONE  ER Tablet 80 milliGRAM(s) Oral every 12 hours  pantoprazole    Tablet 40 milliGRAM(s) Oral before breakfast  potassium chloride    Tablet ER 40 milliEquivalent(s) Oral every 4 hours  rivaroxaban 10 milliGRAM(s) Oral daily  senna 2 Tablet(s) Oral at bedtime  sodium chloride 0.9%. 1000 milliLiter(s) (75 mL/Hr) IV Continuous <Continuous>  zolpidem 5 milliGRAM(s) Oral at bedtime    MEDICATIONS  (PRN):  HYDROmorphone  Injectable 1.5 milliGRAM(s) IV Push every 3 hours PRN Breakthrough Pain  melatonin 3 milliGRAM(s) Oral at bedtime PRN Insomnia  ondansetron Injectable 4 milliGRAM(s) IV Push every 6 hours PRN Nausea and/or Vomiting  oxyCODONE    IR 30 milliGRAM(s) Oral every 3 hours PRN Mild Pain (1 - 3) to Moderate Pain  oxyCODONE    IR 45 milliGRAM(s) Oral every 3 hours PRN Severe Pain (7 - 10)  tiZANidine 4 milliGRAM(s) Oral every 6 hours PRN Muscle Spasm      OBJECTIVE: sitting up in bed     Assessment of Catheter Site:	[ ] Left	[ ] Right  [x ] Epidural 	[ ] Femoral	      [ ] Saphenous   [ ] Supraclavicular   [ ] Other:    [x ] Dressing intact	[x ] Site non-tender	[ x] Site without erythema, discharge, edema  [x ] Epidural tubing and connection checked	[x] Gross neurological exam within normal limits  [X ] Catheter removed – tip intact		[ ] Afebrile	  [ ] Febrile: ___       [ X] see Temp under VS below)    PT/INR - ( 08 Nov 2019 06:28 )   PT: 12.6 SEC;   INR: 1.10          PTT - ( 08 Nov 2019 06:28 )  PTT:25.7 SEC                      9.2    12.80 )-----------( 143      ( 08 Nov 2019 06:22 )             28.4     Vital Signs Last 24 Hrs  T(C): 36.9 (11-08-19 @ 09:55), Max: 37.7 (11-07-19 @ 21:13)  T(F): 98.4 (11-08-19 @ 09:55), Max: 99.9 (11-07-19 @ 21:13)  HR: 89 (11-08-19 @ 09:55) (83 - 104)  BP: 121/66 (11-08-19 @ 09:55) (106/71 - 131/50)  BP(mean): --  RR: 17 (11-08-19 @ 09:55) (17 - 18)  SpO2: 98% (11-08-19 @ 09:55) (96% - 100%)      Sedation Score:	[x ] Alert	[ ] Drowsy	[ ] Arousable	[ ] Asleep	[ ] Unresponsive    Side Effects:	[x ] None	[ ] Nausea	[ ] Vomiting	[ ] Pruritus  		[ ] Weakness		[ ] Numbness	[ ] Other:    ASSESSMENT/ PLAN:    Therapy to  be:	[ ] Continue   [ X] Discontinued   [ X] Change to prn Analgesics    Documentation and Verification of current medications:  [ X ] Done	[ ] Not done, not eligible, reason:    Comments: Changed to IV or oral opioid medication at this point.    Progress Note written now but Patient was seen earlier.

## 2019-11-09 DIAGNOSIS — S73.005D UNSPECIFIED DISLOCATION OF LEFT HIP, SUBSEQUENT ENCOUNTER: ICD-10-CM

## 2019-11-09 DIAGNOSIS — Z29.9 ENCOUNTER FOR PROPHYLACTIC MEASURES, UNSPECIFIED: ICD-10-CM

## 2019-11-09 DIAGNOSIS — I25.10 ATHEROSCLEROTIC HEART DISEASE OF NATIVE CORONARY ARTERY WITHOUT ANGINA PECTORIS: ICD-10-CM

## 2019-11-09 LAB
ANION GAP SERPL CALC-SCNC: 10 MMO/L — SIGNIFICANT CHANGE UP (ref 7–14)
BUN SERPL-MCNC: 10 MG/DL — SIGNIFICANT CHANGE UP (ref 7–23)
CALCIUM SERPL-MCNC: 8.9 MG/DL — SIGNIFICANT CHANGE UP (ref 8.4–10.5)
CHLORIDE SERPL-SCNC: 99 MMOL/L — SIGNIFICANT CHANGE UP (ref 98–107)
CO2 SERPL-SCNC: 26 MMOL/L — SIGNIFICANT CHANGE UP (ref 22–31)
CREAT SERPL-MCNC: 0.82 MG/DL — SIGNIFICANT CHANGE UP (ref 0.5–1.3)
GLUCOSE SERPL-MCNC: 122 MG/DL — HIGH (ref 70–99)
HCT VFR BLD CALC: 26.8 % — LOW (ref 34.5–45)
HGB BLD-MCNC: 8.6 G/DL — LOW (ref 11.5–15.5)
MCHC RBC-ENTMCNC: 30.6 PG — SIGNIFICANT CHANGE UP (ref 27–34)
MCHC RBC-ENTMCNC: 32.1 % — SIGNIFICANT CHANGE UP (ref 32–36)
MCV RBC AUTO: 95.4 FL — SIGNIFICANT CHANGE UP (ref 80–100)
NRBC # FLD: 0 K/UL — SIGNIFICANT CHANGE UP (ref 0–0)
PLATELET # BLD AUTO: 141 K/UL — LOW (ref 150–400)
PMV BLD: 12.8 FL — SIGNIFICANT CHANGE UP (ref 7–13)
POTASSIUM SERPL-MCNC: 3.8 MMOL/L — SIGNIFICANT CHANGE UP (ref 3.5–5.3)
POTASSIUM SERPL-SCNC: 3.8 MMOL/L — SIGNIFICANT CHANGE UP (ref 3.5–5.3)
RBC # BLD: 2.81 M/UL — LOW (ref 3.8–5.2)
RBC # FLD: 15.9 % — HIGH (ref 10.3–14.5)
SODIUM SERPL-SCNC: 135 MMOL/L — SIGNIFICANT CHANGE UP (ref 135–145)
WBC # BLD: 11.16 K/UL — HIGH (ref 3.8–10.5)
WBC # FLD AUTO: 11.16 K/UL — HIGH (ref 3.8–10.5)

## 2019-11-09 PROCEDURE — 99232 SBSQ HOSP IP/OBS MODERATE 35: CPT

## 2019-11-09 RX ORDER — OXYCODONE HYDROCHLORIDE 5 MG/1
80 TABLET ORAL THREE TIMES A DAY
Refills: 0 | Status: DISCONTINUED | OUTPATIENT
Start: 2019-11-09 | End: 2019-11-10

## 2019-11-09 RX ORDER — OXYCODONE HYDROCHLORIDE 5 MG/1
60 TABLET ORAL
Refills: 0 | Status: DISCONTINUED | OUTPATIENT
Start: 2019-11-09 | End: 2019-11-10

## 2019-11-09 RX ORDER — NICOTINE POLACRILEX 2 MG
1 GUM BUCCAL DAILY
Refills: 0 | Status: DISCONTINUED | OUTPATIENT
Start: 2019-11-09 | End: 2019-11-10

## 2019-11-09 RX ORDER — OXYCODONE HYDROCHLORIDE 5 MG/1
30 TABLET ORAL
Refills: 0 | Status: DISCONTINUED | OUTPATIENT
Start: 2019-11-09 | End: 2019-11-10

## 2019-11-09 RX ORDER — OXYCODONE HYDROCHLORIDE 5 MG/1
45 TABLET ORAL
Refills: 0 | Status: DISCONTINUED | OUTPATIENT
Start: 2019-11-09 | End: 2019-11-10

## 2019-11-09 RX ORDER — ACETAMINOPHEN 500 MG
1000 TABLET ORAL ONCE
Refills: 0 | Status: COMPLETED | OUTPATIENT
Start: 2019-11-09 | End: 2019-11-10

## 2019-11-09 RX ADMIN — OXYCODONE HYDROCHLORIDE 45 MILLIGRAM(S): 5 TABLET ORAL at 08:00

## 2019-11-09 RX ADMIN — Medication 100 MILLIGRAM(S): at 16:28

## 2019-11-09 RX ADMIN — LIDOCAINE 1 PATCH: 4 CREAM TOPICAL at 13:07

## 2019-11-09 RX ADMIN — ZOLPIDEM TARTRATE 5 MILLIGRAM(S): 10 TABLET ORAL at 22:06

## 2019-11-09 RX ADMIN — TIZANIDINE 4 MILLIGRAM(S): 4 TABLET ORAL at 19:02

## 2019-11-09 RX ADMIN — Medication 100 MILLIGRAM(S): at 05:24

## 2019-11-09 RX ADMIN — Medication 500 MILLIGRAM(S): at 05:25

## 2019-11-09 RX ADMIN — OXYCODONE HYDROCHLORIDE 60 MILLIGRAM(S): 5 TABLET ORAL at 13:30

## 2019-11-09 RX ADMIN — HYDROMORPHONE HYDROCHLORIDE 1.5 MILLIGRAM(S): 2 INJECTION INTRAMUSCULAR; INTRAVENOUS; SUBCUTANEOUS at 00:58

## 2019-11-09 RX ADMIN — OXYCODONE HYDROCHLORIDE 45 MILLIGRAM(S): 5 TABLET ORAL at 01:00

## 2019-11-09 RX ADMIN — HYDROMORPHONE HYDROCHLORIDE 1.5 MILLIGRAM(S): 2 INJECTION INTRAMUSCULAR; INTRAVENOUS; SUBCUTANEOUS at 08:40

## 2019-11-09 RX ADMIN — HYDROMORPHONE HYDROCHLORIDE 1.5 MILLIGRAM(S): 2 INJECTION INTRAMUSCULAR; INTRAVENOUS; SUBCUTANEOUS at 08:28

## 2019-11-09 RX ADMIN — OXYCODONE HYDROCHLORIDE 60 MILLIGRAM(S): 5 TABLET ORAL at 19:01

## 2019-11-09 RX ADMIN — OXYCODONE HYDROCHLORIDE 80 MILLIGRAM(S): 5 TABLET ORAL at 18:00

## 2019-11-09 RX ADMIN — Medication 1 PATCH: at 13:06

## 2019-11-09 RX ADMIN — HYDROMORPHONE HYDROCHLORIDE 1.5 MILLIGRAM(S): 2 INJECTION INTRAMUSCULAR; INTRAVENOUS; SUBCUTANEOUS at 11:27

## 2019-11-09 RX ADMIN — HYDROMORPHONE HYDROCHLORIDE 1.5 MILLIGRAM(S): 2 INJECTION INTRAMUSCULAR; INTRAVENOUS; SUBCUTANEOUS at 01:55

## 2019-11-09 RX ADMIN — Medication 3 MILLIGRAM(S): at 22:06

## 2019-11-09 RX ADMIN — Medication 500 MILLIGRAM(S): at 18:01

## 2019-11-09 RX ADMIN — HYDROMORPHONE HYDROCHLORIDE 1.5 MILLIGRAM(S): 2 INJECTION INTRAMUSCULAR; INTRAVENOUS; SUBCUTANEOUS at 05:21

## 2019-11-09 RX ADMIN — OXYCODONE HYDROCHLORIDE 60 MILLIGRAM(S): 5 TABLET ORAL at 20:00

## 2019-11-09 RX ADMIN — OXYCODONE HYDROCHLORIDE 45 MILLIGRAM(S): 5 TABLET ORAL at 02:43

## 2019-11-09 RX ADMIN — OXYCODONE HYDROCHLORIDE 45 MILLIGRAM(S): 5 TABLET ORAL at 10:30

## 2019-11-09 RX ADMIN — Medication 325 MILLIGRAM(S): at 08:04

## 2019-11-09 RX ADMIN — OXYCODONE HYDROCHLORIDE 60 MILLIGRAM(S): 5 TABLET ORAL at 16:30

## 2019-11-09 RX ADMIN — HYDROMORPHONE HYDROCHLORIDE 1.5 MILLIGRAM(S): 2 INJECTION INTRAMUSCULAR; INTRAVENOUS; SUBCUTANEOUS at 11:40

## 2019-11-09 RX ADMIN — RIVAROXABAN 10 MILLIGRAM(S): KIT at 13:07

## 2019-11-09 RX ADMIN — OXYCODONE HYDROCHLORIDE 80 MILLIGRAM(S): 5 TABLET ORAL at 10:07

## 2019-11-09 RX ADMIN — Medication 325 MILLIGRAM(S): at 18:00

## 2019-11-09 RX ADMIN — Medication 1 TABLET(S): at 13:09

## 2019-11-09 RX ADMIN — SENNA PLUS 2 TABLET(S): 8.6 TABLET ORAL at 22:06

## 2019-11-09 RX ADMIN — Medication 1 MILLIGRAM(S): at 13:09

## 2019-11-09 RX ADMIN — OXYCODONE HYDROCHLORIDE 60 MILLIGRAM(S): 5 TABLET ORAL at 23:05

## 2019-11-09 RX ADMIN — OXYCODONE HYDROCHLORIDE 45 MILLIGRAM(S): 5 TABLET ORAL at 10:00

## 2019-11-09 RX ADMIN — OXYCODONE HYDROCHLORIDE 45 MILLIGRAM(S): 5 TABLET ORAL at 07:01

## 2019-11-09 RX ADMIN — HYDROMORPHONE HYDROCHLORIDE 1.5 MILLIGRAM(S): 2 INJECTION INTRAMUSCULAR; INTRAVENOUS; SUBCUTANEOUS at 06:15

## 2019-11-09 RX ADMIN — TIZANIDINE 4 MILLIGRAM(S): 4 TABLET ORAL at 08:04

## 2019-11-09 RX ADMIN — PANTOPRAZOLE SODIUM 40 MILLIGRAM(S): 20 TABLET, DELAYED RELEASE ORAL at 07:00

## 2019-11-09 RX ADMIN — OXYCODONE HYDROCHLORIDE 60 MILLIGRAM(S): 5 TABLET ORAL at 16:01

## 2019-11-09 RX ADMIN — OXYCODONE HYDROCHLORIDE 45 MILLIGRAM(S): 5 TABLET ORAL at 03:40

## 2019-11-09 RX ADMIN — OXYCODONE HYDROCHLORIDE 60 MILLIGRAM(S): 5 TABLET ORAL at 13:02

## 2019-11-09 RX ADMIN — OXYCODONE HYDROCHLORIDE 60 MILLIGRAM(S): 5 TABLET ORAL at 22:07

## 2019-11-09 RX ADMIN — GABAPENTIN 400 MILLIGRAM(S): 400 CAPSULE ORAL at 07:00

## 2019-11-09 RX ADMIN — Medication 325 MILLIGRAM(S): at 13:10

## 2019-11-09 RX ADMIN — Medication 1 PATCH: at 17:59

## 2019-11-09 NOTE — PROGRESS NOTE ADULT - PROBLEM SELECTOR PLAN 2
-pt was prescribed ASA/Plavix/metoprolol/lisinopril, which she states she stopped all of them 3 weeks ago due to impending Ortho surgery  -2D echo unremarkable, with normal LV function   -BP stable off antihypertensives  -pt denies any current or recent CP/SOB  -Given history of CAD and implantation of AICD, would resume at least ASA, lisinopril and metoprolol. Pt to f/u with her cardiologist post discharge

## 2019-11-09 NOTE — PROGRESS NOTE ADULT - SUBJECTIVE AND OBJECTIVE BOX
CC: "I don't want to see any more doctors"     SUBJECTIVE / OVERNIGHT EVENTS: Pt upset when I came to examine her. States that she wants to be in peace while she eats her breakfast.  Only wants to see her orthopedics doctors and is sick of seeing more than 10 doctors daily.  She wants to leave the hospital as soon as possible, states that pain is controlled.       MEDICATIONS  (STANDING):  acetaminophen  IVPB .. 1000 milliGRAM(s) IV Intermittent once  ascorbic acid 500 milliGRAM(s) Oral two times a day  ceFAZolin   IVPB 2000 milliGRAM(s) IV Intermittent every 8 hours  chlorhexidine 2% Cloths 1 Application(s) Topical every 12 hours  ferrous    sulfate 325 milliGRAM(s) Oral three times a day with meals  folic acid 1 milliGRAM(s) Oral daily  gabapentin 400 milliGRAM(s) Oral every 8 hours  lactated ringers. 1000 milliLiter(s) (75 mL/Hr) IV Continuous <Continuous>  lidocaine   Patch 1 Patch Transdermal daily  multivitamin 1 Tablet(s) Oral daily  oxyCODONE  ER Tablet 80 milliGRAM(s) Oral every 12 hours  pantoprazole    Tablet 40 milliGRAM(s) Oral before breakfast  rivaroxaban 10 milliGRAM(s) Oral daily  senna 2 Tablet(s) Oral at bedtime  sodium chloride 0.9%. 1000 milliLiter(s) (75 mL/Hr) IV Continuous <Continuous>  zolpidem 5 milliGRAM(s) Oral at bedtime    MEDICATIONS  (PRN):  HYDROmorphone  Injectable 1.5 milliGRAM(s) IV Push every 3 hours PRN Breakthrough Pain  melatonin 3 milliGRAM(s) Oral at bedtime PRN Insomnia  ondansetron Injectable 4 milliGRAM(s) IV Push every 6 hours PRN Nausea and/or Vomiting  oxyCODONE    IR 30 milliGRAM(s) Oral every 3 hours PRN Mild Pain (1 - 3) to Moderate Pain  oxyCODONE    IR 45 milliGRAM(s) Oral every 3 hours PRN Severe Pain (7 - 10)  tiZANidine 4 milliGRAM(s) Oral every 6 hours PRN Muscle Spasm      Vital Signs Last 24 Hrs  T(C): 36.7 (09 Nov 2019 05:35), Max: 37.2 (08 Nov 2019 17:17)  T(F): 98 (09 Nov 2019 05:35), Max: 98.9 (08 Nov 2019 17:17)  HR: 84 (09 Nov 2019 05:35) (84 - 98)  BP: 125/67 (09 Nov 2019 05:35) (102/53 - 149/69)  BP(mean): --  RR: 16 (09 Nov 2019 05:35) (16 - 18)  SpO2: 97% (09 Nov 2019 05:35) (96% - 100%)  CAPILLARY BLOOD GLUCOSE      I&O's Summary    08 Nov 2019 07:01  -  09 Nov 2019 07:00  --------------------------------------------------------  IN: 0 mL / OUT: 1810 mL / NET: -1810 mL      PHYSICAL EXAM:  Deferred as pt visibly upset     LABS:                        8.6    11.16 )-----------( 141      ( 09 Nov 2019 05:50 )             26.8     11-09    135  |  99  |  10  ----------------------------<  122<H>  3.8   |  26  |  0.82    Ca    8.9      09 Nov 2019 05:50      PT/INR - ( 08 Nov 2019 06:28 )   PT: 12.6 SEC;   INR: 1.10          PTT - ( 08 Nov 2019 06:28 )  PTT:25.7 SEC          RADIOLOGY & ADDITIONAL TESTS:    Imaging Personally Reviewed:     < from: Xray Hip w/ Pelvis 1 View, Left (11.06.19 @ 17:41) >  IMPRESSION:  Resecured acetabular component with cup cage reconstruction screw   fixation and placement of a constrained liner. Retained prior fractured   screw fragments within the acetabular walls. Realigned previously   dislocated left femoral replacing prosthesis.    Postsurgical changes in the surrounding soft tissues with surgical skin   staples wound VAC and Karan-Ortiz drain in the operated region.    Intact and unremarkable partially visualized upper end of a right femoral   IM doris/nail with compression screw.    Partially visualized inferior and of IVC filter in right paralumbar   region at superior image margin.    Generalized osteopenia.    < end of copied text >      Care Discussed with Orthopedic PA about:  Hospital course

## 2019-11-09 NOTE — PROGRESS NOTE ADULT - SUBJECTIVE AND OBJECTIVE BOX
ORTHO PROGRESS NOTE     Pt seen and examined at bedside, denies SOB, CP, Dizziness. N/V/D /HA.  No significant overnight events. Pain well controlled. Stable    Vital Signs Last 24 Hrs  T(C): 36.5 (09 Nov 2019 01:10), Max: 37.2 (08 Nov 2019 17:17)  T(F): 97.7 (09 Nov 2019 01:10), Max: 98.9 (08 Nov 2019 17:17)  HR: 90 (09 Nov 2019 01:10) (85 - 98)  BP: 102/53 (09 Nov 2019 01:10) (102/53 - 149/69)  BP(mean): --  RR: 18 (09 Nov 2019 01:10) (17 - 18)  SpO2: 100% (09 Nov 2019 01:10) (96% - 100%)    Gen: No apparent distress    Left LE:  Dressing C/D/I  SHANELL SS  Incision vac in place, functional  BLLE: motor intact EHL/FHL/TA/GS . SILT SP/DP/Alexander/Sap/T,  Compartments are soft bilaterally.  Extremities are warm .  DP 2+ BLE                  A/P  s/p left hip revision Arthroplasty, POD #3    - Pain control/ Analgesia  - Xarelto  - venodynes, foot pumps  - PT/OT   - wbat/oob    - Incentive Spirometer  - Monitor SHANELL output   - notify Ortho for questions

## 2019-11-09 NOTE — PROGRESS NOTE ADULT - ASSESSMENT
54 yo F w/ BiVICD, ?CAD,  h/o femur fx ~ 30 years ago c/b frequent prosthetic dislocations and fractures admitted w/ complete dislocation of the L tibial component of the femoral endoprosthesis, s/p OR repair on 11/6

## 2019-11-09 NOTE — PROGRESS NOTE ADULT - PROBLEM SELECTOR PLAN 3
- On [facility] medicine to sign off per patient request to only see her orthopedic doctors

## 2019-11-09 NOTE — PROVIDER CONTACT NOTE (OTHER) - ASSESSMENT
VSS, afebrile. A&OX4. Pt is anxious. Requires dose of IV abx @ 1400. Attempted PIV X2, pt does not tolerate multiple attempts well.

## 2019-11-09 NOTE — PROGRESS NOTE ADULT - PROBLEM SELECTOR PLAN 1
-s/p OR revision 11/6/19  -pt has h/o chronic pain on chronic narcotics, was on PCA post op, d/c'ed this AM  -c/w patient's home pain regimen

## 2019-11-10 ENCOUNTER — TRANSCRIPTION ENCOUNTER (OUTPATIENT)
Age: 54
End: 2019-11-10

## 2019-11-10 VITALS
RESPIRATION RATE: 17 BRPM | HEART RATE: 92 BPM | OXYGEN SATURATION: 98 % | SYSTOLIC BLOOD PRESSURE: 157 MMHG | DIASTOLIC BLOOD PRESSURE: 81 MMHG | TEMPERATURE: 98 F

## 2019-11-10 RX ORDER — RIVAROXABAN 15 MG-20MG
1 KIT ORAL
Qty: 42 | Refills: 0
Start: 2019-11-10 | End: 2019-12-21

## 2019-11-10 RX ORDER — OXYCODONE HYDROCHLORIDE 5 MG/1
1 TABLET ORAL
Qty: 64 | Refills: 0
Start: 2019-11-10 | End: 2019-11-17

## 2019-11-10 RX ORDER — OXYCODONE HYDROCHLORIDE 5 MG/1
3 TABLET ORAL
Qty: 192 | Refills: 0
Start: 2019-11-10 | End: 2019-11-17

## 2019-11-10 RX ORDER — OXYCODONE HYDROCHLORIDE 5 MG/1
1 TABLET ORAL
Qty: 24 | Refills: 0
Start: 2019-11-10 | End: 2019-11-17

## 2019-11-10 RX ADMIN — Medication 325 MILLIGRAM(S): at 07:39

## 2019-11-10 RX ADMIN — OXYCODONE HYDROCHLORIDE 60 MILLIGRAM(S): 5 TABLET ORAL at 07:24

## 2019-11-10 RX ADMIN — OXYCODONE HYDROCHLORIDE 60 MILLIGRAM(S): 5 TABLET ORAL at 05:15

## 2019-11-10 RX ADMIN — OXYCODONE HYDROCHLORIDE 60 MILLIGRAM(S): 5 TABLET ORAL at 14:20

## 2019-11-10 RX ADMIN — Medication 500 MILLIGRAM(S): at 05:49

## 2019-11-10 RX ADMIN — TIZANIDINE 4 MILLIGRAM(S): 4 TABLET ORAL at 05:49

## 2019-11-10 RX ADMIN — OXYCODONE HYDROCHLORIDE 60 MILLIGRAM(S): 5 TABLET ORAL at 13:42

## 2019-11-10 RX ADMIN — OXYCODONE HYDROCHLORIDE 60 MILLIGRAM(S): 5 TABLET ORAL at 08:15

## 2019-11-10 RX ADMIN — PANTOPRAZOLE SODIUM 40 MILLIGRAM(S): 20 TABLET, DELAYED RELEASE ORAL at 07:25

## 2019-11-10 RX ADMIN — OXYCODONE HYDROCHLORIDE 80 MILLIGRAM(S): 5 TABLET ORAL at 02:01

## 2019-11-10 RX ADMIN — OXYCODONE HYDROCHLORIDE 80 MILLIGRAM(S): 5 TABLET ORAL at 09:55

## 2019-11-10 RX ADMIN — Medication 1 MILLIGRAM(S): at 13:11

## 2019-11-10 RX ADMIN — OXYCODONE HYDROCHLORIDE 60 MILLIGRAM(S): 5 TABLET ORAL at 11:15

## 2019-11-10 RX ADMIN — Medication 1 PATCH: at 13:11

## 2019-11-10 RX ADMIN — OXYCODONE HYDROCHLORIDE 60 MILLIGRAM(S): 5 TABLET ORAL at 01:12

## 2019-11-10 RX ADMIN — OXYCODONE HYDROCHLORIDE 60 MILLIGRAM(S): 5 TABLET ORAL at 10:28

## 2019-11-10 RX ADMIN — LIDOCAINE 1 PATCH: 4 CREAM TOPICAL at 02:42

## 2019-11-10 RX ADMIN — RIVAROXABAN 10 MILLIGRAM(S): KIT at 13:11

## 2019-11-10 RX ADMIN — Medication 325 MILLIGRAM(S): at 13:11

## 2019-11-10 RX ADMIN — OXYCODONE HYDROCHLORIDE 60 MILLIGRAM(S): 5 TABLET ORAL at 04:20

## 2019-11-10 RX ADMIN — OXYCODONE HYDROCHLORIDE 60 MILLIGRAM(S): 5 TABLET ORAL at 02:10

## 2019-11-10 RX ADMIN — Medication 1 TABLET(S): at 13:11

## 2019-11-10 RX ADMIN — LIDOCAINE 1 PATCH: 4 CREAM TOPICAL at 13:11

## 2019-11-10 RX ADMIN — Medication 1 PATCH: at 07:25

## 2019-11-10 RX ADMIN — Medication 100 MILLIGRAM(S): at 07:39

## 2019-11-10 RX ADMIN — Medication 100 MILLIGRAM(S): at 00:08

## 2019-11-10 NOTE — DISCHARGE NOTE PROVIDER - CARE PROVIDER_API CALL
Kevin Calero (MD)  Orthopaedic Surgery  [address], [address]  [place] [address]  Phone: [phone]  Fax: [phone]  Follow Up Time:

## 2019-11-10 NOTE — DISCHARGE NOTE PROVIDER - HOSPITAL COURSE
Patient was admitted to undergo left Revision of acetabulum, shortened total femur.  The patient tolerated the procedure well and was transferred to the floor in stable condition. Postoperatively, the patient's pain was well controlled with IV pain medications and later transitioned to PO pain meds, with adequate pain control. The patient participated well with PT starting POD1. Patient is stable for discharge at this time. Patient in agreement with discharge plan.        Please follow up with Dr Calero in 1 week. Call office to make an appointment. Please follow up with your PMD for continuity of care and management. Please keep dressing in place until post op day # 14. .  Please take Xarelto for dvt prophylaxis / blood thinner. Weight bearing as tolerated. Call orthopaedics with any questions.

## 2019-11-10 NOTE — DISCHARGE NOTE PROVIDER - NSDCMRMEDTOKEN_GEN_ALL_CORE_FT
diazePAM 5 mg oral tablet: 1 tab(s) orally every 8 hours, As needed, muscle spasm MDD:3  docusate sodium 100 mg oral capsule: 1 cap(s) orally 3 times a day  gabapentin 300 mg oral capsule: 1 cap(s) orally every 8 hours MDD:3  HYDROmorphone 4 mg oral tablet: 1 tab(s) orally every 4 hours, As needed, Severe Pain (7 - 10) MDD:6  Mobic 15 mg oral tablet: 1 tab(s) orally once a day MDD:1  nicotine 21 mg/24 hr transdermal film, extended release: 1 patch transdermal once a day MDD:1  oxyCODONE 15 mg oral tablet: 3 tab(s) orally every 3 hours, As needed, Severe Pain (7 - 10) MDD:18  oxyCODONE 80 mg oral tablet, extended release: 1 tab(s) orally every 8 hours MDD:3  rivaroxaban 10 mg oral tablet: 1 tab(s) orally once a day MDD:1  senna oral tablet: 2 tab(s) orally once a day (at bedtime), As needed, Constipation MDD:2  tiZANidine 4 mg oral tablet: 1 tab(s) orally 3 times a day MDD:3 diazePAM 5 mg oral tablet: 1 tab(s) orally every 8 hours, As needed, muscle spasm MDD:3  docusate sodium 100 mg oral capsule: 1 cap(s) orally 3 times a day  gabapentin 300 mg oral capsule: 1 cap(s) orally every 8 hours MDD:3  Mobic 15 mg oral tablet: 1 tab(s) orally once a day MDD:1  nicotine 21 mg/24 hr transdermal film, extended release: 1 patch transdermal once a day MDD:1  oxyCODONE 15 mg oral tablet: 3 tab(s) orally every 3 hours, As needed, Moderate Pain (4 - 6) MDD:8  oxyCODONE 30 mg oral tablet: 1 tab(s) orally every 3 hours, As needed, Mild Pain (1 - 3) MDD:8  oxyCODONE 80 mg oral tablet, extended release: 1 tab(s) orally 3 times a day, As Needed for severe pain MDD:8  rivaroxaban 10 mg oral tablet: 1 tab(s) orally once a day MDD:1  senna oral tablet: 2 tab(s) orally once a day (at bedtime), As needed, Constipation MDD:2  tiZANidine 4 mg oral tablet: 1 tab(s) orally 3 times a day MDD:3

## 2019-11-10 NOTE — PROGRESS NOTE ADULT - SUBJECTIVE AND OBJECTIVE BOX
Pt seen and examined. Doing remarkably well. Getting OOB, walking with crutches, going up and down stairs. Pain greatly improved. She is NVI. SHANELL: 44.5/107.  Continue drain for now. Will discuss with dispo with Dr. Calero.

## 2019-11-10 NOTE — PROGRESS NOTE ADULT - SUBJECTIVE AND OBJECTIVE BOX
ORTHO PROGRESS NOTE     Pt seen and examined at bedside, denies SOB, CP, Dizziness. N/V/D /HA.  No significant overnight events. Pain well controlled. Stable. Increased doses of oxycotin to match home doses and oxycodone IR given post operative pain.     Vital Signs Last 24 Hrs  T(C): 37.1 (09 Nov 2019 22:03), Max: 37.1 (09 Nov 2019 22:03)  T(F): 98.7 (09 Nov 2019 22:03), Max: 98.7 (09 Nov 2019 22:03)  HR: 91 (09 Nov 2019 22:03) (84 - 94)  BP: 130/95 (09 Nov 2019 22:03) (102/53 - 145/70)  BP(mean): --  RR: 17 (09 Nov 2019 22:03) (15 - 18)  SpO2: 100% (09 Nov 2019 22:03) (97% - 100%)    Gen: No apparent distress    Left LE:  Dressing C/D/I  SHANELL SS  Incision vac in place, functional  BLLE: motor intact EHL/FHL/TA/GS . SILT SP/DP/Alexander/Sap/T,  Compartments are soft bilaterally.  Extremities are warm .  DP 2+ BLE       A/P  s/p left hip revision Arthroplasty, POD #4    - Pain control/ Analgesia  - Xarelto  - venodynes, foot pumps  - PT/OT   - wbat/oob    - Incentive Spirometer  - Monitor SHANELL output   - notify Ortho for questions

## 2019-11-12 LAB — CULTURE - SURGICAL SITE: SIGNIFICANT CHANGE UP

## 2019-11-14 ENCOUNTER — APPOINTMENT (OUTPATIENT)
Dept: ORTHOPEDIC SURGERY | Facility: CLINIC | Age: 54
End: 2019-11-14
Payer: MEDICAID

## 2019-11-14 PROCEDURE — 99024 POSTOP FOLLOW-UP VISIT: CPT

## 2019-11-14 RX ORDER — GABAPENTIN 300 MG/1
300 CAPSULE ORAL 3 TIMES DAILY
Qty: 90 | Refills: 3 | Status: ACTIVE | COMMUNITY
Start: 2019-11-14 | End: 1900-01-01
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
## 2019-11-25 ENCOUNTER — APPOINTMENT (OUTPATIENT)
Dept: ORTHOPEDIC SURGERY | Facility: CLINIC | Age: 54
End: 2019-11-25
Payer: MEDICAID

## 2019-11-25 PROCEDURE — 99024 POSTOP FOLLOW-UP VISIT: CPT
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
## 2019-12-03 ENCOUNTER — TRANSCRIPTION ENCOUNTER (OUTPATIENT)
Age: 54
End: 2019-12-03

## 2019-12-04 ENCOUNTER — INPATIENT (INPATIENT)
Facility: HOSPITAL | Age: 54
LOS: 0 days | Discharge: HOME | End: 2019-12-05
Attending: ORTHOPAEDIC SURGERY | Admitting: ORTHOPAEDIC SURGERY
Payer: MEDICAID

## 2019-12-04 VITALS
TEMPERATURE: 98 F | RESPIRATION RATE: 20 BRPM | HEART RATE: 109 BPM | OXYGEN SATURATION: 100 % | DIASTOLIC BLOOD PRESSURE: 96 MMHG | SYSTOLIC BLOOD PRESSURE: 131 MMHG

## 2019-12-04 DIAGNOSIS — S73.005A UNSPECIFIED DISLOCATION OF LEFT HIP, INITIAL ENCOUNTER: ICD-10-CM

## 2019-12-04 DIAGNOSIS — S32.9XXA FRACTURE OF UNSPECIFIED PARTS OF LUMBOSACRAL SPINE AND PELVIS, INITIAL ENCOUNTER FOR CLOSED FRACTURE: Chronic | ICD-10-CM

## 2019-12-04 DIAGNOSIS — Z29.9 ENCOUNTER FOR PROPHYLACTIC MEASURES, UNSPECIFIED: ICD-10-CM

## 2019-12-04 DIAGNOSIS — Z95.810 PRESENCE OF AUTOMATIC (IMPLANTABLE) CARDIAC DEFIBRILLATOR: ICD-10-CM

## 2019-12-04 DIAGNOSIS — I25.10 ATHEROSCLEROTIC HEART DISEASE OF NATIVE CORONARY ARTERY WITHOUT ANGINA PECTORIS: ICD-10-CM

## 2019-12-04 LAB
ALBUMIN SERPL ELPH-MCNC: 3.9 G/DL — SIGNIFICANT CHANGE UP (ref 3.3–5)
ALP SERPL-CCNC: 109 U/L — SIGNIFICANT CHANGE UP (ref 40–120)
ALT FLD-CCNC: 9 U/L — SIGNIFICANT CHANGE UP (ref 4–33)
ANION GAP SERPL CALC-SCNC: 12 MMO/L — SIGNIFICANT CHANGE UP (ref 7–14)
ANION GAP SERPL CALC-SCNC: 14 MMO/L — SIGNIFICANT CHANGE UP (ref 7–14)
APTT BLD: 25 SEC — LOW (ref 27.5–36.3)
AST SERPL-CCNC: 14 U/L — SIGNIFICANT CHANGE UP (ref 4–32)
BASOPHILS # BLD AUTO: 0.06 K/UL — SIGNIFICANT CHANGE UP (ref 0–0.2)
BASOPHILS NFR BLD AUTO: 0.5 % — SIGNIFICANT CHANGE UP (ref 0–2)
BILIRUB SERPL-MCNC: 0.3 MG/DL — SIGNIFICANT CHANGE UP (ref 0.2–1.2)
BLD GP AB SCN SERPL QL: NEGATIVE — SIGNIFICANT CHANGE UP
BUN SERPL-MCNC: 13 MG/DL — SIGNIFICANT CHANGE UP (ref 7–23)
BUN SERPL-MCNC: 13 MG/DL — SIGNIFICANT CHANGE UP (ref 7–23)
CALCIUM SERPL-MCNC: 9.2 MG/DL — SIGNIFICANT CHANGE UP (ref 8.4–10.5)
CALCIUM SERPL-MCNC: 9.5 MG/DL — SIGNIFICANT CHANGE UP (ref 8.4–10.5)
CHLORIDE SERPL-SCNC: 100 MMOL/L — SIGNIFICANT CHANGE UP (ref 98–107)
CHLORIDE SERPL-SCNC: 96 MMOL/L — LOW (ref 98–107)
CO2 SERPL-SCNC: 24 MMOL/L — SIGNIFICANT CHANGE UP (ref 22–31)
CO2 SERPL-SCNC: 26 MMOL/L — SIGNIFICANT CHANGE UP (ref 22–31)
CREAT SERPL-MCNC: 0.89 MG/DL — SIGNIFICANT CHANGE UP (ref 0.5–1.3)
CREAT SERPL-MCNC: 0.95 MG/DL — SIGNIFICANT CHANGE UP (ref 0.5–1.3)
EOSINOPHIL # BLD AUTO: 0.49 K/UL — SIGNIFICANT CHANGE UP (ref 0–0.5)
EOSINOPHIL NFR BLD AUTO: 3.8 % — SIGNIFICANT CHANGE UP (ref 0–6)
GLUCOSE SERPL-MCNC: 118 MG/DL — HIGH (ref 70–99)
GLUCOSE SERPL-MCNC: 98 MG/DL — SIGNIFICANT CHANGE UP (ref 70–99)
GRAM STN WND: SIGNIFICANT CHANGE UP
HCT VFR BLD CALC: 33.7 % — LOW (ref 34.5–45)
HCT VFR BLD CALC: 35.3 % — SIGNIFICANT CHANGE UP (ref 34.5–45)
HGB BLD-MCNC: 10.2 G/DL — LOW (ref 11.5–15.5)
HGB BLD-MCNC: 10.6 G/DL — LOW (ref 11.5–15.5)
IMM GRANULOCYTES NFR BLD AUTO: 0.5 % — SIGNIFICANT CHANGE UP (ref 0–1.5)
INR BLD: 1.11 — SIGNIFICANT CHANGE UP (ref 0.88–1.17)
LYMPHOCYTES # BLD AUTO: 2.85 K/UL — SIGNIFICANT CHANGE UP (ref 1–3.3)
LYMPHOCYTES # BLD AUTO: 22.2 % — SIGNIFICANT CHANGE UP (ref 13–44)
MCHC RBC-ENTMCNC: 28.2 PG — SIGNIFICANT CHANGE UP (ref 27–34)
MCHC RBC-ENTMCNC: 28.3 PG — SIGNIFICANT CHANGE UP (ref 27–34)
MCHC RBC-ENTMCNC: 30 % — LOW (ref 32–36)
MCHC RBC-ENTMCNC: 30.3 % — LOW (ref 32–36)
MCV RBC AUTO: 93.4 FL — SIGNIFICANT CHANGE UP (ref 80–100)
MCV RBC AUTO: 93.9 FL — SIGNIFICANT CHANGE UP (ref 80–100)
MONOCYTES # BLD AUTO: 1.15 K/UL — HIGH (ref 0–0.9)
MONOCYTES NFR BLD AUTO: 9 % — SIGNIFICANT CHANGE UP (ref 2–14)
NEUTROPHILS # BLD AUTO: 8.21 K/UL — HIGH (ref 1.8–7.4)
NEUTROPHILS NFR BLD AUTO: 64 % — SIGNIFICANT CHANGE UP (ref 43–77)
NRBC # FLD: 0 K/UL — SIGNIFICANT CHANGE UP (ref 0–0)
NRBC # FLD: 0 K/UL — SIGNIFICANT CHANGE UP (ref 0–0)
PLATELET # BLD AUTO: 224 K/UL — SIGNIFICANT CHANGE UP (ref 150–400)
PLATELET # BLD AUTO: 312 K/UL — SIGNIFICANT CHANGE UP (ref 150–400)
PMV BLD: 11.7 FL — SIGNIFICANT CHANGE UP (ref 7–13)
PMV BLD: 12 FL — SIGNIFICANT CHANGE UP (ref 7–13)
POTASSIUM SERPL-MCNC: 3.4 MMOL/L — LOW (ref 3.5–5.3)
POTASSIUM SERPL-MCNC: 4.2 MMOL/L — SIGNIFICANT CHANGE UP (ref 3.5–5.3)
POTASSIUM SERPL-SCNC: 3.4 MMOL/L — LOW (ref 3.5–5.3)
POTASSIUM SERPL-SCNC: 4.2 MMOL/L — SIGNIFICANT CHANGE UP (ref 3.5–5.3)
PROT SERPL-MCNC: 8.4 G/DL — HIGH (ref 6–8.3)
PROTHROM AB SERPL-ACNC: 12.7 SEC — SIGNIFICANT CHANGE UP (ref 9.8–13.1)
RBC # BLD: 3.61 M/UL — LOW (ref 3.8–5.2)
RBC # BLD: 3.76 M/UL — LOW (ref 3.8–5.2)
RBC # FLD: 16.8 % — HIGH (ref 10.3–14.5)
RBC # FLD: 16.8 % — HIGH (ref 10.3–14.5)
RH IG SCN BLD-IMP: NEGATIVE — SIGNIFICANT CHANGE UP
SODIUM SERPL-SCNC: 134 MMOL/L — LOW (ref 135–145)
SODIUM SERPL-SCNC: 138 MMOL/L — SIGNIFICANT CHANGE UP (ref 135–145)
SPECIMEN SOURCE: SIGNIFICANT CHANGE UP
WBC # BLD: 12.82 K/UL — HIGH (ref 3.8–10.5)
WBC # BLD: 15.46 K/UL — HIGH (ref 3.8–10.5)
WBC # FLD AUTO: 12.82 K/UL — HIGH (ref 3.8–10.5)
WBC # FLD AUTO: 15.46 K/UL — HIGH (ref 3.8–10.5)

## 2019-12-04 PROCEDURE — 73552 X-RAY EXAM OF FEMUR 2/>: CPT | Mod: 26,LT

## 2019-12-04 PROCEDURE — 73562 X-RAY EXAM OF KNEE 3: CPT | Mod: 26,50

## 2019-12-04 PROCEDURE — 99223 1ST HOSP IP/OBS HIGH 75: CPT

## 2019-12-04 PROCEDURE — 73560 X-RAY EXAM OF KNEE 1 OR 2: CPT | Mod: 26,50

## 2019-12-04 PROCEDURE — 27299 UNLISTED PX PELVIS/HIP JOINT: CPT | Mod: 78,LT

## 2019-12-04 PROCEDURE — 73502 X-RAY EXAM HIP UNI 2-3 VIEWS: CPT | Mod: 26,LT

## 2019-12-04 PROCEDURE — 71045 X-RAY EXAM CHEST 1 VIEW: CPT | Mod: 26

## 2019-12-04 RX ORDER — RIVAROXABAN 15 MG-20MG
10 KIT ORAL DAILY
Refills: 0 | Status: DISCONTINUED | OUTPATIENT
Start: 2019-12-05 | End: 2019-12-05

## 2019-12-04 RX ORDER — SODIUM CHLORIDE 9 MG/ML
1000 INJECTION, SOLUTION INTRAVENOUS
Refills: 0 | Status: DISCONTINUED | OUTPATIENT
Start: 2019-12-04 | End: 2019-12-05

## 2019-12-04 RX ORDER — ACETAMINOPHEN 500 MG
975 TABLET ORAL EVERY 8 HOURS
Refills: 0 | Status: DISCONTINUED | OUTPATIENT
Start: 2019-12-04 | End: 2019-12-04

## 2019-12-04 RX ORDER — KETAMINE HYDROCHLORIDE 100 MG/ML
10 INJECTION INTRAMUSCULAR; INTRAVENOUS ONCE
Refills: 0 | Status: DISCONTINUED | OUTPATIENT
Start: 2019-12-04 | End: 2019-12-04

## 2019-12-04 RX ORDER — HYDROMORPHONE HYDROCHLORIDE 2 MG/ML
2 INJECTION INTRAMUSCULAR; INTRAVENOUS; SUBCUTANEOUS
Refills: 0 | Status: DISCONTINUED | OUTPATIENT
Start: 2019-12-04 | End: 2019-12-05

## 2019-12-04 RX ORDER — OXYCODONE HYDROCHLORIDE 5 MG/1
30 TABLET ORAL
Refills: 0 | Status: DISCONTINUED | OUTPATIENT
Start: 2019-12-04 | End: 2019-12-05

## 2019-12-04 RX ORDER — OXYCODONE HYDROCHLORIDE 5 MG/1
5 TABLET ORAL EVERY 4 HOURS
Refills: 0 | Status: DISCONTINUED | OUTPATIENT
Start: 2019-12-04 | End: 2019-12-04

## 2019-12-04 RX ORDER — OXYCODONE HYDROCHLORIDE 5 MG/1
2.5 TABLET ORAL EVERY 4 HOURS
Refills: 0 | Status: DISCONTINUED | OUTPATIENT
Start: 2019-12-04 | End: 2019-12-04

## 2019-12-04 RX ORDER — OXYCODONE HYDROCHLORIDE 5 MG/1
80 TABLET ORAL EVERY 8 HOURS
Refills: 0 | Status: DISCONTINUED | OUTPATIENT
Start: 2019-12-04 | End: 2019-12-05

## 2019-12-04 RX ORDER — HYDROMORPHONE HYDROCHLORIDE 2 MG/ML
1 INJECTION INTRAMUSCULAR; INTRAVENOUS; SUBCUTANEOUS
Refills: 0 | Status: DISCONTINUED | OUTPATIENT
Start: 2019-12-04 | End: 2019-12-04

## 2019-12-04 RX ORDER — HYDROMORPHONE HYDROCHLORIDE 2 MG/ML
2 INJECTION INTRAMUSCULAR; INTRAVENOUS; SUBCUTANEOUS
Refills: 0 | Status: DISCONTINUED | OUTPATIENT
Start: 2019-12-04 | End: 2019-12-04

## 2019-12-04 RX ORDER — ACETAMINOPHEN 500 MG
975 TABLET ORAL EVERY 8 HOURS
Refills: 0 | Status: DISCONTINUED | OUTPATIENT
Start: 2019-12-04 | End: 2019-12-05

## 2019-12-04 RX ORDER — HYDROMORPHONE HYDROCHLORIDE 2 MG/ML
0.5 INJECTION INTRAMUSCULAR; INTRAVENOUS; SUBCUTANEOUS EVERY 6 HOURS
Refills: 0 | Status: DISCONTINUED | OUTPATIENT
Start: 2019-12-04 | End: 2019-12-04

## 2019-12-04 RX ORDER — DIAZEPAM 5 MG
5 TABLET ORAL EVERY 8 HOURS
Refills: 0 | Status: DISCONTINUED | OUTPATIENT
Start: 2019-12-04 | End: 2019-12-04

## 2019-12-04 RX ORDER — CEFAZOLIN SODIUM 1 G
2000 VIAL (EA) INJECTION EVERY 8 HOURS
Refills: 0 | Status: COMPLETED | OUTPATIENT
Start: 2019-12-04 | End: 2019-12-05

## 2019-12-04 RX ORDER — LANOLIN ALCOHOL/MO/W.PET/CERES
6 CREAM (GRAM) TOPICAL AT BEDTIME
Refills: 0 | Status: DISCONTINUED | OUTPATIENT
Start: 2019-12-04 | End: 2019-12-05

## 2019-12-04 RX ORDER — PANTOPRAZOLE SODIUM 20 MG/1
40 TABLET, DELAYED RELEASE ORAL
Refills: 0 | Status: DISCONTINUED | OUTPATIENT
Start: 2019-12-04 | End: 2019-12-05

## 2019-12-04 RX ORDER — OXYCODONE HYDROCHLORIDE 5 MG/1
80 TABLET ORAL EVERY 8 HOURS
Refills: 0 | Status: DISCONTINUED | OUTPATIENT
Start: 2019-12-04 | End: 2019-12-04

## 2019-12-04 RX ORDER — SODIUM CHLORIDE 9 MG/ML
1000 INJECTION INTRAMUSCULAR; INTRAVENOUS; SUBCUTANEOUS
Refills: 0 | Status: DISCONTINUED | OUTPATIENT
Start: 2019-12-04 | End: 2019-12-05

## 2019-12-04 RX ORDER — SENNA PLUS 8.6 MG/1
2 TABLET ORAL AT BEDTIME
Refills: 0 | Status: DISCONTINUED | OUTPATIENT
Start: 2019-12-04 | End: 2019-12-05

## 2019-12-04 RX ORDER — OXYCODONE HYDROCHLORIDE 5 MG/1
45 TABLET ORAL
Refills: 0 | Status: DISCONTINUED | OUTPATIENT
Start: 2019-12-04 | End: 2019-12-05

## 2019-12-04 RX ORDER — LANOLIN ALCOHOL/MO/W.PET/CERES
5 CREAM (GRAM) TOPICAL AT BEDTIME
Refills: 0 | Status: DISCONTINUED | OUTPATIENT
Start: 2019-12-04 | End: 2019-12-04

## 2019-12-04 RX ORDER — NICOTINE POLACRILEX 2 MG
1 GUM BUCCAL DAILY
Refills: 0 | Status: DISCONTINUED | OUTPATIENT
Start: 2019-12-04 | End: 2019-12-05

## 2019-12-04 RX ORDER — ONDANSETRON 8 MG/1
4 TABLET, FILM COATED ORAL EVERY 6 HOURS
Refills: 0 | Status: DISCONTINUED | OUTPATIENT
Start: 2019-12-04 | End: 2019-12-05

## 2019-12-04 RX ORDER — HYDROMORPHONE HYDROCHLORIDE 2 MG/ML
1 INJECTION INTRAMUSCULAR; INTRAVENOUS; SUBCUTANEOUS ONCE
Refills: 0 | Status: DISCONTINUED | OUTPATIENT
Start: 2019-12-04 | End: 2019-12-04

## 2019-12-04 RX ORDER — HYDROMORPHONE HYDROCHLORIDE 2 MG/ML
2 INJECTION INTRAMUSCULAR; INTRAVENOUS; SUBCUTANEOUS ONCE
Refills: 0 | Status: DISCONTINUED | OUTPATIENT
Start: 2019-12-04 | End: 2019-12-04

## 2019-12-04 RX ORDER — GABAPENTIN 400 MG/1
300 CAPSULE ORAL EVERY 8 HOURS
Refills: 0 | Status: DISCONTINUED | OUTPATIENT
Start: 2019-12-04 | End: 2019-12-05

## 2019-12-04 RX ORDER — OXYCODONE HYDROCHLORIDE 5 MG/1
80 TABLET ORAL EVERY 12 HOURS
Refills: 0 | Status: DISCONTINUED | OUTPATIENT
Start: 2019-12-04 | End: 2019-12-04

## 2019-12-04 RX ORDER — DIAZEPAM 5 MG
5 TABLET ORAL EVERY 6 HOURS
Refills: 0 | Status: DISCONTINUED | OUTPATIENT
Start: 2019-12-04 | End: 2019-12-05

## 2019-12-04 RX ORDER — HYDROMORPHONE HYDROCHLORIDE 2 MG/ML
1 INJECTION INTRAMUSCULAR; INTRAVENOUS; SUBCUTANEOUS EVERY 4 HOURS
Refills: 0 | Status: DISCONTINUED | OUTPATIENT
Start: 2019-12-04 | End: 2019-12-04

## 2019-12-04 RX ADMIN — HYDROMORPHONE HYDROCHLORIDE 2 MILLIGRAM(S): 2 INJECTION INTRAMUSCULAR; INTRAVENOUS; SUBCUTANEOUS at 07:33

## 2019-12-04 RX ADMIN — Medication 1 PATCH: at 19:36

## 2019-12-04 RX ADMIN — HYDROMORPHONE HYDROCHLORIDE 2 MILLIGRAM(S): 2 INJECTION INTRAMUSCULAR; INTRAVENOUS; SUBCUTANEOUS at 22:00

## 2019-12-04 RX ADMIN — OXYCODONE HYDROCHLORIDE 40 MILLIGRAM(S): 5 TABLET ORAL at 17:08

## 2019-12-04 RX ADMIN — HYDROMORPHONE HYDROCHLORIDE 2 MILLIGRAM(S): 2 INJECTION INTRAMUSCULAR; INTRAVENOUS; SUBCUTANEOUS at 14:11

## 2019-12-04 RX ADMIN — Medication 6 MILLIGRAM(S): at 22:55

## 2019-12-04 RX ADMIN — HYDROMORPHONE HYDROCHLORIDE 2 MILLIGRAM(S): 2 INJECTION INTRAMUSCULAR; INTRAVENOUS; SUBCUTANEOUS at 21:12

## 2019-12-04 RX ADMIN — Medication 5 MILLIGRAM(S): at 19:36

## 2019-12-04 RX ADMIN — Medication 100 MILLIGRAM(S): at 20:00

## 2019-12-04 RX ADMIN — GABAPENTIN 300 MILLIGRAM(S): 400 CAPSULE ORAL at 18:32

## 2019-12-04 RX ADMIN — HYDROMORPHONE HYDROCHLORIDE 2 MILLIGRAM(S): 2 INJECTION INTRAMUSCULAR; INTRAVENOUS; SUBCUTANEOUS at 14:35

## 2019-12-04 RX ADMIN — HYDROMORPHONE HYDROCHLORIDE 2 MILLIGRAM(S): 2 INJECTION INTRAMUSCULAR; INTRAVENOUS; SUBCUTANEOUS at 14:29

## 2019-12-04 RX ADMIN — OXYCODONE HYDROCHLORIDE 45 MILLIGRAM(S): 5 TABLET ORAL at 15:51

## 2019-12-04 RX ADMIN — OXYCODONE HYDROCHLORIDE 80 MILLIGRAM(S): 5 TABLET ORAL at 17:40

## 2019-12-04 RX ADMIN — OXYCODONE HYDROCHLORIDE 45 MILLIGRAM(S): 5 TABLET ORAL at 18:32

## 2019-12-04 RX ADMIN — HYDROMORPHONE HYDROCHLORIDE 2 MILLIGRAM(S): 2 INJECTION INTRAMUSCULAR; INTRAVENOUS; SUBCUTANEOUS at 13:50

## 2019-12-04 RX ADMIN — HYDROMORPHONE HYDROCHLORIDE 1 MILLIGRAM(S): 2 INJECTION INTRAMUSCULAR; INTRAVENOUS; SUBCUTANEOUS at 07:32

## 2019-12-04 RX ADMIN — HYDROMORPHONE HYDROCHLORIDE 2 MILLIGRAM(S): 2 INJECTION INTRAMUSCULAR; INTRAVENOUS; SUBCUTANEOUS at 10:10

## 2019-12-04 RX ADMIN — KETAMINE HYDROCHLORIDE 10 MILLIGRAM(S): 100 INJECTION INTRAMUSCULAR; INTRAVENOUS at 15:00

## 2019-12-04 RX ADMIN — OXYCODONE HYDROCHLORIDE 45 MILLIGRAM(S): 5 TABLET ORAL at 19:30

## 2019-12-04 RX ADMIN — HYDROMORPHONE HYDROCHLORIDE 2 MILLIGRAM(S): 2 INJECTION INTRAMUSCULAR; INTRAVENOUS; SUBCUTANEOUS at 09:10

## 2019-12-04 RX ADMIN — SODIUM CHLORIDE 100 MILLILITER(S): 9 INJECTION INTRAMUSCULAR; INTRAVENOUS; SUBCUTANEOUS at 10:34

## 2019-12-04 RX ADMIN — SENNA PLUS 2 TABLET(S): 8.6 TABLET ORAL at 22:55

## 2019-12-04 RX ADMIN — GABAPENTIN 300 MILLIGRAM(S): 400 CAPSULE ORAL at 23:01

## 2019-12-04 RX ADMIN — OXYCODONE HYDROCHLORIDE 45 MILLIGRAM(S): 5 TABLET ORAL at 23:35

## 2019-12-04 RX ADMIN — OXYCODONE HYDROCHLORIDE 45 MILLIGRAM(S): 5 TABLET ORAL at 22:56

## 2019-12-04 RX ADMIN — HYDROMORPHONE HYDROCHLORIDE 2 MILLIGRAM(S): 2 INJECTION INTRAMUSCULAR; INTRAVENOUS; SUBCUTANEOUS at 07:52

## 2019-12-04 RX ADMIN — OXYCODONE HYDROCHLORIDE 10 MILLIGRAM(S): 5 TABLET ORAL at 15:14

## 2019-12-04 RX ADMIN — HYDROMORPHONE HYDROCHLORIDE 1 MILLIGRAM(S): 2 INJECTION INTRAMUSCULAR; INTRAVENOUS; SUBCUTANEOUS at 06:53

## 2019-12-04 RX ADMIN — SODIUM CHLORIDE 75 MILLILITER(S): 9 INJECTION, SOLUTION INTRAVENOUS at 14:05

## 2019-12-04 RX ADMIN — HYDROMORPHONE HYDROCHLORIDE 2 MILLIGRAM(S): 2 INJECTION INTRAMUSCULAR; INTRAVENOUS; SUBCUTANEOUS at 15:00

## 2019-12-04 NOTE — CONSULT NOTE ADULT - ATTENDING COMMENTS
Patient unable to be seen, taken to OR immediately after Fellow's interview  Noted plan as outlined above

## 2019-12-04 NOTE — ED PROVIDER NOTE - OBJECTIVE STATEMENT
Patient is a 53 year old female with pacemaker/defibrillator, recent left hip acetabulum revision surgery by Dr. Calero's orthopedic surgery team on 11/6/19. States she had her staples removed 3 days ago. The day after, when she went to bear weight on her crutches, her left hip "gave out" and dislocated, causing extreme pain. Patient came to ED with her crutches. Was discharged with oxycodone q3h regimen. Denies fevers, chills, dyspnea or other complaints other than severe pain.

## 2019-12-04 NOTE — ED ADULT TRIAGE NOTE - CHIEF COMPLAINT QUOTE
Arrives to ED for left hip dislocation that occurred last night.  Pt attempting to ambulate when dislocation occurred.  Pt in severe pain, unable to stand.  visible through skin.

## 2019-12-04 NOTE — CONSULT NOTE ADULT - SUBJECTIVE AND OBJECTIVE BOX
53y Female community ambulatory presents c/o L hip pain and inability to ambulate after fall on 11/29. 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 Patient recently underwent revision left hip arthroplasty after she dislocated her total femur and had failure of her acetabular component. She was doing well post-op until her recent fall on 11/29. Patient stunted fall with right knee, which is bruised and painful. Denies HS/LOC. Denies numbness/tingling. Denies fever/chills. Denies pain/injury elsewhere.    No pertinent family history in first degree relatives  Handoff  No pertinent past medical history  Hip dislocation, left  Closed pelvic fracture  HIP DISLOCATION  23    PAST MEDICAL & SURGICAL HISTORY:  No pertinent past medical history  Closed pelvic fracture    MEDICATIONS  (STANDING):    Allergies    No Known Allergies    Intolerances                            10.2   12.82 )-----------( 312      ( 04 Dec 2019 06:49 )             33.7     04 Dec 2019 06:49    134    |  96     |  13     ----------------------------<  118    3.4     |  26     |  0.95     Ca    9.5        04 Dec 2019 06:49    TPro  8.4    /  Alb  3.9    /  TBili  0.3    /  DBili  x      /  AST  14     /  ALT  9      /  AlkPhos  109    04 Dec 2019 06:49    PT/INR - ( 04 Dec 2019 06:49 )   PT: 12.7 SEC;   INR: 1.11          PTT - ( 04 Dec 2019 06:49 )  PTT:25.0 SEC  Vital Signs Last 24 Hrs  T(C): 36.7 (12-04-19 @ 08:51), Max: 36.8 (12-04-19 @ 06:26)  T(F): 98.1 (12-04-19 @ 08:51), Max: 98.2 (12-04-19 @ 06:26)  HR: 72 (12-04-19 @ 08:51) (72 - 118)  BP: 125/71 (12-04-19 @ 08:51) (125/71 - 142/87)  BP(mean): --  RR: 18 (12-04-19 @ 08:51) (18 - 20)  SpO2: 100% (12-04-19 @ 08:51) (100% - 100%)    Imaging: XR pending    Physical Exam  Gen: NAD  LLE: skin intact, no palpable protruding prosthetic, unable to SLR, + log roll, +ttp hip/groin, no ttp elsewhere, +ehl/fhl/ta/gs function, no calf ttp, dp/pt pulse intact, compartments soft  Secondary survey: benign, nv intact, able to SLR contralateral leg, negative log roll contralateral leg, no bony ttp elsewhere, bilateral upper extremity skin intact with no gross deformity, non tender to palpation over bony prominences, neurovascularly intact    RLE: Skin intact, no TTP at hip, TTP at knee with visible bruising from fall, full AROM of hip/knee/ankle    A/P: 53y Female with Left total femur and known prosthetic hip instability.   Pending imaging and possible ED reduction

## 2019-12-04 NOTE — CONSULT NOTE ADULT - SUBJECTIVE AND OBJECTIVE BOX
CHIEF COMPLAINT: Patient is a 53y old  Female who presents with a chief complaint of     HPI: "53y Female community ambulatory presents c/o L hip pain and inability to ambulate after fall on 11/29. 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 Patient recently underwent revision left hip arthroplasty after she dislocated her total femur and had failure of her acetabular component. She was doing well post-op until her recent fall on 11/29. Patient stunted fall with right knee, which is bruised and painful. Denies HS/LOC. Denies numbness/tingling. Denies fever/chills. Denies pain/injury elsewhere."    Interval hx: 53F w/PMH of biVICD/PPM?, CAD?, multiple orthopedic surgeries for L hip fracture, most recently L hip revision of acetabulum on 11/6.     History limited due to: [ ] Dementia  [ ] Delirium  [ ] Condition    Pain Symptoms if applicable:             	                         none	   mild         moderate         severe  	                            0	    1-3	     4-6	         7-10  Pain:  Location:	  Modifying factors:	  Associated symptoms:	    Allergies    No Known Allergies    Intolerances        HOME MEDICATIONS: [x] Reviewed    MEDICATIONS  (STANDING):  acetaminophen   Tablet .. 975 milliGRAM(s) Oral every 8 hours  senna 2 Tablet(s) Oral at bedtime  sodium chloride 0.9%. 1000 milliLiter(s) (100 mL/Hr) IV Continuous <Continuous>    MEDICATIONS  (PRN):  HYDROmorphone  Injectable 0.5 milliGRAM(s) IV Push every 6 hours PRN Severe Pain (7 - 10)  oxyCODONE    IR 2.5 milliGRAM(s) Oral every 4 hours PRN Moderate Pain (4 - 6)  oxyCODONE    IR 5 milliGRAM(s) Oral every 4 hours PRN Severe Pain (7 - 10)      PAST MEDICAL & SURGICAL HISTORY:  No pertinent past medical history  Closed pelvic fracture  [x ] Reviewed     SOCIAL HISTORY:  [x] Substance abuse:   [x] Alcohol use:   [x] Tobacco:     FAMILY HISTORY:  No pertinent family history in first degree relatives  [x] No pertinent family history in first degree relatives     REVIEW OF SYSTEMS:  [x] All other ROS negative  [  ] Unable to obtain due to poor mental status    Vital Signs Last 24 Hrs  T(C): 36.7 (04 Dec 2019 08:51), Max: 36.8 (04 Dec 2019 06:26)  T(F): 98.1 (04 Dec 2019 08:51), Max: 98.2 (04 Dec 2019 06:26)  HR: 72 (04 Dec 2019 08:51) (72 - 118)  BP: 125/71 (04 Dec 2019 08:51) (125/71 - 142/87)  BP(mean): --  RR: 18 (04 Dec 2019 08:51) (18 - 20)  SpO2: 100% (04 Dec 2019 08:51) (100% - 100%)    PHYSICAL EXAM:  GENERAL: NAD, well-groomed, well-developed  HEAD:  Atraumatic, Normocephalic  EYES: EOMI, PERRLA, conjunctiva and sclera clear  ENMT: Moist mucous membranes  NECK: Supple, No JVD  RESPIRATORY: Clear to auscultation bilaterally; No rales, rhonchi, wheezing, or rubs  CARDIOVASCULAR: Regular rate and rhythm; No murmurs, rubs, or gallops  GASTROINTESTINAL: Soft, Nontender, Nondistended; Bowel sounds present  GENITOURINARY: Not examined  EXTREMITIES:  2+ Peripheral Pulses, No clubbing, cyanosis, or edema  NERVOUS SYSTEM:  Alert & Oriented X3; Moving all 4 extremities; No gross sensory deficits  HEME/LYMPH: No lymphadenopathy noted  SKIN: No rashes or lesions; Incisions C/D/I    LABS:                        10.2   12.82 )-----------( 312      ( 04 Dec 2019 06:49 )             33.7     Hemoglobin: 10.2 g/dL (12-04 @ 06:49)    12-04    134<L>  |  96<L>  |  13  ----------------------------<  118<H>  3.4<L>   |  26  |  0.95    Ca    9.5      04 Dec 2019 06:49    TPro  8.4<H>  /  Alb  3.9  /  TBili  0.3  /  DBili  x   /  AST  14  /  ALT  9   /  AlkPhos  109  12-04    PT/INR - ( 04 Dec 2019 06:49 )   PT: 12.7 SEC;   INR: 1.11          PTT - ( 04 Dec 2019 06:49 )  PTT:25.0 SEC    Microbiology     RADIOLOGY & ADDITIONAL STUDIES:    EKG:   Personally Reviewed:  [x] YES     Imaging:   Personally Reviewed:  [x] YES               [ ] Consultant(s) Notes Reviewed  [x] Care Discussed with Consultants/Other Providers: Ortho PA - discussed CHIEF COMPLAINT: Patient is a 53y old  Female who presents with a chief complaint of     HPI: "53y Female community ambulatory presents c/o L hip pain and inability to ambulate after fall on 11/29. 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 Patient recently underwent revision left hip arthroplasty after she dislocated her total femur and had failure of her acetabular component. She was doing well post-op until her recent fall on 11/29. Patient stunted fall with right knee, which is bruised and painful. Denies HS/LOC. Denies numbness/tingling. Denies fever/chills. Denies pain/injury elsewhere."    Interval hx: 53F w/PMH of biVICD/PPM?, CAD?, multiple orthopedic surgeries for L hip fracture, most recently L hip revision of acetabulum on 11/6.   Pt reports pain of L hip, worse with movement.     History limited due to: [ ] Dementia  [ ] Delirium  [ ] Condition    Pain Symptoms if applicable:   Pain: severe  Location: L hip 	  Modifying factors: worse with movement 	  Associated symptoms:	    Allergies    No Known Allergies    Intolerances        HOME MEDICATIONS: [x] Reviewed    MEDICATIONS  (STANDING):  acetaminophen   Tablet .. 975 milliGRAM(s) Oral every 8 hours  senna 2 Tablet(s) Oral at bedtime  sodium chloride 0.9%. 1000 milliLiter(s) (100 mL/Hr) IV Continuous <Continuous>    MEDICATIONS  (PRN):  HYDROmorphone  Injectable 0.5 milliGRAM(s) IV Push every 6 hours PRN Severe Pain (7 - 10)  oxyCODONE    IR 2.5 milliGRAM(s) Oral every 4 hours PRN Moderate Pain (4 - 6)  oxyCODONE    IR 5 milliGRAM(s) Oral every 4 hours PRN Severe Pain (7 - 10)      PAST MEDICAL & SURGICAL HISTORY:  No pertinent past medical history  Closed pelvic fracture  [x ] Reviewed     SOCIAL HISTORY:  [x] Substance abuse: +THC use       FAMILY HISTORY:  No pertinent family history in first degree relatives  [x] No pertinent family history in first degree relatives     REVIEW OF SYSTEMS:  [x] All other ROS negative  [  ] Unable to obtain due to poor mental status    Vital Signs Last 24 Hrs  T(C): 36.7 (04 Dec 2019 08:51), Max: 36.8 (04 Dec 2019 06:26)  T(F): 98.1 (04 Dec 2019 08:51), Max: 98.2 (04 Dec 2019 06:26)  HR: 72 (04 Dec 2019 08:51) (72 - 118)  BP: 125/71 (04 Dec 2019 08:51) (125/71 - 142/87)  BP(mean): --  RR: 18 (04 Dec 2019 08:51) (18 - 20)  SpO2: 100% (04 Dec 2019 08:51) (100% - 100%)    PHYSICAL EXAM:  GENERAL: in mild distress 2/2 pain, anxious   HEAD:  Atraumatic, Normocephalic  EYES: EOMI, PERRLA, conjunctiva and sclera clear  ENMT: Moist mucous membranes  NECK: Supple  RESPIRATORY: Clear to auscultation bilaterally; No rales, rhonchi, wheezing, or rubs  CARDIOVASCULAR: Regular rate and rhythm; No murmurs, rubs, or gallops  GASTROINTESTINAL: Soft, Nontender, Nondistended;  GENITOURINARY: Not examined  EXTREMITIES:  not examined due to pain  NERVOUS SYSTEM:  Alert & Oriented X3; Moving all 4 extremities; No gross sensory deficits    LABS:                        10.2   12.82 )-----------( 312      ( 04 Dec 2019 06:49 )             33.7     Hemoglobin: 10.2 g/dL (12-04 @ 06:49)    12-04    134<L>  |  96<L>  |  13  ----------------------------<  118<H>  3.4<L>   |  26  |  0.95    Ca    9.5      04 Dec 2019 06:49    TPro  8.4<H>  /  Alb  3.9  /  TBili  0.3  /  DBili  x   /  AST  14  /  ALT  9   /  AlkPhos  109  12-04    PT/INR - ( 04 Dec 2019 06:49 )   PT: 12.7 SEC;   INR: 1.11          PTT - ( 04 Dec 2019 06:49 )  PTT:25.0 SEC    Microbiology     RADIOLOGY & ADDITIONAL STUDIES:    EKG:   Personally Reviewed:  [x] YES     Imaging:   Personally Reviewed:  [x] YES               [ ] Consultant(s) Notes Reviewed  [x] Care Discussed with Consultants/Other Providers: Ortho PA - discussed

## 2019-12-04 NOTE — CHART NOTE - NSCHARTNOTEFT_GEN_A_CORE
Patient seen at bedside, weeping and crying in pain about her right hip surgery.  After recent discharge from the hospital, at home she was taking Oxycontin 80 mg every 8 hours at home and Oxycodone 30 mg, 1-2 tablets or Oxycodone 45 mg 4-6 times a day.  However, 1 week prior to this surgery, patient stated she was off any opioid medication because she experienced no pain.  Discussed case with anesthesia attending, elmo Pearce to order IV Ketamine 10 mg x1 which offered some relief.  Pain medications adjusted to lower doses for now based on her 1 week off opioids.  Continuous pulse oximetry necessary. Patient seen at bedside, weeping and crying in pain about her left hip surgery.  After recent discharge from the hospital, at home she was taking Oxycontin 80 mg every 8 hours at home and Oxycodone 30 mg, 1-2 tablets or Oxycodone 45 mg 4-6 times a day.  However, 1 week prior to this surgery, patient stated she was off any opioid medication because she experienced no pain.  Discussed case with anesthesia attending, elmo Pearce to order IV Ketamine 10 mg x1 which offered some relief.  Pain medications adjusted to lower doses for now based on her 1 week off opioids.  Continuous pulse oximetry necessary.
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
## 2019-12-04 NOTE — PROVIDER CONTACT NOTE (MEDICATION) - ACTION/TREATMENT ORDERED:
I was advised that it was okay to alternate the oxycodone 45mg and the 2mg/ml dilaudid IV push every 90 minutes as long as the patient is hemodynamically stable.

## 2019-12-04 NOTE — ED PROVIDER NOTE - PROGRESS NOTE DETAILS
BRIGITTE Garcia: Pt seen by ortho, recommend admission for OR reduction of hip dislocation. Pt agrees with admission.

## 2019-12-04 NOTE — CONSULT NOTE ADULT - PROBLEM SELECTOR RECOMMENDATION 9
-Planned for OR today  -Medically optimized per cardiology note  -Pain control   -Bowel regimen  -DVT ppx per primary team

## 2019-12-04 NOTE — PROGRESS NOTE ADULT - SUBJECTIVE AND OBJECTIVE BOX
Date: 12/4/19   This is a 53y Female, who is undergoing an OR procedure.   Called to the operating room to deactivate his/her cardio defibrillator.  R2 pad with external defibrillator have been placed on the patient for monitoring while the device is deactivated and asynchronous mode programmed    TIME:     The cardio defibrillator is reactivated and the original settings have been restored.

## 2019-12-04 NOTE — ED PROVIDER NOTE - CLINICAL SUMMARY MEDICAL DECISION MAKING FREE TEXT BOX
53F with recent left acetabulum surgery by orthopedic surgery, presenting with left hip dislocation with resultant severe pain. Orthopedic surgery team notified of presentation, will see the patient. Ordered pre-operative labs as well as Xrays left pelvis and knee. Will give pain control with IV dilaudid. 53F with recent left acetabulum surgery by orthopedic surgery, presenting with left hip dislocation with resultant severe pain. Orthopedic surgery team notified of presentation, will see the patient. Ordered pre-operative labs as well as Xrays left pelvis and knee. Will give pain control with IV dilaudid. Patient to be admitted to ortho for OR reduction.

## 2019-12-04 NOTE — CONSULT NOTE ADULT - SUBJECTIVE AND OBJECTIVE BOX
Date of Admission: 12/4/19    Patient is a 53y old  Female who presents with a chief complaint of fall.    HISTORY OF PRESENT ILLNESS:   53y F with HF s/p AICD presents after a fall. She has prior history of multiple hip surgeries. She was doing well until recently when she had a mechanical fall. Currently notes L hip pain. Denies CP, SOB, palp, dizziness. She recently saw her cardiologist 3 weeks ago and had 'all cardiac testing' done which was reportedly normal. Prior to the fall patient was ambulating w/o difficulty.     PAST MEDICAL & SURGICAL HISTORY:  No pertinent past medical history  Closed pelvic fracture      Allergies    No Known Allergies    Intolerances    	    MEDICATIONS:  acetaminophen   Tablet .. 975 milliGRAM(s) Oral every 8 hours  HYDROmorphone  Injectable 0.5 milliGRAM(s) IV Push every 6 hours PRN  oxyCODONE    IR 2.5 milliGRAM(s) Oral every 4 hours PRN  oxyCODONE    IR 5 milliGRAM(s) Oral every 4 hours PRN  senna 2 Tablet(s) Oral at bedtime  sodium chloride 0.9%. 1000 milliLiter(s) IV Continuous <Continuous>      PAST MEDICAL & SURGICAL HISTORY:  No pertinent past medical history  Closed pelvic fracture      FAMILY HISTORY:  No pertinent family history in first degree relatives      REVIEW OF SYSTEMS:  CONSTITUTIONAL: No weakness, fevers or chills  EYES/ENT: No visual changes;  No dysphagia  RESPIRATORY: No cough, wheezing, hemoptysis; No shortness of breath  CARDIOVASCULAR: No chest pain or palpitations; No lower extremity edema  GASTROINTESTINAL: No abdominal or epigastric pain. No nausea, vomiting, or hematemesis  GENITOURINARY: No dysuria, frequency or hematuria  NEUROLOGICAL: No numbness or weakness  SKIN: No itching, burning, rashes, or lesions  HEME: No bleeding or bruising  MSK: L hip pain  All other review of systems is negative unless indicated above.    PHYSICAL EXAM:  T(C): 37.1 (12-04-19 @ 10:18), Max: 37.1 (12-04-19 @ 10:18)  HR: 96 (12-04-19 @ 10:18) (72 - 118)  BP: 124/64 (12-04-19 @ 10:18) (124/64 - 142/87)  RR: 20 (12-04-19 @ 10:18) (18 - 20)  SpO2: 100% (12-04-19 @ 10:18) (100% - 100%)  Wt(kg): --  I&O's Summary      Appearance: Normal	  HEENT:   Normal oral mucosa  Cardiovascular: Normal S1 S2, No JVD, No murmurs, No edema  Respiratory: Lungs clear to auscultation	  Psychiatry: A & O x 3, Mood & affect appropriate  Gastrointestinal:  Soft, Non-tender, + BS	  Skin: No rashes, No ecchymoses, No cyanosis	  Neurologic: Non-focal  Extremities: Normal range of motion, No clubbing, cyanosis    LABS:	 	    CBC Full  -  ( 04 Dec 2019 06:49 )  WBC Count : 12.82 K/uL  Hemoglobin : 10.2 g/dL  Hematocrit : 33.7 %  Platelet Count - Automated : 312 K/uL  Mean Cell Volume : 93.4 fL  Mean Cell Hemoglobin : 28.3 pg  Mean Cell Hemoglobin Concentration : 30.3 %  Auto Neutrophil # : 8.21 K/uL  Auto Lymphocyte # : 2.85 K/uL  Auto Monocyte # : 1.15 K/uL  Auto Eosinophil # : 0.49 K/uL  Auto Basophil # : 0.06 K/uL  Auto Neutrophil % : 64.0 %  Auto Lymphocyte % : 22.2 %  Auto Monocyte % : 9.0 %  Auto Eosinophil % : 3.8 %  Auto Basophil % : 0.5 %    12-04    134<L>  |  96<L>  |  13  ----------------------------<  118<H>  3.4<L>   |  26  |  0.95    Ca    9.5      04 Dec 2019 06:49    TPro  8.4<H>  /  Alb  3.9  /  TBili  0.3  /  DBili  x   /  AST  14  /  ALT  9   /  AlkPhos  109  12-04    ECG:  	a sensed, vpaced    PREVIOUS DIAGNOSTIC TESTING:    Echo 11/5/19  1. Normal left ventricular internal dimensions and wall  thicknesses.  2. Normal left ventricular systolic function. No segmental  wall motion abnormalities.  3. The right ventricle is not well visualized; grossly  normal right ventricular systolic function. A device wire  is noted in the right heart.  4. Estimated right ventricular systolic pressure equals 31  mm Hg, assuming right atrial pressure equals 10 mm Hg,  consistent with normal pulmonary pressures.  *** No previous Echo exam.    ASSESSMENT/PLAN: 	  54 yo F with multiple hip surgeries, PPM/AICD placement presented after fall, found to have L hip fracture. Patient had PPM placed due to "cardiac arrest" and was placed on ASA, plavix, metoprolol, Lisinopril. Patient is unsure of her cardiac conditions and does not want to offer any information right now.     - patient asymptomatic, euvolemic  - recent echo with normal EF and no valvular disease  - unable to fully assess functional status or cardiac history as patient only offers that she was 'completely fine and walking around' prior tho this fall and that she had 'all cardiac tests done 3 weeks ago and were normal'  - patient has RCRI score of ~1 equating to risk of 6% 30 days risk of death, MI, or cardiac arrest  - given patient is clinically optimized from cardiac standpoint and is undergoing an urgent surgery, okay to proceed    To be discussed with attending.      Vanita Rolon MD  Cardiology Fellow   [phone], M-F 7:30A-5P    All Cardiology service information can be found on [email or web address], password: [email or web address]. Date of Admission: 12/4/19    Patient is a 53y old  Female who presents with a chief complaint of fall.    HISTORY OF PRESENT ILLNESS:   53y F with HF s/p AICD presents after a fall. She has prior history of multiple hip surgeries. She was doing well until recently when she had a mechanical fall. Currently notes L hip pain. Denies CP, SOB, palp, dizziness. She recently saw her cardiologist 3 weeks ago and had 'all cardiac testing' done which was reportedly normal. Prior to the fall patient was ambulating w/o difficulty.     PAST MEDICAL & SURGICAL HISTORY:  No pertinent past medical history  Closed pelvic fracture      Allergies    No Known Allergies    Intolerances    	    MEDICATIONS:  acetaminophen   Tablet .. 975 milliGRAM(s) Oral every 8 hours  HYDROmorphone  Injectable 0.5 milliGRAM(s) IV Push every 6 hours PRN  oxyCODONE    IR 2.5 milliGRAM(s) Oral every 4 hours PRN  oxyCODONE    IR 5 milliGRAM(s) Oral every 4 hours PRN  senna 2 Tablet(s) Oral at bedtime  sodium chloride 0.9%. 1000 milliLiter(s) IV Continuous <Continuous>      PAST MEDICAL & SURGICAL HISTORY:  No pertinent past medical history  Closed pelvic fracture      FAMILY HISTORY:  No pertinent family history in first degree relatives      REVIEW OF SYSTEMS:  CONSTITUTIONAL: No weakness, fevers or chills  EYES/ENT: No visual changes;  No dysphagia  RESPIRATORY: No cough, wheezing, hemoptysis; No shortness of breath  CARDIOVASCULAR: No chest pain or palpitations; No lower extremity edema  GASTROINTESTINAL: No abdominal or epigastric pain. No nausea, vomiting, or hematemesis  GENITOURINARY: No dysuria, frequency or hematuria  NEUROLOGICAL: No numbness or weakness  SKIN: No itching, burning, rashes, or lesions  HEME: No bleeding or bruising  MSK: L hip pain  All other review of systems is negative unless indicated above.    PHYSICAL EXAM:  T(C): 37.1 (12-04-19 @ 10:18), Max: 37.1 (12-04-19 @ 10:18)  HR: 96 (12-04-19 @ 10:18) (72 - 118)  BP: 124/64 (12-04-19 @ 10:18) (124/64 - 142/87)  RR: 20 (12-04-19 @ 10:18) (18 - 20)  SpO2: 100% (12-04-19 @ 10:18) (100% - 100%)  Wt(kg): --  I&O's Summary      Appearance: Normal	  HEENT:   Normal oral mucosa  Cardiovascular: Normal S1 S2, No JVD, No murmurs, No edema  Respiratory: Lungs clear to auscultation	  Psychiatry: A & O x 3, Mood & affect appropriate  Gastrointestinal:  Soft, Non-tender, + BS	  Skin: No rashes, No ecchymoses, No cyanosis	  Neurologic: Non-focal  Extremities: Normal range of motion, No clubbing, cyanosis    LABS:	 	    CBC Full  -  ( 04 Dec 2019 06:49 )  WBC Count : 12.82 K/uL  Hemoglobin : 10.2 g/dL  Hematocrit : 33.7 %  Platelet Count - Automated : 312 K/uL  Mean Cell Volume : 93.4 fL  Mean Cell Hemoglobin : 28.3 pg  Mean Cell Hemoglobin Concentration : 30.3 %  Auto Neutrophil # : 8.21 K/uL  Auto Lymphocyte # : 2.85 K/uL  Auto Monocyte # : 1.15 K/uL  Auto Eosinophil # : 0.49 K/uL  Auto Basophil # : 0.06 K/uL  Auto Neutrophil % : 64.0 %  Auto Lymphocyte % : 22.2 %  Auto Monocyte % : 9.0 %  Auto Eosinophil % : 3.8 %  Auto Basophil % : 0.5 %    12-04    134<L>  |  96<L>  |  13  ----------------------------<  118<H>  3.4<L>   |  26  |  0.95    Ca    9.5      04 Dec 2019 06:49    TPro  8.4<H>  /  Alb  3.9  /  TBili  0.3  /  DBili  x   /  AST  14  /  ALT  9   /  AlkPhos  109  12-04    ECG:  	a sensed, vpaced    PREVIOUS DIAGNOSTIC TESTING:    Echo 11/5/19  1. Normal left ventricular internal dimensions and wall  thicknesses.  2. Normal left ventricular systolic function. No segmental  wall motion abnormalities.  3. The right ventricle is not well visualized; grossly  normal right ventricular systolic function. A device wire  is noted in the right heart.  4. Estimated right ventricular systolic pressure equals 31  mm Hg, assuming right atrial pressure equals 10 mm Hg,  consistent with normal pulmonary pressures.  *** No previous Echo exam.    ASSESSMENT/PLAN: 	  54 yo F with multiple hip surgeries, PPM/AICD placement presented after fall, found to have L hip fracture. Patient had PPM placed due to "cardiac arrest" and was placed on ASA, plavix, metoprolol, Lisinopril. Patient is unsure of her cardiac conditions and does not want to offer any information right now.     - patient asymptomatic, euvolemic  - recent echo with normal EF and no valvular disease  - unable to fully assess functional status or cardiac history as patient only offers that she was 'completely fine and walking around' prior tho this fall and that she had 'all cardiac tests done 3 weeks ago and were normal'  - patient has RCRI score of ~1 equating to risk of 6% 30 days risk of death, MI, or cardiac arrest  - given patient is clinically stable from cardiac standpoint and is undergoing an urgent surgery, no additional testing at this time    To be discussed with attending.      Vanita Rolon MD  Cardiology Fellow   [phone], M-F 7:30A-5P    All Cardiology service information can be found on [email or web address], password: [email or web address].

## 2019-12-04 NOTE — ED ADULT NURSE NOTE - NSIMPLEMENTINTERV_GEN_ALL_ED
Implemented All Fall Risk Interventions:  [place] to call system. Call bell, personal items and telephone within reach. Instruct patient to call for assistance. Room bathroom lighting operational. Non-slip footwear when patient is off stretcher. Physically safe environment: no spills, clutter or unnecessary equipment. Stretcher in lowest position, wheels locked, appropriate side rails in place. Provide visual cue, wrist band, yellow gown, etc. Monitor gait and stability. Monitor for mental status changes and reorient to person, place, and time. Review medications for side effects contributing to fall risk. Reinforce activity limits and safety measures with patient and family.

## 2019-12-04 NOTE — ED ADULT NURSE NOTE - OBJECTIVE STATEMENT
Pt is a 53 year old female reporting to the ED fro left hip pain. Pt has a pmh of left total hip replacement nov 6. Pt has stiches removed 4 days ago. Pt reports left hip pain increasing 4 days ago. Pt reports waking up from sleep and tried to ambulate but left leg "totally gave out". Pt denies  fall or trauma to area. Pt reports feeling dislocation after waking up from sleep. Pt reports for 2 days left hip has been shortened and "disfigured". Pt arrives to room 24 crying in pain. Pt waited to come to ED due to snow. Pt left leg is shortened and internally rotated. Pt reports pain 10/10, increasing when moving or touching. Pt is AOX4. Pt denies chest pain or SOB. PT respirations even an unlabored.  Pt denies fever, chills, n/v/d. Pt denies abdominal pain, dysuria, hematuria. 20 g iv palce din right fore arm, labs drawn, pending review, will continue to monitor.

## 2019-12-04 NOTE — PROGRESS NOTE ADULT - SUBJECTIVE AND OBJECTIVE BOX
ORTHO POC      Patient resting  complains of pain  s/p open reduction left total femur today     Vital Signs Last 24 Hrs  T(C): 36.3 (04 Dec 2019 13:30), Max: 37.1 (04 Dec 2019 10:18)  T(F): 97.3 (04 Dec 2019 13:30), Max: 98.8 (04 Dec 2019 10:18)  HR: 72 (04 Dec 2019 15:45) (70 - 118)  BP: 129/80 (04 Dec 2019 15:45) (118/102 - 142/87)  BP(mean): 80 (04 Dec 2019 15:45) (72 - 109)  RR: 16 (04 Dec 2019 15:45) (15 - 20)  SpO2: 97% (04 Dec 2019 15:45) (96% - 100%)    left hip  : dressing clean/dry/ intact   abduction pillow in place           LE:  EHL/GC/TA 5/5                  sensory intact                   DP pulse 2+    Labs :                      10.6   15.46 )-----------( 224      ( 04 Dec 2019 14:59 )             35.3                 12-04    138  |  100  |  13  ----------------------------<  98  4.2   |  24  |  0.89    Ca    9.2      04 Dec 2019 14:59    TPro  8.4<H>  /  Alb  3.9  /  TBili  0.3  /  DBili  x   /  AST  14  /  ALT  9   /  AlkPhos  109  12-04      U/O:  DTV    A/P: Stable postop            PT- WBAT with abduction brace ON at all times            DVT prophylaxis- venodynes/ Xarelto           Pain Control per pain service (consulted)            Incentive Spirometer            Antibiotics x 24 hr            Follow up AM labs

## 2019-12-04 NOTE — PRE-OP CHECKLIST - INTERNAL PROSTHESES
yes(specify) right forearm hardware, bilateral hip and bilateral knee replacements, bilateral ankle hardware/yes(specify)

## 2019-12-04 NOTE — CONSULT NOTE ADULT - CONSULT REASON
[facility]  CARDIAC CATH    Name:  Sondra Mandel  MR#:  [id number]  :  1946  ACCOUNT #:  [de-identified]  DATE OF SERVICE:  2019    PRIMARY CARDIOLOGIST:  Juliane Hurt MD    PRIMARY CARE PHYSICIAN:  None. BRIEF HISTORY:  The patient is a 69-year-old gentleman who underwent recent stress test in the office. Recent stress testing suggesting medium reversible with mild severity throughout the entire anterior wall with akinesis of the anterior apex. Ejection fraction is mildly reduced. It was determined to be a high-risk scan, referred for cardiac catheterization. PREOPERATIVE DIAGNOSES:  Dyspnea, typical angina. POSTOPERATIVE DIAGNOSIS:  Chronic stable angina. PROCEDURES PERFORMED:  Bilateral selective coronary angiography, left ventriculography, saphenous vein graft injection x1, left internal mammary artery injection. SURGEON:  Caroline Steven MD    ASSISTANT:  None. ANESTHESIA:  Conscious sedation. Start time 3:51, end time 4:20. MEDICATIONS:  2 mg of Versed and 50 mcg of fentanyl    MONITORING RN:  Bekah Werner    ESTIMATED BLOOD LOSS:  5cc    SPECIMENS REMOVED:  None     COMPLICATIONS:  non3    IMPLANTS:  none    PROCEDURE:  After informed consent, he was prepped and draped in usual sterile fashion. The left wrist was infiltrated with lidocaine. Left radial artery was accessed. A 6-Trinidadian sheath was advanced. A 6-Trinidadian IM catheter was utilized for left internal mammary artery injection. A 6-Trinidadian JL4 was utilized for left coronary artery injection. A 6-Trinidadian AL1 was utilized for right coronary artery injection as well as saphenous vein graft to the diagonal.  A 6-Trinidadian angled pigtail was utilized for left ventriculography. Isovue contrast utilized. FINDINGS:  1. Left ventricle:  Normal left ventricular size with mildly reduced left ventricular systolic function. Ejection fraction is 40% to 45% with mild global hypokinesis present.   Left ventricular end-diastolic pressure measured 7 mmHg. There is no aortic valve gradient. 2.  Left main:  Left main is moderate in size. Originally trifurcates into LAD, ramus, and circumflex vessels. This remains patent into the circumflex only. 3.  Left anterior descending coronary artery:  Occluded. 4.  Ramus intermedius coronary artery:  Occluded. 5.  Left circumflex coronary artery: This is a dominant vessel. The stent in the proximal portion remains patent. The first obtuse marginal is large in size. There is a small subsegmental branch 2 mm in nature that has a 90% stenosis. The terminal circumflex gives rise to a posterolateral branch and a posterior descending branch. All of these are severely diseased on the order of 70% to 90% throughout their entire course distally. 6.  Right coronary artery: It is a small nondominant vessel. Appears angiographically normal.  7.  Left internal mammary artery to the LAD:  This is a large graft, good proximal takeoff, good distal anastomosis. Fills a large LAD diagonal system as well as collaterals to the ramus intermedius. 8.  Saphenous vein graft to the ramus intermedius:  Occluded. Successful hemostasis with pneumatic radial band. CONCLUSIONS:  1.  Mildly reduced left ventricular systolic function. 2.  Severe three-vessel atherosclerotic coronary artery disease. 3.  Patent LIMA to the LAD. 4.  Occluded vein graft to the ramus intermedius. There are collaterals to the ramus intermedius via the left internal mammary artery to the LAD. 5.  Severe small vessel disease involving the terminal left dominant circumflex. This is amenable only to medical management. Thank you for allowing us to participate in the care of this patient. For any questions or concerns, please feel free to contact me.       MD TIA [email or web address]  D:  08/26/2019 16:30  T:  08/26/2019 16:35  JOB #:  [id number] comanagement

## 2019-12-04 NOTE — CONSULT NOTE ADULT - ASSESSMENT
53F w/PMH of femur fx ~ 30 years ago c/b frequent prosthetic dislocations and fractures, recent revision left hip arthroplasty on 11/6, p/w after mechanical fall on 11/29, planned for OR today.

## 2019-12-04 NOTE — CONSULT NOTE ADULT - PROBLEM SELECTOR RECOMMENDATION 2
Per prior documention, pt was prescribed ASA/Plavix/metoprolol/lisinopril, which pt stopped in October? Pt was not dcd on cardiac meds on 11/10 and was told to followup with her cardiologist  -Denies any chest pain or sob at this time  -TTE (11/5/19) normal LV function   -Cardiology consulted, appreciate recs
no

## 2019-12-04 NOTE — CONSULT NOTE ADULT - PROBLEM SELECTOR RECOMMENDATION 3
Last interrogated by EP on 11/4 prior to last OR procedure w/underlying rhythm of SR with 2:1 AVB in high 30 bpm, 100% Bi-V paced   -Recommend consulting EP for further recs regarding device

## 2019-12-05 ENCOUNTER — TRANSCRIPTION ENCOUNTER (OUTPATIENT)
Age: 54
End: 2019-12-05

## 2019-12-05 VITALS
RESPIRATION RATE: 16 BRPM | TEMPERATURE: 99 F | HEART RATE: 74 BPM | OXYGEN SATURATION: 98 % | DIASTOLIC BLOOD PRESSURE: 61 MMHG | SYSTOLIC BLOOD PRESSURE: 121 MMHG

## 2019-12-05 LAB
ANION GAP SERPL CALC-SCNC: 13 MMO/L — SIGNIFICANT CHANGE UP (ref 7–14)
BUN SERPL-MCNC: 18 MG/DL — SIGNIFICANT CHANGE UP (ref 7–23)
CALCIUM SERPL-MCNC: 9.4 MG/DL — SIGNIFICANT CHANGE UP (ref 8.4–10.5)
CHLORIDE SERPL-SCNC: 97 MMOL/L — LOW (ref 98–107)
CO2 SERPL-SCNC: 24 MMOL/L — SIGNIFICANT CHANGE UP (ref 22–31)
CREAT SERPL-MCNC: 0.96 MG/DL — SIGNIFICANT CHANGE UP (ref 0.5–1.3)
GLUCOSE SERPL-MCNC: 118 MG/DL — HIGH (ref 70–99)
HCT VFR BLD CALC: 30.2 % — LOW (ref 34.5–45)
HGB BLD-MCNC: 9.2 G/DL — LOW (ref 11.5–15.5)
MCHC RBC-ENTMCNC: 28 PG — SIGNIFICANT CHANGE UP (ref 27–34)
MCHC RBC-ENTMCNC: 30.5 % — LOW (ref 32–36)
MCV RBC AUTO: 92.1 FL — SIGNIFICANT CHANGE UP (ref 80–100)
NRBC # FLD: 0 K/UL — SIGNIFICANT CHANGE UP (ref 0–0)
PLATELET # BLD AUTO: 254 K/UL — SIGNIFICANT CHANGE UP (ref 150–400)
PMV BLD: 11.7 FL — SIGNIFICANT CHANGE UP (ref 7–13)
POTASSIUM SERPL-MCNC: 4.4 MMOL/L — SIGNIFICANT CHANGE UP (ref 3.5–5.3)
POTASSIUM SERPL-SCNC: 4.4 MMOL/L — SIGNIFICANT CHANGE UP (ref 3.5–5.3)
RBC # BLD: 3.28 M/UL — LOW (ref 3.8–5.2)
RBC # FLD: 16.3 % — HIGH (ref 10.3–14.5)
SODIUM SERPL-SCNC: 134 MMOL/L — LOW (ref 135–145)
SPECIMEN SOURCE: SIGNIFICANT CHANGE UP
WBC # BLD: 12.84 K/UL — HIGH (ref 3.8–10.5)
WBC # FLD AUTO: 12.84 K/UL — HIGH (ref 3.8–10.5)

## 2019-12-05 PROCEDURE — 99233 SBSQ HOSP IP/OBS HIGH 50: CPT

## 2019-12-05 RX ORDER — GABAPENTIN 400 MG/1
1 CAPSULE ORAL
Qty: 90 | Refills: 0
Start: 2019-12-05 | End: 2020-01-03

## 2019-12-05 RX ORDER — SENNA PLUS 8.6 MG/1
2 TABLET ORAL
Qty: 30 | Refills: 0
Start: 2019-12-05 | End: 2019-12-19

## 2019-12-05 RX ORDER — HYDROMORPHONE HYDROCHLORIDE 2 MG/ML
1 INJECTION INTRAMUSCULAR; INTRAVENOUS; SUBCUTANEOUS
Qty: 56 | Refills: 0
Start: 2019-12-05 | End: 2019-12-11

## 2019-12-05 RX ORDER — ASPIRIN/CALCIUM CARB/MAGNESIUM 324 MG
1 TABLET ORAL
Qty: 84 | Refills: 0
Start: 2019-12-05 | End: 2020-01-15

## 2019-12-05 RX ORDER — OXYCODONE HYDROCHLORIDE 5 MG/1
3 TABLET ORAL
Qty: 84 | Refills: 0
Start: 2019-12-05 | End: 2019-12-11

## 2019-12-05 RX ORDER — OXYCODONE HYDROCHLORIDE 5 MG/1
45 TABLET ORAL
Refills: 0 | Status: DISCONTINUED | OUTPATIENT
Start: 2019-12-05 | End: 2019-12-05

## 2019-12-05 RX ORDER — ACETAMINOPHEN 500 MG
650 TABLET ORAL EVERY 6 HOURS
Refills: 0 | Status: DISCONTINUED | OUTPATIENT
Start: 2019-12-06 | End: 2019-12-05

## 2019-12-05 RX ORDER — ACETAMINOPHEN 500 MG
1000 TABLET ORAL ONCE
Refills: 0 | Status: COMPLETED | OUTPATIENT
Start: 2019-12-05 | End: 2019-12-05

## 2019-12-05 RX ORDER — OXYCODONE HYDROCHLORIDE 5 MG/1
1 TABLET ORAL
Qty: 21 | Refills: 0
Start: 2019-12-05 | End: 2019-12-11

## 2019-12-05 RX ORDER — ACETAMINOPHEN 500 MG
1000 TABLET ORAL ONCE
Refills: 0 | Status: DISCONTINUED | OUTPATIENT
Start: 2019-12-05 | End: 2019-12-05

## 2019-12-05 RX ORDER — ACETAMINOPHEN 500 MG
2 TABLET ORAL
Qty: 240 | Refills: 0
Start: 2019-12-05 | End: 2020-01-03

## 2019-12-05 RX ORDER — ACETAMINOPHEN 500 MG
1000 TABLET ORAL ONCE
Refills: 0 | Status: DISCONTINUED | OUTPATIENT
Start: 2019-12-06 | End: 2019-12-05

## 2019-12-05 RX ORDER — PANTOPRAZOLE SODIUM 20 MG/1
1 TABLET, DELAYED RELEASE ORAL
Qty: 30 | Refills: 0
Start: 2019-12-05 | End: 2020-01-03

## 2019-12-05 RX ORDER — HYDROMORPHONE HYDROCHLORIDE 2 MG/ML
1 INJECTION INTRAMUSCULAR; INTRAVENOUS; SUBCUTANEOUS
Refills: 0 | Status: DISCONTINUED | OUTPATIENT
Start: 2019-12-05 | End: 2019-12-05

## 2019-12-05 RX ORDER — TIZANIDINE 4 MG/1
1 TABLET ORAL
Qty: 21 | Refills: 0
Start: 2019-12-05 | End: 2019-12-11

## 2019-12-05 RX ORDER — NICOTINE POLACRILEX 2 MG
1 GUM BUCCAL
Qty: 30 | Refills: 0
Start: 2019-12-05 | End: 2020-01-03

## 2019-12-05 RX ORDER — HYDROMORPHONE HYDROCHLORIDE 2 MG/ML
4 INJECTION INTRAMUSCULAR; INTRAVENOUS; SUBCUTANEOUS
Refills: 0 | Status: DISCONTINUED | OUTPATIENT
Start: 2019-12-05 | End: 2019-12-05

## 2019-12-05 RX ORDER — MELOXICAM 15 MG/1
1 TABLET ORAL
Qty: 30 | Refills: 0
Start: 2019-12-05 | End: 2020-01-03

## 2019-12-05 RX ORDER — OXYCODONE HYDROCHLORIDE 5 MG/1
30 TABLET ORAL
Refills: 0 | Status: DISCONTINUED | OUTPATIENT
Start: 2019-12-05 | End: 2019-12-05

## 2019-12-05 RX ORDER — RIVAROXABAN 15 MG-20MG
1 KIT ORAL
Qty: 30 | Refills: 0
Start: 2019-12-05 | End: 2020-01-03

## 2019-12-05 RX ADMIN — OXYCODONE HYDROCHLORIDE 45 MILLIGRAM(S): 5 TABLET ORAL at 18:04

## 2019-12-05 RX ADMIN — HYDROMORPHONE HYDROCHLORIDE 4 MILLIGRAM(S): 2 INJECTION INTRAMUSCULAR; INTRAVENOUS; SUBCUTANEOUS at 16:37

## 2019-12-05 RX ADMIN — OXYCODONE HYDROCHLORIDE 80 MILLIGRAM(S): 5 TABLET ORAL at 06:39

## 2019-12-05 RX ADMIN — OXYCODONE HYDROCHLORIDE 45 MILLIGRAM(S): 5 TABLET ORAL at 11:07

## 2019-12-05 RX ADMIN — OXYCODONE HYDROCHLORIDE 45 MILLIGRAM(S): 5 TABLET ORAL at 12:12

## 2019-12-05 RX ADMIN — HYDROMORPHONE HYDROCHLORIDE 2 MILLIGRAM(S): 2 INJECTION INTRAMUSCULAR; INTRAVENOUS; SUBCUTANEOUS at 05:45

## 2019-12-05 RX ADMIN — RIVAROXABAN 10 MILLIGRAM(S): KIT at 12:37

## 2019-12-05 RX ADMIN — Medication 400 MILLIGRAM(S): at 09:24

## 2019-12-05 RX ADMIN — Medication 1 PATCH: at 13:15

## 2019-12-05 RX ADMIN — HYDROMORPHONE HYDROCHLORIDE 2 MILLIGRAM(S): 2 INJECTION INTRAMUSCULAR; INTRAVENOUS; SUBCUTANEOUS at 00:51

## 2019-12-05 RX ADMIN — OXYCODONE HYDROCHLORIDE 45 MILLIGRAM(S): 5 TABLET ORAL at 05:45

## 2019-12-05 RX ADMIN — PANTOPRAZOLE SODIUM 40 MILLIGRAM(S): 20 TABLET, DELAYED RELEASE ORAL at 07:29

## 2019-12-05 RX ADMIN — HYDROMORPHONE HYDROCHLORIDE 2 MILLIGRAM(S): 2 INJECTION INTRAMUSCULAR; INTRAVENOUS; SUBCUTANEOUS at 04:36

## 2019-12-05 RX ADMIN — HYDROMORPHONE HYDROCHLORIDE 4 MILLIGRAM(S): 2 INJECTION INTRAMUSCULAR; INTRAVENOUS; SUBCUTANEOUS at 16:04

## 2019-12-05 RX ADMIN — Medication 5 MILLIGRAM(S): at 07:29

## 2019-12-05 RX ADMIN — OXYCODONE HYDROCHLORIDE 45 MILLIGRAM(S): 5 TABLET ORAL at 06:30

## 2019-12-05 RX ADMIN — HYDROMORPHONE HYDROCHLORIDE 2 MILLIGRAM(S): 2 INJECTION INTRAMUSCULAR; INTRAVENOUS; SUBCUTANEOUS at 08:51

## 2019-12-05 RX ADMIN — Medication 100 MILLIGRAM(S): at 04:48

## 2019-12-05 RX ADMIN — HYDROMORPHONE HYDROCHLORIDE 4 MILLIGRAM(S): 2 INJECTION INTRAMUSCULAR; INTRAVENOUS; SUBCUTANEOUS at 19:26

## 2019-12-05 RX ADMIN — HYDROMORPHONE HYDROCHLORIDE 2 MILLIGRAM(S): 2 INJECTION INTRAMUSCULAR; INTRAVENOUS; SUBCUTANEOUS at 01:30

## 2019-12-05 RX ADMIN — Medication 1 PATCH: at 07:30

## 2019-12-05 RX ADMIN — OXYCODONE HYDROCHLORIDE 45 MILLIGRAM(S): 5 TABLET ORAL at 02:41

## 2019-12-05 RX ADMIN — OXYCODONE HYDROCHLORIDE 45 MILLIGRAM(S): 5 TABLET ORAL at 13:00

## 2019-12-05 RX ADMIN — OXYCODONE HYDROCHLORIDE 45 MILLIGRAM(S): 5 TABLET ORAL at 03:34

## 2019-12-05 RX ADMIN — OXYCODONE HYDROCHLORIDE 80 MILLIGRAM(S): 5 TABLET ORAL at 13:15

## 2019-12-05 RX ADMIN — OXYCODONE HYDROCHLORIDE 45 MILLIGRAM(S): 5 TABLET ORAL at 15:41

## 2019-12-05 RX ADMIN — OXYCODONE HYDROCHLORIDE 45 MILLIGRAM(S): 5 TABLET ORAL at 10:31

## 2019-12-05 RX ADMIN — HYDROMORPHONE HYDROCHLORIDE 2 MILLIGRAM(S): 2 INJECTION INTRAMUSCULAR; INTRAVENOUS; SUBCUTANEOUS at 09:15

## 2019-12-05 RX ADMIN — OXYCODONE HYDROCHLORIDE 45 MILLIGRAM(S): 5 TABLET ORAL at 15:06

## 2019-12-05 RX ADMIN — GABAPENTIN 300 MILLIGRAM(S): 400 CAPSULE ORAL at 07:29

## 2019-12-05 RX ADMIN — OXYCODONE HYDROCHLORIDE 45 MILLIGRAM(S): 5 TABLET ORAL at 18:36

## 2019-12-05 RX ADMIN — Medication 5 MILLIGRAM(S): at 00:11

## 2019-12-05 RX ADMIN — GABAPENTIN 300 MILLIGRAM(S): 400 CAPSULE ORAL at 13:15

## 2019-12-05 RX ADMIN — HYDROMORPHONE HYDROCHLORIDE 1 MILLIGRAM(S): 2 INJECTION INTRAMUSCULAR; INTRAVENOUS; SUBCUTANEOUS at 12:08

## 2019-12-05 NOTE — PHYSICAL THERAPY INITIAL EVALUATION ADULT - PERTINENT HX OF CURRENT PROBLEM, REHAB EVAL
Patient is a 53 year old female admitted to [facility] for left hip dislocation following mechanical fall, now s/p open reduction. PMH includes closed pelvic fracture, CAD, ? PPM/AICD placed 1 year ago, of femur fx ~ 30 years ago complicated by frequent prosthetic dislocations and fractures, recent revision left hip arthroplasty on 11/6

## 2019-12-05 NOTE — DISCHARGE NOTE PROVIDER - NSDCCPTREATMENT_GEN_ALL_CORE_FT
PRINCIPAL PROCEDURE  Procedure: Open reduction and internal fixation (ORIF) of dislocation of hip prosthesis  Findings and Treatment:

## 2019-12-05 NOTE — DISCHARGE NOTE NURSING/CASE MANAGEMENT/SOCIAL WORK - NSDCPECAREGIVERED_GEN_ALL_CORE
No know your medication side effects, managing pain at home, exercise worksheet, sex positions after joint replacement, force patient guide, posterior hip precautions, total hip replacement discharge instruction sheet,  total hip replacement/No

## 2019-12-05 NOTE — DISCHARGE NOTE NURSING/CASE MANAGEMENT/SOCIAL WORK - PATIENT PORTAL LINK FT
You can access the FollowMyHealth Patient Portal offered by [facility] by registering at the following website: [email or web address]. By joining [facility]’s FollowMyHealth portal, you will also be able to view your health information using other applications (apps) compatible with our system.

## 2019-12-05 NOTE — PHYSICAL THERAPY INITIAL EVALUATION ADULT - CRITERIA FOR SKILLED THERAPEUTIC INTERVENTIONS
anticipated discharge recommendation/functional limitations in following categories/risk reduction/prevention/rehab potential/predicted duration of therapy intervention/impairments found/therapy frequency

## 2019-12-05 NOTE — DISCHARGE NOTE PROVIDER - NSDCFUADDINST_GEN_ALL_CORE_FT
weight bear as tolerated with abduction brace ON AT ALL TIMES, POSTERIOR total hip precautions   DR Calero 2 weeks call for appt [phone]

## 2019-12-05 NOTE — PROGRESS NOTE ADULT - PROBLEM SELECTOR PLAN 2
Per prior documention, pt was prescribed ASA/Plavix/metoprolol/lisinopril, which pt stopped in October? Pt was not dcd on cardiac meds on 11/10 and was told to followup with her cardiologist  -Denies any chest pain or sob at this time  -TTE (11/5/19) normal LV function   -Cardiology consulted, appreciate recs Per prior documention, pt was prescribed ASA/Plavix/metoprolol/lisinopril, which pt stopped in October? Pt was not dcd on cardiac meds on 11/10 and was told to followup with her cardiologist  -Denies any chest pain or sob at this time  -TTE (11/5/19) normal LV function   -Seen by cardiology

## 2019-12-05 NOTE — PROGRESS NOTE ADULT - ASSESSMENT
53F w/PMH CAD, ? PPM/AICD placed 1 year ago, of femur fx ~ 30 years ago c/b frequent prosthetic dislocations and fractures, recent revision left hip arthroplasty on 11/6, p/w after mechanical fall on 11/29, planned for OR today. 53F w/PMH CAD, ? PPM/AICD placed 1 year ago, of femur fx ~ 30 years ago c/b frequent prosthetic dislocations and fractures, recent revision left hip arthroplasty on 11/6, admitted for L hip dislocation post fall, s/p open reduction of left hip dislocation in OR on 12/4, now 1 OR culture growing staph aureus.

## 2019-12-05 NOTE — PHYSICAL THERAPY INITIAL EVALUATION ADULT - ADDITIONAL COMMENTS
Patient reports she ambulates with axillary crutches for the past 30 years due to various hip problems. She is independent with the crutches at baseline

## 2019-12-05 NOTE — PROGRESS NOTE ADULT - SUBJECTIVE AND OBJECTIVE BOX
Anesthesia Pain Management Service- Attending Addendum    SUBJECTIVE: Patient's pain control adequate    Therapy:	  [ X] IV PCA	   [ ] Epidural           [ ] s/p Spinal Opoid              [ ] Postpartum infusion	  [ ] Patient controlled regional anesthesia (PCRA)    [ ] prn Analgesics    Allergies    No Known Allergies    Intolerances      MEDICATIONS  (STANDING):  acetaminophen  IVPB .. 1000 milliGRAM(s) IV Intermittent once  acetaminophen  IVPB .. 1000 milliGRAM(s) IV Intermittent once  gabapentin 300 milliGRAM(s) Oral every 8 hours  lactated ringers. 1000 milliLiter(s) (75 mL/Hr) IV Continuous <Continuous>  melatonin 6 milliGRAM(s) Oral at bedtime  nicotine - 21 mG/24Hr(s) Patch 1 patch Transdermal daily  oxyCODONE  ER Tablet 80 milliGRAM(s) Oral every 8 hours  pantoprazole    Tablet 40 milliGRAM(s) Oral before breakfast  rivaroxaban 10 milliGRAM(s) Oral daily  senna 2 Tablet(s) Oral at bedtime  sodium chloride 0.9%. 1000 milliLiter(s) (100 mL/Hr) IV Continuous <Continuous>    MEDICATIONS  (PRN):  HYDROmorphone   Tablet 4 milliGRAM(s) Oral every 3 hours PRN Severe Break Through Pain  ondansetron Injectable 4 milliGRAM(s) IV Push every 6 hours PRN Nausea and/or Vomiting  oxyCODONE    IR 30 milliGRAM(s) Oral every 3 hours PRN Moderate Pain (4 - 6)  oxyCODONE    IR 45 milliGRAM(s) Oral every 3 hours PRN Severe Pain (7 - 10)      OBJECTIVE:   [X] No new signs     [ ] Other:    Side Effects:  [X ] None			[ ] Other:      ASSESSMENT/PLAN  -Discontinue current therapy    [ ] Therapy changed to:    [ ] IV PCA       [ ] Epidural     [ X] prn Analgesics     Comments: Tolerating PO, participating in PT.  Discussed with patient that we will d/c IV Dilaudid and transition her to PO Dilaudid for breakthrough pain prn 3H. She is to be continued on her standing OxyER and IR doses.  Patient understands and is agreeable.

## 2019-12-05 NOTE — DISCHARGE NOTE PROVIDER - CARE PROVIDER_API CALL
Kevin Calero (MD)  Orthopaedic Surgery  [address], [address]  [place] [address]  Phone: [phone]  Fax: [phone]  Established Patient  Follow Up Time:

## 2019-12-05 NOTE — PROGRESS NOTE ADULT - SUBJECTIVE AND OBJECTIVE BOX
CHIEF COMPLAINT: f/u     SUBJECTIVE / OVERNIGHT EVENTS: Patient seen and examined. No acute events overnight. Pain well controlled and patient without any complaints.  One culture from OR growing staph aureus. Pt afebrile, mild leukocytosis which has been downtrending    MEDICATIONS  (STANDING):  acetaminophen  IVPB .. 1000 milliGRAM(s) IV Intermittent once  acetaminophen  IVPB .. 1000 milliGRAM(s) IV Intermittent once  diazepam    Tablet 5 milliGRAM(s) Oral every 6 hours  gabapentin 300 milliGRAM(s) Oral every 8 hours  lactated ringers. 1000 milliLiter(s) (75 mL/Hr) IV Continuous <Continuous>  melatonin 6 milliGRAM(s) Oral at bedtime  nicotine - 21 mG/24Hr(s) Patch 1 patch Transdermal daily  oxyCODONE  ER Tablet 80 milliGRAM(s) Oral every 8 hours  pantoprazole    Tablet 40 milliGRAM(s) Oral before breakfast  rivaroxaban 10 milliGRAM(s) Oral daily  senna 2 Tablet(s) Oral at bedtime  sodium chloride 0.9%. 1000 milliLiter(s) (100 mL/Hr) IV Continuous <Continuous>    MEDICATIONS  (PRN):  HYDROmorphone  Injectable 2 milliGRAM(s) IV Push every 3 hours PRN Severe Pain (7 - 10)  ondansetron Injectable 4 milliGRAM(s) IV Push every 6 hours PRN Nausea and/or Vomiting  oxyCODONE    IR 30 milliGRAM(s) Oral every 3 hours PRN Mild Pain (1 - 3)  oxyCODONE    IR 45 milliGRAM(s) Oral every 3 hours PRN Moderate Pain (4 - 6)      VITALS:  T(F): 98.1 (12-05-19 @ 08:54), Max: 98.8 (12-04-19 @ 10:18)  HR: 77 (12-05-19 @ 08:54) (68 - 96)  BP: 126/66 (12-05-19 @ 08:54) (118/102 - 153/83)  RR: 17 (12-05-19 @ 08:54) (14 - 20)  SpO2: 97% (12-05-19 @ 08:54)  Wt(kg): --  Height (cm): 172.72 (10:50)  Weight (kg): 67.1 (10:50)  BMI (kg/m2): 22.5 (10:50)    CAPILLARY BLOOD GLUCOSE    PHYSICAL EXAM:  GENERAL: NAD, well-developed  HEAD:  Atraumatic, Normocephalic  EYES: EOMI, PERRLA, conjunctiva and sclera clear  NECK: Supple, No JVD  CHEST/LUNG: Clear to auscultation bilaterally; No wheeze  HEART: Regular rate and rhythm; No murmurs, rubs, or gallops  ABDOMEN: Soft, Nontender, Nondistended; Bowel sounds present  EXTREMITIES:  2+ Peripheral Pulses, No clubbing, cyanosis, or edema  PSYCH: AAOx3  NEUROLOGY: non-focal  SKIN: No rashes or lesions    LABS:              9.2                  134  | 24   | 18           12.84 >-----------< 254     ------------------------< 118                   30.2                 4.4  | 97   | 0.96                                         Ca 9.4   Mg x     Ph x           TPro  8.4  /  Alb  3.9      TBili  0.3  /  DBili  x         AST  14  /  ALT  9             AlkPhos  109      INR: 1.11 ;    PT: 12.7 SEC;    PTT: 25.0 SEC<L>      Culture - Surg Site Aerob/Anaer w/Gm St (12.04.19 @ 14:43)    Culture - Surgical Site:   NO GROWTH - PRELIMINARY RESULTS    Gram Stain Wound:   NOS^No Organisms Seen  WBC^White Blood Cells  QNTY CELLS IN GRAM STAIN: NO CELLS SEEN    Specimen Source: HIP    Culture - Surg Site Aerob/Anaer w/[facility]m [facility] (12.04.19 @ 14:42)    Gram Stain Wound:   NOS^No Organisms Seen  WBC^White Blood Cells  QNTY CELLS IN GRAM STAIN: RARE (1+)    Culture - Surgical Site:   FEW  STAU^Staphylococcus aureus    Specimen Source: HIP    Culture - Surg Site Aerob/Anaer w/Gm [facility] (12.04.19 @ 14:39)    Culture - Surgical Site:   NO GROWTH - PRELIMINARY RESULTS    Gram Stain Wound:   NOS^No Organisms Seen  WBC^White Blood Cells  QNTY CELLS IN GRAM STAIN: NO CELLS SEEN    Specimen Source: HIP          RADIOLOGY & ADDITIONAL TESTS:  Imaging Personally Reviewed: [x] Yes    [ ] Consultant(s) Notes Reviewed:  [x] Care Discussed with Consultants/Other Providers: Orthopedic PA - discussed CHIEF COMPLAINT: f/u     SUBJECTIVE / OVERNIGHT EVENTS: Patient seen and examined. No acute events overnight. Pain well controlled and patient without any complaints.  One culture from OR growing staph aureus. Pt afebrile, mild leukocytosis which has been downtrending. 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 States she never had a fever during that infection, it was only discovered due to increasing pain and bedside aspiration.     MEDICATIONS  (STANDING):  acetaminophen  IVPB .. 1000 milliGRAM(s) IV Intermittent once  acetaminophen  IVPB .. 1000 milliGRAM(s) IV Intermittent once  diazepam    Tablet 5 milliGRAM(s) Oral every 6 hours  gabapentin 300 milliGRAM(s) Oral every 8 hours  lactated ringers. 1000 milliLiter(s) (75 mL/Hr) IV Continuous <Continuous>  melatonin 6 milliGRAM(s) Oral at bedtime  nicotine - 21 mG/24Hr(s) Patch 1 patch Transdermal daily  oxyCODONE  ER Tablet 80 milliGRAM(s) Oral every 8 hours  pantoprazole    Tablet 40 milliGRAM(s) Oral before breakfast  rivaroxaban 10 milliGRAM(s) Oral daily  senna 2 Tablet(s) Oral at bedtime  sodium chloride 0.9%. 1000 milliLiter(s) (100 mL/Hr) IV Continuous <Continuous>    MEDICATIONS  (PRN):  HYDROmorphone  Injectable 2 milliGRAM(s) IV Push every 3 hours PRN Severe Pain (7 - 10)  ondansetron Injectable 4 milliGRAM(s) IV Push every 6 hours PRN Nausea and/or Vomiting  oxyCODONE    IR 30 milliGRAM(s) Oral every 3 hours PRN Mild Pain (1 - 3)  oxyCODONE    IR 45 milliGRAM(s) Oral every 3 hours PRN Moderate Pain (4 - 6)      VITALS:  T(F): 98.1 (12-05-19 @ 08:54), Max: 98.8 (12-04-19 @ 10:18)  HR: 77 (12-05-19 @ 08:54) (68 - 96)  BP: 126/66 (12-05-19 @ 08:54) (118/102 - 153/83)  RR: 17 (12-05-19 @ 08:54) (14 - 20)  SpO2: 97% (12-05-19 @ 08:54)  Wt(kg): --  Height (cm): 172.72 (10:50)  Weight (kg): 67.1 (10:50)  BMI (kg/m2): 22.5 (10:50)    CAPILLARY BLOOD GLUCOSE    PHYSICAL EXAM:  GENERAL: NAD, well-developed  EYES: EOMI, conjunctiva and sclera clear  NECK: Supple  CHEST/LUNG: Clear to auscultation bilaterally; No wheeze  HEART: Regular rate and rhythm; No murmurs, rubs, or gallops  ABDOMEN: Soft, Nontender, Nondistended; Bowel sounds present  EXTREMITIES:  LLE in dressing, c/d/i  PSYCH: AAOx3  NEUROLOGY: non-focal  SKIN: No rashes or lesions    LABS:              9.2                  134  | 24   | 18           12.84 >-----------< 254     ------------------------< 118                   30.2                 4.4  | 97   | 0.96                                         Ca 9.4   Mg x     Ph x           TPro  8.4  /  Alb  3.9      TBili  0.3  /  DBili  x         AST  14  /  ALT  9             AlkPhos  109      INR: 1.11 ;    PT: 12.7 SEC;    PTT: 25.0 SEC<L>      Culture - Surg Site Aerob/Anaer w/[facility]m [facility] (12.04.19 @ 14:43)    Culture - Surgical Site:   NO GROWTH - PRELIMINARY RESULTS    Gram Stain Wound:   NOS^No Organisms Seen  WBC^White Blood Cells  QNTY CELLS IN GRAM STAIN: NO CELLS SEEN    Specimen Source: HIP    Culture - Surg Site Aerob/Anaer w/[facility] (12.04.19 @ 14:42)    Gram Stain Wound:   NOS^No Organisms Seen  WBC^White Blood Cells  QNTY CELLS IN GRAM STAIN: RARE (1+)    Culture - Surgical Site:   FEW  STAU^Staphylococcus aureus    Specimen Source: HIP    Culture - Surg Site Aerob/Anaer w/[facility]m [facility] (12.04.19 @ 14:39)    Culture - Surgical Site:   NO GROWTH - PRELIMINARY RESULTS    Gram Stain Wound:   NOS^No Organisms Seen  WBC^White Blood Cells  QNTY CELLS IN GRAM STAIN: NO CELLS SEEN    Specimen Source: HIP          RADIOLOGY & ADDITIONAL TESTS:  Imaging Personally Reviewed: [x] Yes    [ ] Consultant(s) Notes Reviewed:  [x] Care Discussed with Consultants/Other Providers: Orthopedic PA - discussed

## 2019-12-05 NOTE — PROGRESS NOTE ADULT - SUBJECTIVE AND OBJECTIVE BOX
Requested to consult on this patient for pain management.    HPI: [id number] dislocation of left hip, initial encounter  No pertinent family history in first degree relatives  Handoff  MEWS Score  No pertinent past medical history  History of dislocation of hip  History of dislocation of hip  Hip dislocation, left  Prophylactic measure  AICD (automatic cardioverter/defibrillator) present  CAD (coronary artery disease)  Hip dislocation, left  Reduction, spontaneous dislocation, hip  Closed pelvic fracture  HIP DISLOCATION  23      acetaminophen  IVPB .. 1000 milliGRAM(s) IV Intermittent once  acetaminophen  IVPB .. 1000 milliGRAM(s) IV Intermittent once  gabapentin 300 milliGRAM(s) Oral every 8 hours  HYDROmorphone  Injectable 1 milliGRAM(s) IV Push every 3 hours PRN  lactated ringers. 1000 milliLiter(s) IV Continuous <Continuous>  melatonin 6 milliGRAM(s) Oral at bedtime  nicotine - 21 mG/24Hr(s) Patch 1 patch Transdermal daily  ondansetron Injectable 4 milliGRAM(s) IV Push every 6 hours PRN  oxyCODONE    IR 30 milliGRAM(s) Oral every 3 hours PRN  oxyCODONE    IR 45 milliGRAM(s) Oral every 3 hours PRN  oxyCODONE  ER Tablet 80 milliGRAM(s) Oral every 8 hours  pantoprazole    Tablet 40 milliGRAM(s) Oral before breakfast  rivaroxaban 10 milliGRAM(s) Oral daily  senna 2 Tablet(s) Oral at bedtime  sodium chloride 0.9%. 1000 milliLiter(s) IV Continuous <Continuous>      No Known Allergies      (Floorstock):   Qty Removed: 1 each (12-04-19 @ 11:18)  (Floorstock):   Qty Removed: 1 each (12-04-19 @ 11:20)  (Floorstock):   Qty Removed: 1 each (12-04-19 @ 11:20)  Xray Non Rad Fluoro Up to 1 Hr: 12:05  Exam Completed (12-04-19 @ 12:07)  (Floorstock):   Qty Removed: 1 each (12-04-19 @ 12:25)  (Floorstock):   Qty Removed: 2 each (12-04-19 @ 12:55)  Provider to RN:       pt recieved intraoperative IV tylenol at 1pm.  please no tylenol containing compounds until after 7pm.  Thank nyou. (12-04-19 @ 13:09)  HYDROmorphone  Injectable: [Known as DILAUDID Injectable]  1 milliGRAM(s), IV Push, every 15 minutes, PRN for Moderate Pain (4 - 6), Stop After 3 Doses  Administration Instructions: This is a Look-alike/Sound-alike Medication  Provider's Contact #: [phone] (12-04-19 @ 13:10)  HYDROmorphone  Injectable: [Known as DILAUDID Injectable]  2 milliGRAM(s), IV Push, every 15 minutes, PRN for Severe Pain (7 - 10), Stop After 3 Doses  Administration Instructions: This is a Look-alike/Sound-alike Medication  Provider's Contact #: [phone] (12-04-19 @ 13:10)  gabapentin: [Known as NEURONTIN]  300 milliGRAM(s), Oral, every 8 hours  Provider's Contact #: [phone] (12-04-19 @ 13:35)  diazepam    Tablet: [Known as VALIUM]  5 milliGRAM(s), Oral, every 8 hours, PRN for muscle spasm  Provider's Contact #: [phone] (12-04-19 @ 13:35)  rivaroxaban: [Known as XARELTO]  10 milliGRAM(s), Oral, daily, Stop After 35 Days  Indication: Postoperative DVT/PE prophylaxis [Total hip (adult)]  Provider's Contact #: [phone]     (Adult Calc Info: Calculation : (10 mG Flat Dose)  Calculated Dose : 10 mG) (12-04-19 @ 13:35)  nicotine - 21 mG/24Hr(s) Patch: [Known as NICODERM CQ 21]  1 patch, Transdermal, daily  Administration Instructions: external use only  Return unused medication/wrapper to Pharmacy.  Provider's Contact #: [phone] (12-04-19 @ 13:37)  oxyCODONE    IR:   30 milliGRAM(s), Oral, every 3 hours, PRN for Mild Pain (1 - 3)  Administration Instructions: This is a Look-alike/Sound-alike Medication  Provider's Contact #: [phone] (12-04-19 @ 13:37)  oxyCODONE    IR:   45 milliGRAM(s), Oral, every 3 hours, PRN for Moderate Pain (4 - 6)  Administration Instructions: This is a Look-alike/Sound-alike Medication  Provider's Contact #: [phone] (12-04-19 @ 13:37)  oxyCODONE  ER Tablet: [Known as OxyCONTIN]  80 milliGRAM(s), Oral, every 8 hours  Administration Instructions: Swallow whole * don't crush/chew  This is a Look-alike/Sound-alike Medication  Provider's Contact #: [phone] (12-04-19 @ 13:37)  Intermittent Pneumatic Compression:     Body Side:  Bilateral    Additional Instructions:  Apply stocking or device now and remove only for bathing and skin evaluation as per nursing protocol (12-04-19 @ 13:42)  Assess for Hypothermia and Hyperthermia (12-04-19 @ 13:42)  Weight:     Frequency:  once (12-04-19 @ 13:42)  Vital Signs:     Frequency:  See Frequency Below    Every: 4 hour(s)   For: 48 hour(s)    Every: 8 hour(s) (12-04-19 @ 13:42)  Assess Pain:     Frequency:  every 4 hours    Additional Instructions:  While awake (12-04-19 @ 13:42)  Intake and Output:     Frequency:  every 4 hours;  Duration:  24 Hours    Additional Instructions:  For patient WITH an indwelling urinary catheter (12-04-19 @ 13:42)  Intake and Output:     Frequency:  every 8 hours    Additional Instructions:  For patient WITHOUT an indwelling urinary catheter (12-04-19 @ 13:42)  Assess Neurological Status-Post Procedure:     Frequency:  See Frequency Below    Every: 4 hour(s)   For: 12 hour(s)    Every: 8 hour(s)    Additional Instructions:  Assess NEUROVASCULAR Status of affected limb (12-04-19 @ 13:42)  Notify Provider For:     Additional Instructions:  Any signs and symptoms of neurovascular compromise (12-04-19 @ 13:42)  Notify Provider For:     Additional Instructions:  No void by 8 hours after removal of urinary catheter (12-04-19 @ 13:42)  Activity - Out of Bed to Chair:     Additional Instructions:  Start on Postoperative Day # 0 (12-04-19 @ 13:42)  Activity - Ambulate as Tolerated:     Time/Priority:  Routine (12-04-19 @ 13:42)  Activity - Weight Bearing Status:     Left Lower Extremity:  Weight Bearing As Tolerated    Right Lower Extremity:  Weight Bearing As Tolerated (12-04-19 @ 13:42)  Overhead Trapeze:     Time/Priority:  Routine    Additional Instructions:  Apply overhead trapeze to bed (12-04-19 @ 13:42)  Skin Care:     Frequency:  every 8 hours (12-04-19 @ 13:42)  Posterior Dislocation Precautions:     Additional Instructions:  STRICT throughout hospitalization (no flexion greater than 90 degrees; no internal rotation; no ADDuction past the midline) (12-04-19 @ 13:42)  ABduction Pillow Hip:     Additional Instructions:  while seated/supine (12-04-19 @ 13:42)  Education:     Pump/Device: Incentive Spirometer    Additional Instructions: Incentive Spirometry (12-04-19 @ 13:42)  Ice Pack:     Body Side:  Left;  Location:  Hip    Apply For:  20 minute(s)    Frequency:  every 4 hours    Additional Instructions:  Apply ice pack for 20 minutes every 4 hours to INCISION SITE for 24 hours (12-04-19 @ 13:42)  Incentive Spirometry:   Frequency: every 1 hour  Number of Repetitions per Hour:  10     While Awake  Additional Instructions:  Administer 10 times every hour when awake (12-04-19 @ 13:42)  Cough/Deep Breathing Exercises:     Additional Instructions:  While Awake (12-04-19 @ 13:42)  Oxygen Delivery Therapy:   Oxygen Method: Nasal Cannula  LPM: 3  Notify Provider for SpO2 BELOW: 94    Additional Instructions:  May be discontinued if patient requests and SpO2 GREATER THAN 94% (12-04-19 @ 13:42)  Diet, Clear Liquid (12-04-19 @ 13:42)  Diet, Full Liquid (12-04-19 @ 13:42)  Diet, Regular (12-04-19 @ 13:42)  lactated ringers.: Solution, 1000 milliLiter(s) infuse at 75 mL/Hr  Special Instructions: DC when tolerating PO  Provider's Contact #: [phone] (12-04-19 @ 13:42)  ceFAZolin   IVPB: [Known as ANCEF   IVPB]  2000 milliGRAM(s) in IV Solution 50 milliLiter(s), IV Intermittent, every 8 hours, infuse over 30 Minute(s), Stop After 2 Doses  Special Instructions: Total of 3 Doses including all perioperative and PACU doses  Administration Instructions: This is a Look-alike/Sound-alike Medication  Provider's Contact #: [phone] (12-04-19 @ 13:42)  ondansetron Injectable: [Known as ZOFRAN Injectable]  4 milliGRAM(s), IV Push, every 6 hours, PRN for Nausea and/or Vomiting  Administration Instructions: Max IV Push dose 8 milliGRAM(s)  IF IV PUSH, ADMINISTER OVER 2-5 MIN  Provider's Contact #: [phone] (12-04-19 @ 13:42)  pantoprazole    Tablet: [Known as PROTONIX   DR]  40 milliGRAM(s), Oral, before breakfast  Administration Instructions: swallow whole * don't crush/chew  Provider's Contact #: [phone] (12-04-19 @ 13:42)  bisacodyl Suppository: [Known as DULCOLAX Suppository]  10 milliGRAM(s), Rectal, daily, PRN for If no bowel movement by POD#2  Provider's Contact #: [phone] (12-04-19 @ 13:42)  Basic Metabolic Panel: STAT  Addl Info: Upon arrival to PACU (12-04-19 @ 13:42)  Basic Metabolic Panel: AM Sched. Collection: 05-Dec-2019 06:00 (12-04-19 @ 13:42)  Complete Blood Count: STAT  Addl Info: Conditional Order: Upon arrival to PACU (12-04-19 @ 13:42)  Complete Blood Count: AM Sched. Collection: 05-Dec-2019 06:00 (12-04-19 @ 13:42)  Consult- Case Management (12-04-19 @ 13:42)  Consult- OT Evaluate and Treat:   *Reason for Consult - Must select at least one choice*     Ortho Trauma/Surgery  Weight Bearing Restrictions: No (12-04-19 @ 13:42)  Consult- PT Evaluate and Treat:   *Reason for Consult - Must select at least one choice*     Ortho Trauma/Surgery  Weight Bearing Restrictions: No  Additional Information: Total Hip Protocol (12-04-19 @ 13:42)  Consult- Social Work, Adult (12-04-19 @ 13:42)  melatonin:   5 milliGRAM(s), Oral, at bedtime, PRN for Insomnia  Indication: Insomnia  Provider's Contact #: [phone] (12-04-19 @ 13:42)  HYDROmorphone  Injectable: [Ordered as DILAUDID Injectable]  2 milliGRAM(s), IV Push, every 3 hours, PRN for Severe Pain (7 - 10)  Administration Instructions: This is a Look-alike/Sound-alike Medication  Provider's Contact #: [phone] (12-04-19 @ 13:42)  Basic Metabolic Panel: STAT  Addl Info: Upon arrival to PACU (12-04-19 @ 13:44)  Complete Blood Count: STAT  Addl Info: Conditional Order: Upon arrival to PACU (12-04-19 @ 13:45)  Culture - Surg Site Aerob/Anaer w/Gm St (12-04-19 @ 14:41)  Culture - Yeast and Fungus (12-04-19 @ 14:41)  Culture - Surg Site Aerob/Anaer w/Gm St (12-04-19 @ 14:43)  Culture - Yeast and Fungus (12-04-19 @ 14:43)  Culture - Surg Site Aerob/Anaer w/Gm St (12-04-19 @ 14:45)  Culture - Yeast and Fungus (12-04-19 @ 14:46)  ketamine Injectable: [Known as KETALAR]  10 milliGRAM(s), IV Push, once, Stop After 1 Doses  Provider's Contact #: [phone] (12-04-19 @ 14:56)  (Floorstock):   Qty Removed: 1 each (12-04-19 @ 15:02)  diazepam    Tablet: [Known as VALIUM]  5 milliGRAM(s), Oral, every 6 hours, Stop After 2 Days  Special Instructions: Hold for oversedation.  Provider's Contact #: [phone] (12-04-19 @ 15:02)  oxyCODONE  ER Tablet: [Known as OxyCONTIN]  80 milliGRAM(s), Oral, every 12 hours  Special Instructions: Hold for oversedation.  Administration Instructions: Swallow whole * don't crush/chew  This is a Look-alike/Sound-alike Medication  Provider's Contact #: [phone] (12-04-19 @ 15:10)  Pulse Oximetry:   Frequency: <Continuous>    Additional Instructions:  Must maintain continuous pulse oximetry secondary to high dose long acting, short acting opioid and benzodiazepines. (12-04-19 @ 15:13)  oxyCODONE  ER Tablet: [Known as OxyCONTIN]  80 milliGRAM(s), Oral, every 8 hours  Special Instructions: Hold for oversedation.  Administration Instructions: Swallow whole * don't crush/chew  This is a Look-alike/Sound-alike Medication  Provider's Contact #: [phone] (12-04-19 @ 16:25)  Transfer in Level of Care and or Service (12-04-19 @ 16:58)  Diet, Regular (12-04-19 @ 18:41)  melatonin:   6 milliGRAM(s), Oral, at bedtime  Indication: Insomnia (12-04-19 @ 20:32)  acetaminophen    Suspension ..: [Known as TYLENOL Suspension..]  975 milliGRAM(s), Oral, every 8 hours  Administration Instructions: SHAKE WELL  MAX DAILY DOSE:  ADULT = 4,000 mG/Day  This is a Look-alike/Sound-alike Medication (12-04-19 @ 20:34)  HYDROmorphone  Injectable: [Ordered as DILAUDID Injectable]  1 milliGRAM(s), IV Push, every 4 hours  Indication: breakthrough pain  Administration Instructions: This is a Look-alike/Sound-alike Medication (12-04-19 @ 20:44)  Diet, Regular:   1000mL Fluid Restriction ([id number]) (12-05-19 @ 08:18)  Complete Blood Count: 05:00 (12-05-19 @ 08:19)  Basic Metabolic Panel: 05:00 (12-05-19 @ 08:19)  acetaminophen  IVPB ..: [Known as OFIRMEV.]  1000 milliGRAM(s) in IV Solution 100 milliLiter(s), IV Intermittent, once, infuse over 15 Minute(s), Stop After 1 Doses  Administration Instructions: MAX DAILY DOSE:  ADULT = 4,000 mG/Day  This is a Look-alike/Sound-alike Medication  Provider's Contact #: [phone] (12-05-19 @ 09:05)  acetaminophen  IVPB ..: [Known as OFIRMEV.]  1000 milliGRAM(s) in IV Solution 100 milliLiter(s), IV Intermittent, once, infuse over 15 Minute(s), Stop After 1 Doses  Administration Instructions: MAX DAILY DOSE:  ADULT = 4,000 mG/Day  This is a Look-alike/Sound-alike Medication  Provider's Contact #: [phone] (12-05-19 @ 09:05)  acetaminophen  IVPB ..: [Known as OFIRMEV.]  1000 milliGRAM(s) in IV Solution 100 milliLiter(s), IV Intermittent, once, infuse over 15 Minute(s), Stop After 1 Doses  Administration Instructions: MAX DAILY DOSE:  ADULT = 4,000 mG/Day  This is a Look-alike/Sound-alike Medication  Provider's Contact #: [phone] (12-05-19 @ 09:05)  acetaminophen  IVPB ..: [Known as OFIRMEV.]  1000 milliGRAM(s) in IV Solution 100 milliLiter(s), IV Intermittent, once, infuse over 15 Minute(s), Stop After 1 Doses  Administration Instructions: MAX DAILY DOSE:  ADULT = 4,000 mG/Day  This is a Look-alike/Sound-alike Medication  Provider's Contact #: [phone] (12-05-19 @ 09:05)  acetaminophen  IVPB ..: [Known as OFIRMEV.]  1000 milliGRAM(s) in IV Solution 100 milliLiter(s), IV Intermittent, once, infuse over 15 Minute(s), Stop After 1 Doses  Administration Instructions: MAX DAILY DOSE:  ADULT = 4,000 mG/Day  This is a Look-alike/Sound-alike Medication  Provider's Contact #: [phone] (12-05-19 @ 09:05)  acetaminophen  IVPB ..: [Known as OFIRMEV.]  1000 milliGRAM(s) in IV Solution 100 milliLiter(s), IV Intermittent, once, infuse over 15 Minute(s), Stop After 1 Doses  Administration Instructions: MAX DAILY DOSE:  ADULT = 4,000 mG/Day  This is a Look-alike/Sound-alike Medication  Provider's Contact #: [phone] (12-05-19 @ 09:05)  acetaminophen   Tablet ..: [Known as TYLENOL..]  650 milliGRAM(s), Oral, every 6 hours, Stop After 72 Hours  Special Instructions: ATC x 72hrs  Administration Instructions: MAX DAILY DOSE:  ADULT = 4,000 mG/Day  This is a Look-alike/Sound-alike Medication  Provider's Contact #: [phone] (12-05-19 @ 09:05)  oxyCODONE    IR:   30 milliGRAM(s), Oral, every 3 hours, PRN for Moderate Pain (4 - 6)  Special Instructions: Hold for oversedation. Only one dose in any three hours.  Administration Instructions: This is a Look-alike/Sound-alike Medication  Provider's Contact #: [phone] (12-05-19 @ 11:14)  oxyCODONE    IR:   45 milliGRAM(s), Oral, every 3 hours, PRN for Severe Pain (7 - 10)  Special Instructions: Hold for oversedation. Only one dose in any three hours.  Administration Instructions: This is a Look-alike/Sound-alike Medication  Provider's Contact #: [phone] (12-05-19 @ 11:14)  HYDROmorphone  Injectable: [Ordered as DILAUDID Injectable]  1 milliGRAM(s), IV Push, every 3 hours, PRN for Severe Break Through Pain  Special Instructions: Hold for oversedation. Not to be given within 1 hour of oxycodone  Provider's Contact #: [phone] (12-05-19 @ 11:14)                   PHYSICAL EXAM:    GENERAL: Alert & Oriented x 3 in NAD, well-groomed, well-developed         Impression/Plan: Pt. states pain is 5/10 and has been managed well overnight. Informed pt. that we will be weening her off of IV pain meds in preparation for discharge. Discussed plan with ortho team and patient. PO Oxycodone ordered PRN with IV dilaudid for break through if needed and will switch to PO dilaudid for break through tomorrow, along with non-opioid adjuvant analgesics. May call Pain Service if further assistance needed.    Patient was seen 12-05-19 @ 10:15

## 2019-12-05 NOTE — DISCHARGE NOTE NURSING/CASE MANAGEMENT/SOCIAL WORK - NSDCPNINST_GEN_ALL_CORE
Call MD for any c/o numbness, tingling, swelling to left hip, pain not relieved after medicated for pain, fever and a return appointment.  Continue to wear hip brace as instructed.  Take over the counter stool softener to prevent constipation that can be a side effect from taking pain medication.

## 2019-12-05 NOTE — CHART NOTE - NSCHARTNOTEFT_GEN_A_CORE
One of six of pt  ORCx  prelim -few S.aureus.  D/W Dr Calero, aware, states will follow cx in office.  Pt may still be discharged to home .

## 2019-12-05 NOTE — PROGRESS NOTE ADULT - PROBLEM SELECTOR PLAN 3
Last interrogated by EP on 11/4 prior to last OR procedure w/underlying rhythm of SR with 2:1 AVB in high 30 bpm, 100% Bi-V paced   -Seen by EP, cardio defibrillator reactivated and the original settings have been restored post-procedure

## 2019-12-05 NOTE — DISCHARGE NOTE PROVIDER - NSDCFUSCHEDAPPT_GEN_ALL_CORE_FT
NEERU CAMPOS ; 01/06/2020 ; [facility] NEERU CAMPOS ; 01/06/2020 ; [facility] NEERU CAMPOS ; 01/06/2020 ; [facility]

## 2019-12-05 NOTE — PROGRESS NOTE ADULT - SUBJECTIVE AND OBJECTIVE BOX
No acute events overnight. Pain well controlled with pain medications.    Vital Signs Last 24 Hrs  T(C): 36.8 (04 Dec 2019 22:33), Max: 37.1 (04 Dec 2019 10:18)  T(F): 98.3 (04 Dec 2019 22:33), Max: 98.8 (04 Dec 2019 10:18)  HR: 70 (05 Dec 2019 04:35) (68 - 118)  BP: 143/78 (05 Dec 2019 04:35) (118/102 - 153/83)  BP(mean): 74 (04 Dec 2019 17:00) (72 - 109)  RR: 18 (05 Dec 2019 04:35) (14 - 20)  SpO2: 96% (05 Dec 2019 04:35) (94% - 100%)    Exam:  Gen: NAD  Motor: EHL/FHL/TA/Gastrocnemius intact  Sensory: SILT DP/SP/S/S/T nerve distributions  Vascular: 2+ Dorsalis Pedis pulse  Dressing: Clean, Dry, Intact    A/P: 53 year old female s/p L Hip Open Reduction  - Pain Control  - Regular Diet  - PT/OT, OOB  - Abduction pillow  - Posterior hip precautions  - Discharge Planning

## 2019-12-05 NOTE — PHYSICAL THERAPY INITIAL EVALUATION ADULT - GENERAL OBSERVATIONS, REHAB EVAL
Patient found supine in bed head of bed elevated in NAD, agreeable to evaluation +hip abduction pillow.

## 2019-12-05 NOTE — OCCUPATIONAL THERAPY INITIAL EVALUATION ADULT - MD ORDER
Occupational Therapy to evaluate and treat. Abduction Pillow. Ambulate as tolerated. Out of bed to chair.

## 2019-12-05 NOTE — DISCHARGE NOTE NURSING/CASE MANAGEMENT/SOCIAL WORK - NSDCPEXARELTOCOMP_GEN_ALL_CORE
Patient called to follow up on prior auth req she states she requested at 10/16 office visit. Please process for patient so she can get her rx for HYDROcodone-acetaminophen (NORCO) 5-325 mg per tablet . Please advise patient when completed at [phone] - she states she is in a significant amount of pain. Rivaroxaban/Xarelto is used to treat and prevent blood clots.  If you are not able to swallow the tablets whole, you may crush the tablets and mix them with a small amount of applesauce and promptly take within four hours. Eat some food right after you swallow the mixture. Take 2.5mg or 10mg tablets of Rivaroxaban/Xarelto with or without food. Take 15mg or 20mg tablets of Rivaroxaban/Xarelto with food. Never skip a dose of Rivaroxaban/Xarelto. If you take Rivaroxaban/Xarelto once a day and you forget to take your dose, take a dose as soon as you remember on the same day. If you take Rivaroxaban/Xarelto 2.5 mg twice a day and you forget to take your dose, skip the missed dose and take your next dose at your usual time. DO NOT take an extra pill to ‘catch up’. If you take Rivaroxaban/Xarelto 15 mg twice a day and you forget to take your dose, take a dose as soon as you remember on the same day. You may take 2 doses at the same time to make up for missed dose ONLY if you take a total of 30mg per day. Notify your doctor that you missed a dose. Take Rivaroxaban/Xarelto at the same time each morning and evening. Rivaroxaban/Xarelto may be taken with other medication or food.

## 2019-12-05 NOTE — DISCHARGE NOTE PROVIDER - HOSPITAL COURSE
53 Pt is s/p  open reduction left total femur 12/4/19 without any intraoperative complications.  Pt is doing well and stable for discharge.  Pt is tolerating physical therapy: WBAT with abduction brace ON AT ALL TIMES , POSTERIOR TOTAL HIP PRECAUTIONS, gait training.  Leave dressing on until post op office visit(POD#14).  Have sutures/staples removed POD#14 if present.  Pt is on Xarelto for DVT prophylaxis .  follow up with Dr. Calero  in two weeks.  Follow up with primary care doctor in 1-2 weeks for continuity of care.    The patient had no post-operative complications and clinically progressed faster than anticipated. The following medical conditions were actively treated during the hospital stay.      ASA score >3/  cardiac disease /  chronic pain requiring narcotics.     The patient met criteria for discharge before the 2nd night of stay. The patient was safely and appropriately discharged home.            ISTOP Reference #: [id number]

## 2019-12-05 NOTE — OCCUPATIONAL THERAPY INITIAL EVALUATION ADULT - PERTINENT HX OF CURRENT PROBLEM, REHAB EVAL
Pt is a 53 year old female community ambulatory presents c/o Left hip pain and inability to ambulate after fall on 11/29. 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 Pt recently underwent revision left hip arthroplasty after she dislocated her total femur and had failure of her acetabular component. She was doing well post-op until her recent fall on 11/29. Pt now s/p open reduction of left hip dislocation 12/4/19.

## 2019-12-05 NOTE — PROGRESS NOTE ADULT - PROBLEM SELECTOR PLAN 1
s/p open reduction of left hip dislocation in OR on 12/4  -OR culture w/staph aureus in 1 set, pending other cultures   -Primary team aware of result and to discuss with attending role of abx. Pt also does have PPM/AICD placed at OSH 1 year ago.   -Would recommend ID consult if other cultures also positive  -Pain control   -Bowel regimen  -DVT ppx per primary team

## 2019-12-05 NOTE — DISCHARGE NOTE NURSING/CASE MANAGEMENT/SOCIAL WORK - NSDPDISTO_GEN_ALL_CORE
Left hip dressing c/d/i.  +NVS  +pp  +mobility  Abduction brace on as instructed.  Tolerated po and fluids.  void.  Patient agree with discharge./Home

## 2019-12-05 NOTE — DISCHARGE NOTE PROVIDER - NSDCMRMEDTOKEN_GEN_ALL_CORE_FT
acetaminophen 325 mg oral tablet: 2 tab(s) orally every 6 hours MDD:8  gabapentin 300 mg oral capsule: 1 cap(s) orally every 8 hours MDD:3  HYDROmorphone 4 mg oral tablet: 1 tab(s) orally every 3 hours, As needed, Severe Break Through Pain MDD:8  Mobic 15 mg oral tablet: 1 tab(s) orally once a day MDD:1  nicotine 21 mg/24 hr transdermal film, extended release: 1 patch transdermal once a day MDD:1- remove at night   oxyCODONE 15 mg oral tablet: 3 tab(s) orally every 6 hours, As Needed -Severe Pain (7 - 10) MDD:12  oxyCODONE 80 mg oral tablet, extended release: 1 tab(s) orally every 8 hours MDD:3  pantoprazole 40 mg oral delayed release tablet: 1 tab(s) orally once a day (before a meal) MDD:1  rivaroxaban 10 mg oral tablet: 1 tab(s) orally once a day MDD:1  senna oral tablet: 2 tab(s) orally once a day (at bedtime) MDD:2  tiZANidine 4 mg oral tablet: 1 tab(s) orally 3 times a day, As Needed MDD:3 acetaminophen 325 mg oral tablet: 2 tab(s) orally every 6 hours MDD:8  Aspirin Enteric Coated 325 mg oral delayed release tablet: 1 tab(s) orally 2 times a day MDD:2  gabapentin 300 mg oral capsule: 1 cap(s) orally every 8 hours MDD:3  HYDROmorphone 4 mg oral tablet: 1 tab(s) orally every 3 hours, As needed, Severe Break Through Pain MDD:8  Mobic 15 mg oral tablet: 1 tab(s) orally once a day MDD:1  nicotine 21 mg/24 hr transdermal film, extended release: 1 patch transdermal once a day MDD:1- remove at night   oxyCODONE 15 mg oral tablet: 3 tab(s) orally every 6 hours, As Needed -Severe Pain (7 - 10) MDD:12  oxyCODONE 80 mg oral tablet, extended release: 1 tab(s) orally every 8 hours MDD:3  pantoprazole 40 mg oral delayed release tablet: 1 tab(s) orally once a day (before a meal) MDD:1  senna oral tablet: 2 tab(s) orally once a day (at bedtime) MDD:2  tiZANidine 4 mg oral tablet: 1 tab(s) orally 3 times a day, As Needed MDD:3

## 2019-12-06 LAB
-  CEFAZOLIN: SIGNIFICANT CHANGE UP
-  CIPROFLOXACIN: SIGNIFICANT CHANGE UP
-  CLINDAMYCIN: SIGNIFICANT CHANGE UP
-  ERYTHROMYCIN: SIGNIFICANT CHANGE UP
-  GENTAMICIN: SIGNIFICANT CHANGE UP
-  LEVOFLOXACIN: SIGNIFICANT CHANGE UP
-  MOXIFLOXACIN(AEROBIC): SIGNIFICANT CHANGE UP
-  OXACILLIN: SIGNIFICANT CHANGE UP
-  PENICILLIN: SIGNIFICANT CHANGE UP
-  RIFAMPIN.: SIGNIFICANT CHANGE UP
-  TETRACYCLINE: SIGNIFICANT CHANGE UP
-  TRIMETHOPRIM/SULFAMETHOXAZOLE: SIGNIFICANT CHANGE UP
-  VANCOMYCIN: SIGNIFICANT CHANGE UP
CULTURE - SURGICAL SITE: SIGNIFICANT CHANGE UP
GRAM STN WND: SIGNIFICANT CHANGE UP
METHOD TYPE: SIGNIFICANT CHANGE UP
ORGANISM # SPEC MICROSCOPIC CNT: SIGNIFICANT CHANGE UP
ORGANISM # SPEC MICROSCOPIC CNT: SIGNIFICANT CHANGE UP

## 2019-12-10 ENCOUNTER — MED ADMIN CHARGE (OUTPATIENT)
Age: 54
End: 2019-12-10

## 2019-12-10 ENCOUNTER — RX RENEWAL (OUTPATIENT)
Age: 54
End: 2019-12-10

## 2019-12-10 LAB
CULTURE - SURGICAL SITE: SIGNIFICANT CHANGE UP
CULTURE - SURGICAL SITE: SIGNIFICANT CHANGE UP

## 2019-12-19 ENCOUNTER — APPOINTMENT (OUTPATIENT)
Dept: ORTHOPEDIC SURGERY | Facility: CLINIC | Age: 54
End: 2019-12-19
Payer: MEDICAID

## 2019-12-19 PROCEDURE — 99024 POSTOP FOLLOW-UP VISIT: CPT
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
## 2020-01-03 LAB
FUNGUS SPEC QL CULT: SIGNIFICANT CHANGE UP

## 2020-01-06 ENCOUNTER — APPOINTMENT (OUTPATIENT)
Dept: ORTHOPEDIC SURGERY | Facility: CLINIC | Age: 55
End: 2020-01-06

## 2020-01-15 ENCOUNTER — INPATIENT (INPATIENT)
Facility: HOSPITAL | Age: 55
LOS: 5 days | Discharge: HOME | End: 2020-01-21
Attending: ORTHOPAEDIC SURGERY | Admitting: ORTHOPAEDIC SURGERY
Payer: MEDICAID

## 2020-01-15 VITALS
RESPIRATION RATE: 18 BRPM | DIASTOLIC BLOOD PRESSURE: 65 MMHG | TEMPERATURE: 98 F | OXYGEN SATURATION: 100 % | SYSTOLIC BLOOD PRESSURE: 109 MMHG | HEART RATE: 124 BPM

## 2020-01-15 DIAGNOSIS — S73.005A UNSPECIFIED DISLOCATION OF LEFT HIP, INITIAL ENCOUNTER: ICD-10-CM

## 2020-01-15 DIAGNOSIS — Z01.818 ENCOUNTER FOR OTHER PREPROCEDURAL EXAMINATION: ICD-10-CM

## 2020-01-15 DIAGNOSIS — S32.9XXA FRACTURE OF UNSPECIFIED PARTS OF LUMBOSACRAL SPINE AND PELVIS, INITIAL ENCOUNTER FOR CLOSED FRACTURE: Chronic | ICD-10-CM

## 2020-01-15 LAB
ALBUMIN SERPL ELPH-MCNC: 4.3 G/DL — SIGNIFICANT CHANGE UP (ref 3.3–5)
ALP SERPL-CCNC: 107 U/L — SIGNIFICANT CHANGE UP (ref 40–120)
ALT FLD-CCNC: 12 U/L — SIGNIFICANT CHANGE UP (ref 4–33)
ANION GAP SERPL CALC-SCNC: 15 MMO/L — HIGH (ref 7–14)
APPEARANCE UR: CLEAR — SIGNIFICANT CHANGE UP
APTT BLD: 24.2 SEC — LOW (ref 27.5–36.3)
AST SERPL-CCNC: 18 U/L — SIGNIFICANT CHANGE UP (ref 4–32)
BASOPHILS # BLD AUTO: 0.07 K/UL — SIGNIFICANT CHANGE UP (ref 0–0.2)
BASOPHILS NFR BLD AUTO: 0.4 % — SIGNIFICANT CHANGE UP (ref 0–2)
BILIRUB SERPL-MCNC: 0.2 MG/DL — SIGNIFICANT CHANGE UP (ref 0.2–1.2)
BILIRUB UR-MCNC: NEGATIVE — SIGNIFICANT CHANGE UP
BLD GP AB SCN SERPL QL: NEGATIVE — SIGNIFICANT CHANGE UP
BLOOD UR QL VISUAL: NEGATIVE — SIGNIFICANT CHANGE UP
BUN SERPL-MCNC: 23 MG/DL — SIGNIFICANT CHANGE UP (ref 7–23)
CALCIUM SERPL-MCNC: 9.7 MG/DL — SIGNIFICANT CHANGE UP (ref 8.4–10.5)
CHLORIDE SERPL-SCNC: 99 MMOL/L — SIGNIFICANT CHANGE UP (ref 98–107)
CO2 SERPL-SCNC: 21 MMOL/L — LOW (ref 22–31)
COLOR SPEC: SIGNIFICANT CHANGE UP
CREAT SERPL-MCNC: 0.88 MG/DL — SIGNIFICANT CHANGE UP (ref 0.5–1.3)
EOSINOPHIL # BLD AUTO: 0.27 K/UL — SIGNIFICANT CHANGE UP (ref 0–0.5)
EOSINOPHIL NFR BLD AUTO: 1.6 % — SIGNIFICANT CHANGE UP (ref 0–6)
GLUCOSE SERPL-MCNC: 137 MG/DL — HIGH (ref 70–99)
GLUCOSE UR-MCNC: NEGATIVE — SIGNIFICANT CHANGE UP
HCG SERPL-ACNC: < 5 MIU/ML — SIGNIFICANT CHANGE UP
HCT VFR BLD CALC: 38.8 % — SIGNIFICANT CHANGE UP (ref 34.5–45)
HGB BLD-MCNC: 12.2 G/DL — SIGNIFICANT CHANGE UP (ref 11.5–15.5)
IMM GRANULOCYTES NFR BLD AUTO: 0.5 % — SIGNIFICANT CHANGE UP (ref 0–1.5)
INR BLD: 1.01 — SIGNIFICANT CHANGE UP (ref 0.88–1.17)
KETONES UR-MCNC: NEGATIVE — SIGNIFICANT CHANGE UP
LEUKOCYTE ESTERASE UR-ACNC: NEGATIVE — SIGNIFICANT CHANGE UP
LYMPHOCYTES # BLD AUTO: 15.9 % — SIGNIFICANT CHANGE UP (ref 13–44)
LYMPHOCYTES # BLD AUTO: 2.75 K/UL — SIGNIFICANT CHANGE UP (ref 1–3.3)
MCHC RBC-ENTMCNC: 26.8 PG — LOW (ref 27–34)
MCHC RBC-ENTMCNC: 31.4 % — LOW (ref 32–36)
MCV RBC AUTO: 85.1 FL — SIGNIFICANT CHANGE UP (ref 80–100)
MONOCYTES # BLD AUTO: 0.98 K/UL — HIGH (ref 0–0.9)
MONOCYTES NFR BLD AUTO: 5.7 % — SIGNIFICANT CHANGE UP (ref 2–14)
NEUTROPHILS # BLD AUTO: 13.15 K/UL — HIGH (ref 1.8–7.4)
NEUTROPHILS NFR BLD AUTO: 75.9 % — SIGNIFICANT CHANGE UP (ref 43–77)
NITRITE UR-MCNC: NEGATIVE — SIGNIFICANT CHANGE UP
NRBC # FLD: 0 K/UL — SIGNIFICANT CHANGE UP (ref 0–0)
PH UR: 7 — SIGNIFICANT CHANGE UP (ref 5–8)
PLATELET # BLD AUTO: 208 K/UL — SIGNIFICANT CHANGE UP (ref 150–400)
PMV BLD: SIGNIFICANT CHANGE UP FL (ref 7–13)
POTASSIUM SERPL-MCNC: 3.8 MMOL/L — SIGNIFICANT CHANGE UP (ref 3.5–5.3)
POTASSIUM SERPL-SCNC: 3.8 MMOL/L — SIGNIFICANT CHANGE UP (ref 3.5–5.3)
PROT SERPL-MCNC: 8.1 G/DL — SIGNIFICANT CHANGE UP (ref 6–8.3)
PROT UR-MCNC: NEGATIVE — SIGNIFICANT CHANGE UP
PROTHROM AB SERPL-ACNC: 11.2 SEC — SIGNIFICANT CHANGE UP (ref 9.8–13.1)
RBC # BLD: 4.56 M/UL — SIGNIFICANT CHANGE UP (ref 3.8–5.2)
RBC # FLD: 18.9 % — HIGH (ref 10.3–14.5)
RH IG SCN BLD-IMP: NEGATIVE — SIGNIFICANT CHANGE UP
SODIUM SERPL-SCNC: 135 MMOL/L — SIGNIFICANT CHANGE UP (ref 135–145)
SP GR SPEC: 1.01 — SIGNIFICANT CHANGE UP (ref 1–1.04)
UROBILINOGEN FLD QL: NORMAL — SIGNIFICANT CHANGE UP
WBC # BLD: 17.31 K/UL — HIGH (ref 3.8–10.5)
WBC # FLD AUTO: 17.31 K/UL — HIGH (ref 3.8–10.5)

## 2020-01-15 PROCEDURE — 71045 X-RAY EXAM CHEST 1 VIEW: CPT | Mod: 26

## 2020-01-15 PROCEDURE — 73552 X-RAY EXAM OF FEMUR 2/>: CPT | Mod: 26,LT

## 2020-01-15 PROCEDURE — 93010 ELECTROCARDIOGRAM REPORT: CPT

## 2020-01-15 PROCEDURE — 73502 X-RAY EXAM HIP UNI 2-3 VIEWS: CPT | Mod: 26,LT

## 2020-01-15 PROCEDURE — 99223 1ST HOSP IP/OBS HIGH 75: CPT

## 2020-01-15 RX ORDER — HYDROMORPHONE HYDROCHLORIDE 2 MG/ML
1.5 INJECTION INTRAMUSCULAR; INTRAVENOUS; SUBCUTANEOUS ONCE
Refills: 0 | Status: DISCONTINUED | OUTPATIENT
Start: 2020-01-15 | End: 2020-01-15

## 2020-01-15 RX ORDER — MAGNESIUM HYDROXIDE 400 MG/1
30 TABLET, CHEWABLE ORAL DAILY
Refills: 0 | Status: DISCONTINUED | OUTPATIENT
Start: 2020-01-15 | End: 2020-01-21

## 2020-01-15 RX ORDER — HEPARIN SODIUM 5000 [USP'U]/ML
5000 INJECTION INTRAVENOUS; SUBCUTANEOUS EVERY 8 HOURS
Refills: 0 | Status: COMPLETED | OUTPATIENT
Start: 2020-01-15 | End: 2020-01-15

## 2020-01-15 RX ORDER — CHLORHEXIDINE GLUCONATE 213 G/1000ML
1 SOLUTION TOPICAL EVERY 12 HOURS
Refills: 0 | Status: COMPLETED | OUTPATIENT
Start: 2020-01-15 | End: 2020-01-16

## 2020-01-15 RX ORDER — GABAPENTIN 400 MG/1
300 CAPSULE ORAL EVERY 8 HOURS
Refills: 0 | Status: DISCONTINUED | OUTPATIENT
Start: 2020-01-15 | End: 2020-01-15

## 2020-01-15 RX ORDER — HYDROMORPHONE HYDROCHLORIDE 2 MG/ML
1 INJECTION INTRAMUSCULAR; INTRAVENOUS; SUBCUTANEOUS ONCE
Refills: 0 | Status: DISCONTINUED | OUTPATIENT
Start: 2020-01-15 | End: 2020-01-15

## 2020-01-15 RX ORDER — OXYCODONE HYDROCHLORIDE 5 MG/1
80 TABLET ORAL EVERY 8 HOURS
Refills: 0 | Status: DISCONTINUED | OUTPATIENT
Start: 2020-01-15 | End: 2020-01-17

## 2020-01-15 RX ORDER — OXYCODONE HYDROCHLORIDE 5 MG/1
15 TABLET ORAL EVERY 4 HOURS
Refills: 0 | Status: DISCONTINUED | OUTPATIENT
Start: 2020-01-15 | End: 2020-01-17

## 2020-01-15 RX ORDER — NICOTINE POLACRILEX 2 MG
1 GUM BUCCAL DAILY
Refills: 0 | Status: DISCONTINUED | OUTPATIENT
Start: 2020-01-15 | End: 2020-01-21

## 2020-01-15 RX ORDER — LANOLIN ALCOHOL/MO/W.PET/CERES
6 CREAM (GRAM) TOPICAL AT BEDTIME
Refills: 0 | Status: DISCONTINUED | OUTPATIENT
Start: 2020-01-15 | End: 2020-01-21

## 2020-01-15 RX ORDER — TIZANIDINE 4 MG/1
4 TABLET ORAL THREE TIMES A DAY
Refills: 0 | Status: DISCONTINUED | OUTPATIENT
Start: 2020-01-15 | End: 2020-01-17

## 2020-01-15 RX ORDER — SODIUM CHLORIDE 9 MG/ML
1000 INJECTION INTRAMUSCULAR; INTRAVENOUS; SUBCUTANEOUS
Refills: 0 | Status: DISCONTINUED | OUTPATIENT
Start: 2020-01-15 | End: 2020-01-17

## 2020-01-15 RX ORDER — GABAPENTIN 400 MG/1
300 CAPSULE ORAL EVERY 8 HOURS
Refills: 0 | Status: DISCONTINUED | OUTPATIENT
Start: 2020-01-15 | End: 2020-01-21

## 2020-01-15 RX ORDER — OXYCODONE HYDROCHLORIDE 5 MG/1
45 TABLET ORAL EVERY 4 HOURS
Refills: 0 | Status: DISCONTINUED | OUTPATIENT
Start: 2020-01-15 | End: 2020-01-17

## 2020-01-15 RX ORDER — OXYCODONE HYDROCHLORIDE 5 MG/1
45 TABLET ORAL EVERY 6 HOURS
Refills: 0 | Status: DISCONTINUED | OUTPATIENT
Start: 2020-01-15 | End: 2020-01-15

## 2020-01-15 RX ORDER — POVIDONE-IODINE 5 %
1 AEROSOL (ML) TOPICAL ONCE
Refills: 0 | Status: COMPLETED | OUTPATIENT
Start: 2020-01-15 | End: 2020-01-16

## 2020-01-15 RX ORDER — PANTOPRAZOLE SODIUM 20 MG/1
40 TABLET, DELAYED RELEASE ORAL
Refills: 0 | Status: DISCONTINUED | OUTPATIENT
Start: 2020-01-15 | End: 2020-01-17

## 2020-01-15 RX ORDER — ACETAMINOPHEN 500 MG
975 TABLET ORAL EVERY 8 HOURS
Refills: 0 | Status: DISCONTINUED | OUTPATIENT
Start: 2020-01-15 | End: 2020-01-21

## 2020-01-15 RX ORDER — SENNA PLUS 8.6 MG/1
2 TABLET ORAL AT BEDTIME
Refills: 0 | Status: DISCONTINUED | OUTPATIENT
Start: 2020-01-15 | End: 2020-01-17

## 2020-01-15 RX ORDER — HYDROMORPHONE HYDROCHLORIDE 2 MG/ML
4 INJECTION INTRAMUSCULAR; INTRAVENOUS; SUBCUTANEOUS
Refills: 0 | Status: DISCONTINUED | OUTPATIENT
Start: 2020-01-15 | End: 2020-01-15

## 2020-01-15 RX ADMIN — TIZANIDINE 4 MILLIGRAM(S): 4 TABLET ORAL at 20:03

## 2020-01-15 RX ADMIN — OXYCODONE HYDROCHLORIDE 15 MILLIGRAM(S): 5 TABLET ORAL at 20:04

## 2020-01-15 RX ADMIN — SENNA PLUS 2 TABLET(S): 8.6 TABLET ORAL at 21:32

## 2020-01-15 RX ADMIN — OXYCODONE HYDROCHLORIDE 80 MILLIGRAM(S): 5 TABLET ORAL at 10:55

## 2020-01-15 RX ADMIN — Medication 6 MILLIGRAM(S): at 21:32

## 2020-01-15 RX ADMIN — OXYCODONE HYDROCHLORIDE 80 MILLIGRAM(S): 5 TABLET ORAL at 20:02

## 2020-01-15 RX ADMIN — OXYCODONE HYDROCHLORIDE 45 MILLIGRAM(S): 5 TABLET ORAL at 16:00

## 2020-01-15 RX ADMIN — TIZANIDINE 4 MILLIGRAM(S): 4 TABLET ORAL at 12:23

## 2020-01-15 RX ADMIN — OXYCODONE HYDROCHLORIDE 45 MILLIGRAM(S): 5 TABLET ORAL at 15:02

## 2020-01-15 RX ADMIN — HYDROMORPHONE HYDROCHLORIDE 1 MILLIGRAM(S): 2 INJECTION INTRAMUSCULAR; INTRAVENOUS; SUBCUTANEOUS at 05:33

## 2020-01-15 RX ADMIN — OXYCODONE HYDROCHLORIDE 45 MILLIGRAM(S): 5 TABLET ORAL at 23:03

## 2020-01-15 RX ADMIN — OXYCODONE HYDROCHLORIDE 15 MILLIGRAM(S): 5 TABLET ORAL at 21:00

## 2020-01-15 RX ADMIN — HEPARIN SODIUM 5000 UNIT(S): 5000 INJECTION INTRAVENOUS; SUBCUTANEOUS at 21:31

## 2020-01-15 RX ADMIN — OXYCODONE HYDROCHLORIDE 15 MILLIGRAM(S): 5 TABLET ORAL at 16:02

## 2020-01-15 RX ADMIN — Medication 1 PATCH: at 12:20

## 2020-01-15 RX ADMIN — Medication 975 MILLIGRAM(S): at 20:03

## 2020-01-15 RX ADMIN — OXYCODONE HYDROCHLORIDE 80 MILLIGRAM(S): 5 TABLET ORAL at 19:02

## 2020-01-15 RX ADMIN — OXYCODONE HYDROCHLORIDE 45 MILLIGRAM(S): 5 TABLET ORAL at 19:02

## 2020-01-15 RX ADMIN — HEPARIN SODIUM 5000 UNIT(S): 5000 INJECTION INTRAVENOUS; SUBCUTANEOUS at 15:02

## 2020-01-15 RX ADMIN — Medication 1 PATCH: at 19:04

## 2020-01-15 RX ADMIN — OXYCODONE HYDROCHLORIDE 45 MILLIGRAM(S): 5 TABLET ORAL at 11:50

## 2020-01-15 RX ADMIN — OXYCODONE HYDROCHLORIDE 80 MILLIGRAM(S): 5 TABLET ORAL at 11:50

## 2020-01-15 RX ADMIN — OXYCODONE HYDROCHLORIDE 15 MILLIGRAM(S): 5 TABLET ORAL at 17:02

## 2020-01-15 RX ADMIN — PANTOPRAZOLE SODIUM 40 MILLIGRAM(S): 20 TABLET, DELAYED RELEASE ORAL at 10:55

## 2020-01-15 RX ADMIN — Medication 975 MILLIGRAM(S): at 12:20

## 2020-01-15 RX ADMIN — HYDROMORPHONE HYDROCHLORIDE 4 MILLIGRAM(S): 2 INJECTION INTRAMUSCULAR; INTRAVENOUS; SUBCUTANEOUS at 14:30

## 2020-01-15 RX ADMIN — HYDROMORPHONE HYDROCHLORIDE 1 MILLIGRAM(S): 2 INJECTION INTRAMUSCULAR; INTRAVENOUS; SUBCUTANEOUS at 05:15

## 2020-01-15 RX ADMIN — HYDROMORPHONE HYDROCHLORIDE 1 MILLIGRAM(S): 2 INJECTION INTRAMUSCULAR; INTRAVENOUS; SUBCUTANEOUS at 07:48

## 2020-01-15 RX ADMIN — HYDROMORPHONE HYDROCHLORIDE 4 MILLIGRAM(S): 2 INJECTION INTRAMUSCULAR; INTRAVENOUS; SUBCUTANEOUS at 13:32

## 2020-01-15 RX ADMIN — GABAPENTIN 300 MILLIGRAM(S): 400 CAPSULE ORAL at 12:20

## 2020-01-15 RX ADMIN — OXYCODONE HYDROCHLORIDE 45 MILLIGRAM(S): 5 TABLET ORAL at 10:59

## 2020-01-15 RX ADMIN — HYDROMORPHONE HYDROCHLORIDE 1 MILLIGRAM(S): 2 INJECTION INTRAMUSCULAR; INTRAVENOUS; SUBCUTANEOUS at 06:03

## 2020-01-15 RX ADMIN — HYDROMORPHONE HYDROCHLORIDE 1.5 MILLIGRAM(S): 2 INJECTION INTRAMUSCULAR; INTRAVENOUS; SUBCUTANEOUS at 09:26

## 2020-01-15 RX ADMIN — HYDROMORPHONE HYDROCHLORIDE 1.5 MILLIGRAM(S): 2 INJECTION INTRAMUSCULAR; INTRAVENOUS; SUBCUTANEOUS at 09:11

## 2020-01-15 RX ADMIN — OXYCODONE HYDROCHLORIDE 45 MILLIGRAM(S): 5 TABLET ORAL at 20:02

## 2020-01-15 RX ADMIN — HYDROMORPHONE HYDROCHLORIDE 1 MILLIGRAM(S): 2 INJECTION INTRAMUSCULAR; INTRAVENOUS; SUBCUTANEOUS at 04:58

## 2020-01-15 RX ADMIN — GABAPENTIN 300 MILLIGRAM(S): 400 CAPSULE ORAL at 21:32

## 2020-01-15 NOTE — ED PROVIDER NOTE - CARE PLAN
Principal Discharge DX:	Closed pelvic fracture Principal Discharge DX:	Dislocation of left hip, initial encounter

## 2020-01-15 NOTE — CONSULT NOTE ADULT - PROBLEM SELECTOR RECOMMENDATION 3
Last interrogated by EP on 11/4 prior to last OR procedure w/underlying rhythm of SR with 2:1 AVB in high 30 bpm, 100% Bi-V paced   -No further interrogation is needed per EP since no new events have been reported during this admission. Hip dislocated spontaneously without any fall or related to syncopal episode.

## 2020-01-15 NOTE — CONSULT NOTE ADULT - SUBJECTIVE AND OBJECTIVE BOX
CHIEF COMPLAINT: Patient is a 54y old  Female who presents with a chief complaint of hip dislocation    HPI: 54y Female presents s/p spontaneous left hip dislocation. Patient reports a chronic history of left hip instability with a known dislocation of total femur and left acetabulum. Patient recently underwent revision left hip arthroplasty after she dislocated her total femur and had failure of her acetabular component on 11/7/19. She was doing well after that surgery however she sustained a fall on 11/29/19 resulting in hip dislocation requiring her to undergo open reduction on 12/4/19. She was doing well up till yesterday when her hip dislocated again. She denies any trauma or fall, syncope or LOC. Currently she reports severe pain in her left leg. She states that she has been on pain medication for very long time and usually has high tolerance however currently pain is very bad. She reports that she usually ambulates using crutches. She reprots h/o cardiac arrest in 2018 requiring AICD placement. She states that her doctor recommended to come off all heart medications (plavix, lopressor, lisinopril) until her orthopedic issues resolves. Only heart medication she is still taking is aspirin. Currently denies any SOB, chest pain, dizziness, N/V/D. Reports chronic constipation due to narcotic use. AICD has never discharged for her.       Pain Symptoms if applicable:   Pain: severe  Location: L hip 	  Modifying factors: worse with movement 	  Associated symptom:     Allergies    No Known Allergies    Intolerances    HOME MEDICATIONS: [x] Reviewed    MEDICATIONS  (STANDING):  MEDICATIONS  (STANDING):  acetaminophen   Tablet .. 975 milliGRAM(s) Oral every 8 hours  chlorhexidine 2% Cloths 1 Application(s) Topical every 12 hours  gabapentin 300 milliGRAM(s) Oral every 8 hours  heparin  Injectable 5000 Unit(s) SubCutaneous every 8 hours  nicotine 21 mG/24 Hr(s) Transdermal Patch - Peds 1 Patch Transdermal daily  oxyCODONE  ER Tablet 80 milliGRAM(s) Oral every 8 hours  pantoprazole    Tablet 40 milliGRAM(s) Oral before breakfast  povidone iodine 5% Nasal Swab 1 Application(s) Both Nostrils once  senna 2 Tablet(s) Oral at bedtime  sodium chloride 0.9%. 1000 milliLiter(s) (75 mL/Hr) IV Continuous <Continuous>  tiZANidine 4 milliGRAM(s) Oral three times a day    MEDICATIONS  (PRN):  HYDROmorphone   Tablet 4 milliGRAM(s) Oral every 3 hours PRN Severe Break Through Pain  magnesium hydroxide Suspension 30 milliLiter(s) Oral daily PRN Constipation  oxyCODONE    IR 45 milliGRAM(s) Oral every 6 hours PRN Severe Pain (7 - 10)      PAST MEDICAL & SURGICAL HISTORY:  Cardiac arrest requiring AICD placement  Closed pelvic fracture  Open reduction for left hip dislocation  [x ] Reviewed     SOCIAL HISTORY:  [x] Substance abuse: active smoker, trying to quit      FAMILY HISTORY:  No pertinent family history in first degree relatives  [x] No pertinent family history in first degree relatives     REVIEW OF SYSTEMS:  [x] All other ROS negative  [  ] Unable to obtain due to poor mental status    Vital Signs Last 24 Hrs  T(C): 37.1 (15 León 2020 07:59), Max: 37.1 (15 León 2020 07:59)  T(F): 98.8 (15 León 2020 07:59), Max: 98.8 (15 León 2020 07:59)  HR: 79 (15 León 2020 09:18) (79 - 124)  BP: 142/76 (15 León 2020 09:18) (109/65 - 158/89)  BP(mean): --  RR: 18 (15 León 2020 09:18) (18 - 24)  SpO2: 100% (15 León 2020 09:18) (99% - 100%)    PHYSICAL EXAM:  GENERAL: in mild distress 2/2 pain, anxious   HEAD:  Atraumatic, Normocephalic  EYES: EOMI, PERRLA, conjunctiva and sclera clear  ENMT: Moist mucous membranes  NECK: Supple  RESPIRATORY: Clear to auscultation bilaterally; No rales, rhonchi, wheezing, or rubs  CARDIOVASCULAR: Regular rate and rhythm; No murmurs, rubs, or gallops  GASTROINTESTINAL: Soft, Nontender, Nondistended;  GENITOURINARY: Not examined  EXTREMITIES:  not examined due to pain, no edema b/l  NERVOUS SYSTEM:  Alert & Oriented X3; Moving all 4 extremities; No gross sensory deficits    LABS:                                   12.2   17.31 )-----------( 208      ( 15 León 2020 04:42 )             38.8   01-15    135  |  99  |  23  ----------------------------<  137<H>  3.8   |  21<L>  |  0.88    Ca    9.7      15 León 2020 04:42    TPro  8.1  /  Alb  4.3  /  TBili  0.2  /  DBili  x   /  AST  18  /  ALT  12  /  AlkPhos  107  01-15      RADIOLOGY & ADDITIONAL STUDIES:    EKG:   Personally Reviewed:  [x] YES paced rhythm     Imaging:   Personally Reviewed:  [x] YES               [ ] Consultant(s) Notes Reviewed  [x] Care Discussed with Consultants/Other Providers: Ortho PA - discussed

## 2020-01-15 NOTE — ED PROVIDER NOTE - ATTENDING CONTRIBUTION TO CARE
HPI: 54 F w/ h/o L femur fx ~ 30 years ago c/b frequent prosthetic dislocations and fractures presenting due to L-hip prosthesis dislocation while maneuvering in bed this evening around 1AM. Pt with multiple hospitalizations for recurrent hip dislocation. Last hospitalization 12/4 - 12/5/2019 s/p open reduction of left femur. Pt follows with Dr. Calero (Ortho). Pt states that she takes both ASA and clopidogrel and that she had PPM placed after having a "heart attack" at [place] last year. Pt currently endorses 10/10 hip pain but denies fever/chills/chest pain/SOB/n/v/d  EXAM: Uncomfortable, left hip external rotated, shortened, pulses palpable including DP on left, warm. sensation intact.   MDM: pt with left hip replacement now with possible dislocation. Will need labs, preops, pain control imaging and consult ortho. most likely admit.

## 2020-01-15 NOTE — ED PROVIDER NOTE - NS ED ROS FT
CONSTITUTIONAL: +weakness. No fevers or chills  EYES/ENT: No visual changes;  no throat pain   NECK: No pain or stiffness  RESPIRATORY: No cough, wheezing, hemoptysis; No shortness of breath  CARDIOVASCULAR: No chest pain or palpitations  GASTROINTESTINAL: No abdominal or epigastric pain. No nausea, vomiting, or hematemesis; No diarrhea or constipation. No melena or hematochezia.  GENITOURINARY: No dysuria, change in frequency or hematuria  MSK: +left sided hip pain, states her toes on the left side feel cool   NEUROLOGICAL: No numbness or weakness  SKIN: No itching, burning, rashes, or lesions   All other review of systems is negative unless indicated above.

## 2020-01-15 NOTE — ED PROVIDER NOTE - PHYSICAL EXAMINATION
ICU Vital Signs Last 24 Hrs  T(C): 36.7 (15 León 2020 03:31), Max: 36.7 (15 León 2020 03:31)  T(F): 98 (15 León 2020 03:31), Max: 98 (15 León 2020 03:31)  HR: 102 (15 León 2020 05:04) (102 - 124)  BP: 158/89 (15 León 2020 05:04) (109/65 - 158/89)  RR: 24 (15 León 2020 05:04) (18 - 24)  SpO2: 100% (15 León 2020 05:04) (100% - 100%)    GENERAL: NAD  HEENT: EOMI, PERRL, MMM, no oropharyngeal lesions or erythema appreciated  Pulm: normal work of breathing, CTABL  CV: RRR, S1&S2+, no m/r/g appreciated  ABDOMEN: +BS, soft, nt, nd, no hepatosplenomegaly  MSK: nl ROM  EXTREMITIES: Skin intact, no palpable protruding prosthetic, unable to SLR,+ttp left hip/groin, +ehl/fhl/ta/gs function, no calf ttp, dp/pt pulse intact  Neuro: A&Ox3, no focal deficits  SKIN: warm and dry, no visible rash

## 2020-01-15 NOTE — ED PROVIDER NOTE - CLINICAL SUMMARY MEDICAL DECISION MAKING FREE TEXT BOX
54F pw hip dislocation.   Xray hip and pelvis. Ortho consulted  Likely admission to ortho for reduction of hip dislocation

## 2020-01-15 NOTE — ED ADULT NURSE NOTE - CHIEF COMPLAINT QUOTE
Pt. states she had "surgery due to left hip prosthetic dislocation 2 weeks ago with MD Calero and it's back out." Ambulatory with crutches at present. Unable to tolerate sitting and lying in stretcher at present. Denies palpitations. PMH: frequent prosthetic dislocations and fractures, pacemaker  Addendum: Pt. refusing EKG at present. Awaiting room assignment, wait time explained.

## 2020-01-15 NOTE — H&P ADULT - HISTORY OF PRESENT ILLNESS
54yFemale with hx of L total femur replacement complicated by recurrent dislocations last one 12/4/19 requiring open reduction in OR c/o L hip pain. Reports taking brace off last night to use the bathroom and felt her hip dislocate. Patient then drove from [place] to [facility] to be evaluated. Denies any other injuries. Denies numbness/tingling in LLE.     ROS: 10 point review of systems otherwise negative unless noted in HPI    PMH:  L total femur replacement  AICD    PSH:  Closed pelvic fracture

## 2020-01-15 NOTE — CONSULT NOTE ADULT - PROBLEM SELECTOR RECOMMENDATION 9
-h/o recurrent hip dislocation. Last open reduction performed on 12/4/19  -Denies any fall or trauma to the hip. Reports hip dislocated while removing brace  -OR planning per ortho  -Pain control. Patient is on high doses of narcotics. On oxycontin 80BID and oxycodone 45IR PRN  -Patient would benefit with pain service consult post operatively for pain control  -Bowel regimen  -DVT ppx per primary team

## 2020-01-15 NOTE — CONSULT NOTE ADULT - ASSESSMENT
54F w/PMH of femur fx ~ 30 years ago c/b frequent prosthetic dislocations and fractures, recent revision left hip arthroplasty on 11/6 followed by hip dislocation requiring open reduction on 12/4/19 presents today s/p spontaneous dislocation of left hip

## 2020-01-15 NOTE — CONSULT NOTE ADULT - PROBLEM SELECTOR RECOMMENDATION 4
-Patient currently without any active chest pain or SOB  -Patient is euvolemic on exam  -No recent cardiac events such as syncopal event, AICD discharge or acute chest pain  -Patient at baseline physical status  -TTE (11/5/19) normal LV function   -Device interrogated on 11/4/19  -RCRI score of 1 which correlates to 6% risk of 30 day mortality related to cardiovascular events  -Currently optimized for planned procedure and no further testing is indicated

## 2020-01-15 NOTE — ED PROVIDER NOTE - PROGRESS NOTE DETAILS
Hip dislocated, will admit to Ortho Dr Calero. Pt Istop checked # [id number]  No findings in [facility] but findings for [place] and [place] with Rx for narcotics and BZPs.

## 2020-01-15 NOTE — ED PROVIDER NOTE - OBJECTIVE STATEMENT
54 F w/ h/o L femur fx ~ 30 years ago c/b frequent prosthetic dislocations and fractures presenting due to L-hip prosthesis dislocation while maneuvering in bed this evening around 1AM. Pt with multiple hospitalizations for recurrent hip dislocation. Last hospitalization 12/4 - 12/5/2019 s/p open reduction of left femur. Pt follows with Dr. Calero (Ortho). Pt states that she takes both ASA and clopidogrel and that she had PPM placed after having a "heart attack" at [facility] last year. Pt currently endorses 10/10 hip pain but denies fever/chills/chest pain/SOB/n/v/d

## 2020-01-15 NOTE — CHART NOTE - NSCHARTNOTEFT_GEN_A_CORE
spoke to electrophysiology and there is no need to interrogate due to the fact that it was interrogated within last 6 months  last interrogation was 11/4/2019 and reviewed by NP on the phone at this time.

## 2020-01-15 NOTE — ED ADULT TRIAGE NOTE - CHIEF COMPLAINT QUOTE
Pt. states she had "surgery due to left prosthesis dislocation 2 weeks ago and it's back out." Ambulatory with crutches at present. Pt. states she had "surgery due to left prosthesis dislocation 2 weeks ago with MD Calero and it's back out." Ambulatory with crutches at present. Unable to tolerate lying in stretcher at present. Denies palpitations. Pt. states she had "surgery due to left hip prosthetic dislocation 2 weeks ago with MD Calero and it's back out." Ambulatory with crutches at present. Unable to tolerate sitting and lying in stretcher at present. Denies palpitations. PMH: frequent prosthetic dislocations and fractures, pacemaker  Addendum: Pt. refusing EKG at present. Awaiting room assignment, wait time explained.
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
## 2020-01-15 NOTE — PROGRESS NOTE ADULT - SUBJECTIVE AND OBJECTIVE BOX
Diagnosis: Left total femur replacement dislocation 	  Surgeon: Dr Tyler Hackett 	  Procedure: Closed vs Open Reduction vs Revision Arthroplasty     Labs:                        12.2   17.31 )-----------( 208      ( 15 León 2020 04:42 )             38.8       01-15    135  |  99  |  23  ----------------------------<  137<H>  3.8   |  21<L>  |  0.88    Ca    9.7      15 León 2020 04:42    TPro  8.1  /  Alb  4.3  /  TBili  0.2  /  DBili  x   /  AST  18  /  ALT  12  /  AlkPhos  107  01-15      PT/INR - ( 15 León 2020 04:42 )   PT: 11.2 SEC;   INR: 1.01          PTT - ( 15 León 2020 04:42 )  PTT:24.2 SEC    Type and Screen X 2: 1 complete 1 pending  EKG: in chart	  CXR: complete  Consent: pending  Clearance: Medicine / EP clear'd    A/P: Patient is a 54y y/o Female Pending Orthopaedic surgery tomorrow    -NPO and IVF @ midnight  -pain control/analgesia  -Hold Anticoagulation  -Inc Spirometry  -F/U Dr Hackett Recs  -F/U Pending Labs  -Notify Ortho with any questions

## 2020-01-15 NOTE — CONSULT NOTE ADULT - PROBLEM SELECTOR RECOMMENDATION 2
Per prior documentation, pt with reports h/o MI and cardiac arrest in 2018 requiring AICD placement  -Per patient she was taken off of Plavix/metoprolol/lisinopril by cardiologist and to be restarted in future once her hip issues resolve  -Denies any chest pain or sob at this time  -TTE (11/5/19) normal LV function   -Patient appears euvolemic on exam  -No further intervention is needed at this time

## 2020-01-15 NOTE — H&P ADULT - NSHPLABSRESULTS_GEN_ALL_CORE
12.2   17.31 )-----------( 208      ( 15 León 2020 04:42 )             38.8   01-15    135  |  99  |  23  ----------------------------<  137<H>  3.8   |  21<L>  |  0.88    Ca    9.7      15 León 2020 04:42    TPro  8.1  /  Alb  4.3  /  TBili  0.2  /  DBili  x   /  AST  18  /  ALT  12  /  AlkPhos  107  01-15      XR demonstrating L hip dislocation

## 2020-01-15 NOTE — H&P ADULT - ASSESSMENT
A/p: 54yoF with L total femur replacement c/b recurrent dislocations now dislocated    Plan for OR today for open reduction  Neuro: Pain control  Resp: IS  GI: NPO/IVF  MSK: ANA ZAMARRIPAE  Heme: DVT PPX: Venodynes  Will need medical clearance (aware)  Consent in chart

## 2020-01-15 NOTE — PATIENT PROFILE ADULT - DO YOU FEEL LIKE HURTING YOURSELF OR OTHERS?
Results letter sent to patient. Surgical history and health maintenance updated. Wait list appointment entered.   no

## 2020-01-15 NOTE — ED ADULT NURSE NOTE - ED STAT RN HANDOFF DETAILS
Pt is complaining of 10/10 pain, requesting more pain medicine and stating "none of this is working I need to speak to the doctor." Pt is VSS, resting in stretcher. Pt is complaining of 10/10 pain, requesting more pain medicine and stating "none of this is working I need to speak to the doctor." Pt medicated prior transfer to floor as per ordered. Pt is VSS, resting in stretcher.

## 2020-01-16 ENCOUNTER — TRANSCRIPTION ENCOUNTER (OUTPATIENT)
Age: 55
End: 2020-01-16

## 2020-01-16 LAB
AMPHET UR-MCNC: NEGATIVE — SIGNIFICANT CHANGE UP
ANION GAP SERPL CALC-SCNC: 15 MMO/L — HIGH (ref 7–14)
APTT BLD: 26.9 SEC — LOW (ref 27.5–36.3)
BARBITURATES UR SCN-MCNC: NEGATIVE — SIGNIFICANT CHANGE UP
BENZODIAZ UR-MCNC: NEGATIVE — SIGNIFICANT CHANGE UP
BLD GP AB SCN SERPL QL: NEGATIVE — SIGNIFICANT CHANGE UP
BUN SERPL-MCNC: 18 MG/DL — SIGNIFICANT CHANGE UP (ref 7–23)
CALCIUM SERPL-MCNC: 9.2 MG/DL — SIGNIFICANT CHANGE UP (ref 8.4–10.5)
CANNABINOIDS UR-MCNC: POSITIVE — SIGNIFICANT CHANGE UP
CHLORIDE SERPL-SCNC: 106 MMOL/L — SIGNIFICANT CHANGE UP (ref 98–107)
CO2 SERPL-SCNC: 16 MMOL/L — LOW (ref 22–31)
COCAINE METAB.OTHER UR-MCNC: NEGATIVE — SIGNIFICANT CHANGE UP
CREAT SERPL-MCNC: 0.96 MG/DL — SIGNIFICANT CHANGE UP (ref 0.5–1.3)
GLUCOSE SERPL-MCNC: 89 MG/DL — SIGNIFICANT CHANGE UP (ref 70–99)
HCG SERPL-ACNC: < 5 MIU/ML — SIGNIFICANT CHANGE UP
HCT VFR BLD CALC: 38.6 % — SIGNIFICANT CHANGE UP (ref 34.5–45)
HGB BLD-MCNC: 11.6 G/DL — SIGNIFICANT CHANGE UP (ref 11.5–15.5)
INR BLD: 1.01 — SIGNIFICANT CHANGE UP (ref 0.88–1.17)
MCHC RBC-ENTMCNC: 26 PG — LOW (ref 27–34)
MCHC RBC-ENTMCNC: 30.1 % — LOW (ref 32–36)
MCV RBC AUTO: 86.5 FL — SIGNIFICANT CHANGE UP (ref 80–100)
METHADONE UR-MCNC: POSITIVE — SIGNIFICANT CHANGE UP
NRBC # FLD: 0 K/UL — SIGNIFICANT CHANGE UP (ref 0–0)
OPIATES UR-MCNC: NEGATIVE — SIGNIFICANT CHANGE UP
OXYCODONE UR-MCNC: POSITIVE — HIGH
PCP UR-MCNC: NEGATIVE — SIGNIFICANT CHANGE UP
PLATELET # BLD AUTO: 156 K/UL — SIGNIFICANT CHANGE UP (ref 150–400)
PMV BLD: 12.3 FL — SIGNIFICANT CHANGE UP (ref 7–13)
POTASSIUM SERPL-MCNC: 4.8 MMOL/L — SIGNIFICANT CHANGE UP (ref 3.5–5.3)
POTASSIUM SERPL-SCNC: 4.8 MMOL/L — SIGNIFICANT CHANGE UP (ref 3.5–5.3)
PROTHROM AB SERPL-ACNC: 11.5 SEC — SIGNIFICANT CHANGE UP (ref 9.8–13.1)
RBC # BLD: 4.46 M/UL — SIGNIFICANT CHANGE UP (ref 3.8–5.2)
RBC # FLD: 18.8 % — HIGH (ref 10.3–14.5)
RH IG SCN BLD-IMP: NEGATIVE — SIGNIFICANT CHANGE UP
SODIUM SERPL-SCNC: 137 MMOL/L — SIGNIFICANT CHANGE UP (ref 135–145)
WBC # BLD: 8.34 K/UL — SIGNIFICANT CHANGE UP (ref 3.8–10.5)
WBC # FLD AUTO: 8.34 K/UL — SIGNIFICANT CHANGE UP (ref 3.8–10.5)

## 2020-01-16 PROCEDURE — 99233 SBSQ HOSP IP/OBS HIGH 50: CPT

## 2020-01-16 PROCEDURE — 90792 PSYCH DIAG EVAL W/MED SRVCS: CPT

## 2020-01-16 RX ORDER — SODIUM CHLORIDE 9 MG/ML
1000 INJECTION, SOLUTION INTRAVENOUS
Refills: 0 | Status: DISCONTINUED | OUTPATIENT
Start: 2020-01-17 | End: 2020-01-19

## 2020-01-16 RX ORDER — TRAZODONE HCL 50 MG
50 TABLET ORAL AT BEDTIME
Refills: 0 | Status: DISCONTINUED | OUTPATIENT
Start: 2020-01-16 | End: 2020-01-21

## 2020-01-16 RX ORDER — POLYETHYLENE GLYCOL 3350 17 G/17G
17 POWDER, FOR SOLUTION ORAL DAILY
Refills: 0 | Status: DISCONTINUED | OUTPATIENT
Start: 2020-01-17 | End: 2020-01-21

## 2020-01-16 RX ORDER — HYDROMORPHONE HYDROCHLORIDE 2 MG/ML
1.5 INJECTION INTRAMUSCULAR; INTRAVENOUS; SUBCUTANEOUS ONCE
Refills: 0 | Status: DISCONTINUED | OUTPATIENT
Start: 2020-01-16 | End: 2020-01-16

## 2020-01-16 RX ADMIN — OXYCODONE HYDROCHLORIDE 80 MILLIGRAM(S): 5 TABLET ORAL at 02:42

## 2020-01-16 RX ADMIN — OXYCODONE HYDROCHLORIDE 45 MILLIGRAM(S): 5 TABLET ORAL at 19:29

## 2020-01-16 RX ADMIN — TIZANIDINE 4 MILLIGRAM(S): 4 TABLET ORAL at 14:29

## 2020-01-16 RX ADMIN — HYDROMORPHONE HYDROCHLORIDE 1.5 MILLIGRAM(S): 2 INJECTION INTRAMUSCULAR; INTRAVENOUS; SUBCUTANEOUS at 17:44

## 2020-01-16 RX ADMIN — HYDROMORPHONE HYDROCHLORIDE 1.5 MILLIGRAM(S): 2 INJECTION INTRAMUSCULAR; INTRAVENOUS; SUBCUTANEOUS at 17:59

## 2020-01-16 RX ADMIN — OXYCODONE HYDROCHLORIDE 15 MILLIGRAM(S): 5 TABLET ORAL at 17:04

## 2020-01-16 RX ADMIN — OXYCODONE HYDROCHLORIDE 45 MILLIGRAM(S): 5 TABLET ORAL at 00:00

## 2020-01-16 RX ADMIN — CHLORHEXIDINE GLUCONATE 1 APPLICATION(S): 213 SOLUTION TOPICAL at 11:14

## 2020-01-16 RX ADMIN — OXYCODONE HYDROCHLORIDE 45 MILLIGRAM(S): 5 TABLET ORAL at 04:30

## 2020-01-16 RX ADMIN — OXYCODONE HYDROCHLORIDE 15 MILLIGRAM(S): 5 TABLET ORAL at 09:19

## 2020-01-16 RX ADMIN — Medication 1 PATCH: at 19:30

## 2020-01-16 RX ADMIN — OXYCODONE HYDROCHLORIDE 45 MILLIGRAM(S): 5 TABLET ORAL at 07:50

## 2020-01-16 RX ADMIN — Medication 975 MILLIGRAM(S): at 11:13

## 2020-01-16 RX ADMIN — OXYCODONE HYDROCHLORIDE 15 MILLIGRAM(S): 5 TABLET ORAL at 16:15

## 2020-01-16 RX ADMIN — OXYCODONE HYDROCHLORIDE 45 MILLIGRAM(S): 5 TABLET ORAL at 15:08

## 2020-01-16 RX ADMIN — Medication 1 PATCH: at 14:00

## 2020-01-16 RX ADMIN — GABAPENTIN 300 MILLIGRAM(S): 400 CAPSULE ORAL at 05:12

## 2020-01-16 RX ADMIN — SODIUM CHLORIDE 75 MILLILITER(S): 9 INJECTION INTRAMUSCULAR; INTRAVENOUS; SUBCUTANEOUS at 09:19

## 2020-01-16 RX ADMIN — OXYCODONE HYDROCHLORIDE 45 MILLIGRAM(S): 5 TABLET ORAL at 20:21

## 2020-01-16 RX ADMIN — OXYCODONE HYDROCHLORIDE 80 MILLIGRAM(S): 5 TABLET ORAL at 11:13

## 2020-01-16 RX ADMIN — Medication 1 APPLICATION(S): at 11:16

## 2020-01-16 RX ADMIN — Medication 1 PATCH: at 14:29

## 2020-01-16 RX ADMIN — OXYCODONE HYDROCHLORIDE 80 MILLIGRAM(S): 5 TABLET ORAL at 19:28

## 2020-01-16 RX ADMIN — OXYCODONE HYDROCHLORIDE 15 MILLIGRAM(S): 5 TABLET ORAL at 00:07

## 2020-01-16 RX ADMIN — OXYCODONE HYDROCHLORIDE 45 MILLIGRAM(S): 5 TABLET ORAL at 08:30

## 2020-01-16 RX ADMIN — OXYCODONE HYDROCHLORIDE 15 MILLIGRAM(S): 5 TABLET ORAL at 05:12

## 2020-01-16 RX ADMIN — TIZANIDINE 4 MILLIGRAM(S): 4 TABLET ORAL at 05:12

## 2020-01-16 RX ADMIN — GABAPENTIN 300 MILLIGRAM(S): 400 CAPSULE ORAL at 14:29

## 2020-01-16 RX ADMIN — OXYCODONE HYDROCHLORIDE 15 MILLIGRAM(S): 5 TABLET ORAL at 10:10

## 2020-01-16 RX ADMIN — OXYCODONE HYDROCHLORIDE 45 MILLIGRAM(S): 5 TABLET ORAL at 03:44

## 2020-01-16 RX ADMIN — OXYCODONE HYDROCHLORIDE 15 MILLIGRAM(S): 5 TABLET ORAL at 01:00

## 2020-01-16 RX ADMIN — OXYCODONE HYDROCHLORIDE 45 MILLIGRAM(S): 5 TABLET ORAL at 15:40

## 2020-01-16 RX ADMIN — OXYCODONE HYDROCHLORIDE 15 MILLIGRAM(S): 5 TABLET ORAL at 06:00

## 2020-01-16 RX ADMIN — Medication 1 PATCH: at 06:36

## 2020-01-16 NOTE — PROGRESS NOTE ADULT - SUBJECTIVE AND OBJECTIVE BOX
Patient seen and examined.  No acute events overnight.  In mild pain, complains of mild numbness in foot and leg.     Vital Signs Last 24 Hrs  T(C): 36.4 (16 Jan 2020 05:11), Max: 37.1 (15 León 2020 07:59)  T(F): 97.6 (16 Jan 2020 05:11), Max: 98.8 (15 León 2020 07:59)  HR: 72 (16 Jan 2020 05:11) (72 - 105)  BP: 117/- (16 Jan 2020 05:11) (117/- - 142/76)  BP(mean): --  RR: 18 (16 Jan 2020 05:11) (17 - 20)  SpO2: 96% (16 Jan 2020 05:11) (96% - 100%)    01-15 @ 07:01  -  01-16 @ 06:17  --------------------------------------------------------  IN: 0 mL / OUT: 400 mL / NET: -400 mL                            12.2   17.31 )-----------( 208      ( 15 León 2020 04:42 )             38.8   01-15    135  |  99  |  23  ----------------------------<  137<H>  3.8   |  21<L>  |  0.88    Ca    9.7      15 León 2020 04:42    TPro  8.1  /  Alb  4.3  /  TBili  0.2  /  DBili  x   /  AST  18  /  ALT  12  /  AlkPhos  107  01-15    Exam:  Gen: NAD  LLE:  Externally rotated  Skin intact  Mild numbness diffusely about distal leg and foot  Motor: 5/5 EHL/FHL/TA/Gastrocnemius  Vascular: 2+ Dorsalis Pedis pulse        Assessment/Plan:  This is a 54yFemale admitted for L TFR dislocation.    -pain control  -PT  -NWB  -OOB  -DVT ppx  -OR today with Dr. Hackett  -glen plan

## 2020-01-16 NOTE — BEHAVIORAL HEALTH ASSESSMENT NOTE - NSBHCHARTREVIEWVS_PSY_A_CORE FT
Vital Signs Last 24 Hrs  T(C): 36.7 (16 Jan 2020 09:19), Max: 36.7 (15 León 2020 20:37)  T(F): 98.1 (16 Jan 2020 09:19), Max: 98.1 (16 Jan 2020 09:19)  HR: 79 (16 Jan 2020 09:19) (72 - 79)  BP: 115/48 (16 Jan 2020 09:19) (115/48 - 117/82)  BP(mean): --  RR: 16 (16 Jan 2020 09:19) (16 - 18)  SpO2: 98% (16 Jan 2020 09:19) (96% - 99%)

## 2020-01-16 NOTE — BEHAVIORAL HEALTH ASSESSMENT NOTE - DESCRIPTION (FIRST USE, LAST USE, QUANTITY, FREQUENCY, DURATION)
1 ppd x ~40 years daily use x few years see HPI buying Oxycontin off street as per HPI see HPI - patient with 1x use mthadone see HPI, no recent use see HPI - patient with 1x use methadone

## 2020-01-16 NOTE — PROGRESS NOTE ADULT - SUBJECTIVE AND OBJECTIVE BOX
Patient is a 54y old  Female who presents with a chief complaint of left hip dislocation (16 Jan 2020 10:37)      HPI:  54yFemale with hx of L total femur replacement complicated by recurrent dislocations last one 12/4/19 requiring open reduction in OR c/o L hip pain. Reports taking brace off last night to use the bathroom and felt her hip dislocate. Patient then drove from [place] to [facility] to be evaluated. Denies any other injuries. Denies numbness/tingling in LLE.     ROS: 10 point review of systems otherwise negative unless noted in HPI    PMH:  L total femur replacement  AICD    PSH:  Closed pelvic fracture (15 León 2020 06:49)      PAST MEDICAL & SURGICAL HISTORY:  No pertinent past medical history  Closed pelvic fracture      MEDICATIONS  (STANDING):  acetaminophen   Tablet .. 975 milliGRAM(s) Oral every 8 hours  gabapentin 300 milliGRAM(s) Oral every 8 hours  melatonin 6 milliGRAM(s) Oral at bedtime  nicotine 21 mG/24 Hr(s) Transdermal Patch - Peds 1 Patch Transdermal daily  oxyCODONE  ER Tablet 80 milliGRAM(s) Oral every 8 hours  pantoprazole    Tablet 40 milliGRAM(s) Oral before breakfast  senna 2 Tablet(s) Oral at bedtime  sodium chloride 0.9%. 1000 milliLiter(s) (75 mL/Hr) IV Continuous <Continuous>  tiZANidine 4 milliGRAM(s) Oral three times a day  traZODone 50 milliGRAM(s) Oral at bedtime    MEDICATIONS  (PRN):  magnesium hydroxide Suspension 30 milliLiter(s) Oral daily PRN Constipation  oxyCODONE    IR 45 milliGRAM(s) Oral every 4 hours PRN Severe Pain (7 - 10)  oxyCODONE    IR 15 milliGRAM(s) Oral every 4 hours PRN Severe Break Through Pain      ICU Vital Signs Last 24 Hrs  T(C): 36.7 (16 Jan 2020 09:19), Max: 36.7 (15 León 2020 20:37)  T(F): 98.1 (16 Jan 2020 09:19), Max: 98.1 (16 Jan 2020 09:19)  HR: 70 (16 Jan 2020 14:16) (70 - 79)  BP: 131/71 (16 Jan 2020 14:16) (115/48 - 131/71)  BP(mean): --  ABP: --  ABP(mean): --  RR: 16 (16 Jan 2020 14:16) (16 - 18)  SpO2: 98% (16 Jan 2020 14:16) (96% - 99%)      Vital Signs Last 24 Hrs  T(C): 36.7 (16 Jan 2020 09:19), Max: 36.7 (15 León 2020 20:37)  T(F): 98.1 (16 Jan 2020 09:19), Max: 98.1 (16 Jan 2020 09:19)  HR: 70 (16 Jan 2020 14:16) (70 - 79)  BP: 131/71 (16 Jan 2020 14:16) (115/48 - 131/71)  BP(mean): --  RR: 16 (16 Jan 2020 14:16) (16 - 18)  SpO2: 98% (16 Jan 2020 14:16) (96% - 99%)                          11.6   8.34  )-----------( 156      ( 16 Jan 2020 06:15 )             38.6       LIVER FUNCTIONS - ( 15 León 2020 04:42 )  Alb: 4.3 g/dL / Pro: 8.1 g/dL / ALK PHOS: 107 u/L / ALT: 12 u/L / AST: 18 u/L / GGT: x             PT/INR - ( 16 Jan 2020 06:27 )   PT: 11.5 SEC;   INR: 1.01          PTT - ( 16 Jan 2020 06:27 )  PTT:26.9 SEC      COMMENTS/PLAN:

## 2020-01-16 NOTE — PROGRESS NOTE ADULT - SUBJECTIVE AND OBJECTIVE BOX
Patient is a 54y old  Female who presents with a chief complaint of left hip dislocation (16 Jan 2020 10:37)      HPI:  54yFemale with hx of L total femur replacement complicated by recurrent dislocations last one 12/4/19 requiring open reduction in OR c/o L hip pain. Reports taking brace off last night to use the bathroom and felt her hip dislocate. Patient then drove from [place] to [facility] to be evaluated. Denies any other injuries. Denies numbness/tingling in LLE.     ROS: 10 point review of systems otherwise negative unless noted in HPI    PMH:  L total femur replacement  AICD    PSH:  Closed pelvic fracture (15 León 2020 06:49)      PAST MEDICAL & SURGICAL HISTORY:  No pertinent past medical history  Closed pelvic fracture      MEDICATIONS  (STANDING):  acetaminophen   Tablet .. 975 milliGRAM(s) Oral every 8 hours  gabapentin 300 milliGRAM(s) Oral every 8 hours  melatonin 6 milliGRAM(s) Oral at bedtime  nicotine 21 mG/24 Hr(s) Transdermal Patch - Peds 1 Patch Transdermal daily  oxyCODONE  ER Tablet 80 milliGRAM(s) Oral every 8 hours  pantoprazole    Tablet 40 milliGRAM(s) Oral before breakfast  senna 2 Tablet(s) Oral at bedtime  sodium chloride 0.9%. 1000 milliLiter(s) (75 mL/Hr) IV Continuous <Continuous>  tiZANidine 4 milliGRAM(s) Oral three times a day  traZODone 50 milliGRAM(s) Oral at bedtime    MEDICATIONS  (PRN):  magnesium hydroxide Suspension 30 milliLiter(s) Oral daily PRN Constipation  oxyCODONE    IR 45 milliGRAM(s) Oral every 4 hours PRN Severe Pain (7 - 10)  oxyCODONE    IR 15 milliGRAM(s) Oral every 4 hours PRN Severe Break Through Pain      ICU Vital Signs Last 24 Hrs  T(C): 36.7 (16 Jan 2020 09:19), Max: 36.7 (15 León 2020 20:37)  T(F): 98.1 (16 Jan 2020 09:19), Max: 98.1 (16 Jan 2020 09:19)  HR: 70 (16 Jan 2020 14:16) (70 - 79)  BP: 131/71 (16 Jan 2020 14:16) (115/48 - 131/71)  BP(mean): --  ABP: --  ABP(mean): --  RR: 16 (16 Jan 2020 14:16) (16 - 18)  SpO2: 98% (16 Jan 2020 14:16) (96% - 99%)      Vital Signs Last 24 Hrs  T(C): 36.7 (16 Jan 2020 09:19), Max: 36.7 (15 León 2020 20:37)  T(F): 98.1 (16 Jan 2020 09:19), Max: 98.1 (16 Jan 2020 09:19)  HR: 70 (16 Jan 2020 14:16) (70 - 79)  BP: 131/71 (16 Jan 2020 14:16) (115/48 - 131/71)  BP(mean): --  RR: 16 (16 Jan 2020 14:16) (16 - 18)  SpO2: 98% (16 Jan 2020 14:16) (96% - 99%)                          11.6   8.34  )-----------( 156      ( 16 Jan 2020 06:15 )             38.6       LIVER FUNCTIONS - ( 15 León 2020 04:42 )  Alb: 4.3 g/dL / Pro: 8.1 g/dL / ALK PHOS: 107 u/L / ALT: 12 u/L / AST: 18 u/L / GGT: x             PT/INR - ( 16 Jan 2020 06:27 )   PT: 11.5 SEC;   INR: 1.01          PTT - ( 16 Jan 2020 06:27 )  PTT:26.9 SEC      COMMENTS/PLAN:    Patient was complaining of 8/10, left hip pain.  Patient brought down to PACU #18.  Dr. Rangel at the bedside with Dr. tijerina.  IV Ketamine bolus 5mg x2 given and Zofran 4mg IV given.  Patient felt relief initially with a 5/10 pain score, now currently it is 0/10.   Patient is comfortable. [facility] called to bring patient back to [facility].  Vitals prior to Ketamine: BP: 108/79 mmHg/ HR:73/ RR:15/ O2 sat 95%   Vitals after Ketamine and prior to leaving to go back to floor: BP: 123/83 mmHg/ HR:76/ RR:18/ O2 sat 96%.
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
## 2020-01-16 NOTE — PRE-OP CHECKLIST - INTERNAL PROSTHESES
yes(specify)/right forearm hardware, bilateral hip and bilateral knee replacements, bilateral ankle hardware

## 2020-01-16 NOTE — BEHAVIORAL HEALTH ASSESSMENT NOTE - RISK ASSESSMENT
Low Acute Suicide Risk Patient appears to be a low risk of harm to self or others at this time.    Risk factors: anxiety, severe pain,  impulsivity, substance misuse, functional decline, poor reactivity to stressors    Protective factors: denies any active suicidal ideation/intent/plan, no hx of prior attempts, no self-harm behaviors, identifies reasons for living, future oriented, supportive social network, no access to firearms, no legal issues, engaged in treatment Patient appears to be a low risk of harm to self or others at this time.    Risk factors: anxiety, severe pain,  impulsivity, substance misuse, functional decline, poor reactivity to stressors    Protective factors: denies any active suicidal ideation/intent/plan, no hx of prior attempts, no self-harm behaviors, identifies reasons for living, future oriented, supportive social network, no access to firearms, no legal issues, engaged in treatment    Not at acute risk of harm to self or others at this time
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
## 2020-01-16 NOTE — PROGRESS NOTE ADULT - PROBLEM SELECTOR PLAN 4
-Patient currently without any active chest pain or SOB  -Patient is euvolemic on exam  -No recent cardiac events such as syncopal event, AICD discharge or acute chest pain  -Patient at baseline physical status  -TTE (11/5/19) normal LV function   -Device interrogated on 11/4/19  -RCRI score of 1 which correlates to 6% risk of 30 day mortality related to cardiovascular events  -Currently optimized for planned procedure and no further testing is indicated WDL
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
## 2020-01-16 NOTE — PROGRESS NOTE ADULT - SUBJECTIVE AND OBJECTIVE BOX
Patient is a 54y old  Female who presents with a chief complaint of hip dislocation (15 León 2020 12:25)    SUBJECTIVE / OVERNIGHT EVENTS: Seen and examined this morning. Crying in pain. Reports pain has been uncontrolled since 7AM. Denies any chest pain, SOB, chills, N/V/. Had BM yesterday. Does not want to further engage in interview due to severe pain. Spoke to RN - pain service has been requested to come evaluate patient.     MEDICATIONS  (STANDING):  acetaminophen   Tablet .. 975 milliGRAM(s) Oral every 8 hours  chlorhexidine 2% Cloths 1 Application(s) Topical every 12 hours  gabapentin 300 milliGRAM(s) Oral every 8 hours  melatonin 6 milliGRAM(s) Oral at bedtime  nicotine 21 mG/24 Hr(s) Transdermal Patch - Peds 1 Patch Transdermal daily  oxyCODONE  ER Tablet 80 milliGRAM(s) Oral every 8 hours  pantoprazole    Tablet 40 milliGRAM(s) Oral before breakfast  povidone iodine 5% Nasal Swab 1 Application(s) Both Nostrils once  senna 2 Tablet(s) Oral at bedtime  sodium chloride 0.9%. 1000 milliLiter(s) (75 mL/Hr) IV Continuous <Continuous>  tiZANidine 4 milliGRAM(s) Oral three times a day    MEDICATIONS  (PRN):  magnesium hydroxide Suspension 30 milliLiter(s) Oral daily PRN Constipation  oxyCODONE    IR 45 milliGRAM(s) Oral every 4 hours PRN Severe Pain (7 - 10)  oxyCODONE    IR 15 milliGRAM(s) Oral every 4 hours PRN Severe Break Through Pain    Vital Signs Last 24 Hrs  T(C): 36.7 (2020 09:19), Max: 36.7 (15 León 2020 20:37)  T(F): 98.1 (2020 09:19), Max: 98.1 (2020 09:19)  HR: 79 (2020 09:19) (72 - 84)  BP: 115/48 (2020 09:19) (115/48 - 135/65)  BP(mean): --  RR: 16 (2020 09:19) (16 - 18)  SpO2: 98% (2020 09:19) (96% - 100%)    I&O's Summary    15 León 2020 07:01  -  2020 07:00  --------------------------------------------------------  IN: 0 mL / OUT: 400 mL / NET: -400 mL    2020 07:01  -  2020 10:38  --------------------------------------------------------  IN: 0 mL / OUT: 0 mL / NET: 0 mL    PHYSICAL EXAM:  GENERAL: NAD, well-developed  HEAD:  Atraumatic, Normocephalic  EYES: EOMI, PERRLA, conjunctiva and sclera clear  NECK: Supple, No JVD  CHEST/LUNG: Clear to auscultation bilaterally; No wheeze  HEART: Regular rate and rhythm; No murmurs, rubs, or gallops  ABDOMEN: Soft, Nontender, Nondistended; Bowel sounds present  EXTREMITIES:  dislocated left hip, no edema b/l  PSYCH: AAOx3  NEUROLOGY: non-focal  SKIN: No rashes or lesions    LABS:                        11.6   8.34  )-----------( 156      ( 2020 06:15 )             38.6     01-16    137  |  106  |  18  ----------------------------<  89  4.8   |  16<L>  |  0.96    Ca    9.2      2020 06:27    TPro  8.1  /  Alb  4.3  /  TBili  0.2  /  DBili  x   /  AST  18  /  ALT  12  /  AlkPhos  107  01-15    PT/INR - ( 2020 06:27 )   PT: 11.5 SEC;   INR: 1.01          PTT - ( 2020 06:27 )  PTT:26.9 SEC      Urinalysis Basic - ( 15 León 2020 12:09 )    Color: LIGHT YELLOW / Appearance: CLEAR / S.012 / pH: 7.0  Gluc: NEGATIVE / Ketone: NEGATIVE  / Bili: NEGATIVE / Urobili: NORMAL   Blood: NEGATIVE / Protein: NEGATIVE / Nitrite: NEGATIVE   Leuk Esterase: NEGATIVE / RBC: x / WBC x   Sq Epi: x / Non Sq Epi: x / Bacteria: x      RADIOLOGY & ADDITIONAL TESTS:    Imaging Personally Reviewed:    Consultant(s) Notes Reviewed:      Care Discussed with Consultants/Other Providers: ortho PA    Assessment and Plan:

## 2020-01-16 NOTE — BEHAVIORAL HEALTH ASSESSMENT NOTE - PAST PSYCHOTROPIC MEDICATION
tried Prozac x2 months, did not work. Did not tolerate Ativan or Klonopin, Xanax helped. tried Prozac x2 months, did not work. Did not tolerate Ativan or Klonopin, Xanax helped.  reported trialing ambien - caused vivid nightmares

## 2020-01-16 NOTE — BEHAVIORAL HEALTH ASSESSMENT NOTE - NSBHCONSULTFOLLOWAFTERCARE_PSY_A_CORE FT
[facility] walk in clinic : [phone] (9AM-7PM)  [facility] Outpatient clinic: [phone]

## 2020-01-16 NOTE — PROGRESS NOTE ADULT - PROBLEM SELECTOR PLAN 2
-Per prior documentation, pt with reports h/o MI and cardiac arrest in 2018 requiring AICD placement  -Per patient she was taken off of Plavix/metoprolol/lisinopril by cardiologist and to be restarted in future once her hip issues resolve  -Denies any chest pain or sob at this time  -TTE (11/5/19) normal LV function   -Patient appears euvolemic on exam  -No further intervention is needed at this time.

## 2020-01-16 NOTE — PROGRESS NOTE ADULT - PROBLEM SELECTOR PLAN 3
-Last interrogated by EP on 11/4 prior to last OR procedure w/underlying rhythm of SR with 2:1 AVB in high 30 bpm, 100% Bi-V paced   -No further interrogation is needed per EP since no new events have been reported during this admission. Hip dislocated spontaneously without any fall or related to syncopal episode.

## 2020-01-16 NOTE — BEHAVIORAL HEALTH ASSESSMENT NOTE - NSBHCHARTREVIEWLAB_PSY_A_CORE FT
11.6   8.34  )-----------( 156      ( 16 Jan 2020 06:15 )             38.6   01-16    137  |  106  |  18  ----------------------------<  89  4.8   |  16<L>  |  0.96    Ca    9.2      16 Jan 2020 06:27    TPro  8.1  /  Alb  4.3  /  TBili  0.2  /  DBili  x   /  AST  18  /  ALT  12  /  AlkPhos  107  01-15

## 2020-01-16 NOTE — PROGRESS NOTE ADULT - PROBLEM SELECTOR PLAN 1
-h/o recurrent hip dislocation. Last open reduction performed on 12/4/19  -Denies any fall or trauma to the hip. Reports hip dislocated while removing brace  -OR planning per ortho  -Pain control. Patient is on high doses of narcotics. On oxycontin 80BID and oxycodone 45IR PRN  -Patient would benefit with pain service consult post operatively for pain control  -Bowel regimen  -DVT ppx per primary team.

## 2020-01-16 NOTE — BEHAVIORAL HEALTH ASSESSMENT NOTE - OTHER
in bed future oriented severe pain future oriented, speaks of pain and medications frequently anxiety

## 2020-01-16 NOTE — BEHAVIORAL HEALTH ASSESSMENT NOTE - SUICIDE RISK FACTORS
Chronic pain/Agitation/Severe Anxiety/Panic/Alcohol/Substance abuse disorders Alcohol/Substance abuse disorders/Chronic pain/Other/Agitation/Severe Anxiety/Panic

## 2020-01-16 NOTE — BEHAVIORAL HEALTH ASSESSMENT NOTE - VIOLENCE PROTECTIVE FACTORS:
Residential stability Insight into violence risk and need for management/treatment/Residential stability

## 2020-01-17 LAB
ANION GAP SERPL CALC-SCNC: 11 MMO/L — SIGNIFICANT CHANGE UP (ref 7–14)
APTT BLD: 24 SEC — LOW (ref 27.5–36.3)
BUN SERPL-MCNC: 17 MG/DL — SIGNIFICANT CHANGE UP (ref 7–23)
CALCIUM SERPL-MCNC: 8.6 MG/DL — SIGNIFICANT CHANGE UP (ref 8.4–10.5)
CHLORIDE SERPL-SCNC: 102 MMOL/L — SIGNIFICANT CHANGE UP (ref 98–107)
CO2 SERPL-SCNC: 23 MMOL/L — SIGNIFICANT CHANGE UP (ref 22–31)
CREAT SERPL-MCNC: 0.97 MG/DL — SIGNIFICANT CHANGE UP (ref 0.5–1.3)
CULTURE - ACID FAST SMEAR CONCENTRATED: SIGNIFICANT CHANGE UP
GLUCOSE SERPL-MCNC: 109 MG/DL — HIGH (ref 70–99)
GRAM STN WND: SIGNIFICANT CHANGE UP
GRAM STN WND: SIGNIFICANT CHANGE UP
HCT VFR BLD CALC: 31 % — LOW (ref 34.5–45)
HGB BLD-MCNC: 9.5 G/DL — LOW (ref 11.5–15.5)
INR BLD: 1.12 — SIGNIFICANT CHANGE UP (ref 0.88–1.17)
MCHC RBC-ENTMCNC: 26.5 PG — LOW (ref 27–34)
MCHC RBC-ENTMCNC: 30.6 % — LOW (ref 32–36)
MCV RBC AUTO: 86.4 FL — SIGNIFICANT CHANGE UP (ref 80–100)
NRBC # FLD: 0 K/UL — SIGNIFICANT CHANGE UP (ref 0–0)
PLATELET # BLD AUTO: 152 K/UL — SIGNIFICANT CHANGE UP (ref 150–400)
PMV BLD: SIGNIFICANT CHANGE UP FL (ref 7–13)
POTASSIUM SERPL-MCNC: 4.2 MMOL/L — SIGNIFICANT CHANGE UP (ref 3.5–5.3)
POTASSIUM SERPL-SCNC: 4.2 MMOL/L — SIGNIFICANT CHANGE UP (ref 3.5–5.3)
PROTHROM AB SERPL-ACNC: 12.8 SEC — SIGNIFICANT CHANGE UP (ref 9.8–13.1)
RBC # BLD: 3.59 M/UL — LOW (ref 3.8–5.2)
RBC # FLD: 18.1 % — HIGH (ref 10.3–14.5)
SODIUM SERPL-SCNC: 136 MMOL/L — SIGNIFICANT CHANGE UP (ref 135–145)
SPECIMEN SOURCE: SIGNIFICANT CHANGE UP
WBC # BLD: 16.24 K/UL — HIGH (ref 3.8–10.5)
WBC # FLD AUTO: 16.24 K/UL — HIGH (ref 3.8–10.5)

## 2020-01-17 PROCEDURE — 27299 UNLISTED PX PELVIS/HIP JOINT: CPT | Mod: 78,LT

## 2020-01-17 PROCEDURE — 99232 SBSQ HOSP IP/OBS MODERATE 35: CPT

## 2020-01-17 PROCEDURE — 26990 DRAINAGE OF PELVIS LESION: CPT | Mod: 78,LT

## 2020-01-17 RX ORDER — HYDROMORPHONE HYDROCHLORIDE 2 MG/ML
1 INJECTION INTRAMUSCULAR; INTRAVENOUS; SUBCUTANEOUS
Refills: 0 | Status: DISCONTINUED | OUTPATIENT
Start: 2020-01-17 | End: 2020-01-17

## 2020-01-17 RX ORDER — ONDANSETRON 8 MG/1
4 TABLET, FILM COATED ORAL EVERY 6 HOURS
Refills: 0 | Status: DISCONTINUED | OUTPATIENT
Start: 2020-01-17 | End: 2020-01-19

## 2020-01-17 RX ORDER — SODIUM CHLORIDE 9 MG/ML
500 INJECTION, SOLUTION INTRAVENOUS ONCE
Refills: 0 | Status: DISCONTINUED | OUTPATIENT
Start: 2020-01-17 | End: 2020-01-17

## 2020-01-17 RX ORDER — OXYCODONE HYDROCHLORIDE 5 MG/1
80 TABLET ORAL EVERY 8 HOURS
Refills: 0 | Status: DISCONTINUED | OUTPATIENT
Start: 2020-01-17 | End: 2020-01-21

## 2020-01-17 RX ORDER — ROPIVACAINE HCL/PF 5 MG/ML
200 AMPUL (ML) INJECTION
Refills: 0 | Status: DISCONTINUED | OUTPATIENT
Start: 2020-01-17 | End: 2020-01-17

## 2020-01-17 RX ORDER — PANTOPRAZOLE SODIUM 20 MG/1
40 TABLET, DELAYED RELEASE ORAL
Refills: 0 | Status: DISCONTINUED | OUTPATIENT
Start: 2020-01-17 | End: 2020-01-21

## 2020-01-17 RX ORDER — KETAMINE HYDROCHLORIDE 100 MG/ML
40 INJECTION INTRAMUSCULAR; INTRAVENOUS ONCE
Refills: 0 | Status: DISCONTINUED | OUTPATIENT
Start: 2020-01-17 | End: 2020-01-17

## 2020-01-17 RX ORDER — HYDROMORPHONE HYDROCHLORIDE 2 MG/ML
2 INJECTION INTRAMUSCULAR; INTRAVENOUS; SUBCUTANEOUS
Refills: 0 | Status: DISCONTINUED | OUTPATIENT
Start: 2020-01-17 | End: 2020-01-21

## 2020-01-17 RX ORDER — TIZANIDINE 4 MG/1
2 TABLET ORAL EVERY 6 HOURS
Refills: 0 | Status: DISCONTINUED | OUTPATIENT
Start: 2020-01-17 | End: 2020-01-17

## 2020-01-17 RX ORDER — HYDROMORPHONE HYDROCHLORIDE 2 MG/ML
0.5 INJECTION INTRAMUSCULAR; INTRAVENOUS; SUBCUTANEOUS ONCE
Refills: 0 | Status: DISCONTINUED | OUTPATIENT
Start: 2020-01-17 | End: 2020-01-17

## 2020-01-17 RX ORDER — OXYCODONE HYDROCHLORIDE 5 MG/1
15 TABLET ORAL EVERY 6 HOURS
Refills: 0 | Status: DISCONTINUED | OUTPATIENT
Start: 2020-01-17 | End: 2020-01-17

## 2020-01-17 RX ORDER — HYDROMORPHONE HYDROCHLORIDE 2 MG/ML
1.5 INJECTION INTRAMUSCULAR; INTRAVENOUS; SUBCUTANEOUS
Refills: 0 | Status: DISCONTINUED | OUTPATIENT
Start: 2020-01-17 | End: 2020-01-17

## 2020-01-17 RX ORDER — SODIUM CHLORIDE 9 MG/ML
500 INJECTION, SOLUTION INTRAVENOUS ONCE
Refills: 0 | Status: COMPLETED | OUTPATIENT
Start: 2020-01-17 | End: 2020-01-17

## 2020-01-17 RX ORDER — TIZANIDINE 4 MG/1
4 TABLET ORAL EVERY 6 HOURS
Refills: 0 | Status: DISCONTINUED | OUTPATIENT
Start: 2020-01-17 | End: 2020-01-21

## 2020-01-17 RX ORDER — OXYCODONE HYDROCHLORIDE 5 MG/1
30 TABLET ORAL EVERY 6 HOURS
Refills: 0 | Status: DISCONTINUED | OUTPATIENT
Start: 2020-01-17 | End: 2020-01-17

## 2020-01-17 RX ORDER — HYDROMORPHONE HYDROCHLORIDE 2 MG/ML
2 INJECTION INTRAMUSCULAR; INTRAVENOUS; SUBCUTANEOUS EVERY 4 HOURS
Refills: 0 | Status: DISCONTINUED | OUTPATIENT
Start: 2020-01-17 | End: 2020-01-17

## 2020-01-17 RX ORDER — ROPIVACAINE HCL/PF 5 MG/ML
200 AMPUL (ML) INJECTION
Refills: 0 | Status: DISCONTINUED | OUTPATIENT
Start: 2020-01-17 | End: 2020-01-19

## 2020-01-17 RX ORDER — HYDROMORPHONE HYDROCHLORIDE 2 MG/ML
1.5 INJECTION INTRAMUSCULAR; INTRAVENOUS; SUBCUTANEOUS EVERY 4 HOURS
Refills: 0 | Status: DISCONTINUED | OUTPATIENT
Start: 2020-01-17 | End: 2020-01-17

## 2020-01-17 RX ORDER — SENNA PLUS 8.6 MG/1
2 TABLET ORAL AT BEDTIME
Refills: 0 | Status: DISCONTINUED | OUTPATIENT
Start: 2020-01-17 | End: 2020-01-17

## 2020-01-17 RX ORDER — ROPIVACAINE HCL/PF 5 MG/ML
5 AMPUL (ML) INJECTION
Refills: 0 | Status: DISCONTINUED | OUTPATIENT
Start: 2020-01-17 | End: 2020-01-19

## 2020-01-17 RX ORDER — NALOXONE HYDROCHLORIDE 4 MG/.1ML
0.1 SPRAY NASAL
Refills: 0 | Status: DISCONTINUED | OUTPATIENT
Start: 2020-01-17 | End: 2020-01-19

## 2020-01-17 RX ORDER — OXYCODONE HYDROCHLORIDE 5 MG/1
45 TABLET ORAL EVERY 4 HOURS
Refills: 0 | Status: DISCONTINUED | OUTPATIENT
Start: 2020-01-17 | End: 2020-01-20

## 2020-01-17 RX ORDER — CEFAZOLIN SODIUM 1 G
2000 VIAL (EA) INJECTION EVERY 8 HOURS
Refills: 0 | Status: DISCONTINUED | OUTPATIENT
Start: 2020-01-17 | End: 2020-01-19

## 2020-01-17 RX ORDER — SENNA PLUS 8.6 MG/1
2 TABLET ORAL AT BEDTIME
Refills: 0 | Status: DISCONTINUED | OUTPATIENT
Start: 2020-01-17 | End: 2020-01-21

## 2020-01-17 RX ORDER — ONDANSETRON 8 MG/1
4 TABLET, FILM COATED ORAL EVERY 6 HOURS
Refills: 0 | Status: DISCONTINUED | OUTPATIENT
Start: 2020-01-17 | End: 2020-01-17

## 2020-01-17 RX ORDER — HEPARIN SODIUM 5000 [USP'U]/ML
5000 INJECTION INTRAVENOUS; SUBCUTANEOUS EVERY 12 HOURS
Refills: 0 | Status: DISCONTINUED | OUTPATIENT
Start: 2020-01-17 | End: 2020-01-21

## 2020-01-17 RX ADMIN — HYDROMORPHONE HYDROCHLORIDE 1 MILLIGRAM(S): 2 INJECTION INTRAMUSCULAR; INTRAVENOUS; SUBCUTANEOUS at 03:40

## 2020-01-17 RX ADMIN — Medication 200 MILLILITER(S): at 19:53

## 2020-01-17 RX ADMIN — HYDROMORPHONE HYDROCHLORIDE 2 MILLIGRAM(S): 2 INJECTION INTRAMUSCULAR; INTRAVENOUS; SUBCUTANEOUS at 18:51

## 2020-01-17 RX ADMIN — HYDROMORPHONE HYDROCHLORIDE 1.5 MILLIGRAM(S): 2 INJECTION INTRAMUSCULAR; INTRAVENOUS; SUBCUTANEOUS at 10:30

## 2020-01-17 RX ADMIN — GABAPENTIN 300 MILLIGRAM(S): 400 CAPSULE ORAL at 09:27

## 2020-01-17 RX ADMIN — SENNA PLUS 2 TABLET(S): 8.6 TABLET ORAL at 21:50

## 2020-01-17 RX ADMIN — HYDROMORPHONE HYDROCHLORIDE 1.5 MILLIGRAM(S): 2 INJECTION INTRAMUSCULAR; INTRAVENOUS; SUBCUTANEOUS at 15:14

## 2020-01-17 RX ADMIN — GABAPENTIN 300 MILLIGRAM(S): 400 CAPSULE ORAL at 17:41

## 2020-01-17 RX ADMIN — HYDROMORPHONE HYDROCHLORIDE 1 MILLIGRAM(S): 2 INJECTION INTRAMUSCULAR; INTRAVENOUS; SUBCUTANEOUS at 03:50

## 2020-01-17 RX ADMIN — OXYCODONE HYDROCHLORIDE 45 MILLIGRAM(S): 5 TABLET ORAL at 18:29

## 2020-01-17 RX ADMIN — Medication 1 PATCH: at 13:46

## 2020-01-17 RX ADMIN — Medication 6 MILLIGRAM(S): at 21:51

## 2020-01-17 RX ADMIN — TIZANIDINE 4 MILLIGRAM(S): 4 TABLET ORAL at 16:21

## 2020-01-17 RX ADMIN — POLYETHYLENE GLYCOL 3350 17 GRAM(S): 17 POWDER, FOR SOLUTION ORAL at 13:46

## 2020-01-17 RX ADMIN — Medication 100 MILLIGRAM(S): at 06:30

## 2020-01-17 RX ADMIN — OXYCODONE HYDROCHLORIDE 45 MILLIGRAM(S): 5 TABLET ORAL at 09:27

## 2020-01-17 RX ADMIN — TIZANIDINE 4 MILLIGRAM(S): 4 TABLET ORAL at 22:51

## 2020-01-17 RX ADMIN — Medication 200 MILLILITER(S): at 03:48

## 2020-01-17 RX ADMIN — OXYCODONE HYDROCHLORIDE 45 MILLIGRAM(S): 5 TABLET ORAL at 14:20

## 2020-01-17 RX ADMIN — OXYCODONE HYDROCHLORIDE 45 MILLIGRAM(S): 5 TABLET ORAL at 22:21

## 2020-01-17 RX ADMIN — Medication 1 PATCH: at 20:13

## 2020-01-17 RX ADMIN — Medication 1 MILLIGRAM(S): at 03:45

## 2020-01-17 RX ADMIN — Medication 1 MILLIGRAM(S): at 04:00

## 2020-01-17 RX ADMIN — Medication 5 MILLILITER(S): at 12:10

## 2020-01-17 RX ADMIN — OXYCODONE HYDROCHLORIDE 80 MILLIGRAM(S): 5 TABLET ORAL at 09:27

## 2020-01-17 RX ADMIN — OXYCODONE HYDROCHLORIDE 45 MILLIGRAM(S): 5 TABLET ORAL at 13:44

## 2020-01-17 RX ADMIN — Medication 100 MILLIGRAM(S): at 13:45

## 2020-01-17 RX ADMIN — Medication 1 PATCH: at 06:51

## 2020-01-17 RX ADMIN — SODIUM CHLORIDE 150 MILLILITER(S): 9 INJECTION, SOLUTION INTRAVENOUS at 10:08

## 2020-01-17 RX ADMIN — OXYCODONE HYDROCHLORIDE 45 MILLIGRAM(S): 5 TABLET ORAL at 17:45

## 2020-01-17 RX ADMIN — Medication 1 PATCH: at 13:56

## 2020-01-17 RX ADMIN — SODIUM CHLORIDE 150 MILLILITER(S): 9 INJECTION, SOLUTION INTRAVENOUS at 04:15

## 2020-01-17 RX ADMIN — Medication 200 MILLILITER(S): at 10:07

## 2020-01-17 RX ADMIN — HYDROMORPHONE HYDROCHLORIDE 1.5 MILLIGRAM(S): 2 INJECTION INTRAMUSCULAR; INTRAVENOUS; SUBCUTANEOUS at 14:59

## 2020-01-17 RX ADMIN — KETAMINE HYDROCHLORIDE 40 MILLIGRAM(S): 100 INJECTION INTRAMUSCULAR; INTRAVENOUS at 04:15

## 2020-01-17 RX ADMIN — OXYCODONE HYDROCHLORIDE 45 MILLIGRAM(S): 5 TABLET ORAL at 21:51

## 2020-01-17 RX ADMIN — HYDROMORPHONE HYDROCHLORIDE 1.5 MILLIGRAM(S): 2 INJECTION INTRAMUSCULAR; INTRAVENOUS; SUBCUTANEOUS at 10:45

## 2020-01-17 RX ADMIN — Medication 200 MILLILITER(S): at 20:32

## 2020-01-17 RX ADMIN — SODIUM CHLORIDE 500 MILLILITER(S): 9 INJECTION, SOLUTION INTRAVENOUS at 07:51

## 2020-01-17 RX ADMIN — OXYCODONE HYDROCHLORIDE 45 MILLIGRAM(S): 5 TABLET ORAL at 10:02

## 2020-01-17 RX ADMIN — Medication 200 MILLILITER(S): at 07:40

## 2020-01-17 RX ADMIN — HYDROMORPHONE HYDROCHLORIDE 2 MILLIGRAM(S): 2 INJECTION INTRAMUSCULAR; INTRAVENOUS; SUBCUTANEOUS at 19:06

## 2020-01-17 RX ADMIN — OXYCODONE HYDROCHLORIDE 80 MILLIGRAM(S): 5 TABLET ORAL at 17:42

## 2020-01-17 RX ADMIN — HYDROMORPHONE HYDROCHLORIDE 1 MILLIGRAM(S): 2 INJECTION INTRAMUSCULAR; INTRAVENOUS; SUBCUTANEOUS at 04:00

## 2020-01-17 RX ADMIN — HEPARIN SODIUM 5000 UNIT(S): 5000 INJECTION INTRAVENOUS; SUBCUTANEOUS at 17:43

## 2020-01-17 NOTE — PROGRESS NOTE ADULT - SUBJECTIVE AND OBJECTIVE BOX
Patient is a 54y old  Female who presents with a chief complaint of hip dislocation (15 León 2020 12:25)    SUBJECTIVE / OVERNIGHT EVENTS: Seen this morning. Patient is crying and reports severe pain. States that no one is addressing her pain needs. She states that "no one can help her" and that "I have lost lincoln in [place]". Also states that "she is done with psychiatry" since they only recommended Trazadone and that doesn't help her much. Does not want to further engage in interview due to severe pain.     MEDICATIONS  (STANDING):  acetaminophen   Tablet .. 975 milliGRAM(s) Oral every 8 hours  chlorhexidine 2% Cloths 1 Application(s) Topical every 12 hours  gabapentin 300 milliGRAM(s) Oral every 8 hours  melatonin 6 milliGRAM(s) Oral at bedtime  nicotine 21 mG/24 Hr(s) Transdermal Patch - Peds 1 Patch Transdermal daily  oxyCODONE  ER Tablet 80 milliGRAM(s) Oral every 8 hours  pantoprazole    Tablet 40 milliGRAM(s) Oral before breakfast  povidone iodine 5% Nasal Swab 1 Application(s) Both Nostrils once  senna 2 Tablet(s) Oral at bedtime  sodium chloride 0.9%. 1000 milliLiter(s) (75 mL/Hr) IV Continuous <Continuous>  tiZANidine 4 milliGRAM(s) Oral three times a day    MEDICATIONS  (PRN):  magnesium hydroxide Suspension 30 milliLiter(s) Oral daily PRN Constipation  oxyCODONE    IR 45 milliGRAM(s) Oral every 4 hours PRN Severe Pain (7 - 10)  oxyCODONE    IR 15 milliGRAM(s) Oral every 4 hours PRN Severe Break Through Pain    Vital Signs Last 24 Hrs  T(C): 36.7 (17 Jan 2020 09:34), Max: 36.8 (17 Jan 2020 07:00)  T(F): 98.1 (17 Jan 2020 09:34), Max: 98.2 (17 Jan 2020 07:00)  HR: 111 (17 Jan 2020 09:34) (70 - 111)  BP: 126/66 (17 Jan 2020 09:34) (89/45 - 146/78)  BP(mean): 65 (17 Jan 2020 07:00) (62 - 122)  RR: 16 (17 Jan 2020 09:34) (13 - 28)  SpO2: 98% (17 Jan 2020 09:34) (93% - 99%)    I&O's Summary    16 Jan 2020 07:01  -  17 Jan 2020 07:00  --------------------------------------------------------  IN: 550 mL / OUT: 1840 mL / NET: -1290 mL    17 Jan 2020 07:01  -  17 Jan 2020 11:50  --------------------------------------------------------  IN: 0 mL / OUT: 520 mL / NET: -520 mL        PHYSICAL EXAM:  GENERAL: crying due to pain  HEAD:  Atraumatic, Normocephalic  EYES: EOMI, conjunctiva and sclera clear  NECK: Supple  CHEST/LUNG: refused exam  HEART: Refused  ABDOMEN:   EXTREMITIES:  left hip in immobilizer   PSYCH: AAOx3  NEUROLOGY:   SKIN: No rashes or lesions    LABS:                                   9.5    16.24 )-----------( 152      ( 17 Jan 2020 05:10 )             31.0   01-17    136  |  102  |  17  ----------------------------<  109<H>  4.2   |  23  |  0.97    Ca    8.6      17 Jan 2020 05:10        RADIOLOGY & ADDITIONAL TESTS:    Imaging Personally Reviewed:    Consultant(s) Notes Reviewed:      Care Discussed with Consultants/Other Providers: ortho PA    Assessment and Plan:

## 2020-01-17 NOTE — PHYSICAL THERAPY INITIAL EVALUATION ADULT - RANGE OF MOTION EXAMINATION, REHAB EVAL
bilateral upper extremity ROM was WFL (within functional limits)/Pt. agitated throughout session secondary to reports of pain. Pt, deferring LE ROM assessment and all forms of functional mobility, RN aware. will attempt when appropriate/feasible

## 2020-01-17 NOTE — PHYSICAL THERAPY INITIAL EVALUATION ADULT - PRECAUTIONS/LIMITATIONS, REHAB EVAL
left hip precautions/surgical precautions/(+) IV (+) knee immobilizer on left LE, hip abduction brace,

## 2020-01-17 NOTE — OCCUPATIONAL THERAPY INITIAL EVALUATION ADULT - GENERAL OBSERVATIONS, REHAB EVAL
Pt. received semisupine in bed. No acute distress. However, pt. reporting pain to Left LE (LOU Napoles aware). Pt deferring to participate in functional mobility and LE ROM assessment due to pain however, agreeable to discuss Social History/Prior Level of Function, and perform UE ROM assessment. LOU kenney. +Clean dry intact dressing to Left Hip, +Left Knee Immobilizer, +Accordion Drain, +Abduction Hip Pillow, +Urethral Catheter.

## 2020-01-17 NOTE — PROGRESS NOTE ADULT - ASSESSMENT
A/P: 54yoF s/p L TFR open reduction    Neuro: Pain control  Resp: IS  GI: Regular diet, bowel reg  MSK: WBAT, PT/OT, in KI  Heme: DVT PPX: ASA when able (PCEA out)  PCEA  HV  Ancef

## 2020-01-17 NOTE — PHYSICAL THERAPY INITIAL EVALUATION ADULT - PATIENT PROFILE REVIEW, REHAB EVAL
yes/Pt. profile reviewed, consulted with LOU CALDWELL prior to initial PT evaluation and tx, as per RN, Pt. is OK to participate in skilled therapy session, current activity orders; out of bed to chair.

## 2020-01-17 NOTE — PROGRESS NOTE ADULT - SUBJECTIVE AND OBJECTIVE BOX
Pt seen and examined. Now POD#1 s/p open reduction of the left hip. +severe pain after surgery, now improved.   LLE: Dressing cdi. NVI distally. Leg lengths at baseline.    Continue HV.  Ancef while HV is in.  FU OR Cx.  Abd pillow/knee immobilizer  FU hip brace  Pain control

## 2020-01-17 NOTE — OCCUPATIONAL THERAPY INITIAL EVALUATION ADULT - MD ORDER
Occupational Therapy (OT) to evaluate and treat. Occupational Therapy (OT) to evaluate and treat. Out of Bed to Chair. Per LOU Napoles, pt is okay to participate in OT evaluation and perform activity as tolerated.

## 2020-01-17 NOTE — PROVIDER CONTACT NOTE (OTHER) - ASSESSMENT
Pt without IV access since before my shift. Day RN stated Alannah, ANM will attempt to get IV access before next dose of IV Dilaudid due (22:49). ANM attempted IV access x2 without success. Pt increasingly upset with staff. Offered pt PCEA clinical bolus for pain relief, pt refused. Pt took Zanaflex instead. Asked pain management MD to attempt IV access, MD stated he could not come to the floor right now. Also paged MD Pedro from [facility] to make aware of situation. Stated he will come to the floor around midnight to speak with pt. Pt without IV access since before my shift. Day RN stated Alannah, ANM will attempt to get IV access before next dose of IV Dilaudid due (22:49). ANM attempted IV access x2 without success. Pt increasingly upset with staff. Offered pt PCEA clinical bolus for pain relief, pt refused. Pt took Zanaflex instead. Asked pain management MD to attempt IV access, MD stated he could not come to the floor right now. Also paged Pedro Lopez MD from [facility] to make aware of situation. Clinical impact LOU Ramirez and nurse educator La also paged, were in emergencies and unable to come to bedside at the time. Stated he will come to the floor around midnight to speak with pt.

## 2020-01-17 NOTE — PROGRESS NOTE ADULT - SUBJECTIVE AND OBJECTIVE BOX
ANESTHESIA POSTOP CHECK    54y Female POSTOP DAY 1 S/P ORIF L HIP. Pt complains of pain 8/10, with epidural. However, no other complications noted. Pain medication adjusted to provide relief. Pt is a chronic pain user, and has high tolerance to opioids. Acute pain following, increased her epidural continous rate to 10ml/hr, and gave 5ml bolus. IV breakthrough has also been added. Hemodynamically stable.     Vital Signs Last 24 Hrs  T(C): 36.7 (17 Jan 2020 09:34), Max: 36.8 (17 Jan 2020 07:00)  T(F): 98.1 (17 Jan 2020 09:34), Max: 98.2 (17 Jan 2020 07:00)  HR: 111 (17 Jan 2020 09:34) (70 - 111)  BP: 126/66 (17 Jan 2020 09:34) (89/45 - 146/78)  BP(mean): 65 (17 Jan 2020 07:00) (62 - 122)  RR: 16 (17 Jan 2020 09:34) (13 - 28)  SpO2: 98% (17 Jan 2020 09:34) (93% - 99%)  I&O's Summary    16 Jan 2020 07:01  -  17 Jan 2020 07:00  --------------------------------------------------------  IN: 550 mL / OUT: 1840 mL / NET: -1290 mL    17 Jan 2020 07:01  -  17 Jan 2020 10:45  --------------------------------------------------------  IN: 0 mL / OUT: 520 mL / NET: -520 mL        [X ] NO APPARENT ANESTHESIA COMPLICATIONS      Comments:

## 2020-01-17 NOTE — PROGRESS NOTE ADULT - SUBJECTIVE AND OBJECTIVE BOX
Anesthesia Pain Management Service: Day _2_ of Epidural    SUBJECTIVE: Patient complaining of pain in her left hip  Pain Scale Score:   Refer to charted pain scores    THERAPY:  [ ] Epidural Bupivacaine 0.0625% and Hydromorphone  		[ ] 10 micrograms/mL	[ ] 5 micrograms/mL  [ ] Epidural Bupivacaine 0.0625% and Fentanyl - 2 micrograms/mL  [X ] Epidural Ropivacaine 0.1% plain – 1 mg/mL  [ ] Patient Controlled Regional Anesthesia (PCRA) Ropivacaine  		[ ] 0.2%			[ ] 0.1%    Demand dose __3_ lockout __15_ (minutes) Continuous Rate _6__ Total: _33.7___ ml used (in past 24 hours)      MEDICATIONS  (STANDING):  acetaminophen   Tablet .. 975 milliGRAM(s) Oral every 8 hours  ceFAZolin   IVPB 2000 milliGRAM(s) IV Intermittent every 8 hours  gabapentin 300 milliGRAM(s) Oral every 8 hours  lactated ringers. 1000 milliLiter(s) (150 mL/Hr) IV Continuous <Continuous>  melatonin 6 milliGRAM(s) Oral at bedtime  nicotine 21 mG/24 Hr(s) Transdermal Patch - Peds 1 Patch Transdermal daily  oxyCODONE  ER Tablet 80 milliGRAM(s) Oral every 8 hours  pantoprazole    Tablet 40 milliGRAM(s) Oral before breakfast  polyethylene glycol 3350 17 Gram(s) Oral daily  ropivacaine 0.2% in 0.9% Sodium Chloride PCEA 200 milliLiter(s) Epidural PCA Continuous  senna 2 Tablet(s) Oral at bedtime  traZODone 50 milliGRAM(s) Oral at bedtime    MEDICATIONS  (PRN):  bisacodyl Suppository 10 milliGRAM(s) Rectal daily PRN If no bowel movement by POD#2  HYDROmorphone  Injectable 1.5 milliGRAM(s) IV Push every 4 hours PRN Severe Break Through  magnesium hydroxide Suspension 30 milliLiter(s) Oral daily PRN Constipation  naloxone Injectable 0.1 milliGRAM(s) IV Push every 3 minutes PRN For ANY of the following changes in patient status:  A. RR LESS THAN 10 breaths per minute, B. Oxygen saturation LESS THAN 90%, C. Sedation score of 6  ondansetron Injectable 4 milliGRAM(s) IV Push every 6 hours PRN Nausea  oxyCODONE    IR 45 milliGRAM(s) Oral every 4 hours PRN Severe Pain (7 - 10)  ropivacaine 0.2% in 0.9% Sodium Chloride PCEA Rescue Clinician Bolus 5 milliLiter(s) Epidural every 15 minutes PRN for Pain Score greater than 6  senna 2 Tablet(s) Oral at bedtime PRN Constipation      OBJECTIVE: laying in bed     Assessment of Catheter Site:	[ ] Left	[ ] Right  [x ] Epidural 	[ ] Femoral	      [ ] Saphenous   [ ] Supraclavicular   [ ] Other:    [x ] Dressing intact	[x ] Site non-tender	[ x] Site without erythema, discharge, edema  [x ] Epidural tubing and connection checked	[x] Gross neurological exam within normal limits  [ ] Catheter removed – tip intact		[ ] Afebrile  	[ ] Febrile: ___   [ X] see Temp under VS below)    PT/INR - ( 16 Jan 2020 06:27 )   PT: 11.5 SEC;   INR: 1.01          PTT - ( 16 Jan 2020 06:27 )  PTT:26.9 SEC                      9.5    16.24 )-----------( 152      ( 17 Jan 2020 05:10 )             31.0     Vital Signs Last 24 Hrs  T(C): 36.7 (01-17-20 @ 09:34), Max: 36.8 (01-17-20 @ 07:00)  T(F): 98.1 (01-17-20 @ 09:34), Max: 98.2 (01-17-20 @ 07:00)  HR: 111 (01-17-20 @ 09:34) (70 - 111)  BP: 126/66 (01-17-20 @ 09:34) (89/45 - 146/78)  BP(mean): 65 (01-17-20 @ 07:00) (62 - 122)  RR: 16 (01-17-20 @ 09:34) (13 - 28)  SpO2: 98% (01-17-20 @ 09:34) (93% - 99%)      Sedation Score:	[x ] Alert	[ ] Drowsy	[ ] Arousable	[ ] Asleep	[ ] Unresponsive    Side Effects:	[x ] None	[ ] Nausea	[ ] Vomiting	[ ] Pruritus  		[ ] Weakness		[ ] Numbness	[ ] Other:    ASSESSMENT/ PLAN:    Therapy to  be:	[x ] Continue   [ ] Discontinued   [ ] Change to prn Analgesics    Documentation and Verification of current medications:  [ X ] Done	[ ] Not done, not eligible, reason:    Comments: Doing OK with epidural and may continue. Plan is to increase epidural continous to 10cc/hr and give a bolus dose through epidural. IV dilaudid break through ordered, will follow up later today.

## 2020-01-17 NOTE — PHYSICAL THERAPY INITIAL EVALUATION ADULT - PATIENT/FAMILY AGREES WITH PLAN
Pt. educated on importance of participating in skilled therapy services. Pt. educated on importance of participating in skilled therapy services. Pt. deferring at this time due to reports of pain. LOU kenney. will follow up as appropriate.

## 2020-01-17 NOTE — PROGRESS NOTE ADULT - SUBJECTIVE AND OBJECTIVE BOX
Anesthesia Pain Management Service    SUBJECTIVE: Pt on PCEA but c/o pain and requests more pain medication and otherwise without problems reported.    Therapy:	  [ ] IV PCA	   [ X] Epidural           [ ] s/p Spinal Opoid              [ ] Postpartum infusion	  [ ] Patient controlled regional anesthesia (PCRA)    [ ] prn Analgesics    Allergies    No Known Allergies    Intolerances      MEDICATIONS  (STANDING):  acetaminophen   Tablet .. 975 milliGRAM(s) Oral every 8 hours  ceFAZolin   IVPB 2000 milliGRAM(s) IV Intermittent every 8 hours  gabapentin 300 milliGRAM(s) Oral every 8 hours  heparin  Injectable 5000 Unit(s) SubCutaneous every 12 hours  lactated ringers. 1000 milliLiter(s) (150 mL/Hr) IV Continuous <Continuous>  melatonin 6 milliGRAM(s) Oral at bedtime  nicotine 21 mG/24 Hr(s) Transdermal Patch - Peds 1 Patch Transdermal daily  oxyCODONE  ER Tablet 80 milliGRAM(s) Oral every 8 hours  pantoprazole    Tablet 40 milliGRAM(s) Oral before breakfast  polyethylene glycol 3350 17 Gram(s) Oral daily  ropivacaine 0.2% in 0.9% Sodium Chloride PCEA 200 milliLiter(s) Epidural PCA Continuous  senna 2 Tablet(s) Oral at bedtime  traZODone 50 milliGRAM(s) Oral at bedtime    MEDICATIONS  (PRN):  bisacodyl Suppository 10 milliGRAM(s) Rectal daily PRN If no bowel movement by POD#2  HYDROmorphone  Injectable 2 milliGRAM(s) IV Push every 3 hours PRN For Severe Breakthrough Pain  magnesium hydroxide Suspension 30 milliLiter(s) Oral daily PRN Constipation  naloxone Injectable 0.1 milliGRAM(s) IV Push every 3 minutes PRN For ANY of the following changes in patient status:  A. RR LESS THAN 10 breaths per minute, B. Oxygen saturation LESS THAN 90%, C. Sedation score of 6  ondansetron Injectable 4 milliGRAM(s) IV Push every 6 hours PRN Nausea  oxyCODONE    IR 45 milliGRAM(s) Oral every 4 hours PRN Severe Pain (7 - 10)  ropivacaine 0.2% in 0.9% Sodium Chloride PCEA Rescue Clinician Bolus 5 milliLiter(s) Epidural every 15 minutes PRN for Pain Score greater than 6  tiZANidine 4 milliGRAM(s) Oral every 6 hours PRN Muscle Spasm      OBJECTIVE:   [X] No new signs     [ ] Other:    Side Effects:  [X ] None			[ ] Other:    Assessment of Catheter Site:		[ X] Intact		[ ] Other:    ASSESSMENT/PLAN  [ X] Continue current therapy    [ ] Therapy changed to:    [ ] IV PCA       [ ] Epidural     [ ] prn Analgesics     Comments: Continue current PCEA settings. Non-opioid adjuvant analgesics to be used when possible and when allowed by primary surgical team. Pt states she was on Tizanidine preop and 4mg ordered q6hr PRN and RN to given now and evaluate later. Dilaudid IV increased and q3hr and pt agrees with plan. Cont pulse ox.    Progress Note written now but Patient was seen earlier.

## 2020-01-17 NOTE — OCCUPATIONAL THERAPY INITIAL EVALUATION ADULT - LIVES WITH, PROFILE
Pt. reports she lives with her family in a house with no steps to enter. Once inside, pt. reports bedroom and bathroom are located on the main level. Per pt., she has a shower stall in her bathroom with shower chair & grab bar. Pt states her home is "entirely handicap accessible."

## 2020-01-17 NOTE — PHYSICAL THERAPY INITIAL EVALUATION ADULT - PERTINENT HX OF CURRENT PROBLEM, REHAB EVAL
Pt. is a 54 year old female admitted to [facility] secondary to s/p open reduction, left total femur replacement.

## 2020-01-17 NOTE — PROGRESS NOTE ADULT - SUBJECTIVE AND OBJECTIVE BOX
Ortho Progress Note    S: Patient seen and examined. Recently was given ketamine in PACU for pain control so patient unable to follow commands. Stable.      O:  Physical Exam:  Gen: Laying in bed, NAD, alert and oriented.   Resp: Unlabored breathing  Ext:   Dressing c/d/i  KI in place  abduction pillow in place  DP 2+  Unable to assess motor/sensation given medications     Vital Signs Last 24 Hrs  T(C): 36.6 (16 Jan 2020 17:40), Max: 36.7 (16 Jan 2020 09:19)  T(F): 97.9 (16 Jan 2020 17:40), Max: 98.1 (16 Jan 2020 09:19)  HR: 86 (17 Jan 2020 05:45) (70 - 110)  BP: 102/52 (17 Jan 2020 05:45) (99/57 - 146/78)  BP(mean): 64 (17 Jan 2020 05:45) (64 - 122)  RR: 15 (17 Jan 2020 05:45) (13 - 28)  SpO2: 98% (17 Jan 2020 05:45) (93% - 99%)                          9.5    16.24 )-----------( 152      ( 17 Jan 2020 05:10 )             31.0                         11.6   8.34  )-----------( 156      ( 16 Jan 2020 06:15 )             38.6       01-17    136  |  102  |  17  ----------------------------<  109<H>  4.2   |  23  |  0.97        PT/INR - ( 16 Jan 2020 06:27 )   PT: 11.5 SEC;   INR: 1.01          PTT - ( 16 Jan 2020 06:27 )  PTT:26.9 SEC

## 2020-01-17 NOTE — OCCUPATIONAL THERAPY INITIAL EVALUATION ADULT - LEVEL OF INDEPENDENCE: GROOMING, OT EVAL
Not assessed. Pt deferred to participate secondary to  pain. LOU Napoles aware and acknowledged. Will attempt to follow-up with pt. at later date/time if and when safe and appropriate.

## 2020-01-17 NOTE — CHART NOTE - NSCHARTNOTEFT_GEN_A_CORE
Anesthesia attending saw patient at bedside, and adjusted current pain regimen. Tizanidine 4 mg q6 hours prn added and IV Dilaudid for breakthrough pain increased to 2mg every 3 hours for breakthrough pain or pain unrelieved by po Oxycodone IR.  Encouraged patient to maintain the continuous pulse oximetry. Patient should be considered a high fall risk.

## 2020-01-17 NOTE — OCCUPATIONAL THERAPY INITIAL EVALUATION ADULT - ANTICIPATED DISCHARGE DISPOSITION, OT EVAL
To be determined. Pt deferred functional mobility secondary to symptoms of pain. Please continue to follow progress notes closely.

## 2020-01-17 NOTE — PROGRESS NOTE ADULT - PROBLEM SELECTOR PLAN 1
-h/o recurrent hip dislocation. S/p open reduction on 1/16/20  -Denies any fall or trauma to the hip. Reports hip dislocated while removing brace  -Pain control. Patient is on high doses of narcotics. On oxycontin 80BID and oxycodone 45IR PRN, dilaudid PRN and also on PCEA currently  -Recommend pain service to follow for pain management given very high opioid requirements.   -Bowel regimen  -DVT ppx per primary team.

## 2020-01-17 NOTE — PHYSICAL THERAPY INITIAL EVALUATION ADULT - LEVEL OF INDEPENDENCE, REHAB EVAL
unable to perform/Pt. agitated throughout session secondary to reports of pain. Pt, deferring LE ROM assessment and all forms of functional mobility, RN aware. will attempt when appropriate/feasible

## 2020-01-17 NOTE — OCCUPATIONAL THERAPY INITIAL EVALUATION ADULT - DIAGNOSIS, OT EVAL
s/p Left hip total femur replacement and open reduction; Decreased functional mobility; Decreased ADL management

## 2020-01-18 DIAGNOSIS — D64.9 ANEMIA, UNSPECIFIED: ICD-10-CM

## 2020-01-18 LAB
ANION GAP SERPL CALC-SCNC: 10 MMO/L — SIGNIFICANT CHANGE UP (ref 7–14)
APTT BLD: 26.2 SEC — LOW (ref 27.5–36.3)
BUN SERPL-MCNC: 12 MG/DL — SIGNIFICANT CHANGE UP (ref 7–23)
CALCIUM SERPL-MCNC: 8.5 MG/DL — SIGNIFICANT CHANGE UP (ref 8.4–10.5)
CHLORIDE SERPL-SCNC: 104 MMOL/L — SIGNIFICANT CHANGE UP (ref 98–107)
CO2 SERPL-SCNC: 25 MMOL/L — SIGNIFICANT CHANGE UP (ref 22–31)
CREAT SERPL-MCNC: 0.94 MG/DL — SIGNIFICANT CHANGE UP (ref 0.5–1.3)
GLUCOSE SERPL-MCNC: 116 MG/DL — HIGH (ref 70–99)
HCT VFR BLD CALC: 24.5 % — LOW (ref 34.5–45)
HCT VFR BLD CALC: 28.8 % — LOW (ref 34.5–45)
HGB BLD-MCNC: 7.7 G/DL — LOW (ref 11.5–15.5)
HGB BLD-MCNC: 9.2 G/DL — LOW (ref 11.5–15.5)
INR BLD: 1.16 — SIGNIFICANT CHANGE UP (ref 0.88–1.17)
MCHC RBC-ENTMCNC: 26.6 PG — LOW (ref 27–34)
MCHC RBC-ENTMCNC: 27.1 PG — SIGNIFICANT CHANGE UP (ref 27–34)
MCHC RBC-ENTMCNC: 31.4 % — LOW (ref 32–36)
MCHC RBC-ENTMCNC: 31.9 % — LOW (ref 32–36)
MCV RBC AUTO: 84.7 FL — SIGNIFICANT CHANGE UP (ref 80–100)
MCV RBC AUTO: 84.8 FL — SIGNIFICANT CHANGE UP (ref 80–100)
NRBC # FLD: 0 K/UL — SIGNIFICANT CHANGE UP (ref 0–0)
NRBC # FLD: 0 K/UL — SIGNIFICANT CHANGE UP (ref 0–0)
PLATELET # BLD AUTO: 156 K/UL — SIGNIFICANT CHANGE UP (ref 150–400)
PLATELET # BLD AUTO: 161 K/UL — SIGNIFICANT CHANGE UP (ref 150–400)
PMV BLD: 12.3 FL — SIGNIFICANT CHANGE UP (ref 7–13)
PMV BLD: 12.8 FL — SIGNIFICANT CHANGE UP (ref 7–13)
POTASSIUM SERPL-MCNC: 3.7 MMOL/L — SIGNIFICANT CHANGE UP (ref 3.5–5.3)
POTASSIUM SERPL-SCNC: 3.7 MMOL/L — SIGNIFICANT CHANGE UP (ref 3.5–5.3)
PROTHROM AB SERPL-ACNC: 13.3 SEC — HIGH (ref 9.8–13.1)
RBC # BLD: 2.89 M/UL — LOW (ref 3.8–5.2)
RBC # BLD: 3.4 M/UL — LOW (ref 3.8–5.2)
RBC # FLD: 18 % — HIGH (ref 10.3–14.5)
RBC # FLD: 18.9 % — HIGH (ref 10.3–14.5)
SODIUM SERPL-SCNC: 139 MMOL/L — SIGNIFICANT CHANGE UP (ref 135–145)
WBC # BLD: 11.74 K/UL — HIGH (ref 3.8–10.5)
WBC # BLD: 9.01 K/UL — SIGNIFICANT CHANGE UP (ref 3.8–10.5)
WBC # FLD AUTO: 11.74 K/UL — HIGH (ref 3.8–10.5)
WBC # FLD AUTO: 9.01 K/UL — SIGNIFICANT CHANGE UP (ref 3.8–10.5)

## 2020-01-18 PROCEDURE — 99232 SBSQ HOSP IP/OBS MODERATE 35: CPT

## 2020-01-18 RX ADMIN — Medication 1 PATCH: at 06:04

## 2020-01-18 RX ADMIN — OXYCODONE HYDROCHLORIDE 80 MILLIGRAM(S): 5 TABLET ORAL at 19:01

## 2020-01-18 RX ADMIN — Medication 1 PATCH: at 13:01

## 2020-01-18 RX ADMIN — TIZANIDINE 4 MILLIGRAM(S): 4 TABLET ORAL at 04:51

## 2020-01-18 RX ADMIN — OXYCODONE HYDROCHLORIDE 45 MILLIGRAM(S): 5 TABLET ORAL at 14:13

## 2020-01-18 RX ADMIN — OXYCODONE HYDROCHLORIDE 45 MILLIGRAM(S): 5 TABLET ORAL at 18:12

## 2020-01-18 RX ADMIN — SENNA PLUS 2 TABLET(S): 8.6 TABLET ORAL at 22:15

## 2020-01-18 RX ADMIN — Medication 100 MILLIGRAM(S): at 18:12

## 2020-01-18 RX ADMIN — HYDROMORPHONE HYDROCHLORIDE 2 MILLIGRAM(S): 2 INJECTION INTRAMUSCULAR; INTRAVENOUS; SUBCUTANEOUS at 19:34

## 2020-01-18 RX ADMIN — HEPARIN SODIUM 5000 UNIT(S): 5000 INJECTION INTRAVENOUS; SUBCUTANEOUS at 06:01

## 2020-01-18 RX ADMIN — OXYCODONE HYDROCHLORIDE 45 MILLIGRAM(S): 5 TABLET ORAL at 06:32

## 2020-01-18 RX ADMIN — PANTOPRAZOLE SODIUM 40 MILLIGRAM(S): 20 TABLET, DELAYED RELEASE ORAL at 06:01

## 2020-01-18 RX ADMIN — Medication 1 PATCH: at 19:38

## 2020-01-18 RX ADMIN — TIZANIDINE 4 MILLIGRAM(S): 4 TABLET ORAL at 19:01

## 2020-01-18 RX ADMIN — OXYCODONE HYDROCHLORIDE 45 MILLIGRAM(S): 5 TABLET ORAL at 15:00

## 2020-01-18 RX ADMIN — HYDROMORPHONE HYDROCHLORIDE 2 MILLIGRAM(S): 2 INJECTION INTRAMUSCULAR; INTRAVENOUS; SUBCUTANEOUS at 15:25

## 2020-01-18 RX ADMIN — HYDROMORPHONE HYDROCHLORIDE 2 MILLIGRAM(S): 2 INJECTION INTRAMUSCULAR; INTRAVENOUS; SUBCUTANEOUS at 07:05

## 2020-01-18 RX ADMIN — Medication 100 MILLIGRAM(S): at 00:23

## 2020-01-18 RX ADMIN — HYDROMORPHONE HYDROCHLORIDE 2 MILLIGRAM(S): 2 INJECTION INTRAMUSCULAR; INTRAVENOUS; SUBCUTANEOUS at 03:54

## 2020-01-18 RX ADMIN — HYDROMORPHONE HYDROCHLORIDE 2 MILLIGRAM(S): 2 INJECTION INTRAMUSCULAR; INTRAVENOUS; SUBCUTANEOUS at 15:40

## 2020-01-18 RX ADMIN — HYDROMORPHONE HYDROCHLORIDE 2 MILLIGRAM(S): 2 INJECTION INTRAMUSCULAR; INTRAVENOUS; SUBCUTANEOUS at 23:12

## 2020-01-18 RX ADMIN — OXYCODONE HYDROCHLORIDE 45 MILLIGRAM(S): 5 TABLET ORAL at 02:26

## 2020-01-18 RX ADMIN — OXYCODONE HYDROCHLORIDE 45 MILLIGRAM(S): 5 TABLET ORAL at 10:50

## 2020-01-18 RX ADMIN — HYDROMORPHONE HYDROCHLORIDE 2 MILLIGRAM(S): 2 INJECTION INTRAMUSCULAR; INTRAVENOUS; SUBCUTANEOUS at 11:22

## 2020-01-18 RX ADMIN — Medication 100 MILLIGRAM(S): at 10:06

## 2020-01-18 RX ADMIN — OXYCODONE HYDROCHLORIDE 80 MILLIGRAM(S): 5 TABLET ORAL at 11:21

## 2020-01-18 RX ADMIN — HYDROMORPHONE HYDROCHLORIDE 2 MILLIGRAM(S): 2 INJECTION INTRAMUSCULAR; INTRAVENOUS; SUBCUTANEOUS at 03:24

## 2020-01-18 RX ADMIN — HYDROMORPHONE HYDROCHLORIDE 2 MILLIGRAM(S): 2 INJECTION INTRAMUSCULAR; INTRAVENOUS; SUBCUTANEOUS at 11:37

## 2020-01-18 RX ADMIN — HYDROMORPHONE HYDROCHLORIDE 2 MILLIGRAM(S): 2 INJECTION INTRAMUSCULAR; INTRAVENOUS; SUBCUTANEOUS at 19:19

## 2020-01-18 RX ADMIN — HYDROMORPHONE HYDROCHLORIDE 2 MILLIGRAM(S): 2 INJECTION INTRAMUSCULAR; INTRAVENOUS; SUBCUTANEOUS at 23:30

## 2020-01-18 RX ADMIN — OXYCODONE HYDROCHLORIDE 45 MILLIGRAM(S): 5 TABLET ORAL at 23:05

## 2020-01-18 RX ADMIN — HEPARIN SODIUM 5000 UNIT(S): 5000 INJECTION INTRAVENOUS; SUBCUTANEOUS at 17:59

## 2020-01-18 RX ADMIN — GABAPENTIN 300 MILLIGRAM(S): 400 CAPSULE ORAL at 17:59

## 2020-01-18 RX ADMIN — OXYCODONE HYDROCHLORIDE 45 MILLIGRAM(S): 5 TABLET ORAL at 06:02

## 2020-01-18 RX ADMIN — TIZANIDINE 4 MILLIGRAM(S): 4 TABLET ORAL at 12:26

## 2020-01-18 RX ADMIN — HYDROMORPHONE HYDROCHLORIDE 2 MILLIGRAM(S): 2 INJECTION INTRAMUSCULAR; INTRAVENOUS; SUBCUTANEOUS at 00:53

## 2020-01-18 RX ADMIN — GABAPENTIN 300 MILLIGRAM(S): 400 CAPSULE ORAL at 01:07

## 2020-01-18 RX ADMIN — OXYCODONE HYDROCHLORIDE 45 MILLIGRAM(S): 5 TABLET ORAL at 19:00

## 2020-01-18 RX ADMIN — OXYCODONE HYDROCHLORIDE 45 MILLIGRAM(S): 5 TABLET ORAL at 10:07

## 2020-01-18 RX ADMIN — HYDROMORPHONE HYDROCHLORIDE 2 MILLIGRAM(S): 2 INJECTION INTRAMUSCULAR; INTRAVENOUS; SUBCUTANEOUS at 07:35

## 2020-01-18 RX ADMIN — OXYCODONE HYDROCHLORIDE 45 MILLIGRAM(S): 5 TABLET ORAL at 01:56

## 2020-01-18 RX ADMIN — Medication 200 MILLILITER(S): at 08:26

## 2020-01-18 RX ADMIN — HYDROMORPHONE HYDROCHLORIDE 2 MILLIGRAM(S): 2 INJECTION INTRAMUSCULAR; INTRAVENOUS; SUBCUTANEOUS at 00:23

## 2020-01-18 RX ADMIN — OXYCODONE HYDROCHLORIDE 45 MILLIGRAM(S): 5 TABLET ORAL at 22:12

## 2020-01-18 RX ADMIN — Medication 200 MILLILITER(S): at 20:28

## 2020-01-18 RX ADMIN — GABAPENTIN 300 MILLIGRAM(S): 400 CAPSULE ORAL at 11:21

## 2020-01-18 RX ADMIN — Medication 1 PATCH: at 13:02

## 2020-01-18 RX ADMIN — OXYCODONE HYDROCHLORIDE 80 MILLIGRAM(S): 5 TABLET ORAL at 01:07

## 2020-01-18 RX ADMIN — Medication 6 MILLIGRAM(S): at 22:12

## 2020-01-18 NOTE — PROGRESS NOTE ADULT - SUBJECTIVE AND OBJECTIVE BOX
Orthopedic Surgery Progress Note     S: Patient seen and examined today. No acute events overnight. Difficulties controlling pain.    MEDICATIONS  (STANDING):  acetaminophen   Tablet .. 975 milliGRAM(s) Oral every 8 hours  ceFAZolin   IVPB 2000 milliGRAM(s) IV Intermittent every 8 hours  gabapentin 300 milliGRAM(s) Oral every 8 hours  heparin  Injectable 5000 Unit(s) SubCutaneous every 12 hours  lactated ringers. 1000 milliLiter(s) (150 mL/Hr) IV Continuous <Continuous>  melatonin 6 milliGRAM(s) Oral at bedtime  nicotine 21 mG/24 Hr(s) Transdermal Patch - Peds 1 Patch Transdermal daily  oxyCODONE  ER Tablet 80 milliGRAM(s) Oral every 8 hours  pantoprazole    Tablet 40 milliGRAM(s) Oral before breakfast  polyethylene glycol 3350 17 Gram(s) Oral daily  ropivacaine 0.2% in 0.9% Sodium Chloride PCEA 200 milliLiter(s) Epidural PCA Continuous  senna 2 Tablet(s) Oral at bedtime  traZODone 50 milliGRAM(s) Oral at bedtime    MEDICATIONS  (PRN):  bisacodyl Suppository 10 milliGRAM(s) Rectal daily PRN If no bowel movement by POD#2  HYDROmorphone  Injectable 2 milliGRAM(s) IV Push every 3 hours PRN For Severe Breakthrough Pain  magnesium hydroxide Suspension 30 milliLiter(s) Oral daily PRN Constipation  naloxone Injectable 0.1 milliGRAM(s) IV Push every 3 minutes PRN For ANY of the following changes in patient status:  A. RR LESS THAN 10 breaths per minute, B. Oxygen saturation LESS THAN 90%, C. Sedation score of 6  ondansetron Injectable 4 milliGRAM(s) IV Push every 6 hours PRN Nausea  oxyCODONE    IR 45 milliGRAM(s) Oral every 4 hours PRN Severe Pain (7 - 10)  ropivacaine 0.2% in 0.9% Sodium Chloride PCEA Rescue Clinician Bolus 5 milliLiter(s) Epidural every 15 minutes PRN for Pain Score greater than 6  tiZANidine 4 milliGRAM(s) Oral every 6 hours PRN Muscle Spasm      Physical Exam:    Vital Signs Last 24 Hrs  T(C): 37.7 (17 Jan 2020 22:47), Max: 37.7 (17 Jan 2020 22:47)  T(F): 99.9 (17 Jan 2020 22:47), Max: 99.9 (17 Jan 2020 22:47)  HR: 110 (17 Jan 2020 22:47) (61 - 111)  BP: 104/59 (17 Jan 2020 22:47) (89/45 - 146/78)  BP(mean): 65 (17 Jan 2020 07:00) (62 - 122)  RR: 18 (17 Jan 2020 22:47) (13 - 28)  SpO2: 98% (17 Jan 2020 17:34) (93% - 99%)    01-16-20 @ 07:01  -  01-17-20 @ 07:00  --------------------------------------------------------  IN: 550 mL / OUT: 1840 mL / NET: -1290 mL    01-17-20 @ 07:01  -  01-18-20 @ 00:30  --------------------------------------------------------  IN: 0 mL / OUT: 1155 mL / NET: -1155 mL            Gen: Laying in bed, NAD, alert and oriented.   Resp: Unlabored breathing  Ext:   Dressing c/d/i  KI in place  abduction pillow in place  DP 2+  Unable to assess motor/sensation given medications         LABS:                        9.5    16.24 )-----------( 152      ( 17 Jan 2020 05:10 )             31.0     01-17    136  |  102  |  17  ----------------------------<  109<H>  4.2   |  23  |  0.97    Ca    8.6      17 Jan 2020 05:10

## 2020-01-18 NOTE — PROGRESS NOTE ADULT - ASSESSMENT
54F w/PMH of femur fx ~ 30 years ago c/b frequent prosthetic dislocations and fractures, recent revision left hip arthroplasty on 11/6 followed by hip dislocation requiring open reduction on 12/4/19 p/w spontaneous dislocation of left hip

## 2020-01-18 NOTE — PROGRESS NOTE ADULT - SUBJECTIVE AND OBJECTIVE BOX
Anesthesia Pain Management Service: Day __ of Epidural    SUBJECTIVE: Patient doing well with PCEA and no problems.  Pain Scale Score:   Refer to charted pain scores    THERAPY:  [x ] Epidural Bupivacaine 0.0625% and Hydromorphone  		[ ] 10 micrograms/mL	[ ] 5 micrograms/mL  [ ] Epidural Bupivacaine 0.0625% and Fentanyl - 2 micrograms/mL  [ ] Epidural Ropivacaine 0.1% plain – 1 mg/mL  [ ] Patient Controlled Regional Anesthesia (PCRA) Ropivacaine  		[ ] 0.2%			[ ] 0.1%    Demand dose __3_ lockout __15_ (minutes) Continuous Rate ___ Total: ___ Daily      MEDICATIONS  (STANDING):  acetaminophen   Tablet .. 975 milliGRAM(s) Oral every 8 hours  ceFAZolin   IVPB 2000 milliGRAM(s) IV Intermittent every 8 hours  gabapentin 300 milliGRAM(s) Oral every 8 hours  heparin  Injectable 5000 Unit(s) SubCutaneous every 12 hours  lactated ringers. 1000 milliLiter(s) (150 mL/Hr) IV Continuous <Continuous>  melatonin 6 milliGRAM(s) Oral at bedtime  nicotine 21 mG/24 Hr(s) Transdermal Patch - Peds 1 Patch Transdermal daily  oxyCODONE  ER Tablet 80 milliGRAM(s) Oral every 8 hours  pantoprazole    Tablet 40 milliGRAM(s) Oral before breakfast  polyethylene glycol 3350 17 Gram(s) Oral daily  ropivacaine 0.2% in 0.9% Sodium Chloride PCEA 200 milliLiter(s) Epidural PCA Continuous  senna 2 Tablet(s) Oral at bedtime  traZODone 50 milliGRAM(s) Oral at bedtime    MEDICATIONS  (PRN):  bisacodyl Suppository 10 milliGRAM(s) Rectal daily PRN If no bowel movement by POD#2  HYDROmorphone  Injectable 2 milliGRAM(s) IV Push every 3 hours PRN For Severe Breakthrough Pain  magnesium hydroxide Suspension 30 milliLiter(s) Oral daily PRN Constipation  naloxone Injectable 0.1 milliGRAM(s) IV Push every 3 minutes PRN For ANY of the following changes in patient status:  A. RR LESS THAN 10 breaths per minute, B. Oxygen saturation LESS THAN 90%, C. Sedation score of 6  ondansetron Injectable 4 milliGRAM(s) IV Push every 6 hours PRN Nausea  oxyCODONE    IR 45 milliGRAM(s) Oral every 4 hours PRN Severe Pain (7 - 10)  ropivacaine 0.2% in 0.9% Sodium Chloride PCEA Rescue Clinician Bolus 5 milliLiter(s) Epidural every 15 minutes PRN for Pain Score greater than 6  tiZANidine 4 milliGRAM(s) Oral every 6 hours PRN Muscle Spasm      OBJECTIVE:    Assessment of Catheter Site:	[ ] Left	[ ] Right  [x ] Epidural 	[ ] Femoral	      [ ] Saphenous   [ ] Supraclavicular   [ ] Other:    [x ] Dressing intact	[x ] Site non-tender	[ x] Site without erythema, discharge, edema  [x ] Epidural tubing and connection checked	[x] Gross neurological exam within normal limits  [ ] Catheter removed – tip intact		[x ] Afebrile	[ ] Febrile: ___    PT/INR - ( 18 Jan 2020 05:45 )   PT: 13.3 SEC;   INR: 1.16          PTT - ( 18 Jan 2020 05:45 )  PTT:26.2 SEC                      7.7    9.01  )-----------( 156      ( 18 Jan 2020 05:45 )             24.5     Vital Signs Last 24 Hrs  T(C): 36.9 (01-18-20 @ 06:22), Max: 37.7 (01-17-20 @ 22:47)  T(F): 98.5 (01-18-20 @ 06:22), Max: 99.9 (01-17-20 @ 22:47)  HR: 90 (01-18-20 @ 06:22) (61 - 111)  BP: 102/65 (01-18-20 @ 06:22) (102/48 - 134/84)  BP(mean): --  RR: 18 (01-18-20 @ 06:22) (16 - 18)  SpO2: 97% (01-18-20 @ 06:22) (95% - 98%)      Sedation Score:	[x ] Alert	[ ] Drowsy	[ ] Arousable	[ ] Asleep	[ ] Unresponsive    Side Effects:	[x ] None	[ ] Nausea	[ ] Vomiting	[ ] Pruritus  		[ ] Weakness		[ ] Numbness	[ ] Other:    ASSESSMENT/ PLAN:    Therapy to  be:	[x ] Continue   [ ] Discontinued   [ ] Change to prn Analgesics    Documentation and Verification of current medications:  [ X ] Done	[ ] Not done, not eligible, reason:    Comments: Doing OK with epidural and may continue.

## 2020-01-18 NOTE — PROGRESS NOTE ADULT - SUBJECTIVE AND OBJECTIVE BOX
Patient is a 54y old  Female who presents with a chief complaint of left hip dislocation (17 Jan 2020 11:47)      SUBJECTIVE / OVERNIGHT EVENTS:  Pt was seen in AM. She stated pain is controlled at this time. denied cp/sob/palpitation.     MEDICATIONS  (STANDING):  acetaminophen   Tablet .. 975 milliGRAM(s) Oral every 8 hours  ceFAZolin   IVPB 2000 milliGRAM(s) IV Intermittent every 8 hours  gabapentin 300 milliGRAM(s) Oral every 8 hours  heparin  Injectable 5000 Unit(s) SubCutaneous every 12 hours  lactated ringers. 1000 milliLiter(s) (150 mL/Hr) IV Continuous <Continuous>  melatonin 6 milliGRAM(s) Oral at bedtime  nicotine 21 mG/24 Hr(s) Transdermal Patch - Peds 1 Patch Transdermal daily  oxyCODONE  ER Tablet 80 milliGRAM(s) Oral every 8 hours  pantoprazole    Tablet 40 milliGRAM(s) Oral before breakfast  polyethylene glycol 3350 17 Gram(s) Oral daily  ropivacaine 0.2% in 0.9% Sodium Chloride PCEA 200 milliLiter(s) Epidural PCA Continuous  senna 2 Tablet(s) Oral at bedtime  traZODone 50 milliGRAM(s) Oral at bedtime    MEDICATIONS  (PRN):  bisacodyl Suppository 10 milliGRAM(s) Rectal daily PRN If no bowel movement by POD#2  HYDROmorphone  Injectable 2 milliGRAM(s) IV Push every 3 hours PRN For Severe Breakthrough Pain  magnesium hydroxide Suspension 30 milliLiter(s) Oral daily PRN Constipation  naloxone Injectable 0.1 milliGRAM(s) IV Push every 3 minutes PRN For ANY of the following changes in patient status:  A. RR LESS THAN 10 breaths per minute, B. Oxygen saturation LESS THAN 90%, C. Sedation score of 6  ondansetron Injectable 4 milliGRAM(s) IV Push every 6 hours PRN Nausea  oxyCODONE    IR 45 milliGRAM(s) Oral every 4 hours PRN Severe Pain (7 - 10)  ropivacaine 0.2% in 0.9% Sodium Chloride PCEA Rescue Clinician Bolus 5 milliLiter(s) Epidural every 15 minutes PRN for Pain Score greater than 6  tiZANidine 4 milliGRAM(s) Oral every 6 hours PRN Muscle Spasm      T(C): 37.1 (01-18-20 @ 21:47), Max: 37.7 (01-17-20 @ 22:47)  HR: 104 (01-18-20 @ 21:47) (70 - 110)  BP: 98/61 (01-18-20 @ 21:47) (93/65 - 130/75)  RR: 18 (01-18-20 @ 21:47) (18 - 18)  SpO2: 100% (01-18-20 @ 21:47) (97% - 100%)  CAPILLARY BLOOD GLUCOSE        I&O's Summary    17 Jan 2020 07:01  -  18 Jan 2020 07:00  --------------------------------------------------------  IN: 0 mL / OUT: 2605 mL / NET: -2605 mL    18 Jan 2020 07:01  -  18 Jan 2020 21:49  --------------------------------------------------------  IN: 0 mL / OUT: 654 mL / NET: -654 mL        PHYSICAL EXAM:  GENERAL: NAD, well-developed  HEAD:  Atraumatic, Normocephalic  EYES: EOMI, PERRLA, conjunctiva and sclera clear  NECK: Supple, No JVD  CHEST/LUNG: Clear to auscultation bilaterally; No wheeze  HEART: s1 s2, regular rhythm and rate   ABDOMEN: Soft, Nontender, Nondistended; Bowel sounds present  EXTREMITIES: Left hip in immobilizer   PSYCH: AAOx3, calm   NEUROLOGY: non-focal  SKIN: No rashes or lesions    LABS:                        9.2    11.74 )-----------( 161      ( 18 Jan 2020 19:10 )             28.8     01-18    139  |  104  |  12  ----------------------------<  116<H>  3.7   |  25  |  0.94    Ca    8.5      18 Jan 2020 05:45      PT/INR - ( 18 Jan 2020 05:45 )   PT: 13.3 SEC;   INR: 1.16          PTT - ( 18 Jan 2020 05:45 )  PTT:26.2 SEC          RADIOLOGY & ADDITIONAL TESTS:    Imaging Personally Reviewed:    Consultant(s) Notes Reviewed:      Care Discussed with Consultants/Other Providers:

## 2020-01-18 NOTE — PROGRESS NOTE ADULT - ASSESSMENT
A/P: 54yoF s/p L TFR open reduction  Continue HV. monitor output  Ancef while HV is in.  FU OR Cx: NGTD a  abd pillow/knee immobilizer  patient to get hip abduction brace on Monday  Pain control - appreciate pain management

## 2020-01-19 PROCEDURE — 99232 SBSQ HOSP IP/OBS MODERATE 35: CPT

## 2020-01-19 RX ORDER — NALOXONE HYDROCHLORIDE 4 MG/.1ML
0.1 SPRAY NASAL
Refills: 0 | Status: DISCONTINUED | OUTPATIENT
Start: 2020-01-19 | End: 2020-01-21

## 2020-01-19 RX ORDER — ONDANSETRON 8 MG/1
4 TABLET, FILM COATED ORAL EVERY 6 HOURS
Refills: 0 | Status: DISCONTINUED | OUTPATIENT
Start: 2020-01-19 | End: 2020-01-21

## 2020-01-19 RX ORDER — HYDROMORPHONE HYDROCHLORIDE 2 MG/ML
30 INJECTION INTRAMUSCULAR; INTRAVENOUS; SUBCUTANEOUS
Refills: 0 | Status: DISCONTINUED | OUTPATIENT
Start: 2020-01-19 | End: 2020-01-21

## 2020-01-19 RX ORDER — SODIUM CHLORIDE 9 MG/ML
1000 INJECTION, SOLUTION INTRAVENOUS
Refills: 0 | Status: DISCONTINUED | OUTPATIENT
Start: 2020-01-19 | End: 2020-01-21

## 2020-01-19 RX ORDER — HYDROMORPHONE HYDROCHLORIDE 2 MG/ML
0.5 INJECTION INTRAMUSCULAR; INTRAVENOUS; SUBCUTANEOUS
Refills: 0 | Status: DISCONTINUED | OUTPATIENT
Start: 2020-01-19 | End: 2020-01-21

## 2020-01-19 RX ADMIN — Medication 1 PATCH: at 07:20

## 2020-01-19 RX ADMIN — HYDROMORPHONE HYDROCHLORIDE 2 MILLIGRAM(S): 2 INJECTION INTRAMUSCULAR; INTRAVENOUS; SUBCUTANEOUS at 11:19

## 2020-01-19 RX ADMIN — TIZANIDINE 4 MILLIGRAM(S): 4 TABLET ORAL at 16:09

## 2020-01-19 RX ADMIN — OXYCODONE HYDROCHLORIDE 45 MILLIGRAM(S): 5 TABLET ORAL at 15:00

## 2020-01-19 RX ADMIN — OXYCODONE HYDROCHLORIDE 45 MILLIGRAM(S): 5 TABLET ORAL at 02:12

## 2020-01-19 RX ADMIN — OXYCODONE HYDROCHLORIDE 45 MILLIGRAM(S): 5 TABLET ORAL at 14:20

## 2020-01-19 RX ADMIN — OXYCODONE HYDROCHLORIDE 80 MILLIGRAM(S): 5 TABLET ORAL at 19:43

## 2020-01-19 RX ADMIN — TIZANIDINE 4 MILLIGRAM(S): 4 TABLET ORAL at 01:01

## 2020-01-19 RX ADMIN — HYDROMORPHONE HYDROCHLORIDE 2 MILLIGRAM(S): 2 INJECTION INTRAMUSCULAR; INTRAVENOUS; SUBCUTANEOUS at 03:30

## 2020-01-19 RX ADMIN — TIZANIDINE 4 MILLIGRAM(S): 4 TABLET ORAL at 09:17

## 2020-01-19 RX ADMIN — Medication 6 MILLIGRAM(S): at 21:17

## 2020-01-19 RX ADMIN — HYDROMORPHONE HYDROCHLORIDE 2 MILLIGRAM(S): 2 INJECTION INTRAMUSCULAR; INTRAVENOUS; SUBCUTANEOUS at 21:55

## 2020-01-19 RX ADMIN — Medication 1 PATCH: at 12:23

## 2020-01-19 RX ADMIN — OXYCODONE HYDROCHLORIDE 45 MILLIGRAM(S): 5 TABLET ORAL at 03:11

## 2020-01-19 RX ADMIN — OXYCODONE HYDROCHLORIDE 45 MILLIGRAM(S): 5 TABLET ORAL at 07:10

## 2020-01-19 RX ADMIN — OXYCODONE HYDROCHLORIDE 45 MILLIGRAM(S): 5 TABLET ORAL at 06:14

## 2020-01-19 RX ADMIN — HYDROMORPHONE HYDROCHLORIDE 2 MILLIGRAM(S): 2 INJECTION INTRAMUSCULAR; INTRAVENOUS; SUBCUTANEOUS at 18:02

## 2020-01-19 RX ADMIN — HEPARIN SODIUM 5000 UNIT(S): 5000 INJECTION INTRAVENOUS; SUBCUTANEOUS at 19:42

## 2020-01-19 RX ADMIN — HYDROMORPHONE HYDROCHLORIDE 30 MILLILITER(S): 2 INJECTION INTRAMUSCULAR; INTRAVENOUS; SUBCUTANEOUS at 20:35

## 2020-01-19 RX ADMIN — OXYCODONE HYDROCHLORIDE 45 MILLIGRAM(S): 5 TABLET ORAL at 10:15

## 2020-01-19 RX ADMIN — HYDROMORPHONE HYDROCHLORIDE 2 MILLIGRAM(S): 2 INJECTION INTRAMUSCULAR; INTRAVENOUS; SUBCUTANEOUS at 22:10

## 2020-01-19 RX ADMIN — GABAPENTIN 300 MILLIGRAM(S): 400 CAPSULE ORAL at 02:07

## 2020-01-19 RX ADMIN — HYDROMORPHONE HYDROCHLORIDE 0.5 MILLIGRAM(S): 2 INJECTION INTRAMUSCULAR; INTRAVENOUS; SUBCUTANEOUS at 16:42

## 2020-01-19 RX ADMIN — HYDROMORPHONE HYDROCHLORIDE 2 MILLIGRAM(S): 2 INJECTION INTRAMUSCULAR; INTRAVENOUS; SUBCUTANEOUS at 07:12

## 2020-01-19 RX ADMIN — Medication 1 PATCH: at 19:47

## 2020-01-19 RX ADMIN — OXYCODONE HYDROCHLORIDE 45 MILLIGRAM(S): 5 TABLET ORAL at 11:00

## 2020-01-19 RX ADMIN — HYDROMORPHONE HYDROCHLORIDE 30 MILLILITER(S): 2 INJECTION INTRAMUSCULAR; INTRAVENOUS; SUBCUTANEOUS at 12:10

## 2020-01-19 RX ADMIN — OXYCODONE HYDROCHLORIDE 45 MILLIGRAM(S): 5 TABLET ORAL at 19:07

## 2020-01-19 RX ADMIN — GABAPENTIN 300 MILLIGRAM(S): 400 CAPSULE ORAL at 09:17

## 2020-01-19 RX ADMIN — HYDROMORPHONE HYDROCHLORIDE 2 MILLIGRAM(S): 2 INJECTION INTRAMUSCULAR; INTRAVENOUS; SUBCUTANEOUS at 03:13

## 2020-01-19 RX ADMIN — SENNA PLUS 2 TABLET(S): 8.6 TABLET ORAL at 21:17

## 2020-01-19 RX ADMIN — OXYCODONE HYDROCHLORIDE 80 MILLIGRAM(S): 5 TABLET ORAL at 12:06

## 2020-01-19 RX ADMIN — HYDROMORPHONE HYDROCHLORIDE 0.5 MILLIGRAM(S): 2 INJECTION INTRAMUSCULAR; INTRAVENOUS; SUBCUTANEOUS at 21:17

## 2020-01-19 RX ADMIN — TIZANIDINE 4 MILLIGRAM(S): 4 TABLET ORAL at 22:09

## 2020-01-19 RX ADMIN — Medication 1 PATCH: at 12:24

## 2020-01-19 RX ADMIN — HYDROMORPHONE HYDROCHLORIDE 2 MILLIGRAM(S): 2 INJECTION INTRAMUSCULAR; INTRAVENOUS; SUBCUTANEOUS at 17:47

## 2020-01-19 RX ADMIN — HYDROMORPHONE HYDROCHLORIDE 0.5 MILLIGRAM(S): 2 INJECTION INTRAMUSCULAR; INTRAVENOUS; SUBCUTANEOUS at 21:02

## 2020-01-19 RX ADMIN — HYDROMORPHONE HYDROCHLORIDE 0.5 MILLIGRAM(S): 2 INJECTION INTRAMUSCULAR; INTRAVENOUS; SUBCUTANEOUS at 16:08

## 2020-01-19 RX ADMIN — GABAPENTIN 300 MILLIGRAM(S): 400 CAPSULE ORAL at 19:43

## 2020-01-19 RX ADMIN — OXYCODONE HYDROCHLORIDE 45 MILLIGRAM(S): 5 TABLET ORAL at 19:47

## 2020-01-19 RX ADMIN — OXYCODONE HYDROCHLORIDE 80 MILLIGRAM(S): 5 TABLET ORAL at 03:18

## 2020-01-19 RX ADMIN — OXYCODONE HYDROCHLORIDE 45 MILLIGRAM(S): 5 TABLET ORAL at 23:11

## 2020-01-19 RX ADMIN — HYDROMORPHONE HYDROCHLORIDE 2 MILLIGRAM(S): 2 INJECTION INTRAMUSCULAR; INTRAVENOUS; SUBCUTANEOUS at 11:34

## 2020-01-19 RX ADMIN — Medication 100 MILLIGRAM(S): at 02:07

## 2020-01-19 RX ADMIN — Medication 200 MILLILITER(S): at 08:18

## 2020-01-19 NOTE — PROGRESS NOTE ADULT - SUBJECTIVE AND OBJECTIVE BOX
Patient is a 54y old  Female who presents with a chief complaint of left hip dislocation (18 Jan 2020 15:50)      SUBJECTIVE / OVERNIGHT EVENTS:  Pt was seen in AM. She stated pain is better controlled now. denied cp/sob/dizziness/palpitation     MEDICATIONS  (STANDING):  acetaminophen   Tablet .. 975 milliGRAM(s) Oral every 8 hours  gabapentin 300 milliGRAM(s) Oral every 8 hours  heparin  Injectable 5000 Unit(s) SubCutaneous every 12 hours  HYDROmorphone PCA (1 mG/mL) 30 milliLiter(s) PCA Continuous PCA Continuous  lactated ringers. 1000 milliLiter(s) (30 mL/Hr) IV Continuous <Continuous>  melatonin 6 milliGRAM(s) Oral at bedtime  nicotine 21 mG/24 Hr(s) Transdermal Patch - Peds 1 Patch Transdermal daily  oxyCODONE  ER Tablet 80 milliGRAM(s) Oral every 8 hours  pantoprazole    Tablet 40 milliGRAM(s) Oral before breakfast  polyethylene glycol 3350 17 Gram(s) Oral daily  senna 2 Tablet(s) Oral at bedtime  traZODone 50 milliGRAM(s) Oral at bedtime    MEDICATIONS  (PRN):  bisacodyl Suppository 10 milliGRAM(s) Rectal daily PRN If no bowel movement by POD#2  HYDROmorphone  Injectable 2 milliGRAM(s) IV Push every 3 hours PRN For Severe Breakthrough Pain  HYDROmorphone PCA (1 mG/mL) Rescue Clinician Bolus 0.5 milliGRAM(s) IV Push every 15 minutes PRN for Pain Scale GREATER THAN 6  magnesium hydroxide Suspension 30 milliLiter(s) Oral daily PRN Constipation  naloxone Injectable 0.1 milliGRAM(s) IV Push every 3 minutes PRN For ANY of the following changes in patient status:  A. RR LESS THAN 10 breaths per minute, B. Oxygen saturation LESS THAN 90%, C. Sedation score of 6  ondansetron Injectable 4 milliGRAM(s) IV Push every 6 hours PRN Nausea  oxyCODONE    IR 45 milliGRAM(s) Oral every 4 hours PRN Severe Pain (7 - 10)  tiZANidine 4 milliGRAM(s) Oral every 6 hours PRN Muscle Spasm      T(C): 36.8 (01-19-20 @ 14:00), Max: 37.1 (01-18-20 @ 21:47)  HR: 80 (01-19-20 @ 14:00) (70 - 104)  BP: 123/69 (01-19-20 @ 14:00) (98/61 - 161/80)  RR: 17 (01-19-20 @ 14:00) (17 - 18)  SpO2: 100% (01-19-20 @ 14:00) (100% - 100%)  CAPILLARY BLOOD GLUCOSE        I&O's Summary    18 Jan 2020 07:01  -  19 Jan 2020 07:00  --------------------------------------------------------  IN: 0 mL / OUT: 1056 mL / NET: -1056 mL    19 Jan 2020 07:01  -  19 Jan 2020 15:20  --------------------------------------------------------  IN: 0 mL / OUT: 700 mL / NET: -700 mL        PHYSICAL EXAM:  GENERAL: NAD, well-developed  HEAD:  Atraumatic, Normocephalic  EYES: EOMI, PERRLA, conjunctiva and sclera clear  NECK: Supple, No JVD  CHEST/LUNG: Clear to auscultation bilaterally; No wheeze  HEART: s1 s2, regular rhythm and rate   ABDOMEN: Soft, Nontender, Nondistended; Bowel sounds present  EXTREMITIES: Left hip in immobilizer   PSYCH: AAOx3, calm   NEUROLOGY: non-focal  SKIN: No rashes or lesions    LABS:                        9.2    11.74 )-----------( 161      ( 18 Jan 2020 19:10 )             28.8     01-18    139  |  104  |  12  ----------------------------<  116<H>  3.7   |  25  |  0.94    Ca    8.5      18 Jan 2020 05:45      PT/INR - ( 18 Jan 2020 05:45 )   PT: 13.3 SEC;   INR: 1.16          PTT - ( 18 Jan 2020 05:45 )  PTT:26.2 SEC          RADIOLOGY & ADDITIONAL TESTS:    Imaging Personally Reviewed:    Consultant(s) Notes Reviewed:      Care Discussed with Consultants/Other Providers:

## 2020-01-19 NOTE — PROGRESS NOTE ADULT - ASSESSMENT
A/P: 54yoF s/p L TFR open reduction, now POD2    Will remove HV today  Ancef while HV is in  FU OR Cx: NGTD  abd pillow/knee immobilizer  patient to get hip abduction brace on Monday  Pain control - appreciate pain management, patient requesting to discontinue the PCEA

## 2020-01-19 NOTE — PROGRESS NOTE ADULT - SUBJECTIVE AND OBJECTIVE BOX
Anesthesia Pain Management Service: Day __ of Epidural    SUBJECTIVE: Patient doing well with PCEA and no problems.  Pain Scale Score:   Refer to charted pain scores    THERAPY:  [x ] Epidural Bupivacaine 0.0625% and Hydromorphone  		[ ] 10 micrograms/mL	[ ] 5 micrograms/mL  [ ] Epidural Bupivacaine 0.0625% and Fentanyl - 2 micrograms/mL  [ ] Epidural Ropivacaine 0.1% plain – 1 mg/mL  [ ] Patient Controlled Regional Anesthesia (PCRA) Ropivacaine  		[ ] 0.2%			[ ] 0.1%    Demand dose __3_ lockout __15_ (minutes) Continuous Rate ___ Total: ___ Daily      MEDICATIONS  (STANDING):  acetaminophen   Tablet .. 975 milliGRAM(s) Oral every 8 hours  ceFAZolin   IVPB 2000 milliGRAM(s) IV Intermittent every 8 hours  gabapentin 300 milliGRAM(s) Oral every 8 hours  heparin  Injectable 5000 Unit(s) SubCutaneous every 12 hours  HYDROmorphone PCA (1 mG/mL) 30 milliLiter(s) PCA Continuous PCA Continuous  lactated ringers. 1000 milliLiter(s) (150 mL/Hr) IV Continuous <Continuous>  melatonin 6 milliGRAM(s) Oral at bedtime  nicotine 21 mG/24 Hr(s) Transdermal Patch - Peds 1 Patch Transdermal daily  oxyCODONE  ER Tablet 80 milliGRAM(s) Oral every 8 hours  pantoprazole    Tablet 40 milliGRAM(s) Oral before breakfast  polyethylene glycol 3350 17 Gram(s) Oral daily  senna 2 Tablet(s) Oral at bedtime  traZODone 50 milliGRAM(s) Oral at bedtime    MEDICATIONS  (PRN):  bisacodyl Suppository 10 milliGRAM(s) Rectal daily PRN If no bowel movement by POD#2  bisacodyl Suppository 10 milliGRAM(s) Rectal daily PRN If no bowel movement by POD#2  HYDROmorphone  Injectable 2 milliGRAM(s) IV Push every 3 hours PRN For Severe Breakthrough Pain  HYDROmorphone PCA (1 mG/mL) Rescue Clinician Bolus 0.5 milliGRAM(s) IV Push every 15 minutes PRN for Pain Scale GREATER THAN 6  magnesium hydroxide Suspension 30 milliLiter(s) Oral daily PRN Constipation  naloxone Injectable 0.1 milliGRAM(s) IV Push every 3 minutes PRN For ANY of the following changes in patient status:  ROXANA SIEGEL LESS THAN 10 breaths per minute, B. Oxygen saturation LESS THAN 90%, C. Sedation score of 6  naloxone Injectable 0.1 milliGRAM(s) IV Push every 3 minutes PRN For ANY of the following changes in patient status:  A. RR LESS THAN 10 breaths per minute, B. Oxygen saturation LESS THAN 90%, C. Sedation score of 6  ondansetron Injectable 4 milliGRAM(s) IV Push every 6 hours PRN Nausea  ondansetron Injectable 4 milliGRAM(s) IV Push every 6 hours PRN Nausea  oxyCODONE    IR 45 milliGRAM(s) Oral every 4 hours PRN Severe Pain (7 - 10)  ropivacaine 0.2% in 0.9% Sodium Chloride PCEA Rescue Clinician Bolus 5 milliLiter(s) Epidural every 15 minutes PRN for Pain Score greater than 6  tiZANidine 4 milliGRAM(s) Oral every 6 hours PRN Muscle Spasm      OBJECTIVE:    Assessment of Catheter Site:	[ ] Left	[ ] Right  [x ] Epidural 	[ ] Femoral	      [ ] Saphenous   [ ] Supraclavicular   [ ] Other:    [x ] Dressing intact	[x ] Site non-tender	[ x] Site without erythema, discharge, edema  [x ] Epidural tubing and connection checked	[x] Gross neurological exam within normal limits  [X ] Catheter removed – tip intact		[x ] Afebrile	[ ] Febrile: ___    PT/INR - ( 18 Jan 2020 05:45 )   PT: 13.3 SEC;   INR: 1.16          PTT - ( 18 Jan 2020 05:45 )  PTT:26.2 SEC                      9.2    11.74 )-----------( 161      ( 18 Jan 2020 19:10 )             28.8     Vital Signs Last 24 Hrs  T(C): 36.9 (01-19-20 @ 03:12), Max: 37.2 (01-18-20 @ 13:25)  T(F): 98.5 (01-19-20 @ 03:12), Max: 98.9 (01-18-20 @ 13:25)  HR: 98 (01-19-20 @ 07:18) (70 - 104)  BP: 161/80 (01-19-20 @ 07:18) (93/65 - 161/80)  BP(mean): --  RR: 18 (01-19-20 @ 07:18) (17 - 18)  SpO2: 100% (01-19-20 @ 07:18) (98% - 100%)      Sedation Score:	[x ] Alert	[ ] Drowsy	[ ] Arousable	[ ] Asleep	[ ] Unresponsive    Side Effects:	[x ] None	[ ] Nausea	[ ] Vomiting	[ ] Pruritus  		[ ] Weakness		[ ] Numbness	[ ] Other:    ASSESSMENT/ PLAN:    Therapy to  be:	[ ] Continue   [ X] Discontinued   [ X] Change to prn Analgesics    Documentation and Verification of current medications:  [ X ] Done	[ ] Not done, not eligible, reason:    Comments: Changed to IV or oral opioid medication at this point.

## 2020-01-19 NOTE — PROGRESS NOTE ADULT - PROBLEM SELECTOR PLAN 3
Per prior documentation, pt with reports h/o MI and cardiac arrest in 2018 requiring AICD placement  Per patient she was taken off of Plavix/metoprolol/lisinopril by cardiologist and to be restarted in future once her hip issues resolve  Denies any chest pain or sob at this time  TTE (11/5/19) normal LV function   -Patient appears euvolemic on exam  -No further intervention is needed at this time.

## 2020-01-19 NOTE — PROGRESS NOTE ADULT - PROBLEM SELECTOR PLAN 1
h/o recurrent hip dislocation. S/p open reduction on 1/16/20  Denies any fall or trauma to the hip. Reports hip dislocated while removing brace  Pain control. Patient is on high doses of narcotics. On oxycontin 80BID and oxycodone 45IR PRN, dilaudid PRN and also on PCA currently  Pain service is following for pain management given very high opioid requirements.   Bowel regimen  DVT ppx per primary team.

## 2020-01-19 NOTE — PROGRESS NOTE ADULT - SUBJECTIVE AND OBJECTIVE BOX
Orthopaedic Surgery Progress Note    Subjective:   Patient seen and examined at bedside, no acute events overnight.  Patient agitated this morning re: having difficulty sleeping and being in pain.     Objective:  T(C): 37.1 (01-18-20 @ 21:47), Max: 37.3 (01-18-20 @ 09:44)  HR: 70 (01-19-20 @ 03:12) (70 - 104)  BP: 101/61 (01-19-20 @ 03:12) (93/65 - 130/75)  RR: 17 (01-19-20 @ 03:12) (17 - 18)  SpO2: 100% (01-19-20 @ 03:12) (97% - 100%)  Wt(kg): --    01-17 @ 07:01  -  01-18 @ 07:00  --------------------------------------------------------  IN: 0 mL / OUT: 2605 mL / NET: -2605 mL    01-18 @ 07:01  -  01-19 @ 06:28  --------------------------------------------------------  IN: 0 mL / OUT: 1054 mL / NET: -1054 mL        PE    Gen: Agitated/abrasive, alert and oriented.   Resp: Unlabored breathing  LLE:   Dressing c/d/i  KI in place  HV intact with minimal SS output  abduction pillow in place  Grossly moving foot, sensation grossly intact  DP 2+                          9.2    11.74 )-----------( 161      ( 18 Jan 2020 19:10 )             28.8     01-18    139  |  104  |  12  ----------------------------<  116<H>  3.7   |  25  |  0.94    Ca    8.5      18 Jan 2020 05:45      PT/INR - ( 18 Jan 2020 05:45 )   PT: 13.3 SEC;   INR: 1.16          PTT - ( 18 Jan 2020 05:45 )  PTT:26.2 SEC

## 2020-01-20 DIAGNOSIS — R22.32 LOCALIZED SWELLING, MASS AND LUMP, LEFT UPPER LIMB: ICD-10-CM

## 2020-01-20 PROCEDURE — 99232 SBSQ HOSP IP/OBS MODERATE 35: CPT

## 2020-01-20 RX ORDER — OXYCODONE HYDROCHLORIDE 5 MG/1
45 TABLET ORAL EVERY 4 HOURS
Refills: 0 | Status: DISCONTINUED | OUTPATIENT
Start: 2020-01-20 | End: 2020-01-21

## 2020-01-20 RX ADMIN — HYDROMORPHONE HYDROCHLORIDE 2 MILLIGRAM(S): 2 INJECTION INTRAMUSCULAR; INTRAVENOUS; SUBCUTANEOUS at 01:15

## 2020-01-20 RX ADMIN — OXYCODONE HYDROCHLORIDE 45 MILLIGRAM(S): 5 TABLET ORAL at 04:00

## 2020-01-20 RX ADMIN — HYDROMORPHONE HYDROCHLORIDE 0.5 MILLIGRAM(S): 2 INJECTION INTRAMUSCULAR; INTRAVENOUS; SUBCUTANEOUS at 00:43

## 2020-01-20 RX ADMIN — OXYCODONE HYDROCHLORIDE 45 MILLIGRAM(S): 5 TABLET ORAL at 20:30

## 2020-01-20 RX ADMIN — HYDROMORPHONE HYDROCHLORIDE 30 MILLILITER(S): 2 INJECTION INTRAMUSCULAR; INTRAVENOUS; SUBCUTANEOUS at 08:35

## 2020-01-20 RX ADMIN — TIZANIDINE 4 MILLIGRAM(S): 4 TABLET ORAL at 17:39

## 2020-01-20 RX ADMIN — HYDROMORPHONE HYDROCHLORIDE 30 MILLILITER(S): 2 INJECTION INTRAMUSCULAR; INTRAVENOUS; SUBCUTANEOUS at 20:36

## 2020-01-20 RX ADMIN — Medication 1 PATCH: at 12:08

## 2020-01-20 RX ADMIN — Medication 1 PATCH: at 19:30

## 2020-01-20 RX ADMIN — Medication 1 PATCH: at 06:13

## 2020-01-20 RX ADMIN — OXYCODONE HYDROCHLORIDE 45 MILLIGRAM(S): 5 TABLET ORAL at 15:25

## 2020-01-20 RX ADMIN — OXYCODONE HYDROCHLORIDE 45 MILLIGRAM(S): 5 TABLET ORAL at 19:30

## 2020-01-20 RX ADMIN — OXYCODONE HYDROCHLORIDE 80 MILLIGRAM(S): 5 TABLET ORAL at 19:29

## 2020-01-20 RX ADMIN — OXYCODONE HYDROCHLORIDE 45 MILLIGRAM(S): 5 TABLET ORAL at 03:12

## 2020-01-20 RX ADMIN — HYDROMORPHONE HYDROCHLORIDE 2 MILLIGRAM(S): 2 INJECTION INTRAMUSCULAR; INTRAVENOUS; SUBCUTANEOUS at 08:50

## 2020-01-20 RX ADMIN — OXYCODONE HYDROCHLORIDE 45 MILLIGRAM(S): 5 TABLET ORAL at 11:23

## 2020-01-20 RX ADMIN — HEPARIN SODIUM 5000 UNIT(S): 5000 INJECTION INTRAVENOUS; SUBCUTANEOUS at 05:57

## 2020-01-20 RX ADMIN — HYDROMORPHONE HYDROCHLORIDE 2 MILLIGRAM(S): 2 INJECTION INTRAMUSCULAR; INTRAVENOUS; SUBCUTANEOUS at 04:26

## 2020-01-20 RX ADMIN — OXYCODONE HYDROCHLORIDE 45 MILLIGRAM(S): 5 TABLET ORAL at 16:25

## 2020-01-20 RX ADMIN — OXYCODONE HYDROCHLORIDE 45 MILLIGRAM(S): 5 TABLET ORAL at 07:16

## 2020-01-20 RX ADMIN — Medication 50 MILLIGRAM(S): at 22:15

## 2020-01-20 RX ADMIN — Medication 1 PATCH: at 12:07

## 2020-01-20 RX ADMIN — HYDROMORPHONE HYDROCHLORIDE 0.5 MILLIGRAM(S): 2 INJECTION INTRAMUSCULAR; INTRAVENOUS; SUBCUTANEOUS at 00:24

## 2020-01-20 RX ADMIN — OXYCODONE HYDROCHLORIDE 45 MILLIGRAM(S): 5 TABLET ORAL at 00:00

## 2020-01-20 RX ADMIN — HYDROMORPHONE HYDROCHLORIDE 2 MILLIGRAM(S): 2 INJECTION INTRAMUSCULAR; INTRAVENOUS; SUBCUTANEOUS at 01:05

## 2020-01-20 RX ADMIN — OXYCODONE HYDROCHLORIDE 45 MILLIGRAM(S): 5 TABLET ORAL at 08:16

## 2020-01-20 RX ADMIN — HYDROMORPHONE HYDROCHLORIDE 2 MILLIGRAM(S): 2 INJECTION INTRAMUSCULAR; INTRAVENOUS; SUBCUTANEOUS at 08:35

## 2020-01-20 RX ADMIN — HYDROMORPHONE HYDROCHLORIDE 30 MILLILITER(S): 2 INJECTION INTRAMUSCULAR; INTRAVENOUS; SUBCUTANEOUS at 07:41

## 2020-01-20 RX ADMIN — GABAPENTIN 300 MILLIGRAM(S): 400 CAPSULE ORAL at 10:34

## 2020-01-20 RX ADMIN — HYDROMORPHONE HYDROCHLORIDE 0.5 MILLIGRAM(S): 2 INJECTION INTRAMUSCULAR; INTRAVENOUS; SUBCUTANEOUS at 18:42

## 2020-01-20 RX ADMIN — HYDROMORPHONE HYDROCHLORIDE 0.5 MILLIGRAM(S): 2 INJECTION INTRAMUSCULAR; INTRAVENOUS; SUBCUTANEOUS at 05:55

## 2020-01-20 RX ADMIN — Medication 6 MILLIGRAM(S): at 22:15

## 2020-01-20 RX ADMIN — GABAPENTIN 300 MILLIGRAM(S): 400 CAPSULE ORAL at 17:39

## 2020-01-20 RX ADMIN — HYDROMORPHONE HYDROCHLORIDE 2 MILLIGRAM(S): 2 INJECTION INTRAMUSCULAR; INTRAVENOUS; SUBCUTANEOUS at 13:11

## 2020-01-20 RX ADMIN — HYDROMORPHONE HYDROCHLORIDE 2 MILLIGRAM(S): 2 INJECTION INTRAMUSCULAR; INTRAVENOUS; SUBCUTANEOUS at 12:56

## 2020-01-20 RX ADMIN — GABAPENTIN 300 MILLIGRAM(S): 400 CAPSULE ORAL at 01:05

## 2020-01-20 RX ADMIN — TIZANIDINE 4 MILLIGRAM(S): 4 TABLET ORAL at 10:34

## 2020-01-20 RX ADMIN — HEPARIN SODIUM 5000 UNIT(S): 5000 INJECTION INTRAVENOUS; SUBCUTANEOUS at 17:39

## 2020-01-20 RX ADMIN — HYDROMORPHONE HYDROCHLORIDE 2 MILLIGRAM(S): 2 INJECTION INTRAMUSCULAR; INTRAVENOUS; SUBCUTANEOUS at 04:40

## 2020-01-20 RX ADMIN — OXYCODONE HYDROCHLORIDE 80 MILLIGRAM(S): 5 TABLET ORAL at 11:23

## 2020-01-20 RX ADMIN — OXYCODONE HYDROCHLORIDE 45 MILLIGRAM(S): 5 TABLET ORAL at 12:04

## 2020-01-20 RX ADMIN — HYDROMORPHONE HYDROCHLORIDE 2 MILLIGRAM(S): 2 INJECTION INTRAMUSCULAR; INTRAVENOUS; SUBCUTANEOUS at 23:12

## 2020-01-20 RX ADMIN — PANTOPRAZOLE SODIUM 40 MILLIGRAM(S): 20 TABLET, DELAYED RELEASE ORAL at 05:57

## 2020-01-20 RX ADMIN — OXYCODONE HYDROCHLORIDE 80 MILLIGRAM(S): 5 TABLET ORAL at 04:01

## 2020-01-20 RX ADMIN — HYDROMORPHONE HYDROCHLORIDE 0.5 MILLIGRAM(S): 2 INJECTION INTRAMUSCULAR; INTRAVENOUS; SUBCUTANEOUS at 22:14

## 2020-01-20 RX ADMIN — HYDROMORPHONE HYDROCHLORIDE 2 MILLIGRAM(S): 2 INJECTION INTRAMUSCULAR; INTRAVENOUS; SUBCUTANEOUS at 22:57

## 2020-01-20 RX ADMIN — HYDROMORPHONE HYDROCHLORIDE 2 MILLIGRAM(S): 2 INJECTION INTRAMUSCULAR; INTRAVENOUS; SUBCUTANEOUS at 16:46

## 2020-01-20 RX ADMIN — HYDROMORPHONE HYDROCHLORIDE 0.5 MILLIGRAM(S): 2 INJECTION INTRAMUSCULAR; INTRAVENOUS; SUBCUTANEOUS at 10:33

## 2020-01-20 RX ADMIN — HYDROMORPHONE HYDROCHLORIDE 0.5 MILLIGRAM(S): 2 INJECTION INTRAMUSCULAR; INTRAVENOUS; SUBCUTANEOUS at 06:13

## 2020-01-20 RX ADMIN — SENNA PLUS 2 TABLET(S): 8.6 TABLET ORAL at 22:15

## 2020-01-20 RX ADMIN — HYDROMORPHONE HYDROCHLORIDE 0.5 MILLIGRAM(S): 2 INJECTION INTRAMUSCULAR; INTRAVENOUS; SUBCUTANEOUS at 15:35

## 2020-01-20 RX ADMIN — HYDROMORPHONE HYDROCHLORIDE 2 MILLIGRAM(S): 2 INJECTION INTRAMUSCULAR; INTRAVENOUS; SUBCUTANEOUS at 17:01

## 2020-01-20 NOTE — PROGRESS NOTE ADULT - SUBJECTIVE AND OBJECTIVE BOX
Orthopaedic Surgery Progress Note    Subjective:   Patient seen and examined at bedside, no acute events overnight.  Stable.    Objective:  Vital Signs Last 24 Hrs  T(C): 36.7 (20 Jan 2020 01:04), Max: 36.9 (19 Jan 2020 20:59)  T(F): 98.1 (20 Jan 2020 01:04), Max: 98.5 (19 Jan 2020 20:59)  HR: 74 (20 Jan 2020 04:23) (72 - 98)  BP: 129/63 (20 Jan 2020 04:23) (105/74 - 161/80)  BP(mean): --  RR: 16 (20 Jan 2020 01:04) (16 - 18)  SpO2: 100% (20 Jan 2020 01:04) (99% - 100%)        PE    Gen: Agitated, alert and oriented.   Resp: Unlabored breathing  LLE:   Dressing c/d/i  KI in place  abduction pillow in place  Grossly moving foot, sensation grossly intact  DP 2+                                   9.2    11.74 )-----------( 161      ( 18 Jan 2020 19:10 )             28.8

## 2020-01-20 NOTE — PROGRESS NOTE ADULT - SUBJECTIVE AND OBJECTIVE BOX
Anesthesia Pain Management Service    SUBJECTIVE: Patient is doing well with IV PCA and no significant problems reported.    Pain Scale Score	At rest: ___ 	With Activity: ___ 	[X ] Refer to charted pain scores    THERAPY:    [ ] IV PCA Morphine		[ ] 5 mg/mL	[ ] 1 mg/mL  [X ] IV PCA Hydromorphone	[ ] 5 mg/mL	[X ] 1 mg/mL  [ ] IV PCA Fentanyl		[ ] 50 micrograms/mL    Demand dose __0.2_ lockout __6_ (minutes) Continuous Rate _0__ Total10.1: ___  Daily      MEDICATIONS  (STANDING):  acetaminophen   Tablet .. 975 milliGRAM(s) Oral every 8 hours  gabapentin 300 milliGRAM(s) Oral every 8 hours  heparin  Injectable 5000 Unit(s) SubCutaneous every 12 hours  HYDROmorphone PCA (1 mG/mL) 30 milliLiter(s) PCA Continuous PCA Continuous  lactated ringers. 1000 milliLiter(s) (30 mL/Hr) IV Continuous <Continuous>  melatonin 6 milliGRAM(s) Oral at bedtime  nicotine 21 mG/24 Hr(s) Transdermal Patch - Peds 1 Patch Transdermal daily  oxyCODONE  ER Tablet 80 milliGRAM(s) Oral every 8 hours  pantoprazole    Tablet 40 milliGRAM(s) Oral before breakfast  polyethylene glycol 3350 17 Gram(s) Oral daily  senna 2 Tablet(s) Oral at bedtime  traZODone 50 milliGRAM(s) Oral at bedtime    MEDICATIONS  (PRN):  bisacodyl Suppository 10 milliGRAM(s) Rectal daily PRN If no bowel movement by POD#2  HYDROmorphone  Injectable 2 milliGRAM(s) IV Push every 3 hours PRN For Severe Breakthrough Pain  HYDROmorphone PCA (1 mG/mL) Rescue Clinician Bolus 0.5 milliGRAM(s) IV Push every 15 minutes PRN for Pain Scale GREATER THAN 6  magnesium hydroxide Suspension 30 milliLiter(s) Oral daily PRN Constipation  naloxone Injectable 0.1 milliGRAM(s) IV Push every 3 minutes PRN For ANY of the following changes in patient status:  A. RR LESS THAN 10 breaths per minute, B. Oxygen saturation LESS THAN 90%, C. Sedation score of 6  ondansetron Injectable 4 milliGRAM(s) IV Push every 6 hours PRN Nausea  oxyCODONE    IR 45 milliGRAM(s) Oral every 4 hours PRN Severe Pain (7 - 10)  tiZANidine 4 milliGRAM(s) Oral every 6 hours PRN Muscle Spasm      OBJECTIVE:    Sedation Score:	[ X] Alert	[ ] Drowsy 	[ ] Arousable	[ ] Asleep	[ ] Unresponsive    Side Effects:	[X ] None	[ ] Nausea	[ ] Vomiting	[ ] Pruritus  		[ ] Other:    Vital Signs Last 24 Hrs  T(C): 36.7 (20 Jan 2020 01:04), Max: 36.9 (19 Jan 2020 20:59)  T(F): 98.1 (20 Jan 2020 01:04), Max: 98.5 (19 Jan 2020 20:59)  HR: 74 (20 Jan 2020 04:23) (72 - 98)  BP: 129/63 (20 Jan 2020 04:23) (105/74 - 141/61)  BP(mean): --  RR: 16 (20 Jan 2020 01:04) (16 - 17)  SpO2: 100% (20 Jan 2020 01:04) (99% - 100%)    ASSESSMENT/ PLAN    Therapy to  be:	[ X] Continue   [ ] Discontinued   [ ] Change to prn Analgesics    Documentation and Verification of current medications:   [X] Done	[ ] Not done, not elligible    Comments:

## 2020-01-20 NOTE — PROGRESS NOTE ADULT - PROBLEM SELECTOR PLAN 5
likely one small LN  Pt is instructed to perform age appropriate cancer screening including mammogram. Pt verbalized the understanding and plans to follow up with her PCP for it after discharge.

## 2020-01-20 NOTE — PROGRESS NOTE ADULT - ASSESSMENT
A/P: 54yoF s/p L TFR open reduction, now POD    FU OR Cx: NGTD  WBAT in KI  abd pillow/knee immobilizer, needs hip abduction brace  Pain control - appreciate pain management  DVT ppx: SQH  GI: reg diet  Resp: IS  Dispo planning

## 2020-01-20 NOTE — PROGRESS NOTE ADULT - SUBJECTIVE AND OBJECTIVE BOX
Patient is a 54y old  Female who presents with a chief complaint of left hip dislocation (19 Jan 2020 15:20)      SUBJECTIVE / OVERNIGHT EVENTS:  Pt was seen in AM. She stated pain is controlled at this time. She denied cp/sob/dizziness/palpitation. She is concerned that she felt a small lump under left arm pit today.     MEDICATIONS  (STANDING):  acetaminophen   Tablet .. 975 milliGRAM(s) Oral every 8 hours  gabapentin 300 milliGRAM(s) Oral every 8 hours  heparin  Injectable 5000 Unit(s) SubCutaneous every 12 hours  HYDROmorphone PCA (1 mG/mL) 30 milliLiter(s) PCA Continuous PCA Continuous  lactated ringers. 1000 milliLiter(s) (30 mL/Hr) IV Continuous <Continuous>  melatonin 6 milliGRAM(s) Oral at bedtime  nicotine 21 mG/24 Hr(s) Transdermal Patch - Peds 1 Patch Transdermal daily  oxyCODONE  ER Tablet 80 milliGRAM(s) Oral every 8 hours  pantoprazole    Tablet 40 milliGRAM(s) Oral before breakfast  polyethylene glycol 3350 17 Gram(s) Oral daily  senna 2 Tablet(s) Oral at bedtime  traZODone 50 milliGRAM(s) Oral at bedtime    MEDICATIONS  (PRN):  bisacodyl Suppository 10 milliGRAM(s) Rectal daily PRN If no bowel movement by POD#2  HYDROmorphone  Injectable 2 milliGRAM(s) IV Push every 3 hours PRN For Severe Breakthrough Pain  HYDROmorphone PCA (1 mG/mL) Rescue Clinician Bolus 0.5 milliGRAM(s) IV Push every 15 minutes PRN for Pain Scale GREATER THAN 6  magnesium hydroxide Suspension 30 milliLiter(s) Oral daily PRN Constipation  naloxone Injectable 0.1 milliGRAM(s) IV Push every 3 minutes PRN For ANY of the following changes in patient status:  A. RR LESS THAN 10 breaths per minute, B. Oxygen saturation LESS THAN 90%, C. Sedation score of 6  ondansetron Injectable 4 milliGRAM(s) IV Push every 6 hours PRN Nausea  oxyCODONE    IR 45 milliGRAM(s) Oral every 4 hours PRN Severe Pain (7 - 10)  tiZANidine 4 milliGRAM(s) Oral every 6 hours PRN Muscle Spasm      T(C): 36.8 (01-20-20 @ 15:40), Max: 36.9 (01-19-20 @ 20:59)  HR: 86 (01-20-20 @ 15:40) (72 - 98)  BP: 152/85 (01-20-20 @ 15:40) (101/43 - 152/85)  RR: 16 (01-20-20 @ 15:40) (16 - 17)  SpO2: 99% (01-20-20 @ 15:40) (96% - 100%)  CAPILLARY BLOOD GLUCOSE        I&O's Summary    19 Jan 2020 07:01  -  20 Jan 2020 07:00  --------------------------------------------------------  IN: 0 mL / OUT: 700 mL / NET: -700 mL    20 Jan 2020 07:01  -  20 Jan 2020 17:15  --------------------------------------------------------  IN: 0 mL / OUT: 450 mL / NET: -450 mL        PHYSICAL EXAM:  GENERAL: NAD, thin built   HEAD:  Atraumatic, Normocephalic  EYES: conjunctiva and sclera clear  NECK: Supple, No JVD  CHEST/LUNG: Clear to auscultation bilaterally; No wheeze  HEART: s1 s2, regular rhythm and rate   ABDOMEN: Soft, Nontender, Nondistended; Bowel sounds present  EXTREMITIES: Left hip in immobilizer   PSYCH: AAOx3, calm   NEUROLOGY: non-focal  SKIN: a very small mobile palpable lump under left armpit, nontender, skin intact.       LABS:                        9.2    11.74 )-----------( 161      ( 18 Jan 2020 19:10 )             28.8                     RADIOLOGY & ADDITIONAL TESTS:    Imaging Personally Reviewed:    Consultant(s) Notes Reviewed:      Care Discussed with Consultants/Other Providers:

## 2020-01-20 NOTE — CHART NOTE - NSCHARTNOTEFT_GEN_A_CORE
Discharge planning to be started for patient. From orthopedic standpoint, patient doing well. Has hip abduction brace in car. Family to come this evening per nurse and bring brace. Per Physical therapy, patient is at baseline in terms of ambulatory function. Patient ambulating with crutches, which is her baseline. Pain management currently managing pain with PCA. Will recommend follow up with pain medicine doctor upon discharge for chronic pain medication management.    Attempted to have discussion with patient regarding discharge planning.  Patient very defensive when approached about discharge. States she has "many other issues that she cannot discuss at this time." States that PT is not working with her appropriately and she feels she was not cleared for safe home discharge. Patient became more defensive and agitated during discussion. Unable to have continue conversation with patient. States she will only discuss with Dr. Hackett.   Dr. Hackett made aware of above. Plans to see patient later today.

## 2020-01-21 ENCOUNTER — TRANSCRIPTION ENCOUNTER (OUTPATIENT)
Age: 55
End: 2020-01-21

## 2020-01-21 VITALS
RESPIRATION RATE: 20 BRPM | SYSTOLIC BLOOD PRESSURE: 145 MMHG | TEMPERATURE: 98 F | DIASTOLIC BLOOD PRESSURE: 99 MMHG | HEART RATE: 100 BPM | OXYGEN SATURATION: 95 %

## 2020-01-21 LAB
SPECIMEN SOURCE: SIGNIFICANT CHANGE UP
SPECIMEN SOURCE: SIGNIFICANT CHANGE UP

## 2020-01-21 PROCEDURE — 99232 SBSQ HOSP IP/OBS MODERATE 35: CPT

## 2020-01-21 PROCEDURE — 99238 HOSP IP/OBS DSCHRG MGMT 30/<: CPT

## 2020-01-21 RX ORDER — OXYCODONE HYDROCHLORIDE 5 MG/1
2 TABLET ORAL
Qty: 56 | Refills: 0
Start: 2020-01-21 | End: 2020-01-27

## 2020-01-21 RX ORDER — POLYETHYLENE GLYCOL 3350 17 G/17G
17 POWDER, FOR SOLUTION ORAL
Qty: 0 | Refills: 0 | DISCHARGE
Start: 2020-01-21

## 2020-01-21 RX ORDER — NICOTINE POLACRILEX 2 MG
0 GUM BUCCAL
Qty: 0 | Refills: 0 | DISCHARGE
Start: 2020-01-21

## 2020-01-21 RX ORDER — TIZANIDINE 4 MG/1
1 TABLET ORAL
Qty: 0 | Refills: 0 | DISCHARGE
Start: 2020-01-21

## 2020-01-21 RX ORDER — TRAZODONE HCL 50 MG
1 TABLET ORAL
Qty: 0 | Refills: 0 | DISCHARGE
Start: 2020-01-21

## 2020-01-21 RX ORDER — SENNA PLUS 8.6 MG/1
2 TABLET ORAL
Qty: 60 | Refills: 0
Start: 2020-01-21 | End: 2020-02-19

## 2020-01-21 RX ORDER — ASPIRIN/CALCIUM CARB/MAGNESIUM 324 MG
1 TABLET ORAL
Qty: 84 | Refills: 0
Start: 2020-01-21 | End: 2020-03-02

## 2020-01-21 RX ORDER — ACETAMINOPHEN 500 MG
3 TABLET ORAL
Qty: 0 | Refills: 0 | DISCHARGE
Start: 2020-01-21

## 2020-01-21 RX ORDER — PANTOPRAZOLE SODIUM 20 MG/1
1 TABLET, DELAYED RELEASE ORAL
Qty: 30 | Refills: 0
Start: 2020-01-21 | End: 2020-02-19

## 2020-01-21 RX ORDER — OXYCODONE HYDROCHLORIDE 5 MG/1
15 TABLET ORAL ONCE
Refills: 0 | Status: DISCONTINUED | OUTPATIENT
Start: 2020-01-21 | End: 2020-01-21

## 2020-01-21 RX ORDER — OXYCODONE HYDROCHLORIDE 5 MG/1
1 TABLET ORAL
Qty: 21 | Refills: 0
Start: 2020-01-21 | End: 2020-01-27

## 2020-01-21 RX ORDER — OXYCODONE HYDROCHLORIDE 5 MG/1
15 TABLET ORAL EVERY 4 HOURS
Refills: 0 | Status: DISCONTINUED | OUTPATIENT
Start: 2020-01-21 | End: 2020-01-21

## 2020-01-21 RX ADMIN — HYDROMORPHONE HYDROCHLORIDE 2 MILLIGRAM(S): 2 INJECTION INTRAMUSCULAR; INTRAVENOUS; SUBCUTANEOUS at 02:20

## 2020-01-21 RX ADMIN — GABAPENTIN 300 MILLIGRAM(S): 400 CAPSULE ORAL at 01:07

## 2020-01-21 RX ADMIN — OXYCODONE HYDROCHLORIDE 15 MILLIGRAM(S): 5 TABLET ORAL at 19:53

## 2020-01-21 RX ADMIN — GABAPENTIN 300 MILLIGRAM(S): 400 CAPSULE ORAL at 10:59

## 2020-01-21 RX ADMIN — HYDROMORPHONE HYDROCHLORIDE 30 MILLILITER(S): 2 INJECTION INTRAMUSCULAR; INTRAVENOUS; SUBCUTANEOUS at 06:15

## 2020-01-21 RX ADMIN — OXYCODONE HYDROCHLORIDE 45 MILLIGRAM(S): 5 TABLET ORAL at 08:48

## 2020-01-21 RX ADMIN — OXYCODONE HYDROCHLORIDE 80 MILLIGRAM(S): 5 TABLET ORAL at 03:36

## 2020-01-21 RX ADMIN — OXYCODONE HYDROCHLORIDE 80 MILLIGRAM(S): 5 TABLET ORAL at 18:26

## 2020-01-21 RX ADMIN — OXYCODONE HYDROCHLORIDE 45 MILLIGRAM(S): 5 TABLET ORAL at 04:12

## 2020-01-21 RX ADMIN — HEPARIN SODIUM 5000 UNIT(S): 5000 INJECTION INTRAVENOUS; SUBCUTANEOUS at 05:12

## 2020-01-21 RX ADMIN — OXYCODONE HYDROCHLORIDE 45 MILLIGRAM(S): 5 TABLET ORAL at 06:15

## 2020-01-21 RX ADMIN — TIZANIDINE 4 MILLIGRAM(S): 4 TABLET ORAL at 01:05

## 2020-01-21 RX ADMIN — OXYCODONE HYDROCHLORIDE 15 MILLIGRAM(S): 5 TABLET ORAL at 14:41

## 2020-01-21 RX ADMIN — Medication 1 PATCH: at 12:38

## 2020-01-21 RX ADMIN — HYDROMORPHONE HYDROCHLORIDE 0.5 MILLIGRAM(S): 2 INJECTION INTRAMUSCULAR; INTRAVENOUS; SUBCUTANEOUS at 01:04

## 2020-01-21 RX ADMIN — OXYCODONE HYDROCHLORIDE 45 MILLIGRAM(S): 5 TABLET ORAL at 12:39

## 2020-01-21 RX ADMIN — TIZANIDINE 4 MILLIGRAM(S): 4 TABLET ORAL at 18:26

## 2020-01-21 RX ADMIN — HYDROMORPHONE HYDROCHLORIDE 2 MILLIGRAM(S): 2 INJECTION INTRAMUSCULAR; INTRAVENOUS; SUBCUTANEOUS at 09:56

## 2020-01-21 RX ADMIN — Medication 1 PATCH: at 06:17

## 2020-01-21 RX ADMIN — OXYCODONE HYDROCHLORIDE 45 MILLIGRAM(S): 5 TABLET ORAL at 13:31

## 2020-01-21 RX ADMIN — PANTOPRAZOLE SODIUM 40 MILLIGRAM(S): 20 TABLET, DELAYED RELEASE ORAL at 08:04

## 2020-01-21 RX ADMIN — OXYCODONE HYDROCHLORIDE 45 MILLIGRAM(S): 5 TABLET ORAL at 00:49

## 2020-01-21 RX ADMIN — OXYCODONE HYDROCHLORIDE 15 MILLIGRAM(S): 5 TABLET ORAL at 18:47

## 2020-01-21 RX ADMIN — HYDROMORPHONE HYDROCHLORIDE 30 MILLILITER(S): 2 INJECTION INTRAMUSCULAR; INTRAVENOUS; SUBCUTANEOUS at 08:46

## 2020-01-21 RX ADMIN — HYDROMORPHONE HYDROCHLORIDE 2 MILLIGRAM(S): 2 INJECTION INTRAMUSCULAR; INTRAVENOUS; SUBCUTANEOUS at 02:09

## 2020-01-21 RX ADMIN — HYDROMORPHONE HYDROCHLORIDE 0.5 MILLIGRAM(S): 2 INJECTION INTRAMUSCULAR; INTRAVENOUS; SUBCUTANEOUS at 08:47

## 2020-01-21 RX ADMIN — OXYCODONE HYDROCHLORIDE 15 MILLIGRAM(S): 5 TABLET ORAL at 15:13

## 2020-01-21 RX ADMIN — HYDROMORPHONE HYDROCHLORIDE 0.5 MILLIGRAM(S): 2 INJECTION INTRAMUSCULAR; INTRAVENOUS; SUBCUTANEOUS at 08:03

## 2020-01-21 RX ADMIN — OXYCODONE HYDROCHLORIDE 45 MILLIGRAM(S): 5 TABLET ORAL at 00:03

## 2020-01-21 RX ADMIN — HYDROMORPHONE HYDROCHLORIDE 2 MILLIGRAM(S): 2 INJECTION INTRAMUSCULAR; INTRAVENOUS; SUBCUTANEOUS at 05:25

## 2020-01-21 RX ADMIN — HYDROMORPHONE HYDROCHLORIDE 2 MILLIGRAM(S): 2 INJECTION INTRAMUSCULAR; INTRAVENOUS; SUBCUTANEOUS at 05:13

## 2020-01-21 RX ADMIN — OXYCODONE HYDROCHLORIDE 45 MILLIGRAM(S): 5 TABLET ORAL at 16:54

## 2020-01-21 RX ADMIN — HYDROMORPHONE HYDROCHLORIDE 0.5 MILLIGRAM(S): 2 INJECTION INTRAMUSCULAR; INTRAVENOUS; SUBCUTANEOUS at 03:37

## 2020-01-21 RX ADMIN — HYDROMORPHONE HYDROCHLORIDE 2 MILLIGRAM(S): 2 INJECTION INTRAMUSCULAR; INTRAVENOUS; SUBCUTANEOUS at 10:57

## 2020-01-21 RX ADMIN — OXYCODONE HYDROCHLORIDE 45 MILLIGRAM(S): 5 TABLET ORAL at 17:59

## 2020-01-21 RX ADMIN — OXYCODONE HYDROCHLORIDE 45 MILLIGRAM(S): 5 TABLET ORAL at 09:40

## 2020-01-21 RX ADMIN — OXYCODONE HYDROCHLORIDE 80 MILLIGRAM(S): 5 TABLET ORAL at 10:59

## 2020-01-21 NOTE — PROGRESS NOTE ADULT - REASON FOR ADMISSION
ORIF hip
left hip dislocation

## 2020-01-21 NOTE — PROGRESS NOTE BEHAVIORAL HEALTH - NSBHFUPINTERVALCCFT_PSY_A_CORE
"they messed up so many times I just will go home and make it work for me" "they messed up so many times I just will go home"

## 2020-01-21 NOTE — PROGRESS NOTE BEHAVIORAL HEALTH - NSBHCONSULTFOLLOWAFTERCARE_PSY_A_CORE FT
[facility]. walk in clinic : [phone] (9AM-7PM)  [facility] Outpatient clinic: [phone] [facility] outpt. walk in clinic : [phone] (9AM-7PM)  [facility] Outpatient clinic: [phone]  [facility] Substance Abuse Outpatient Program ([facility]): [phone]

## 2020-01-21 NOTE — PROGRESS NOTE BEHAVIORAL HEALTH - NSBHCHARTREVIEWVS_PSY_A_CORE FT
Vital Signs Last 24 Hrs  T(C): 36.8 (21 Jan 2020 09:58), Max: 36.8 (20 Jan 2020 15:40)  T(F): 98.2 (21 Jan 2020 09:58), Max: 98.3 (20 Jan 2020 15:40)  HR: 78 (21 Jan 2020 09:58) (71 - 90)  BP: 109/47 (21 Jan 2020 09:58) (109/47 - 152/85)  BP(mean): --  RR: 19 (21 Jan 2020 09:58) (16 - 19)  SpO2: 98% (21 Jan 2020 09:58) (96% - 99%)

## 2020-01-21 NOTE — CHART NOTE - NSCHARTNOTEFT_GEN_A_CORE
Spoke with pain management NP Marcel regarding discharge medication regimen.   Per pain management, patient to be continued on current medication regimen:  Oxycodone IR 45mg [id number] PRN moderate-severe pain  Oxycontin 80mg [id number]   Patient will be given 5 day supply upon discharge.   Spoke with Psychiatry, Dr. Alarcon for addiction medication recommendations. Per Dr. Chang, can only educate patient on options for addictive behavior. Patient will be given name and number of methadone clinic near her home in [place] in discharge paperwork.  Psych recommending pain medication regimen discharge plan per pain service. Per pain management and psychiatry OK to prescribe pain medication for patient cautiously.  Pain management advised that patient can be safely prescribed 50% of current opiate dosing without going into withdrawal symptoms.   Will speak with Dr. Hackett regarding management.

## 2020-01-21 NOTE — DISCHARGE NOTE PROVIDER - CARE PROVIDER_API CALL
Tyler Hackett)  Orthopedics  [address] [place] [address], [address]  [place] [address]  Phone: [phone]  Follow Up Time: 2 weeks

## 2020-01-21 NOTE — PROGRESS NOTE ADULT - SUBJECTIVE AND OBJECTIVE BOX
S: Patient seen and examined at bedside. Reports no acute complaints at this time. Pain is well controlled.    O:  ICU Vital Signs Last 24 Hrs  T(C): 36.8 (21 Jan 2020 05:05), Max: 36.9 (20 Jan 2020 08:46)  T(F): 98.2 (21 Jan 2020 05:05), Max: 98.4 (20 Jan 2020 08:46)  HR: 71 (21 Jan 2020 05:05) (71 - 90)  BP: 122/40 (21 Jan 2020 05:05) (101/43 - 152/85)  BP(mean): --  ABP: --  ABP(mean): --  RR: 17 (21 Jan 2020 05:05) (16 - 18)  SpO2: 99% (21 Jan 2020 05:05) (96% - 99%)        PHYSICAL EXAM:    Gen: Agitated, alert and oriented.   Resp: Unlabored breathing  LLE:   Dressing c/d/i  KI in place  abduction pillow in place  Grossly moving foot, sensation grossly intact  DP 2+

## 2020-01-21 NOTE — PROGRESS NOTE ADULT - SUBJECTIVE AND OBJECTIVE BOX
Anesthesia Pain Management Service- Attending Addendum    SUBJECTIVE: Patient reports intermittent pain relief with current medications s/p PCA.   Is being discharged from hospital- concerned for postoperative RX at home.    Therapy:	  [ X] IV PCA	   [ ] Epidural           [ ] s/p Spinal Opoid              [ ] Postpartum infusion	  [ ] Patient controlled regional anesthesia (PCRA)    [ ] prn Analgesics    Allergies    No Known Allergies    Intolerances      MEDICATIONS  (STANDING):  acetaminophen   Tablet .. 975 milliGRAM(s) Oral every 8 hours  gabapentin 300 milliGRAM(s) Oral every 8 hours  heparin  Injectable 5000 Unit(s) SubCutaneous every 12 hours  melatonin 6 milliGRAM(s) Oral at bedtime  nicotine 21 mG/24 Hr(s) Transdermal Patch - Peds 1 Patch Transdermal daily  oxyCODONE  ER Tablet 80 milliGRAM(s) Oral every 8 hours  pantoprazole    Tablet 40 milliGRAM(s) Oral before breakfast  polyethylene glycol 3350 17 Gram(s) Oral daily  senna 2 Tablet(s) Oral at bedtime  traZODone 50 milliGRAM(s) Oral at bedtime    MEDICATIONS  (PRN):  bisacodyl Suppository 10 milliGRAM(s) Rectal daily PRN If no bowel movement by POD#2  magnesium hydroxide Suspension 30 milliLiter(s) Oral daily PRN Constipation  oxyCODONE    IR 45 milliGRAM(s) Oral every 4 hours PRN Severe Pain (7 - 10)  oxyCODONE    IR 15 milliGRAM(s) Oral every 4 hours PRN Severe breakthrough Pain (7 - 10)  tiZANidine 4 milliGRAM(s) Oral every 6 hours PRN Muscle Spasm      OBJECTIVE:   [X] No new signs     [ ] Other:    Side Effects:  [X ] None			[ ] Other:      ASSESSMENT/PLAN  -IV PCA discontinued. Patient transitioned to PO medication, given instructions for breakthrough pain.  Discussed with patient importance of using her pain medication as prescribed. Patient acknowledged and stated she was understanding.    Comments: Pain management per primary team, APS to sign off

## 2020-01-21 NOTE — PROGRESS NOTE BEHAVIORAL HEALTH - RISK ASSESSMENT
Patient appears to be a low risk of harm to self or others at this time.    Risk factors: anxiety, severe pain,  impulsivity, substance misuse, functional decline, poor reactivity to stressors    Protective factors: denies any active suicidal ideation/intent/plan, no hx of prior attempts, no self-harm behaviors, identifies reasons for living, future oriented, supportive social network, no access to firearms, no legal issues, engaged in treatment    Not at acute risk of harm to self or others at this time Patient appears to be a low risk of harm to self or others at this time.    Risk factors: anxiety, severe pain,  impulsivity, substance misuse, functional decline, poor reactivity to stressors    Protective factors: denies any active suicidal ideation/intent/plan, no hx of prior attempts, no self-harm behaviors, identifies reasons for living, future oriented, supportive social network, no access to firearms, no legal issues, engaged in treatment    Not at acute risk of harm to self or others at this time and does not need an admission

## 2020-01-21 NOTE — DISCHARGE NOTE PROVIDER - NSDCMRMEDTOKEN_GEN_ALL_CORE_FT
acetaminophen 325 mg oral tablet: 2 tab(s) orally every 6 hours MDD:8  Aspirin Enteric Coated 325 mg oral delayed release tablet: 1 tab(s) orally 2 times a day MDD:2  gabapentin 300 mg oral capsule: 1 cap(s) orally every 8 hours MDD:3  HYDROmorphone 4 mg oral tablet: 1 tab(s) orally every 3 hours, As needed, Severe Break Through Pain MDD:8  Mobic 15 mg oral tablet: 1 tab(s) orally once a day MDD:1  nicotine 21 mg/24 hr transdermal film, extended release: 1 patch transdermal once a day MDD:1- remove at night   oxyCODONE 15 mg oral tablet: 3 tab(s) orally every 6 hours, As Needed -Severe Pain (7 - 10) MDD:12  oxyCODONE 80 mg oral tablet, extended release: 1 tab(s) orally every 8 hours MDD:3  pantoprazole 40 mg oral delayed release tablet: 1 tab(s) orally once a day (before a meal) MDD:1  senna oral tablet: 2 tab(s) orally once a day (at bedtime) MDD:2  tiZANidine 4 mg oral tablet: 1 tab(s) orally 3 times a day, As Needed MDD:3 acetaminophen 325 mg oral tablet: 3 tab(s) orally every 8 hours  Aspirin Enteric Coated 325 mg oral delayed release tablet: 1 tab(s) orally 2 times a day MDD:2  gabapentin 300 mg oral capsule: 1 cap(s) orally every 8 hours MDD:3  nicotine 21 mg/24 hr transdermal film, extended release:  transdermal   oxyCODONE 15 mg oral tablet: 2-3 tab(s) orally every 4-6 hours, As Needed -moderate-Severe breakthrough Pain  MDD:8  oxyCODONE 80 mg oral tablet, extended release: 1 tab(s) orally every 8 hours MDD:3  pantoprazole 40 mg oral delayed release tablet: 1 tab(s) orally once a day for stomach protectant  polyethylene glycol 3350 oral powder for reconstitution: 17 gram(s) orally once a day  senna oral tablet: 2 tab(s) orally once a day (at bedtime) MDD:2  tiZANidine 4 mg oral tablet: 1 tab(s) orally every 6 hours, As needed, Muscle Spasm  traZODone 50 mg oral tablet: 1 tab(s) orally once a day (at bedtime)

## 2020-01-21 NOTE — DISCHARGE NOTE PROVIDER - NSDCACTIVITY_GEN_ALL_CORE
Walking - Outdoors allowed/Showering allowed/Do not make important decisions/No heavy lifting/straining/Walking - Indoors allowed/Do not drive or operate machinery

## 2020-01-21 NOTE — PROGRESS NOTE ADULT - PROBLEM SELECTOR PLAN 2
post op anemia, no signs of active bleeding   s/p pRBC x 1 on 1/18 with proper response  CBC stable now

## 2020-01-21 NOTE — DISCHARGE NOTE PROVIDER - NSDCCPCAREPLAN_GEN_ALL_CORE_FT
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 She is s/p open reduction post left total hip replacement with Dr. Hackett on 1/17/2020 without any intraoperative complications.  Patient is doing well and stable for discharge.  Patient is tolerating physical therapy: weight bearing to left leg, gait training, STRICT  POSTERIOR HIP PRECAUTIONS in HIP ABDUCTION BRACE.  Keep dressing on as is until day of staple removal.  Staples/sutures to be removed in Dr. Hackett's office 14 days from date of surgery.    During hospital course, patient was followed by medicine, psychiatry and acute pain service. Acute pain service recommended patient establish a chronic pain management provider for management of chronic pain. Per Dr. Hackett, patient will be discharged on 7 day course of pain medication. Patient understands she must be seen in office with Dr. Hackett for continuity of care and management/pain control. Psychiatry recommended patient follow up with drug addiction facility for management for chronic opioid use/substance misuse. Please call [facility] ([place], [place]) at [phone].   Patient was safely and appropriately discharged home.   Patient is on Aspirin 325mg twice daily for anticoagulation.  Orthopaedic Surgeon Dr. Hackett would like patient to call private MD/Internist for appointment postop to maintain continuity of care.  Follow up with Dr. Hackett's office in 1-2 weeks.

## 2020-01-21 NOTE — DISCHARGE NOTE NURSING/CASE MANAGEMENT/SOCIAL WORK - NSDPDISTO_GEN_ALL_CORE
Surgical hip dressing clean, dry and intact.  +neurovascular status. Surgical leg elevated as instructed.  +void, Tolerated po and fluids.  OOB with walker and one stand by assist.  Venodyne in use while in bed.  Call bell within reach at all times.  Proper usage of incentive spirometer with teach back. Home/Surgical hip dressing clean, dry and intact.  +neurovascular status. Surgical leg elevated as instructed. posterior precautions and Abduction brace maintained.  +void, Tolerated po and fluids.  OOB with walker and one stand by assist.  Venodyne in use while in bed.  Call bell within reach at all times.  Proper usage of incentive spirometer with teach back.
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
## 2020-01-21 NOTE — PROGRESS NOTE ADULT - ASSESSMENT
A/P: 54yoF s/p L TFR open reduction   -F/u pain management, d/c PCA, transition to PO medication   -D/c home today  -FU OR Cx: NGTD  -WBAT in [place]  -abd pillow/knee immobilizer, needs hip abduction brace  -DVT ppx: SQH  -GI: reg diet  -Resp: IS

## 2020-01-21 NOTE — PROGRESS NOTE ADULT - PROBLEM SELECTOR PLAN 1
h/o recurrent hip dislocation. S/p open reduction on 1/16/20  Denies any fall or trauma to the hip. Reports hip dislocated while removing brace  Pain control. Patient is on high doses of narcotics. On oxycontin 80BID and oxycodone 45IR PRN, dilaudid PRN and also on PCA currently  Pain service is following for pain management given very high opioid requirements.   Bowel regimen  DVT ppx per primary team.  DC on pain medications as recommended by pain service.

## 2020-01-21 NOTE — DISCHARGE NOTE PROVIDER - HOSPITAL COURSE
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 She is s/p open reduction post left total hip replacement with Dr. Hackett on 1/17/2020 without any intraoperative complications.  Patient is doing well and stable for discharge.  Patient is tolerating physical therapy: weight bearing to left leg, gait training, STRICT  POSTERIOR HIP PRECAUTIONS in HIP ABDUCTION BRACE.  Keep dressing on as is until day of staple removal.  Staples/sutures to be removed in Dr. Richmond's office 14 days from date of surgery.      During hospital course, patient was followed by medicine, psychiatry and acute pain service. Patient was safely and appropriately discharged home.     Patient is on for anticoagulation.  Orthopaedic Surgeon Dr. Hackett would like patient to call private MD/Internist for appointment postop to maintain continuity of care.  Follow up with Dr. Hackett's office in 1-2 weeks.         [facility] iStop Reference #: [id number] .     [place] iStop Reference Reference #: [id number]     11/14/2019 11/14/2019 DIAZEPAM 5 MG TABLET 60.0 30 MD LYNCH HOWARD     11/14/2019 11/14/2019 OXYCONTIN ER 80 MG TABLET 60.0 30 MD LYNCH HOWARD     12/05/2019 12/05/2019 HYDROMORPHONE 4 MG TABLET 56.0 7 ROSA HAWTHORNE     12/10/2019 12/11/2019 OXYCONTIN ER 80 MG TABLET 66.0 22 MD LYNCH HOWARD     12/10/2019 12/11/2019 OXYCODONE HCL 15 MG TABLET 100.0 13 MD LYNCH HOWARD     12/23/2019 12/23/2019 OXYCODONE HCL 15 MG TABLET 100.0 13 MD LYNCH HOWARD     12/05/2019 12/05/2019 OXYCODONE HCL 15 MG TABLET 84.0 7 ROSA HAWTHORNE     12/05/2019 12/05/2019 OXYCONTIN ER 80 MG TABLET 21.0 7 ROSA HAWTHORNE     11/14/2019 11/14/2019 OXYCODONE HCL 30 MG TABLET 100.0 17 MD LYNCH HOWARD Patient is a 55 yo female history of 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 She is s/p open reduction post left total hip replacement with Dr. Hackett on 1/17/2020 without any intraoperative complications.  Patient is doing well and stable for discharge.  Patient is tolerating physical therapy: weight bearing to left leg, gait training, STRICT  POSTERIOR HIP PRECAUTIONS in HIP ABDUCTION BRACE.  Keep dressing on as is until day of staple removal.  Staples/sutures to be removed in Dr. Hackett's office 14 days from date of surgery.      During hospital course, patient was followed by medicine, psychiatry and acute pain service. Acute pain service recommended patient establish a chronic pain management provider for management of chronic pain. Per Dr. Hackett, patient will be discharged on 7 day course of pain medication. Patient understands she must be seen in office with Dr. Hackett for continuity of care and management/pain control. Psychiatry recommended patient follow up with drug addiction facility for management for chronic opioid use/substance misuse. Please call [facility] ([place], [place]) at [phone].     Patient was safely and appropriately discharged home.     Patient is on Aspirin 325mg twice daily for anticoagulation.  Orthopaedic Surgeon Dr. Hackett would like patient to call private MD/Internist for appointment postop to maintain continuity of care.  Follow up with Dr. Hackett's office in 1-2 weeks.         [facility] iStop Reference #: [id number] .     [place] iStop Reference Reference #: [id number]     11/14/2019 11/14/2019 DIAZEPAM 5 MG TABLET 60.0 30 MD LYNCH HOWARD     11/14/2019 11/14/2019 OXYCONTIN ER 80 MG TABLET 60.0 30 MD LYNCH HOWARD     12/05/2019 12/05/2019 HYDROMORPHONE 4 MG TABLET 56.0 7 ROSA HAWTHORNE     12/10/2019 12/11/2019 OXYCONTIN ER 80 MG TABLET 66.0 22 MD LYNCH HOWARD     12/10/2019 12/11/2019 OXYCODONE HCL 15 MG TABLET 100.0 13 MD LYNCH HOWARD     12/23/2019 12/23/2019 OXYCODONE HCL 15 MG TABLET 100.0 13 MD LYNCH HOWARD     12/05/2019 12/05/2019 OXYCODONE HCL 15 MG TABLET 84.0 7 ROSA HAWTHORNE     12/05/2019 12/05/2019 OXYCONTIN ER 80 MG TABLET 21.0 7 ROSA HAWTHORNE     11/14/2019 11/14/2019 OXYCODONE HCL 30 MG TABLET 100.0 17 MD SYED, JO-ANN

## 2020-01-21 NOTE — DISCHARGE NOTE NURSING/CASE MANAGEMENT/SOCIAL WORK - PATIENT PORTAL LINK FT
You can access the FollowMyHealth Patient Portal offered by [facility] by registering at the following website: [email or web address]. By joining [facility]’s FollowMyHealth portal, you will also be able to view your health information using other applications (apps) compatible with our system.

## 2020-01-21 NOTE — PROGRESS NOTE ADULT - SUBJECTIVE AND OBJECTIVE BOX
Anesthesia Pain Management Service    SUBJECTIVE: Patient is doing well with IV PCA and no significant problems reported.    Pain Scale Score	At rest: __7_ 	With Activity: ___ 	[X ] Refer to charted pain scores    THERAPY:    [ ] IV PCA Morphine		[ ] 5 mg/mL	[ ] 1 mg/mL  [X ] IV PCA Hydromorphone	[ ] 5 mg/mL	[X ] 1 mg/mL  [ ] IV PCA Fentanyl		[ ] 50 micrograms/mL    Demand dose __0.3_ lockout __6_ (minutes) Continuous Rate _0__ Total: _36.6__  mg used (in past 24 hours)      MEDICATIONS  (STANDING):  acetaminophen   Tablet .. 975 milliGRAM(s) Oral every 8 hours  gabapentin 300 milliGRAM(s) Oral every 8 hours  heparin  Injectable 5000 Unit(s) SubCutaneous every 12 hours  HYDROmorphone PCA (1 mG/mL) 30 milliLiter(s) PCA Continuous PCA Continuous  lactated ringers. 1000 milliLiter(s) (30 mL/Hr) IV Continuous <Continuous>  melatonin 6 milliGRAM(s) Oral at bedtime  nicotine 21 mG/24 Hr(s) Transdermal Patch - Peds 1 Patch Transdermal daily  oxyCODONE  ER Tablet 80 milliGRAM(s) Oral every 8 hours  pantoprazole    Tablet 40 milliGRAM(s) Oral before breakfast  polyethylene glycol 3350 17 Gram(s) Oral daily  senna 2 Tablet(s) Oral at bedtime  traZODone 50 milliGRAM(s) Oral at bedtime    MEDICATIONS  (PRN):  bisacodyl Suppository 10 milliGRAM(s) Rectal daily PRN If no bowel movement by POD#2  HYDROmorphone  Injectable 2 milliGRAM(s) IV Push every 3 hours PRN For Severe Breakthrough Pain  HYDROmorphone PCA (1 mG/mL) Rescue Clinician Bolus 0.5 milliGRAM(s) IV Push every 15 minutes PRN for Pain Scale GREATER THAN 6  magnesium hydroxide Suspension 30 milliLiter(s) Oral daily PRN Constipation  naloxone Injectable 0.1 milliGRAM(s) IV Push every 3 minutes PRN For ANY of the following changes in patient status:  A. RR LESS THAN 10 breaths per minute, B. Oxygen saturation LESS THAN 90%, C. Sedation score of 6  ondansetron Injectable 4 milliGRAM(s) IV Push every 6 hours PRN Nausea  oxyCODONE    IR 45 milliGRAM(s) Oral every 4 hours PRN Severe Pain (7 - 10)  tiZANidine 4 milliGRAM(s) Oral every 6 hours PRN Muscle Spasm      OBJECTIVE: sitting in bed     Sedation Score:	[ X] Alert	[ ] Drowsy 	[ ] Arousable	[ ] Asleep	[ ] Unresponsive    Side Effects:	[X ] None	[ ] Nausea	[ ] Vomiting	[ ] Pruritus  		[ ] Other:    Vital Signs Last 24 Hrs  T(C): 36.8 (21 Jan 2020 09:58), Max: 36.8 (20 Jan 2020 15:40)  T(F): 98.2 (21 Jan 2020 09:58), Max: 98.3 (20 Jan 2020 15:40)  HR: 78 (21 Jan 2020 09:58) (71 - 90)  BP: 109/47 (21 Jan 2020 09:58) (101/43 - 152/85)  BP(mean): --  RR: 19 (21 Jan 2020 09:58) (16 - 19)  SpO2: 98% (21 Jan 2020 09:58) (96% - 99%)    ASSESSMENT/ PLAN    Therapy to  be:	[ ] Continue   [ X] Discontinued   [X ] Change to prn Analgesics    Documentation and Verification of current medications:   [X] Done	[ ] Not done, not elligible    Comments: Pt. was on PCA pump and oral pain regimen. Brought to the attention of ortho and nurse manager that both therparies can not be given at the same time due to risk of adverse events. Plan is to d/c IVPCA and consult psych for addiction recs. and continue oral regimen as ordered.

## 2020-01-21 NOTE — PROGRESS NOTE BEHAVIORAL HEALTH - NSBHFUPINTERVALHXFT_PSY_A_CORE
Patient was seen and assessed for f.u.  She appears well groomed.  States she is eating well, sleep is unchanged from home - no improvement with trial of trazodone.  Patient is alert and oriented, cooperative however expresses frustration with care.  patient frequently places blame on others, and reports inadequate pain control. Patient states her pain is at "a 5 right now which before it was a 55".  Patient states she wants to leave and is being discharged, but is concerned with what medication she will be given for pain at home.  patient has no PMD at home and has no pain management doc.  She does state she will call her insurance co and discuss options of providers.  Patient discusses her 1x methadone use prior to her admission.  patient aware this type of medication as well as other controlled substance cannot be combined with pain medication. Education was provided and patient verbalized understanding.     patient is with no SI or HI. Wants to live for her grandchildren and to go out of the road with her . No psychosis, Mood "ok" Patient was seen and assessed for f.u.  She appears well groomed.  States she is eating well, sleep is unchanged from home - no improvement with trial of trazodone.  Patient is alert and oriented, cooperative however expresses frustration with care.  patient frequently places blame on others, and reports inadequate pain control. Patient states her pain is at "a 5 right now which before it was a 55".  Patient states she wants to leave and is being discharged, but is concerned with what medication she will be given for pain at home.  patient has no PMD at home and has no pain management doc.  She does state she will call her insurance co and discuss options of providers.  Patient discusses her 1x methadone use prior to her admission.  patient aware this type of medication as well as other controlled substance cannot be combined with pain medication. Education was provided and patient verbalized understanding.     patient is with no SI or HI. Wants to live for her grandchildren and to go out of the road with her . No psychosis, Mood "ok"    Met with pt.'s sister at bedside (pt. gave consent), who denies any acute safety concerns

## 2020-01-21 NOTE — PROGRESS NOTE BEHAVIORAL HEALTH - CASE SUMMARY
Patient seen and evaluated, agree with above assessment and plan, pt. AAOX3, overall calm, cooperative, can be little irritable at times, expressed frustration with care, thought process is linear, coherent. She denies feeling depressed, denies acute psychotic sxs, denies SI and HI. She is future oriented, hopeful, wants to live for her grandkids. Patient was educated extensively about her substance misuse. Patient took 1x dose of methadone on 1/11 from a friend unsure of amount. Educated her multiple times about risks of using any controlled substances with her current pain medications. She verbalized understanding. Patient is willing to see pain management outpt. and is willing to find a provider through her insurance in [place]. [place] con[place] [phone] and Pain management Dr. Botello.

## 2020-01-21 NOTE — DISCHARGE NOTE PROVIDER - NSDCCPTREATMENT_GEN_ALL_CORE_FT
PRINCIPAL PROCEDURE  Procedure: Open reduction, hip, status post hip replacement  Findings and Treatment: See dictated operative note

## 2020-01-21 NOTE — DISCHARGE NOTE NURSING/CASE MANAGEMENT/SOCIAL WORK - NSDCPNINST_GEN_ALL_CORE
Call MD for any complain of swelling, numbness, tingling to leg, or if pain not relieved after taking pain medications, return appointment, redness or drainage to incision and fever. Elevate surgical leg as instructed.  Take over the counter stool softener to prevent constipation that can be caused by taking pain medication.  Leave dressing on till you see doctor at office. Call MD for any complain of swelling, numbness, tingling to leg, or if pain not relieved after taking pain medications, return appointment, redness or drainage to incision and fever. Elevate surgical leg as instructed.  Take over the counter stool softener to prevent constipation that can be caused by taking pain medication.  Leave dressing on till you see doctor at office.  Continue to follow posterior hip precautions with abduction brace on.  No heavy lifting or straining.

## 2020-01-22 LAB — CULTURE - SURGICAL SITE: SIGNIFICANT CHANGE UP

## 2020-01-24 LAB — SPECIMEN SOURCE: SIGNIFICANT CHANGE UP

## 2020-01-26 RX ORDER — GABAPENTIN 300 MG/1
300 CAPSULE ORAL
Qty: 60 | Refills: 0 | Status: DISCONTINUED | COMMUNITY
Start: 2019-10-10 | End: 2020-01-26

## 2020-01-26 RX ORDER — OXYCODONE HYDROCHLORIDE 15 MG/1
15 TABLET ORAL
Qty: 100 | Refills: 0 | Status: DISCONTINUED | COMMUNITY
Start: 2019-12-10 | End: 2020-01-26

## 2020-01-26 RX ORDER — HYDROMORPHONE HYDROCHLORIDE 4 MG/1
4 TABLET ORAL
Qty: 100 | Refills: 0 | Status: DISCONTINUED | COMMUNITY
Start: 2017-05-12 | End: 2020-01-26

## 2020-01-26 RX ORDER — TIZANIDINE 4 MG/1
4 TABLET ORAL 3 TIMES DAILY
Qty: 45 | Refills: 0 | Status: DISCONTINUED | COMMUNITY
Start: 2019-12-10 | End: 2020-01-26

## 2020-01-26 RX ORDER — OXYCODONE HYDROCHLORIDE 30 MG/1
30 TABLET ORAL
Qty: 100 | Refills: 0 | Status: DISCONTINUED | COMMUNITY
Start: 2019-11-14 | End: 2020-01-26

## 2020-01-26 RX ORDER — OXYCODONE HCL 80 MG/1
80 TABLET, FILM COATED, EXTENDED RELEASE ORAL EVERY 8 HOURS
Qty: 63 | Refills: 0 | Status: ACTIVE | COMMUNITY
Start: 2020-01-26 | End: 1900-01-01

## 2020-01-26 RX ORDER — TIZANIDINE 4 MG/1
4 TABLET ORAL 3 TIMES DAILY
Qty: 60 | Refills: 0 | Status: DISCONTINUED | COMMUNITY
Start: 2017-05-12 | End: 2020-01-26

## 2020-01-26 RX ORDER — OXYCODONE HCL 80 MG/1
80 TABLET, FILM COATED, EXTENDED RELEASE ORAL EVERY 8 HOURS
Qty: 60 | Refills: 0 | Status: DISCONTINUED | COMMUNITY
Start: 2017-05-12 | End: 2020-01-26

## 2020-01-26 RX ORDER — OXYCODONE HYDROCHLORIDE 15 MG/1
15 TABLET ORAL
Qty: 90 | Refills: 0 | Status: DISCONTINUED | COMMUNITY
Start: 2017-05-12 | End: 2020-01-26

## 2020-01-26 RX ORDER — OXYCODONE HYDROCHLORIDE 15 MG/1
15 TABLET ORAL
Qty: 100 | Refills: 0 | Status: DISCONTINUED | COMMUNITY
Start: 2019-12-23 | End: 2020-01-26

## 2020-01-26 RX ORDER — OXYCODONE HCL 80 MG/1
80 TABLET, FILM COATED, EXTENDED RELEASE ORAL EVERY 8 HOURS
Qty: 60 | Refills: 0 | Status: DISCONTINUED | COMMUNITY
Start: 2019-11-14 | End: 2020-01-26

## 2020-01-26 RX ORDER — TIZANIDINE 4 MG/1
4 TABLET ORAL 3 TIMES DAILY
Qty: 45 | Refills: 0 | Status: DISCONTINUED | COMMUNITY
Start: 2019-12-23 | End: 2020-01-26

## 2020-01-26 RX ORDER — GABAPENTIN 300 MG/1
300 CAPSULE ORAL 3 TIMES DAILY
Qty: 90 | Refills: 0 | Status: DISCONTINUED | COMMUNITY
Start: 2017-05-12 | End: 2020-01-26

## 2020-01-26 RX ORDER — OXYCODONE HCL 80 MG/1
80 TABLET, FILM COATED, EXTENDED RELEASE ORAL EVERY 8 HOURS
Qty: 66 | Refills: 0 | Status: DISCONTINUED | COMMUNITY
Start: 2019-12-10 | End: 2020-01-26

## 2020-01-26 RX ORDER — DIAZEPAM 5 MG/1
5 TABLET ORAL TWICE DAILY
Qty: 60 | Refills: 0 | Status: DISCONTINUED | COMMUNITY
Start: 2019-11-14 | End: 2020-01-26

## 2020-01-30 ENCOUNTER — APPOINTMENT (OUTPATIENT)
Dept: ORTHOPEDIC SURGERY | Facility: CLINIC | Age: 55
End: 2020-01-30

## 2020-02-06 ENCOUNTER — APPOINTMENT (OUTPATIENT)
Dept: ORTHOPEDIC SURGERY | Facility: CLINIC | Age: 55
End: 2020-02-06
Payer: MEDICAID

## 2020-02-06 DIAGNOSIS — Z96.649 PRESENCE OF UNSPECIFIED ARTIFICIAL HIP JOINT: ICD-10-CM

## 2020-02-06 PROCEDURE — 99024 POSTOP FOLLOW-UP VISIT: CPT

## 2020-02-06 PROCEDURE — 72170 X-RAY EXAM OF PELVIS: CPT

## 2020-02-06 RX ORDER — DOCUSATE SODIUM 100 MG/1
100 CAPSULE, LIQUID FILLED ORAL TWICE DAILY
Qty: 60 | Refills: 0 | Status: ACTIVE | COMMUNITY
Start: 2019-11-14 | End: 1900-01-01

## 2020-02-06 RX ORDER — STANDARDIZED SENNA CONCENTRATE 8.6 MG/1
8.6 TABLET ORAL AT BEDTIME
Qty: 60 | Refills: 0 | Status: ACTIVE | COMMUNITY
Start: 2019-11-14 | End: 1900-01-01

## 2020-02-06 NOTE — HISTORY OF PRESENT ILLNESS
[de-identified] : Postop visit 20 days s/p open reduction of L total femur hip dislocation (1/17/20) [de-identified] : General: well nourished, in no acute distress, alert and oriented to person, place and time.\par Psychiatric: normal mood and affect, no abnormal movements or speech patterns.\par Eyes: vision intact without deficits, sclera and conjunctiva were normal, pupils were equal in size. \par ENT: Ears and nose were normal in appearance. No thyromegaly.\par Lymph: no enlarged nodes, no lymphedema in extremity.\par Respiratory: Normal respiratory rhythm and effort. No wheezing detected without auscultation. No shortness of breath or respiratory distress.\par Cardiac: no cardiac related leg swelling, 2+ peripheral pulses.\par Neurology: normal gross sensation in extremities to light touch.\par Abdomen: soft, non-tender, tympanic, no masses.\par  \par LLE:\par Skin CDI. Incision appears well healed w/o drainage. Mild small focus of ecchymosis anteriorly. No TTP. No palpable fluctuance. Nylon sutures removed, steris applied.\par No evidence of instability. No palpable masses. No lymphedema.\par EHL/FHL/GS/TA 5/5. S/S/SP/DP/T SILT. Toes warm, BCR. Compartments soft.  [de-identified] : AP pelvis (2/6/20): hardware intact w/o loosening or fracture. No evidence of dislocation.\par \par OR Cultures (1/17/20) Reviewed:\par 2 of 3: Negative\par 1 of 3: Postivie for staph epidermidis, isolated from liquid media. Negative at 48 hours. [de-identified] : 54F now 19 says s/p above. Hx of prior open reduction (12/4/19). Currently doing very well. She drove in from [place], now off narcotics for the drive. Planning a trip to [place] in 3 days to celebrate her husbands dhruv of the year award. She says she does continue to have baseline chronic pain for which the recent pain medication prescriptions have significantly helped. Denies any side effects of these medications. She has attempted to call the chronic pain centers that I have mentioned to her but she says "they are methadone clinics" and she does not want to be on methadone. I have once again encouraged her to seek care by a pain specialist or her PCP with to her high dose narcotic use. She denies any fevers or wound drainage. Has been ambulating with crutches and use of abduction brace.  [de-identified] : 54F 3 weeks s/p L hip open reduction, progressing well w/o any signs of infection. 1 of 3 cultures were positive for S. Epi in the broth, likely representing a contaminant. I have recommended continued WBAT w/ posterior hip precautions and use of abduction brace. Patient asked about sexual intercourse- I have informed her she may participate in sexual activity as long as she keeps her leg abducted without any internal rotation, avoiding any movement that causes pain/instability. Return in 6 weeks for FU. Continue to try to wean off narcotics and seek PCP or chronic pain specialist assistance. An additional week of medication was prescribed today.

## 2020-02-13 LAB
FUNGUS SPEC QL CULT: SIGNIFICANT CHANGE UP
FUNGUS SPEC QL CULT: SIGNIFICANT CHANGE UP

## 2020-02-27 LAB — ACID FAST STN SPEC: SIGNIFICANT CHANGE UP

## 2020-04-17 RX ORDER — HYDROMORPHONE HYDROCHLORIDE 4 MG/1
4 TABLET ORAL EVERY 6 HOURS
Qty: 20 | Refills: 0 | Status: DISCONTINUED | COMMUNITY
Start: 2020-01-26 | End: 2020-04-17

## 2020-05-14 RX ORDER — TIZANIDINE 4 MG/1
4 TABLET ORAL 3 TIMES DAILY
Qty: 42 | Refills: 0 | Status: ACTIVE | COMMUNITY
Start: 2020-01-26 | End: 1900-01-01

## 2020-05-20 ENCOUNTER — INPATIENT (INPATIENT)
Facility: HOSPITAL | Age: 55
LOS: 5 days | Discharge: HOME | End: 2020-05-26
Attending: ORTHOPAEDIC SURGERY | Admitting: ORTHOPAEDIC SURGERY
Payer: MEDICAID

## 2020-05-20 ENCOUNTER — TRANSCRIPTION ENCOUNTER (OUTPATIENT)
Age: 55
End: 2020-05-20

## 2020-05-20 VITALS
TEMPERATURE: 98 F | OXYGEN SATURATION: 100 % | SYSTOLIC BLOOD PRESSURE: 188 MMHG | HEART RATE: 85 BPM | DIASTOLIC BLOOD PRESSURE: 125 MMHG | RESPIRATION RATE: 16 BRPM

## 2020-05-20 DIAGNOSIS — S32.9XXA FRACTURE OF UNSPECIFIED PARTS OF LUMBOSACRAL SPINE AND PELVIS, INITIAL ENCOUNTER FOR CLOSED FRACTURE: Chronic | ICD-10-CM

## 2020-05-20 DIAGNOSIS — Z95.810 PRESENCE OF AUTOMATIC (IMPLANTABLE) CARDIAC DEFIBRILLATOR: ICD-10-CM

## 2020-05-20 DIAGNOSIS — Z29.9 ENCOUNTER FOR PROPHYLACTIC MEASURES, UNSPECIFIED: ICD-10-CM

## 2020-05-20 DIAGNOSIS — G89.29 OTHER CHRONIC PAIN: ICD-10-CM

## 2020-05-20 DIAGNOSIS — S73.005A UNSPECIFIED DISLOCATION OF LEFT HIP, INITIAL ENCOUNTER: ICD-10-CM

## 2020-05-20 LAB
ALBUMIN SERPL ELPH-MCNC: 4.3 G/DL — SIGNIFICANT CHANGE UP (ref 3.3–5)
ALP SERPL-CCNC: 111 U/L — SIGNIFICANT CHANGE UP (ref 40–120)
ALT FLD-CCNC: 11 U/L — SIGNIFICANT CHANGE UP (ref 4–33)
ANION GAP SERPL CALC-SCNC: 15 MMO/L — HIGH (ref 7–14)
ANION GAP SERPL CALC-SCNC: 9 MMO/L — SIGNIFICANT CHANGE UP (ref 7–14)
AST SERPL-CCNC: 29 U/L — SIGNIFICANT CHANGE UP (ref 4–32)
BASOPHILS # BLD AUTO: 0.05 K/UL — SIGNIFICANT CHANGE UP (ref 0–0.2)
BASOPHILS NFR BLD AUTO: 0.3 % — SIGNIFICANT CHANGE UP (ref 0–2)
BILIRUB SERPL-MCNC: < 0.2 MG/DL — LOW (ref 0.2–1.2)
BLD GP AB SCN SERPL QL: NEGATIVE — SIGNIFICANT CHANGE UP
BUN SERPL-MCNC: 12 MG/DL — SIGNIFICANT CHANGE UP (ref 7–23)
BUN SERPL-MCNC: 13 MG/DL — SIGNIFICANT CHANGE UP (ref 7–23)
CALCIUM SERPL-MCNC: 9.1 MG/DL — SIGNIFICANT CHANGE UP (ref 8.4–10.5)
CALCIUM SERPL-MCNC: 9.9 MG/DL — SIGNIFICANT CHANGE UP (ref 8.4–10.5)
CHLORIDE SERPL-SCNC: 101 MMOL/L — SIGNIFICANT CHANGE UP (ref 98–107)
CHLORIDE SERPL-SCNC: 106 MMOL/L — SIGNIFICANT CHANGE UP (ref 98–107)
CO2 SERPL-SCNC: 22 MMOL/L — SIGNIFICANT CHANGE UP (ref 22–31)
CO2 SERPL-SCNC: 24 MMOL/L — SIGNIFICANT CHANGE UP (ref 22–31)
CREAT SERPL-MCNC: 0.91 MG/DL — SIGNIFICANT CHANGE UP (ref 0.5–1.3)
CREAT SERPL-MCNC: 1 MG/DL — SIGNIFICANT CHANGE UP (ref 0.5–1.3)
CRP SERPL-MCNC: < 4 MG/L — SIGNIFICANT CHANGE UP
EOSINOPHIL # BLD AUTO: 0.07 K/UL — SIGNIFICANT CHANGE UP (ref 0–0.5)
EOSINOPHIL NFR BLD AUTO: 0.4 % — SIGNIFICANT CHANGE UP (ref 0–6)
ERYTHROCYTE [SEDIMENTATION RATE] IN BLOOD: 21 MM/HR — SIGNIFICANT CHANGE UP (ref 4–25)
GLUCOSE SERPL-MCNC: 138 MG/DL — HIGH (ref 70–99)
GLUCOSE SERPL-MCNC: 99 MG/DL — SIGNIFICANT CHANGE UP (ref 70–99)
HCT VFR BLD CALC: 42 % — SIGNIFICANT CHANGE UP (ref 34.5–45)
HGB BLD-MCNC: 13.6 G/DL — SIGNIFICANT CHANGE UP (ref 11.5–15.5)
IMM GRANULOCYTES NFR BLD AUTO: 0.4 % — SIGNIFICANT CHANGE UP (ref 0–1.5)
INR BLD: 1.04 — SIGNIFICANT CHANGE UP (ref 0.88–1.17)
LYMPHOCYTES # BLD AUTO: 17.1 % — SIGNIFICANT CHANGE UP (ref 13–44)
LYMPHOCYTES # BLD AUTO: 2.84 K/UL — SIGNIFICANT CHANGE UP (ref 1–3.3)
MCHC RBC-ENTMCNC: 29 PG — SIGNIFICANT CHANGE UP (ref 27–34)
MCHC RBC-ENTMCNC: 32.4 % — SIGNIFICANT CHANGE UP (ref 32–36)
MCV RBC AUTO: 89.6 FL — SIGNIFICANT CHANGE UP (ref 80–100)
MONOCYTES # BLD AUTO: 0.92 K/UL — HIGH (ref 0–0.9)
MONOCYTES NFR BLD AUTO: 5.5 % — SIGNIFICANT CHANGE UP (ref 2–14)
NEUTROPHILS # BLD AUTO: 12.7 K/UL — HIGH (ref 1.8–7.4)
NEUTROPHILS NFR BLD AUTO: 76.3 % — SIGNIFICANT CHANGE UP (ref 43–77)
NRBC # FLD: 0 K/UL — SIGNIFICANT CHANGE UP (ref 0–0)
PLATELET # BLD AUTO: 238 K/UL — SIGNIFICANT CHANGE UP (ref 150–400)
PMV BLD: 12.7 FL — SIGNIFICANT CHANGE UP (ref 7–13)
POTASSIUM SERPL-MCNC: 4.2 MMOL/L — SIGNIFICANT CHANGE UP (ref 3.5–5.3)
POTASSIUM SERPL-MCNC: 5.4 MMOL/L — HIGH (ref 3.5–5.3)
POTASSIUM SERPL-SCNC: 4.2 MMOL/L — SIGNIFICANT CHANGE UP (ref 3.5–5.3)
POTASSIUM SERPL-SCNC: 5.4 MMOL/L — HIGH (ref 3.5–5.3)
PROT SERPL-MCNC: 8.8 G/DL — HIGH (ref 6–8.3)
PROTHROM AB SERPL-ACNC: 12 SEC — SIGNIFICANT CHANGE UP (ref 9.8–13.1)
RBC # BLD: 4.69 M/UL — SIGNIFICANT CHANGE UP (ref 3.8–5.2)
RBC # FLD: 17.2 % — HIGH (ref 10.3–14.5)
RH IG SCN BLD-IMP: NEGATIVE — SIGNIFICANT CHANGE UP
SARS-COV-2 RNA SPEC QL NAA+PROBE: SIGNIFICANT CHANGE UP
SODIUM SERPL-SCNC: 138 MMOL/L — SIGNIFICANT CHANGE UP (ref 135–145)
SODIUM SERPL-SCNC: 139 MMOL/L — SIGNIFICANT CHANGE UP (ref 135–145)
WBC # BLD: 16.65 K/UL — HIGH (ref 3.8–10.5)
WBC # FLD AUTO: 16.65 K/UL — HIGH (ref 3.8–10.5)

## 2020-05-20 PROCEDURE — 71045 X-RAY EXAM CHEST 1 VIEW: CPT | Mod: 26

## 2020-05-20 PROCEDURE — 99222 1ST HOSP IP/OBS MODERATE 55: CPT | Mod: 57

## 2020-05-20 PROCEDURE — 73590 X-RAY EXAM OF LOWER LEG: CPT | Mod: 26,LT

## 2020-05-20 PROCEDURE — 93281 PM DEVICE PROGR EVAL MULTI: CPT | Mod: 26

## 2020-05-20 PROCEDURE — 73560 X-RAY EXAM OF KNEE 1 OR 2: CPT | Mod: 26,LT

## 2020-05-20 PROCEDURE — 93010 ELECTROCARDIOGRAM REPORT: CPT

## 2020-05-20 PROCEDURE — 99232 SBSQ HOSP IP/OBS MODERATE 35: CPT | Mod: GC

## 2020-05-20 PROCEDURE — 73502 X-RAY EXAM HIP UNI 2-3 VIEWS: CPT | Mod: 26,LT

## 2020-05-20 PROCEDURE — 93970 EXTREMITY STUDY: CPT | Mod: 26

## 2020-05-20 PROCEDURE — 73552 X-RAY EXAM OF FEMUR 2/>: CPT | Mod: 26,LT

## 2020-05-20 PROCEDURE — 99223 1ST HOSP IP/OBS HIGH 75: CPT

## 2020-05-20 RX ORDER — HYDROMORPHONE HYDROCHLORIDE 2 MG/ML
1 INJECTION INTRAMUSCULAR; INTRAVENOUS; SUBCUTANEOUS
Refills: 0 | Status: DISCONTINUED | OUTPATIENT
Start: 2020-05-20 | End: 2020-05-20

## 2020-05-20 RX ORDER — HYDROMORPHONE HYDROCHLORIDE 2 MG/ML
2 INJECTION INTRAMUSCULAR; INTRAVENOUS; SUBCUTANEOUS EVERY 4 HOURS
Refills: 0 | Status: DISCONTINUED | OUTPATIENT
Start: 2020-05-20 | End: 2020-05-20

## 2020-05-20 RX ORDER — PANTOPRAZOLE SODIUM 20 MG/1
40 TABLET, DELAYED RELEASE ORAL
Refills: 0 | Status: DISCONTINUED | OUTPATIENT
Start: 2020-05-20 | End: 2020-05-26

## 2020-05-20 RX ORDER — HYDROMORPHONE HYDROCHLORIDE 2 MG/ML
3 INJECTION INTRAMUSCULAR; INTRAVENOUS; SUBCUTANEOUS ONCE
Refills: 0 | Status: DISCONTINUED | OUTPATIENT
Start: 2020-05-20 | End: 2020-05-20

## 2020-05-20 RX ORDER — ACETAMINOPHEN 500 MG
975 TABLET ORAL EVERY 8 HOURS
Refills: 0 | Status: DISCONTINUED | OUTPATIENT
Start: 2020-05-20 | End: 2020-05-21

## 2020-05-20 RX ORDER — ASCORBIC ACID 60 MG
500 TABLET,CHEWABLE ORAL
Refills: 0 | Status: DISCONTINUED | OUTPATIENT
Start: 2020-05-20 | End: 2020-05-24

## 2020-05-20 RX ORDER — GABAPENTIN 400 MG/1
400 CAPSULE ORAL EVERY 8 HOURS
Refills: 0 | Status: DISCONTINUED | OUTPATIENT
Start: 2020-05-20 | End: 2020-05-26

## 2020-05-20 RX ORDER — HYDROMORPHONE HYDROCHLORIDE 2 MG/ML
1 INJECTION INTRAMUSCULAR; INTRAVENOUS; SUBCUTANEOUS EVERY 4 HOURS
Refills: 0 | Status: DISCONTINUED | OUTPATIENT
Start: 2020-05-20 | End: 2020-05-21

## 2020-05-20 RX ORDER — TIZANIDINE 4 MG/1
4 TABLET ORAL EVERY 8 HOURS
Refills: 0 | Status: DISCONTINUED | OUTPATIENT
Start: 2020-05-20 | End: 2020-05-23

## 2020-05-20 RX ORDER — NICOTINE POLACRILEX 2 MG
1 GUM BUCCAL DAILY
Refills: 0 | Status: DISCONTINUED | OUTPATIENT
Start: 2020-05-20 | End: 2020-05-26

## 2020-05-20 RX ORDER — FOLIC ACID 0.8 MG
1 TABLET ORAL DAILY
Refills: 0 | Status: DISCONTINUED | OUTPATIENT
Start: 2020-05-20 | End: 2020-05-24

## 2020-05-20 RX ORDER — GABAPENTIN 400 MG/1
300 CAPSULE ORAL EVERY 8 HOURS
Refills: 0 | Status: DISCONTINUED | OUTPATIENT
Start: 2020-05-20 | End: 2020-05-20

## 2020-05-20 RX ORDER — HEPARIN SODIUM 5000 [USP'U]/ML
5000 INJECTION INTRAVENOUS; SUBCUTANEOUS EVERY 8 HOURS
Refills: 0 | Status: DISCONTINUED | OUTPATIENT
Start: 2020-05-20 | End: 2020-05-20

## 2020-05-20 RX ORDER — POLYETHYLENE GLYCOL 3350 17 G/17G
17 POWDER, FOR SOLUTION ORAL DAILY
Refills: 0 | Status: DISCONTINUED | OUTPATIENT
Start: 2020-05-20 | End: 2020-05-24

## 2020-05-20 RX ORDER — OXYCODONE HYDROCHLORIDE 5 MG/1
45 TABLET ORAL EVERY 4 HOURS
Refills: 0 | Status: DISCONTINUED | OUTPATIENT
Start: 2020-05-20 | End: 2020-05-22

## 2020-05-20 RX ORDER — SODIUM CHLORIDE 9 MG/ML
1000 INJECTION INTRAMUSCULAR; INTRAVENOUS; SUBCUTANEOUS ONCE
Refills: 0 | Status: COMPLETED | OUTPATIENT
Start: 2020-05-20 | End: 2020-05-20

## 2020-05-20 RX ORDER — OXYCODONE HYDROCHLORIDE 5 MG/1
80 TABLET ORAL EVERY 8 HOURS
Refills: 0 | Status: DISCONTINUED | OUTPATIENT
Start: 2020-05-20 | End: 2020-05-22

## 2020-05-20 RX ORDER — FENTANYL CITRATE 50 UG/ML
100 INJECTION INTRAVENOUS ONCE
Refills: 0 | Status: DISCONTINUED | OUTPATIENT
Start: 2020-05-20 | End: 2020-05-20

## 2020-05-20 RX ORDER — OXYCODONE HYDROCHLORIDE 5 MG/1
30 TABLET ORAL EVERY 6 HOURS
Refills: 0 | Status: DISCONTINUED | OUTPATIENT
Start: 2020-05-20 | End: 2020-05-20

## 2020-05-20 RX ORDER — HYDROMORPHONE HYDROCHLORIDE 2 MG/ML
4 INJECTION INTRAMUSCULAR; INTRAVENOUS; SUBCUTANEOUS ONCE
Refills: 0 | Status: DISCONTINUED | OUTPATIENT
Start: 2020-05-20 | End: 2020-05-20

## 2020-05-20 RX ORDER — HYDROMORPHONE HYDROCHLORIDE 2 MG/ML
1 INJECTION INTRAMUSCULAR; INTRAVENOUS; SUBCUTANEOUS ONCE
Refills: 0 | Status: DISCONTINUED | OUTPATIENT
Start: 2020-05-20 | End: 2020-05-20

## 2020-05-20 RX ORDER — TRAZODONE HCL 50 MG
50 TABLET ORAL AT BEDTIME
Refills: 0 | Status: DISCONTINUED | OUTPATIENT
Start: 2020-05-20 | End: 2020-05-26

## 2020-05-20 RX ORDER — HEPARIN SODIUM 5000 [USP'U]/ML
5000 INJECTION INTRAVENOUS; SUBCUTANEOUS EVERY 8 HOURS
Refills: 0 | Status: COMPLETED | OUTPATIENT
Start: 2020-05-20 | End: 2020-05-20

## 2020-05-20 RX ADMIN — HEPARIN SODIUM 5000 UNIT(S): 5000 INJECTION INTRAVENOUS; SUBCUTANEOUS at 21:05

## 2020-05-20 RX ADMIN — HYDROMORPHONE HYDROCHLORIDE 1 MILLIGRAM(S): 2 INJECTION INTRAMUSCULAR; INTRAVENOUS; SUBCUTANEOUS at 08:10

## 2020-05-20 RX ADMIN — Medication 975 MILLIGRAM(S): at 13:37

## 2020-05-20 RX ADMIN — Medication 1 MILLIGRAM(S): at 13:36

## 2020-05-20 RX ADMIN — POLYETHYLENE GLYCOL 3350 17 GRAM(S): 17 POWDER, FOR SOLUTION ORAL at 13:53

## 2020-05-20 RX ADMIN — HYDROMORPHONE HYDROCHLORIDE 2 MILLIGRAM(S): 2 INJECTION INTRAMUSCULAR; INTRAVENOUS; SUBCUTANEOUS at 08:52

## 2020-05-20 RX ADMIN — FENTANYL CITRATE 100 MICROGRAM(S): 50 INJECTION INTRAVENOUS at 11:32

## 2020-05-20 RX ADMIN — GABAPENTIN 300 MILLIGRAM(S): 400 CAPSULE ORAL at 13:36

## 2020-05-20 RX ADMIN — HYDROMORPHONE HYDROCHLORIDE 1 MILLIGRAM(S): 2 INJECTION INTRAMUSCULAR; INTRAVENOUS; SUBCUTANEOUS at 22:16

## 2020-05-20 RX ADMIN — OXYCODONE HYDROCHLORIDE 80 MILLIGRAM(S): 5 TABLET ORAL at 22:17

## 2020-05-20 RX ADMIN — OXYCODONE HYDROCHLORIDE 30 MILLIGRAM(S): 5 TABLET ORAL at 12:10

## 2020-05-20 RX ADMIN — Medication 1 PATCH: at 12:10

## 2020-05-20 RX ADMIN — OXYCODONE HYDROCHLORIDE 80 MILLIGRAM(S): 5 TABLET ORAL at 13:37

## 2020-05-20 RX ADMIN — HYDROMORPHONE HYDROCHLORIDE 3 MILLIGRAM(S): 2 INJECTION INTRAMUSCULAR; INTRAVENOUS; SUBCUTANEOUS at 08:30

## 2020-05-20 RX ADMIN — Medication 1 PATCH: at 19:33

## 2020-05-20 RX ADMIN — OXYCODONE HYDROCHLORIDE 45 MILLIGRAM(S): 5 TABLET ORAL at 21:02

## 2020-05-20 RX ADMIN — GABAPENTIN 400 MILLIGRAM(S): 400 CAPSULE ORAL at 21:06

## 2020-05-20 RX ADMIN — HYDROMORPHONE HYDROCHLORIDE 2 MILLIGRAM(S): 2 INJECTION INTRAMUSCULAR; INTRAVENOUS; SUBCUTANEOUS at 14:20

## 2020-05-20 RX ADMIN — HYDROMORPHONE HYDROCHLORIDE 4 MILLIGRAM(S): 2 INJECTION INTRAMUSCULAR; INTRAVENOUS; SUBCUTANEOUS at 10:45

## 2020-05-20 RX ADMIN — OXYCODONE HYDROCHLORIDE 45 MILLIGRAM(S): 5 TABLET ORAL at 16:56

## 2020-05-20 RX ADMIN — Medication 1 TABLET(S): at 13:36

## 2020-05-20 RX ADMIN — Medication 500 MILLIGRAM(S): at 19:49

## 2020-05-20 RX ADMIN — SODIUM CHLORIDE 1000 MILLILITER(S): 9 INJECTION INTRAMUSCULAR; INTRAVENOUS; SUBCUTANEOUS at 08:11

## 2020-05-20 RX ADMIN — HYDROMORPHONE HYDROCHLORIDE 1 MILLIGRAM(S): 2 INJECTION INTRAMUSCULAR; INTRAVENOUS; SUBCUTANEOUS at 13:43

## 2020-05-20 RX ADMIN — TIZANIDINE 4 MILLIGRAM(S): 4 TABLET ORAL at 21:05

## 2020-05-20 RX ADMIN — HEPARIN SODIUM 5000 UNIT(S): 5000 INJECTION INTRAVENOUS; SUBCUTANEOUS at 13:53

## 2020-05-20 RX ADMIN — HYDROMORPHONE HYDROCHLORIDE 1 MILLIGRAM(S): 2 INJECTION INTRAMUSCULAR; INTRAVENOUS; SUBCUTANEOUS at 18:04

## 2020-05-20 NOTE — CONSULT NOTE ADULT - SUBJECTIVE AND OBJECTIVE BOX
Requested to consult on this patient for pain management.    HPI: [id number] dislocation of left hip, initial encounter  No pertinent family history in first degree relatives  Handoff  MEWS Score  No pertinent past medical history  Hip dislocation, left  Prophylactic measure  Chronic pain  AICD (automatic cardioverter/defibrillator) present  Hip dislocation, left  Closed pelvic fracture  LEFT HIP PAIN  90+      acetaminophen   Tablet .. 975 milliGRAM(s) Oral every 8 hours  ascorbic acid 500 milliGRAM(s) Oral two times a day  folic acid 1 milliGRAM(s) Oral daily  gabapentin 400 milliGRAM(s) Oral every 8 hours  heparin   Injectable 5000 Unit(s) SubCutaneous every 8 hours  multivitamin 1 Tablet(s) Oral daily  nicotine - 21 mG/24Hr(s) Patch 1 patch Transdermal daily  oxyCODONE    IR 45 milliGRAM(s) Oral every 4 hours PRN  oxyCODONE  ER Tablet 80 milliGRAM(s) Oral every 8 hours  pantoprazole    Tablet 40 milliGRAM(s) Oral before breakfast  polyethylene glycol 3350 17 Gram(s) Oral daily  traZODone 50 milliGRAM(s) Oral at bedtime      No Known Allergies      HYDROmorphone  Injectable: [Ordered as DILAUDID Injectable]  4 milliGRAM(s), IV Push, Once, Stop After 1 Doses  Administration Instructions: This is a Look-alike/Sound-alike Medication  Provider's Contact #: [phone] (05-20-20 @ 10:40)  Xray Hip 2-3 Views, Left: Urgent   Indication: dislocation  Transport: Stretcher-Crib  Exam Completed  Provider's Contact #: [phone] (05-20-20 @ 07:52)  HYDROmorphone  Injectable: [Ordered as DILAUDID Injectable]  2 milliGRAM(s), IV Push, every 4 hours, PRN for Severe Pain (7 - 10), Stop After 3 Doses  Administration Instructions: This is a Look-alike/Sound-alike Medication  Provider's Contact #: [phone] (05-20-20 @ 08:25)  heparin   Injectable:   5000 Unit(s), SubCutaneous, every 8 hours, Stop After 2 Doses  Special Instructions: HOLD AFTER MN for OR 5/21  Provider's Contact #: [phone] (05-20-20 @ 11:50)  Notify Provider For:     Additional Instructions:  Heart rate GREATER THAN 100 or LESS THAN 50 beats per minute.. (05-20-20 @ 11:45)  (ADM OVERRIDE):   Qty Removed: 2 each  Route - Dose Given <see task> (05-20-20 @ 13:41)  HYDROmorphone  Injectable: [Ordered as DILAUDID Injectable]  3 milliGRAM(s), IV Push, Once, Stop After 1 Doses  Administration Instructions: This is a Look-alike/Sound-alike Medication  Provider's Contact #: [phone] (05-20-20 @ 09:25)  Overhead Trapeze:     Time/Priority:  Routine    Additional Instructions:  Apply overhead trapeze to bed. (05-20-20 @ 11:45)  pantoprazole    Tablet: [Known as PROTONIX   DR]  40 milliGRAM(s), Oral, before breakfast  Administration Instructions: swallow whole * don't crush/chew  Provider's Contact #: [phone] (05-20-20 @ 11:48)  Notify Provider For:     Additional Instructions:  Changes in neurological status and /or neurovascular status. (05-20-20 @ 11:45)  Packed Red Cells Order:  1 Unit   Indication: Other: on call for OR  Infuse Unit : 3 Hours --- ON CALL FOR OR (05-20-20 @ 12:09)  oxyCODONE    IR:   30 milliGRAM(s), Oral, every 6 hours  Special Instructions: HOLD FOR SEDATION   home medication  Administration Instructions: This is a Look-alike/Sound-alike Medication  Provider's Contact #: [phone] (05-20-20 @ 11:45)  Basic Metabolic Panel: 05:00 (05-20-20 @ 11:45)  Xray Pelvis 2 views: Urgent   Indication: L hip pain  Transport: Stretcher-Crib  Exam Completed  Provider's Contact #: [phone] (05-20-20 @ 07:59)  Complete Blood Count + Automated Diff: STAT (05-20-20 @ 07:59)  Bed Request:   CLEAN    Service:  SURG    Bed/Unit Type:  SURGERY    Isolation:  None    Special Considerations:  None    Diagnosis:  Hip dislocation, left (05-20-20 @ 11:31)  (Floorstock):   Qty Removed: 1 each (05-20-20 @ 12:05)  Prothrombin Time and INR, Plasma:  Start Date:20-May-2020. AM (05-20-20 @ 12:19)  Xray Femur 2 Views, Left: Urgent   Indication: L hip pain  Transport: Stretcher-Crib  Exam Completed  Provider's Contact #: [phone] (05-20-20 @ 07:59)  folic acid:   1 milliGRAM(s), Oral, daily  Provider's Contact #: [phone] (05-20-20 @ 11:45)  Provider to RN:       previous BMP hemolyzed with elevated K please send repeat (05-20-20 @ 11:15)  Activity - Bedrest (05-20-20 @ 11:45)  HYDROmorphone  Injectable: [Known as DILAUDID Injectable]  1 milliGRAM(s), IV Push, every 3 hours, PRN for breakthrough  Administration Instructions: This is a Look-alike/Sound-alike Medication  Provider's Contact #: [phone] (05-20-20 @ 11:45)  (Floorstock):   Qty Removed: 3 each (05-20-20 @ 12:05)  HYDROmorphone  Injectable: [Ordered as DILAUDID Injectable]  1 milliGRAM(s), IV Push, Once, Stop After 1 Doses  Administration Instructions: This is a Look-alike/Sound-alike Medication  Provider's Contact #: [phone] (05-20-20 @ 07:52)  Comprehensive Metabolic Panel: STAT (05-20-20 @ 07:59)  Basic Metabolic Panel: STAT (05-20-20 @ 11:15)  heparin   Injectable:   5000 Unit(s), SubCutaneous, every 8 hours  Provider's Contact #: [phone] (05-20-20 @ 11:45)  (Floorstock):   Qty Removed: 1 each (05-20-20 @ 09:28)  Type + Screen: 05:00 (05-20-20 @ 12:07)  Xray Knee w/Patella 3 View, Left: Urgent   Indication: L leg pain  Transport: Stretcher-Crib  Cancel Reason: PATIENT UNABLE TO TOLERATE PROCEDURE  Provider's Contact #: [phone] (05-20-20 @ 07:59)  (Floorstock):   Qty Removed: 1 each (05-20-20 @ 11:27)  Diet, NPO after Midnight:      NPO Start Date: 20-May-2020,   NPO Start Time: 23:59  Except Medications (05-20-20 @ 12:18)  Complete Blood Count + Automated Diff: 05:00 (05-20-20 @ 11:45)  C-Reactive Protein, Serum: STAT (05-20-20 @ 07:59)  Add On Test-Specimen in Lab: STAT, - APTT to add on  Specimen In Lab. Enter name of test required in Special Instructions.  Cancel Reason: PAST THE LIMIT HOUR.  This is for informational purposes only and will not receive results. Laboratory staff will enter new orders for the requested add on test and that order will receive results. (05-20-20 @ 12:25)  Admit to Inpatient Level of Care:     Service:  ORTHO    Physician:  Lew Scott (05-20-20 @ 11:45)  ascorbic acid: [Known as VITAMIN C]  500 milliGRAM(s), Oral, two times a day  Provider's Contact #: [phone] (05-20-20 @ 11:45)  Blood Typing (ABO + Rho D): Routine  Addl Info: Conditional: ABO Rh Confirmatory Specimen (05-20-20 @ 07:59)  gabapentin: [Known as NEURONTIN]  300 milliGRAM(s), Oral, every 8 hours  Provider's Contact #: [phone] (05-20-20 @ 11:48)  Weight:     Frequency:  once (05-20-20 @ 11:45)  (Floorstock):   Qty Removed: 1 each (05-20-20 @ 13:31)  multivitamin:   1 Tablet(s), Oral, daily  Provider's Contact #: [phone] (05-20-20 @ 11:45)  Height/Length:     Frequency:  once (05-20-20 @ 11:45)  Type + Screen: Routine (05-20-20 @ 07:59)  fentaNYL    Injectable:   100 MICROGram(s), IV Push, Once, Stop After 1 Doses  Administration Instructions: This is a Look-alike/Sound-alike Medication  Provider's Contact #: [phone] (05-20-20 @ 11:20)  Packed Red Cells Order:  1 Unit   Indication: Other: on call for OR  Infuse Unit : 3 Hours --- ON CALL FOR OR (05-20-20 @ 12:09)  Xray Chest 1 View AP/PA: Urgent   Indication: pre-op  Transport: Stretcher-Crib  Exam Completed  Provider's Contact #: [phone] (05-20-20 @ 07:59)  HCG Quantitative, Serum: Routine (05-20-20 @ 11:45)  Sedimentation Rate, Erythrocyte: STAT (05-20-20 @ 07:59)  Activated Partial Thromboplastin Time:  Start Date:20-May-2020. AM (05-20-20 @ 12:19)  Admit to Inpatient Level of Care:     Service:  SURG    Bed/Unit Type:  SURGERY    Isolation:  None    Physician:  Tyler Hackett    Special Considerations:  None    Diagnosis:  Hip dislocation, left    Additional Instructions:  Diagnosis: Hip dislocation, left (05-20-20 @ 11:31)  (Floorstock):   Qty Removed: 1 each (05-20-20 @ 08:52)  acetaminophen   Tablet ..: [Known as TYLENOL..]  975 milliGRAM(s), Oral, every 8 hours  Administration Instructions: MAX DAILY DOSE:  ADULT = 4,000 mG/Day  This is a Look-alike/Sound-alike Medication  Provider's Contact #: [phone] (05-20-20 @ 11:48)  Admit from ED (05-20-20 @ 15:00)  Notify Provider For:     Additional Instructions:  Respiratory rate GREATER THAN 20 or LESS THAN 10. (05-20-20 @ 11:45)  (Floorstock):   Qty Removed: 2 each (05-20-20 @ 13:31)  (Floorstock):   Qty Removed: 1 each (05-20-20 @ 08:05)  Diet, NPO after Midnight:      NPO Start Date: 20-May-2020,   NPO Start Time: 23:59 (05-20-20 @ 11:48)  polyethylene glycol 3350: [Known as MIRALAX]  17 Gram(s), Oral, daily  Administration Instructions: Dissolve in 8 ounces water, juice, cola or tea.  Provider's Contact #: [phone] (05-20-20 @ 11:48)  Notify Provider For:     Additional Instructions:  Systolic blood pressure GREATER THAN 140mmHg or LESS THAN 90mmHg (05-20-20 @ 11:45)  (Floorstock):   Qty Removed: 3 each (05-20-20 @ 13:31)  Prothrombin Time and INR, Plasma:  Start Date:20-May-2020. STAT (05-20-20 @ 07:59)  traZODone: [Known as DESYREL]  50 milliGRAM(s), Oral, at bedtime  Administration Instructions: This is a Look-alike/Sound-alike Medication  Provider's Contact #: [phone] (05-20-20 @ 11:48)  Vital Signs:     Frequency:  every 8 hours hour(s) hour(s)    Additional Instructions:  Assess BP, HR, RR, Temp. (05-20-20 @ 11:45)  (Floorstock):   Qty Removed: 1 each (05-20-20 @ 13:31)  Xray Knee 1 or 2 Views, Left: 07:56  Exam Completed (05-20-20 @ 08:35)  Urinalysis: Routine  Addl Info: Clean Catch (05-20-20 @ 11:45)  COVID-19 PCR: STAT  Specimen Source: Nasopharyngeal (05-20-20 @ 07:59)  (Floorstock):   Qty Removed: 1 each (05-20-20 @ 13:29)  Notify Provider For:     Additional Instructions:  Temperature GREATER THAN 38.3C (101.0F) or LESS THAN 36C (97F). (05-20-20 @ 11:45)  Intermittent Pneumatic Compression:     Body Side:  Bilateral    Additional Instructions:  Apply stocking or device now and remove only for bathing and skin evaluation as per nursing protocol. (05-20-20 @ 11:45)  (Floorstock):   Qty Removed: 1 each (05-20-20 @ 09:28)  Diet, Regular (05-20-20 @ 11:45)  Complete Blood Count: 05:00 (05-20-20 @ 12:07)  Xray Tibia + Fibula 2 Views, Left: Urgent   Indication: L leg pain  Transport: Stretcher-Crib  Exam Completed  Provider's Contact #: [phone] (05-20-20 @ 07:59)  nicotine - 21 mG/24Hr(s) Patch: [Known as NICODERM CQ 21]  1 patch, Transdermal, daily  Administration Instructions: external use only  Provider's Contact #: [phone] (05-20-20 @ 11:48)  oxyCODONE  ER Tablet: [Known as OxyCONTIN]  80 milliGRAM(s), Oral, every 8 hours, Stop After 2 Days  Special Instructions: HOLD FOR SEDATION   Home medication  Administration Instructions: This is a Look-alike/Sound-alike Medication  Provider's Contact #: [phone] (05-20-20 @ 11:45)  (Floorstock):   Qty Removed: 2 each (05-20-20 @ 10:42)  Notify Provider For:     Additional Instructions:  Blood glucose LESS THAN 70 milligram/deciliter or GREATER THAN 180 milligrams/deciliter (05-20-20 @ 11:45)  US Duplex Venous Lower Ext Complete, Bilateral: Urgent   Indication: dislocated total femur  Transport: Stretcher-Crib  Exam Completed  Provider's Contact #: [phone] (05-20-20 @ 12:01)  sodium chloride 0.9% Bolus:   1000 milliLiter(s), IV Bolus, once, infuse over 60 Minute(s), Stop After 1 Doses  Provider's Contact #: [phone] (05-20-20 @ 07:52)  (ADM OVERRIDE):   Qty Removed: 1 each  Route - Dose Given <see task> (05-20-20 @ 14:18)  12 Lead ECG:   Provider's Contact #: [phone] (05-20-20 @ 15:09)  gabapentin: [Ordered as NEURONTIN]  400 milliGRAM(s), Oral, every 8 hours  Special Instructions: Hold for oversedation.  Provider's Contact #: [phone] (05-20-20 @ 15:53)  Provider to RN:       pt allowed to get PO pain meds 5/21 when NPO except meds (05-20-20 @ 15:53)  oxyCODONE    IR:   45 milliGRAM(s), Oral, every 4 hours, PRN for Severe Pain (7 - 10)  Special Instructions: Hold for oversedation.  Administration Instructions: This is a Look-alike/Sound-alike Medication  Provider's Contact #: [phone] (05-20-20 @ 15:56)    Summary:  Patient seen at the bedside, sitting up in bed. Patient states it hurts to lay down or sleep on her left side. Patient said her hip dislocated again.  Patient appears weepy, complaining of 8-9/10, sharp pain on her left hip that is dislocated, aggravated by movement. Patient states that she feels a burning pain on her left posterior-lateral area of her buttock where she says her screws are located.  Patient has been taking chronic pain medications for about 20-30 years, currently she has been taking Oxycontin ER 80 mg, 3 times a day, Tizanidine 4 mg every 8 hours at home, and Oxycodone 45mg every 6 hours for breakthrough pain. Patient I-stopped and pain medications are being ordered by Dr. Hackett.     PHYSICAL EXAM:  GENERAL: Alert & Oriented x 3 in NAD, well-groomed, well-developed     Impression/Plan: Requested by Ortho team to help manage pain. Pain regimen adjusted.   Recommendations:  -Continue Oxy ER 80 mg every 8 hours  -Oxycodone 45 mg q 4 hours PRN for severe pain. Hold for oversedation.  -IV Dilaudid 1 mg every 4 hours PRN for severe breakthrough pain.  -Continue Tylenol standing as ordered.  -Consider ordering Tizanidine 4 mg every 8 hours PRN for muscle spasm and pain. Hold for oversedation or SBP <100.  -Increased Neurontin to 400 mg q8 hours. Hold for oversedation.  Maintain current po regimen till OR tomorrow morning.  OK with team for patient to continue with po medications till surgery.  Please call pain service for recommendations and pain regimen postop.  Thank you.  Discussed patient with Dr. Watson and agrees with the above recommendations.     Patient was seen 05-20-20 @ 16:06 Requested to consult on this patient for pain management.    HPI: [id number] dislocation of left hip, initial encounter  No pertinent family history in first degree relatives  Handoff  MEWS Score  No pertinent past medical history  Hip dislocation, left  Prophylactic measure  Chronic pain  AICD (automatic cardioverter/defibrillator) present  Hip dislocation, left  Closed pelvic fracture  LEFT HIP PAIN  90+      acetaminophen   Tablet .. 975 milliGRAM(s) Oral every 8 hours  ascorbic acid 500 milliGRAM(s) Oral two times a day  folic acid 1 milliGRAM(s) Oral daily  gabapentin 400 milliGRAM(s) Oral every 8 hours  heparin   Injectable 5000 Unit(s) SubCutaneous every 8 hours  multivitamin 1 Tablet(s) Oral daily  nicotine - 21 mG/24Hr(s) Patch 1 patch Transdermal daily  oxyCODONE    IR 45 milliGRAM(s) Oral every 4 hours PRN  oxyCODONE  ER Tablet 80 milliGRAM(s) Oral every 8 hours  pantoprazole    Tablet 40 milliGRAM(s) Oral before breakfast  polyethylene glycol 3350 17 Gram(s) Oral daily  traZODone 50 milliGRAM(s) Oral at bedtime      No Known Allergies      HYDROmorphone  Injectable: [Ordered as DILAUDID Injectable]  4 milliGRAM(s), IV Push, Once, Stop After 1 Doses  Administration Instructions: This is a Look-alike/Sound-alike Medication  Provider's Contact #: [phone] (05-20-20 @ 10:40)  Xray Hip 2-3 Views, Left: Urgent   Indication: dislocation  Transport: Stretcher-Crib  Exam Completed  Provider's Contact #: [phone] (05-20-20 @ 07:52)  HYDROmorphone  Injectable: [Ordered as DILAUDID Injectable]  2 milliGRAM(s), IV Push, every 4 hours, PRN for Severe Pain (7 - 10), Stop After 3 Doses  Administration Instructions: This is a Look-alike/Sound-alike Medication  Provider's Contact #: [phone] (05-20-20 @ 08:25)  heparin   Injectable:   5000 Unit(s), SubCutaneous, every 8 hours, Stop After 2 Doses  Special Instructions: HOLD AFTER MN for OR 5/21  Provider's Contact #: [phone] (05-20-20 @ 11:50)  Notify Provider For:     Additional Instructions:  Heart rate GREATER THAN 100 or LESS THAN 50 beats per minute.. (05-20-20 @ 11:45)  (ADM OVERRIDE):   Qty Removed: 2 each  Route - Dose Given <see task> (05-20-20 @ 13:41)  HYDROmorphone  Injectable: [Ordered as DILAUDID Injectable]  3 milliGRAM(s), IV Push, Once, Stop After 1 Doses  Administration Instructions: This is a Look-alike/Sound-alike Medication  Provider's Contact #: [phone] (05-20-20 @ 09:25)  Overhead Trapeze:     Time/Priority:  Routine    Additional Instructions:  Apply overhead trapeze to bed. (05-20-20 @ 11:45)  pantoprazole    Tablet: [Known as PROTONIX   DR]  40 milliGRAM(s), Oral, before breakfast  Administration Instructions: swallow whole * don't crush/chew  Provider's Contact #: [phone] (05-20-20 @ 11:48)  Notify Provider For:     Additional Instructions:  Changes in neurological status and /or neurovascular status. (05-20-20 @ 11:45)  Packed Red Cells Order:  1 Unit   Indication: Other: on call for OR  Infuse Unit : 3 Hours --- ON CALL FOR OR (05-20-20 @ 12:09)  oxyCODONE    IR:   30 milliGRAM(s), Oral, every 6 hours  Special Instructions: HOLD FOR SEDATION   home medication  Administration Instructions: This is a Look-alike/Sound-alike Medication  Provider's Contact #: [phone] (05-20-20 @ 11:45)  Basic Metabolic Panel: 05:00 (05-20-20 @ 11:45)  Xray Pelvis 2 views: Urgent   Indication: L hip pain  Transport: Stretcher-Crib  Exam Completed  Provider's Contact #: [phone] (05-20-20 @ 07:59)  Complete Blood Count + Automated Diff: STAT (05-20-20 @ 07:59)  Bed Request:   CLEAN    Service:  SURG    Bed/Unit Type:  SURGERY    Isolation:  None    Special Considerations:  None    Diagnosis:  Hip dislocation, left (05-20-20 @ 11:31)  (Floorstock):   Qty Removed: 1 each (05-20-20 @ 12:05)  Prothrombin Time and INR, Plasma:  Start Date:20-May-2020. AM (05-20-20 @ 12:19)  Xray Femur 2 Views, Left: Urgent   Indication: L hip pain  Transport: Stretcher-Crib  Exam Completed  Provider's Contact #: [phone] (05-20-20 @ 07:59)  folic acid:   1 milliGRAM(s), Oral, daily  Provider's Contact #: [phone] (05-20-20 @ 11:45)  Provider to RN:       previous BMP hemolyzed with elevated K please send repeat (05-20-20 @ 11:15)  Activity - Bedrest (05-20-20 @ 11:45)  HYDROmorphone  Injectable: [Known as DILAUDID Injectable]  1 milliGRAM(s), IV Push, every 3 hours, PRN for breakthrough  Administration Instructions: This is a Look-alike/Sound-alike Medication  Provider's Contact #: [phone] (05-20-20 @ 11:45)  (Floorstock):   Qty Removed: 3 each (05-20-20 @ 12:05)  HYDROmorphone  Injectable: [Ordered as DILAUDID Injectable]  1 milliGRAM(s), IV Push, Once, Stop After 1 Doses  Administration Instructions: This is a Look-alike/Sound-alike Medication  Provider's Contact #: [phone] (05-20-20 @ 07:52)  Comprehensive Metabolic Panel: STAT (05-20-20 @ 07:59)  Basic Metabolic Panel: STAT (05-20-20 @ 11:15)  heparin   Injectable:   5000 Unit(s), SubCutaneous, every 8 hours  Provider's Contact #: [phone] (05-20-20 @ 11:45)  (Floorstock):   Qty Removed: 1 each (05-20-20 @ 09:28)  Type + Screen: 05:00 (05-20-20 @ 12:07)  Xray Knee w/Patella 3 View, Left: Urgent   Indication: L leg pain  Transport: Stretcher-Crib  Cancel Reason: PATIENT UNABLE TO TOLERATE PROCEDURE  Provider's Contact #: [phone] (05-20-20 @ 07:59)  (Floorstock):   Qty Removed: 1 each (05-20-20 @ 11:27)  Diet, NPO after Midnight:      NPO Start Date: 20-May-2020,   NPO Start Time: 23:59  Except Medications (05-20-20 @ 12:18)  Complete Blood Count + Automated Diff: 05:00 (05-20-20 @ 11:45)  C-Reactive Protein, Serum: STAT (05-20-20 @ 07:59)  Add On Test-Specimen in Lab: STAT, - APTT to add on  Specimen In Lab. Enter name of test required in Special Instructions.  Cancel Reason: PAST THE LIMIT HOUR.  This is for informational purposes only and will not receive results. Laboratory staff will enter new orders for the requested add on test and that order will receive results. (05-20-20 @ 12:25)  Admit to Inpatient Level of Care:     Service:  ORTHO    Physician:  Lew Scott (05-20-20 @ 11:45)  ascorbic acid: [Known as VITAMIN C]  500 milliGRAM(s), Oral, two times a day  Provider's Contact #: [phone] (05-20-20 @ 11:45)  Blood Typing (ABO + Rho D): Routine  Addl Info: Conditional: ABO Rh Confirmatory Specimen (05-20-20 @ 07:59)  gabapentin: [Known as NEURONTIN]  300 milliGRAM(s), Oral, every 8 hours  Provider's Contact #: [phone] (05-20-20 @ 11:48)  Weight:     Frequency:  once (05-20-20 @ 11:45)  (Floorstock):   Qty Removed: 1 each (05-20-20 @ 13:31)  multivitamin:   1 Tablet(s), Oral, daily  Provider's Contact #: [phone] (05-20-20 @ 11:45)  Height/Length:     Frequency:  once (05-20-20 @ 11:45)  Type + Screen: Routine (05-20-20 @ 07:59)  fentaNYL    Injectable:   100 MICROGram(s), IV Push, Once, Stop After 1 Doses  Administration Instructions: This is a Look-alike/Sound-alike Medication  Provider's Contact #: [phone] (05-20-20 @ 11:20)  Packed Red Cells Order:  1 Unit   Indication: Other: on call for OR  Infuse Unit : 3 Hours --- ON CALL FOR OR (05-20-20 @ 12:09)  Xray Chest 1 View AP/PA: Urgent   Indication: pre-op  Transport: Stretcher-Crib  Exam Completed  Provider's Contact #: [phone] (05-20-20 @ 07:59)  HCG Quantitative, Serum: Routine (05-20-20 @ 11:45)  Sedimentation Rate, Erythrocyte: STAT (05-20-20 @ 07:59)  Activated Partial Thromboplastin Time:  Start Date:20-May-2020. AM (05-20-20 @ 12:19)  Admit to Inpatient Level of Care:     Service:  SURG    Bed/Unit Type:  SURGERY    Isolation:  None    Physician:  Tyler Hackett    Special Considerations:  None    Diagnosis:  Hip dislocation, left    Additional Instructions:  Diagnosis: Hip dislocation, left (05-20-20 @ 11:31)  (Floorstock):   Qty Removed: 1 each (05-20-20 @ 08:52)  acetaminophen   Tablet ..: [Known as TYLENOL..]  975 milliGRAM(s), Oral, every 8 hours  Administration Instructions: MAX DAILY DOSE:  ADULT = 4,000 mG/Day  This is a Look-alike/Sound-alike Medication  Provider's Contact #: [phone] (05-20-20 @ 11:48)  Admit from ED (05-20-20 @ 15:00)  Notify Provider For:     Additional Instructions:  Respiratory rate GREATER THAN 20 or LESS THAN 10. (05-20-20 @ 11:45)  (Floorstock):   Qty Removed: 2 each (05-20-20 @ 13:31)  (Floorstock):   Qty Removed: 1 each (05-20-20 @ 08:05)  Diet, NPO after Midnight:      NPO Start Date: 20-May-2020,   NPO Start Time: 23:59 (05-20-20 @ 11:48)  polyethylene glycol 3350: [Known as MIRALAX]  17 Gram(s), Oral, daily  Administration Instructions: Dissolve in 8 ounces water, juice, cola or tea.  Provider's Contact #: [phone] (05-20-20 @ 11:48)  Notify Provider For:     Additional Instructions:  Systolic blood pressure GREATER THAN 140mmHg or LESS THAN 90mmHg (05-20-20 @ 11:45)  (Floorstock):   Qty Removed: 3 each (05-20-20 @ 13:31)  Prothrombin Time and INR, Plasma:  Start Date:20-May-2020. STAT (05-20-20 @ 07:59)  traZODone: [Known as DESYREL]  50 milliGRAM(s), Oral, at bedtime  Administration Instructions: This is a Look-alike/Sound-alike Medication  Provider's Contact #: [phone] (05-20-20 @ 11:48)  Vital Signs:     Frequency:  every 8 hours hour(s) hour(s)    Additional Instructions:  Assess BP, HR, RR, Temp. (05-20-20 @ 11:45)  (Floorstock):   Qty Removed: 1 each (05-20-20 @ 13:31)  Xray Knee 1 or 2 Views, Left: 07:56  Exam Completed (05-20-20 @ 08:35)  Urinalysis: Routine  Addl Info: Clean Catch (05-20-20 @ 11:45)  COVID-19 PCR: STAT  Specimen Source: Nasopharyngeal (05-20-20 @ 07:59)  (Floorstock):   Qty Removed: 1 each (05-20-20 @ 13:29)  Notify Provider For:     Additional Instructions:  Temperature GREATER THAN 38.3C (101.0F) or LESS THAN 36C (97F). (05-20-20 @ 11:45)  Intermittent Pneumatic Compression:     Body Side:  Bilateral    Additional Instructions:  Apply stocking or device now and remove only for bathing and skin evaluation as per nursing protocol. (05-20-20 @ 11:45)  (Floorstock):   Qty Removed: 1 each (05-20-20 @ 09:28)  Diet, Regular (05-20-20 @ 11:45)  Complete Blood Count: 05:00 (05-20-20 @ 12:07)  Xray Tibia + Fibula 2 Views, Left: Urgent   Indication: L leg pain  Transport: Stretcher-Crib  Exam Completed  Provider's Contact #: [phone] (05-20-20 @ 07:59)  nicotine - 21 mG/24Hr(s) Patch: [Known as NICODERM CQ 21]  1 patch, Transdermal, daily  Administration Instructions: external use only  Provider's Contact #: [phone] (05-20-20 @ 11:48)  oxyCODONE  ER Tablet: [Known as OxyCONTIN]  80 milliGRAM(s), Oral, every 8 hours, Stop After 2 Days  Special Instructions: HOLD FOR SEDATION   Home medication  Administration Instructions: This is a Look-alike/Sound-alike Medication  Provider's Contact #: [phone] (05-20-20 @ 11:45)  (Floorstock):   Qty Removed: 2 each (05-20-20 @ 10:42)  Notify Provider For:     Additional Instructions:  Blood glucose LESS THAN 70 milligram/deciliter or GREATER THAN 180 milligrams/deciliter (05-20-20 @ 11:45)  US Duplex Venous Lower Ext Complete, Bilateral: Urgent   Indication: dislocated total femur  Transport: Stretcher-Crib  Exam Completed  Provider's Contact #: [phone] (05-20-20 @ 12:01)  sodium chloride 0.9% Bolus:   1000 milliLiter(s), IV Bolus, once, infuse over 60 Minute(s), Stop After 1 Doses  Provider's Contact #: [phone] (05-20-20 @ 07:52)  (ADM OVERRIDE):   Qty Removed: 1 each  Route - Dose Given <see task> (05-20-20 @ 14:18)  12 Lead ECG:   Provider's Contact #: [phone] (05-20-20 @ 15:09)  gabapentin: [Ordered as NEURONTIN]  400 milliGRAM(s), Oral, every 8 hours  Special Instructions: Hold for oversedation.  Provider's Contact #: [phone] (05-20-20 @ 15:53)  Provider to RN:       pt allowed to get PO pain meds 5/21 when NPO except meds (05-20-20 @ 15:53)  oxyCODONE    IR:   45 milliGRAM(s), Oral, every 4 hours, PRN for Severe Pain (7 - 10)  Special Instructions: Hold for oversedation.  Administration Instructions: This is a Look-alike/Sound-alike Medication  Provider's Contact #: [phone] (05-20-20 @ 15:56)    Summary:  Patient seen at the bedside, sitting up in bed. Patient states it hurts to lay down or sleep on her left side. Patient said her hip dislocated again.  Patient appears weepy, complaining of 8-9/10, sharp pain on her left hip that is dislocated, aggravated by movement. Patient states that she feels a burning pain on her left posterior-lateral area of her buttock where she says her screws are located.  Patient has been taking chronic pain medications for about 20-30 years, currently she has been taking Oxycontin ER 80 mg, 3 times a day, Tizanidine 4 mg every 8 hours at home, and Oxycodone 45mg every 6 hours for breakthrough pain. Patient I-stopped and pain medications are being ordered by Dr. Hackett.     PHYSICAL EXAM:  GENERAL: Alert & Oriented x 3 in NAD, well-groomed, well-developed    I-Stop done and placed in chart.    Impression/Plan: Requested by Ortho team to help manage pain. Pain regimen adjusted.   Recommendations:  -Continue Oxy ER 80 mg every 8 hours  -Oxycodone 45 mg q 4 hours PRN for severe pain. Hold for oversedation.  -IV Dilaudid 1 mg every 4 hours PRN for severe breakthrough pain.  -Continue Tylenol standing as ordered.  -Consider ordering Tizanidine 4 mg every 8 hours PRN for muscle spasm and pain. Hold for oversedation or SBP <100.  -Increased Neurontin to 400 mg q8 hours. Hold for oversedation.  Maintain current po regimen till OR tomorrow morning.  OK with team for patient to continue with po medications till surgery.  Please call pain service for recommendations and pain regimen postop.  Thank you.  Discussed patient with Dr. Watson and agrees with the above recommendations.     Patient was seen 05-20-20 @ 16:06
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
## 2020-05-20 NOTE — CONSULT NOTE ADULT - PROBLEM SELECTOR RECOMMENDATION 3
pain management consult as pt is on a heavy dose of narcotics, states she takes oxycontin 80 mg q8h and oxyIR 45 mg q4h prn  bowel regimen, pain regimen per pain management

## 2020-05-20 NOTE — PROCEDURE NOTE - ADDITIONAL PROCEDURE DETAILS
Normal BiV pacing and sensing. Good thresholds. Not Dependent. Underlying 2:1 AVB @ 40 bpm. No ventricular arrhythmias detected. Normal BiV pacing and sensing. Good thresholds. Not Dependent. Underlying 2:1 AVB @ 40 bpm. No ventricular arrhythmias detected. Few episodes of AT detected.

## 2020-05-20 NOTE — ED PROVIDER NOTE - ATTENDING CONTRIBUTION TO CARE
53 yo F past medical history cad, aicd for AVB, hip replacement with frequent dislocations with hip pain since last night after moving in bed. exam as above, concerning for left hip dislocation. neurovascularly intact, unable to range left hip 2/2 to pain. xr consistent with left hip dislocation, attending radiology read pending. patient requiring OR for reduction with previous dislocations. plan: labs, ortho admit

## 2020-05-20 NOTE — CONSULT NOTE ADULT - SUBJECTIVE AND OBJECTIVE BOX
Rossy Allen MD  Pager [phone]    HPI:  53 yo female with h/o femur fx ~ 30 years ago c/b frequent prosthetic dislocations and fractures, recent revision left hip arthroplasty on 11/6 followed by hip dislocation requiring open reduction on 12/4/19 and again on 1/16/20, p/w dislocated KONG.  She drove down with her son from [place] to [facility] today. Per Pt , she has been dislocated for approximately 3 months, was unable to come to hospital secondary to traveling with  and then coronavirus pandemic occurred. Pt has sustained multiple dislocations and underwent multiple revisions in the past, is well known to Dr Hackett and Dr Calero.  XR reveal left KONG dislocation. Patient is scheduled for surgery tomorrow.  She reports h/o cardiac arrest in 2018 requiring AICD placement. She denies ICD discharges, chest pain, dizziness or sob, states that her ICD was interrogated about 3 weeks ago, normal function. She reports significant left hip pain, requiring oxycontin 80 mg q8h and oxycodone 45 mg q4h prn. She is being followed up with pain management at [place].     PAST MEDICAL & SURGICAL HISTORY:  No pertinent past medical history  Closed pelvic fracture      Review of Systems:   CONSTITUTIONAL: No fever, weight loss, or fatigue  EYES: No eye pain, visual disturbances, or discharge  ENMT:  No difficulty hearing, tinnitus, vertigo; No sinus or throat pain  NECK: No pain or stiffness  BREASTS: No pain, masses, or nipple discharge  RESPIRATORY: No cough, wheezing, chills or hemoptysis; No shortness of breath  CARDIOVASCULAR: No chest pain, palpitations, dizziness, or leg swelling  GASTROINTESTINAL: No abdominal or epigastric pain. No nausea, vomiting, or hematemesis; No diarrhea or constipation. No melena or hematochezia.  GENITOURINARY: No dysuria, frequency, hematuria, or incontinence  NEUROLOGICAL: No headaches, memory loss, loss of strength, numbness, or tremors  SKIN: No itching, burning, rashes, or lesions   LYMPH NODES: No enlarged glands  ENDOCRINE: No heat or cold intolerance; No hair loss  MUSCULOSKELETAL: No joint pain or swelling; No muscle, back, or extremity pain  PSYCHIATRIC: No depression, anxiety, mood swings, or difficulty sleeping  HEME/LYMPH: No easy bruising, or bleeding gums  ALLERY AND IMMUNOLOGIC: No hives or eczema    Allergies    No Known Allergies    Intolerances    Social History:  active smoker, trying to quit     FAMILY HISTORY:  No pertinent family history in first degree relatives      Home Medications:  acetaminophen 325 mg oral tablet: 3 tab(s) orally every 8 hours (21 Jan 2020 17:14)  nicotine 21 mg/24 hr transdermal film, extended release:  transdermal  (21 Jan 2020 17:14)  polyethylene glycol 3350 oral powder for reconstitution: 17 gram(s) orally once a day (21 Jan 2020 17:14)  tiZANidine 4 mg oral tablet: 1 tab(s) orally every 6 hours, As needed, Muscle Spasm (21 Jan 2020 17:14)  traZODone 50 mg oral tablet: 1 tab(s) orally once a day (at bedtime) (21 Jan 2020 17:14)      MEDICATIONS  (STANDING):  acetaminophen   Tablet .. 975 milliGRAM(s) Oral every 8 hours  ascorbic acid 500 milliGRAM(s) Oral two times a day  folic acid 1 milliGRAM(s) Oral daily  gabapentin 300 milliGRAM(s) Oral every 8 hours  heparin   Injectable 5000 Unit(s) SubCutaneous every 8 hours  multivitamin 1 Tablet(s) Oral daily  nicotine - 21 mG/24Hr(s) Patch 1 patch Transdermal daily  oxyCODONE    IR 30 milliGRAM(s) Oral every 6 hours  oxyCODONE  ER Tablet 80 milliGRAM(s) Oral every 8 hours  pantoprazole    Tablet 40 milliGRAM(s) Oral before breakfast  polyethylene glycol 3350 17 Gram(s) Oral daily  traZODone 50 milliGRAM(s) Oral at bedtime    MEDICATIONS  (PRN):  HYDROmorphone  Injectable 2 milliGRAM(s) IV Push every 4 hours PRN Severe Pain (7 - 10)  HYDROmorphone  Injectable 1 milliGRAM(s) IV Push every 3 hours PRN breakthrough      Vital Signs Last 24 Hrs  T(C): 36.3 (20 May 2020 15:07), Max: 37.1 (20 May 2020 12:11)  T(F): 97.4 (20 May 2020 15:07), Max: 98.7 (20 May 2020 12:11)  HR: 93 (20 May 2020 15:07) (76 - 116)  BP: 129/91 (20 May 2020 13:32) (129/91 - 188/125)  BP(mean): --  RR: 21 (20 May 2020 15:07) (16 - 24)  SpO2: 98% (20 May 2020 15:07) (98% - 100%)  CAPILLARY BLOOD GLUCOSE        I&O's Summary      PHYSICAL EXAM:  GENERAL: NAD, well-developed  HEAD:  Atraumatic, Normocephalic  EYES: EOMI, PERRLA, conjunctiva and sclera clear  NECK: Supple, No JVD  Chest: AICD in place  CHEST/LUNG: Clear to auscultation bilaterally; No wheeze  HEART: Regular rate and rhythm; No murmurs, rubs, or gallops  ABDOMEN: Soft, Nontender, Nondistended; Bowel sounds present  EXTREMITIES:  2+ Peripheral Pulses, No clubbing, cyanosis, or edema  left hip dislocated, abducted and shortened   PSYCH: AAOx3  NEUROLOGY: non-focal  SKIN: No rashes or lesions    LABS:                        13.6   16.65 )-----------( 238      ( 20 May 2020 08:19 )             42.0     05-20    139  |  106  |  12  ----------------------------<  99  4.2   |  24  |  0.91    Ca    9.1      20 May 2020 11:15    TPro  8.8<H>  /  Alb  4.3  /  TBili  < 0.2<L>  /  DBili  x   /  AST  29  /  ALT  11  /  AlkPhos  111  05-20    PT/INR - ( 20 May 2020 08:21 )   PT: 12.0 SEC;   INR: 1.04          Microbiology     RADIOLOGY & ADDITIONAL TESTS:    Imaging Personally Reviewed:  < from: US Duplex Venous Lower Ext Complete, Bilateral (05.20.20 @ 13:04) >    IMPRESSION:     No evidence of deep venous thrombosis in either lower extremity.    < from: Xray Knee 1 or 2 Views, Left (05.20.20 @ 10:53) >  IMPRESSION:  Left hip dislocation.    Consultant(s) Notes Reviewed:      Care Discussed with Consultants/Other Providers: conor Enriquez, cleared for OR tomorrow
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
## 2020-05-20 NOTE — ED PROVIDER NOTE - CLINICAL SUMMARY MEDICAL DECISION MAKING FREE TEXT BOX
A:  -53 yo female history of 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 presents w/ L hip pain  P:  -Labs, xr's, admit to ortho

## 2020-05-20 NOTE — CONSULT NOTE ADULT - ASSESSMENT
53 yo female with h/o femur fx ~ 30 years ago c/b frequent prosthetic dislocations and fractures, recent revision left hip arthroplasty on 11/6 followed by hip dislocation requiring open reduction on 12/4/19 and again on 1/16/20, p/w dislocated KONG, admitted for revision KONG/SIVA tomorrow

## 2020-05-20 NOTE — ED ADULT NURSE NOTE - INTERVENTIONS DEFINITIONS
Call bell, personal items and telephone within reach/Non-slip footwear when patient is off stretcher/Instruct patient to call for assistance/Stretcher in lowest position, wheels locked, appropriate side rails in place

## 2020-05-20 NOTE — ED PROVIDER NOTE - PROGRESS NOTE DETAILS
Callum Mario DO: spoke again with ortho team to discuss results. state they will see patient shortly. Callum Mario DO: left leg examined. distal pulses, cap refill, sensation still intact. patient complaining of pain. will re-dose meds.

## 2020-05-20 NOTE — H&P ADULT - HISTORY OF PRESENT ILLNESS
55 yo female with dislocated left KONG.  Per Pt , she has been dislocated for approximately 3 months, was unable to come to hospital secondary to traveling with  and then pandemic occurred. Pt has sustained multiple dislocations and underwent multiple revisions in the past, is well known to Dr Hackett and Dr Calero.  XR reveal left KONG dislocation
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
## 2020-05-20 NOTE — CONSULT NOTE ADULT - ATTENDING COMMENTS
May safely proceed with planned procedure. Follow standard protocol for perioperative ICD management.

## 2020-05-20 NOTE — ED ADULT NURSE NOTE - OBJECTIVE STATEMENT
received pt to bed 4, A+Ox3, C/O right hip pain following a dislocation of femoral head getting out of bed this morning. external rotation and shanae of the leg noted to the left hip. pt is in severe pain  10/10. non radiating. pt had hip replacement surgery in March. pt denies falls or trauma to left hip. Labs sent, Medicated as per MD, will continue to monitor.

## 2020-05-20 NOTE — ED ADULT TRIAGE NOTE - CHIEF COMPLAINT QUOTE
Pt. with c/o L hip dislocation this morning.  Pt. stated hip was dislocated twice after orthopedics surgery 3 months ago. L leg appears externally rotated. Pt. unable to sit very uncomfortable in triage pain 10/10

## 2020-05-20 NOTE — H&P ADULT - ASSESSMENT
Admit to Ortho Dr Hcakett for revision left KONG / SIVA   Bilateral lower extremity dopp  Medicine/ card for clearance   SQH   NPO p MN Admit to Ortho Dr Hackett for revision left KONG / SIVA   Bilateral lower extremity dopp  - Pain control  - NPO except medications/IVF  - Hold anticoagulation at midnight, SQH during day  - CBC/BMP/Coags/UA/T+S x2  - EKG/CXR  - Please document medical/cards clearance prior to planned procedure  - Plan for OR

## 2020-05-20 NOTE — CONSULT NOTE ADULT - PROBLEM SELECTOR RECOMMENDATION 9
-h/o recurrent L hip dislocations, denies fall or trauma to  hip   -scheduled to undergo revision KONG/SIVA tomorrow per ortho  -pt is cleared by cardiology for OR without further cardiac workup  -TTE (11/5/19) normal LV function   -RCRI 1, Optimized for OR   -EP to interrogate AICD, check EKG

## 2020-05-21 ENCOUNTER — RESULT REVIEW (OUTPATIENT)
Age: 55
End: 2020-05-21

## 2020-05-21 ENCOUNTER — APPOINTMENT (OUTPATIENT)
Dept: ORTHOPEDIC SURGERY | Facility: HOSPITAL | Age: 55
End: 2020-05-21

## 2020-05-21 LAB
ANION GAP SERPL CALC-SCNC: 11 MMO/L — SIGNIFICANT CHANGE UP (ref 7–14)
ANION GAP SERPL CALC-SCNC: 13 MMO/L — SIGNIFICANT CHANGE UP (ref 7–14)
BASE EXCESS BLDA CALC-SCNC: -1.6 MMOL/L — SIGNIFICANT CHANGE UP
BLD GP AB SCN SERPL QL: NEGATIVE — SIGNIFICANT CHANGE UP
BUN SERPL-MCNC: 11 MG/DL — SIGNIFICANT CHANGE UP (ref 7–23)
BUN SERPL-MCNC: 12 MG/DL — SIGNIFICANT CHANGE UP (ref 7–23)
CA-I BLDA-SCNC: 1.24 MMOL/L — SIGNIFICANT CHANGE UP (ref 1.15–1.29)
CALCIUM SERPL-MCNC: 8.4 MG/DL — SIGNIFICANT CHANGE UP (ref 8.4–10.5)
CALCIUM SERPL-MCNC: 9.4 MG/DL — SIGNIFICANT CHANGE UP (ref 8.4–10.5)
CHLORIDE SERPL-SCNC: 105 MMOL/L — SIGNIFICANT CHANGE UP (ref 98–107)
CHLORIDE SERPL-SCNC: 106 MMOL/L — SIGNIFICANT CHANGE UP (ref 98–107)
CO2 SERPL-SCNC: 20 MMOL/L — LOW (ref 22–31)
CO2 SERPL-SCNC: 24 MMOL/L — SIGNIFICANT CHANGE UP (ref 22–31)
CREAT SERPL-MCNC: 0.96 MG/DL — SIGNIFICANT CHANGE UP (ref 0.5–1.3)
CREAT SERPL-MCNC: 1.08 MG/DL — SIGNIFICANT CHANGE UP (ref 0.5–1.3)
GLUCOSE BLDA-MCNC: 94 MG/DL — SIGNIFICANT CHANGE UP (ref 70–99)
GLUCOSE SERPL-MCNC: 148 MG/DL — HIGH (ref 70–99)
GLUCOSE SERPL-MCNC: 96 MG/DL — SIGNIFICANT CHANGE UP (ref 70–99)
HCG SERPL-ACNC: < 5 MIU/ML — SIGNIFICANT CHANGE UP
HCO3 BLDA-SCNC: 22 MMOL/L — SIGNIFICANT CHANGE UP (ref 22–26)
HCT VFR BLD CALC: 33 % — LOW (ref 34.5–45)
HCT VFR BLD CALC: 38.9 % — SIGNIFICANT CHANGE UP (ref 34.5–45)
HCT VFR BLDA CALC: 34.6 % — SIGNIFICANT CHANGE UP (ref 34.5–46.5)
HGB BLD-MCNC: 10.6 G/DL — LOW (ref 11.5–15.5)
HGB BLD-MCNC: 12.2 G/DL — SIGNIFICANT CHANGE UP (ref 11.5–15.5)
HGB BLDA-MCNC: 11.2 G/DL — LOW (ref 11.5–15.5)
MCHC RBC-ENTMCNC: 27.7 PG — SIGNIFICANT CHANGE UP (ref 27–34)
MCHC RBC-ENTMCNC: 29.4 PG — SIGNIFICANT CHANGE UP (ref 27–34)
MCHC RBC-ENTMCNC: 31.4 % — LOW (ref 32–36)
MCHC RBC-ENTMCNC: 32.1 % — SIGNIFICANT CHANGE UP (ref 32–36)
MCV RBC AUTO: 88.4 FL — SIGNIFICANT CHANGE UP (ref 80–100)
MCV RBC AUTO: 91.4 FL — SIGNIFICANT CHANGE UP (ref 80–100)
NRBC # FLD: 0 K/UL — SIGNIFICANT CHANGE UP (ref 0–0)
NRBC # FLD: 0 K/UL — SIGNIFICANT CHANGE UP (ref 0–0)
PCO2 BLDA: 63 MMHG — HIGH (ref 32–48)
PH BLDA: 7.23 PH — LOW (ref 7.35–7.45)
PLATELET # BLD AUTO: 169 K/UL — SIGNIFICANT CHANGE UP (ref 150–400)
PLATELET # BLD AUTO: 203 K/UL — SIGNIFICANT CHANGE UP (ref 150–400)
PMV BLD: 11.4 FL — SIGNIFICANT CHANGE UP (ref 7–13)
PMV BLD: 11.7 FL — SIGNIFICANT CHANGE UP (ref 7–13)
PO2 BLDA: 358 MMHG — HIGH (ref 83–108)
POTASSIUM BLDA-SCNC: 3.8 MMOL/L — SIGNIFICANT CHANGE UP (ref 3.4–4.5)
POTASSIUM SERPL-MCNC: 4 MMOL/L — SIGNIFICANT CHANGE UP (ref 3.5–5.3)
POTASSIUM SERPL-MCNC: 4.5 MMOL/L — SIGNIFICANT CHANGE UP (ref 3.5–5.3)
POTASSIUM SERPL-SCNC: 4 MMOL/L — SIGNIFICANT CHANGE UP (ref 3.5–5.3)
POTASSIUM SERPL-SCNC: 4.5 MMOL/L — SIGNIFICANT CHANGE UP (ref 3.5–5.3)
RBC # BLD: 3.61 M/UL — LOW (ref 3.8–5.2)
RBC # BLD: 4.4 M/UL — SIGNIFICANT CHANGE UP (ref 3.8–5.2)
RBC # FLD: 17.2 % — HIGH (ref 10.3–14.5)
RBC # FLD: 17.2 % — HIGH (ref 10.3–14.5)
RH IG SCN BLD-IMP: NEGATIVE — SIGNIFICANT CHANGE UP
SAO2 % BLDA: 99.5 % — HIGH (ref 95–99)
SODIUM BLDA-SCNC: 138 MMOL/L — SIGNIFICANT CHANGE UP (ref 136–146)
SODIUM SERPL-SCNC: 139 MMOL/L — SIGNIFICANT CHANGE UP (ref 135–145)
SODIUM SERPL-SCNC: 140 MMOL/L — SIGNIFICANT CHANGE UP (ref 135–145)
WBC # BLD: 19.58 K/UL — HIGH (ref 3.8–10.5)
WBC # BLD: 9.57 K/UL — SIGNIFICANT CHANGE UP (ref 3.8–10.5)
WBC # FLD AUTO: 19.58 K/UL — HIGH (ref 3.8–10.5)
WBC # FLD AUTO: 9.57 K/UL — SIGNIFICANT CHANGE UP (ref 3.8–10.5)

## 2020-05-21 PROCEDURE — 27134 REVISE HIP JOINT REPLACEMENT: CPT | Mod: LT

## 2020-05-21 PROCEDURE — 97605 NEG PRS WND THER DME<=50SQCM: CPT

## 2020-05-21 PROCEDURE — 99233 SBSQ HOSP IP/OBS HIGH 50: CPT

## 2020-05-21 PROCEDURE — 73590 X-RAY EXAM OF LOWER LEG: CPT | Mod: 26,LT

## 2020-05-21 PROCEDURE — 73551 X-RAY EXAM OF FEMUR 1: CPT | Mod: 26,LT

## 2020-05-21 PROCEDURE — 88305 TISSUE EXAM BY PATHOLOGIST: CPT | Mod: 26

## 2020-05-21 RX ORDER — ACETAMINOPHEN 500 MG
1000 TABLET ORAL ONCE
Refills: 0 | Status: COMPLETED | OUTPATIENT
Start: 2020-05-21

## 2020-05-21 RX ORDER — KETAMINE HYDROCHLORIDE 100 MG/ML
5 INJECTION INTRAMUSCULAR; INTRAVENOUS ONCE
Refills: 0 | Status: DISCONTINUED | OUTPATIENT
Start: 2020-05-21 | End: 2020-05-21

## 2020-05-21 RX ORDER — HYDROMORPHONE HYDROCHLORIDE 2 MG/ML
0.5 INJECTION INTRAMUSCULAR; INTRAVENOUS; SUBCUTANEOUS ONCE
Refills: 0 | Status: DISCONTINUED | OUTPATIENT
Start: 2020-05-21 | End: 2020-05-21

## 2020-05-21 RX ORDER — DIPHENHYDRAMINE HCL 50 MG
25 CAPSULE ORAL AT BEDTIME
Refills: 0 | Status: DISCONTINUED | OUTPATIENT
Start: 2020-05-21 | End: 2020-05-26

## 2020-05-21 RX ORDER — CEFAZOLIN SODIUM 1 G
2000 VIAL (EA) INJECTION EVERY 8 HOURS
Refills: 0 | Status: COMPLETED | OUTPATIENT
Start: 2020-05-21 | End: 2020-05-22

## 2020-05-21 RX ORDER — HYDROMORPHONE HYDROCHLORIDE 2 MG/ML
0.5 INJECTION INTRAMUSCULAR; INTRAVENOUS; SUBCUTANEOUS EVERY 4 HOURS
Refills: 0 | Status: DISCONTINUED | OUTPATIENT
Start: 2020-05-21 | End: 2020-05-21

## 2020-05-21 RX ORDER — ACETAMINOPHEN 500 MG
1000 TABLET ORAL ONCE
Refills: 0 | Status: COMPLETED | OUTPATIENT
Start: 2020-05-21 | End: 2020-05-21

## 2020-05-21 RX ORDER — FOLIC ACID 0.8 MG
1 TABLET ORAL DAILY
Refills: 0 | Status: DISCONTINUED | OUTPATIENT
Start: 2020-05-21 | End: 2020-05-26

## 2020-05-21 RX ORDER — ASCORBIC ACID 60 MG
500 TABLET,CHEWABLE ORAL
Refills: 0 | Status: DISCONTINUED | OUTPATIENT
Start: 2020-05-21 | End: 2020-05-26

## 2020-05-21 RX ORDER — ONDANSETRON 8 MG/1
4 TABLET, FILM COATED ORAL EVERY 6 HOURS
Refills: 0 | Status: DISCONTINUED | OUTPATIENT
Start: 2020-05-21 | End: 2020-05-24

## 2020-05-21 RX ORDER — ACETAMINOPHEN 500 MG
1000 TABLET ORAL ONCE
Refills: 0 | Status: DISCONTINUED | OUTPATIENT
Start: 2020-05-21 | End: 2020-05-21

## 2020-05-21 RX ORDER — SODIUM CHLORIDE 9 MG/ML
1000 INJECTION INTRAMUSCULAR; INTRAVENOUS; SUBCUTANEOUS
Refills: 0 | Status: DISCONTINUED | OUTPATIENT
Start: 2020-05-21 | End: 2020-05-26

## 2020-05-21 RX ORDER — ONDANSETRON 8 MG/1
4 TABLET, FILM COATED ORAL EVERY 6 HOURS
Refills: 0 | Status: DISCONTINUED | OUTPATIENT
Start: 2020-05-21 | End: 2020-05-26

## 2020-05-21 RX ORDER — BENZOCAINE AND MENTHOL 5; 1 G/100ML; G/100ML
1 LIQUID ORAL
Refills: 0 | Status: DISCONTINUED | OUTPATIENT
Start: 2020-05-21 | End: 2020-05-26

## 2020-05-21 RX ORDER — NALOXONE HYDROCHLORIDE 4 MG/.1ML
0.1 SPRAY NASAL
Refills: 0 | Status: DISCONTINUED | OUTPATIENT
Start: 2020-05-21 | End: 2020-05-24

## 2020-05-21 RX ORDER — KETOROLAC TROMETHAMINE 30 MG/ML
30 SYRINGE (ML) INJECTION EVERY 6 HOURS
Refills: 0 | Status: DISCONTINUED | OUTPATIENT
Start: 2020-05-21 | End: 2020-05-22

## 2020-05-21 RX ORDER — GABAPENTIN 400 MG/1
300 CAPSULE ORAL EVERY 8 HOURS
Refills: 0 | Status: DISCONTINUED | OUTPATIENT
Start: 2020-05-21 | End: 2020-05-22

## 2020-05-21 RX ORDER — HEPARIN SODIUM 5000 [USP'U]/ML
5000 INJECTION INTRAVENOUS; SUBCUTANEOUS EVERY 8 HOURS
Refills: 0 | Status: DISCONTINUED | OUTPATIENT
Start: 2020-05-21 | End: 2020-05-23

## 2020-05-21 RX ORDER — ROPIVACAINE HCL/PF 5 MG/ML
200 AMPUL (ML) INJECTION
Refills: 0 | Status: DISCONTINUED | OUTPATIENT
Start: 2020-05-21 | End: 2020-05-22

## 2020-05-21 RX ORDER — TRAZODONE HCL 50 MG
50 TABLET ORAL AT BEDTIME
Refills: 0 | Status: DISCONTINUED | OUTPATIENT
Start: 2020-05-21 | End: 2020-05-24

## 2020-05-21 RX ORDER — SENNA PLUS 8.6 MG/1
2 TABLET ORAL AT BEDTIME
Refills: 0 | Status: DISCONTINUED | OUTPATIENT
Start: 2020-05-21 | End: 2020-05-26

## 2020-05-21 RX ORDER — MAGNESIUM HYDROXIDE 400 MG/1
30 TABLET, CHEWABLE ORAL DAILY
Refills: 0 | Status: DISCONTINUED | OUTPATIENT
Start: 2020-05-21 | End: 2020-05-26

## 2020-05-21 RX ORDER — ACETAMINOPHEN 500 MG
650 TABLET ORAL EVERY 6 HOURS
Refills: 0 | Status: DISCONTINUED | OUTPATIENT
Start: 2020-05-21 | End: 2020-05-21

## 2020-05-21 RX ORDER — HYDROMORPHONE HYDROCHLORIDE 2 MG/ML
1.5 INJECTION INTRAMUSCULAR; INTRAVENOUS; SUBCUTANEOUS EVERY 4 HOURS
Refills: 0 | Status: DISCONTINUED | OUTPATIENT
Start: 2020-05-21 | End: 2020-05-22

## 2020-05-21 RX ORDER — FERROUS SULFATE 325(65) MG
325 TABLET ORAL
Refills: 0 | Status: DISCONTINUED | OUTPATIENT
Start: 2020-05-21 | End: 2020-05-26

## 2020-05-21 RX ORDER — SODIUM CHLORIDE 9 MG/ML
1000 INJECTION, SOLUTION INTRAVENOUS
Refills: 0 | Status: DISCONTINUED | OUTPATIENT
Start: 2020-05-21 | End: 2020-05-26

## 2020-05-21 RX ORDER — CHLORHEXIDINE GLUCONATE 213 G/1000ML
1 SOLUTION TOPICAL DAILY
Refills: 0 | Status: COMPLETED | OUTPATIENT
Start: 2020-05-21 | End: 2020-05-21

## 2020-05-21 RX ORDER — HYDROMORPHONE HYDROCHLORIDE 2 MG/ML
1 INJECTION INTRAMUSCULAR; INTRAVENOUS; SUBCUTANEOUS ONCE
Refills: 0 | Status: DISCONTINUED | OUTPATIENT
Start: 2020-05-21 | End: 2020-05-21

## 2020-05-21 RX ORDER — POLYETHYLENE GLYCOL 3350 17 G/17G
17 POWDER, FOR SOLUTION ORAL DAILY
Refills: 0 | Status: DISCONTINUED | OUTPATIENT
Start: 2020-05-21 | End: 2020-05-26

## 2020-05-21 RX ORDER — PANTOPRAZOLE SODIUM 20 MG/1
40 TABLET, DELAYED RELEASE ORAL
Refills: 0 | Status: DISCONTINUED | OUTPATIENT
Start: 2020-05-21 | End: 2020-05-24

## 2020-05-21 RX ADMIN — Medication 1 PATCH: at 19:15

## 2020-05-21 RX ADMIN — KETAMINE HYDROCHLORIDE 5 MILLIGRAM(S): 100 INJECTION INTRAMUSCULAR; INTRAVENOUS at 18:08

## 2020-05-21 RX ADMIN — Medication 30 MILLIGRAM(S): at 15:35

## 2020-05-21 RX ADMIN — HYDROMORPHONE HYDROCHLORIDE 0.5 MILLIGRAM(S): 2 INJECTION INTRAMUSCULAR; INTRAVENOUS; SUBCUTANEOUS at 14:52

## 2020-05-21 RX ADMIN — Medication 200 MILLILITER(S): at 23:17

## 2020-05-21 RX ADMIN — OXYCODONE HYDROCHLORIDE 45 MILLIGRAM(S): 5 TABLET ORAL at 18:12

## 2020-05-21 RX ADMIN — HYDROMORPHONE HYDROCHLORIDE 1.5 MILLIGRAM(S): 2 INJECTION INTRAMUSCULAR; INTRAVENOUS; SUBCUTANEOUS at 23:49

## 2020-05-21 RX ADMIN — KETAMINE HYDROCHLORIDE 5 MILLIGRAM(S): 100 INJECTION INTRAMUSCULAR; INTRAVENOUS at 15:40

## 2020-05-21 RX ADMIN — KETAMINE HYDROCHLORIDE 5 MILLIGRAM(S): 100 INJECTION INTRAMUSCULAR; INTRAVENOUS at 15:22

## 2020-05-21 RX ADMIN — OXYCODONE HYDROCHLORIDE 80 MILLIGRAM(S): 5 TABLET ORAL at 05:16

## 2020-05-21 RX ADMIN — GABAPENTIN 400 MILLIGRAM(S): 400 CAPSULE ORAL at 05:14

## 2020-05-21 RX ADMIN — Medication 100 MILLIGRAM(S): at 17:03

## 2020-05-21 RX ADMIN — HEPARIN SODIUM 5000 UNIT(S): 5000 INJECTION INTRAVENOUS; SUBCUTANEOUS at 21:55

## 2020-05-21 RX ADMIN — OXYCODONE HYDROCHLORIDE 80 MILLIGRAM(S): 5 TABLET ORAL at 22:04

## 2020-05-21 RX ADMIN — KETAMINE HYDROCHLORIDE 5 MILLIGRAM(S): 100 INJECTION INTRAMUSCULAR; INTRAVENOUS at 15:15

## 2020-05-21 RX ADMIN — OXYCODONE HYDROCHLORIDE 45 MILLIGRAM(S): 5 TABLET ORAL at 22:48

## 2020-05-21 RX ADMIN — PANTOPRAZOLE SODIUM 40 MILLIGRAM(S): 20 TABLET, DELAYED RELEASE ORAL at 05:14

## 2020-05-21 RX ADMIN — Medication 200 MILLILITER(S): at 14:11

## 2020-05-21 RX ADMIN — Medication 1 TABLET(S): at 21:49

## 2020-05-21 RX ADMIN — CHLORHEXIDINE GLUCONATE 1 APPLICATION(S): 213 SOLUTION TOPICAL at 05:21

## 2020-05-21 RX ADMIN — GABAPENTIN 400 MILLIGRAM(S): 400 CAPSULE ORAL at 21:47

## 2020-05-21 RX ADMIN — MAGNESIUM HYDROXIDE 30 MILLILITER(S): 400 TABLET, CHEWABLE ORAL at 14:50

## 2020-05-21 RX ADMIN — HYDROMORPHONE HYDROCHLORIDE 1 MILLIGRAM(S): 2 INJECTION INTRAMUSCULAR; INTRAVENOUS; SUBCUTANEOUS at 15:18

## 2020-05-21 RX ADMIN — Medication 1 PATCH: at 05:49

## 2020-05-21 RX ADMIN — OXYCODONE HYDROCHLORIDE 45 MILLIGRAM(S): 5 TABLET ORAL at 05:26

## 2020-05-21 RX ADMIN — TIZANIDINE 4 MILLIGRAM(S): 4 TABLET ORAL at 15:46

## 2020-05-21 RX ADMIN — HYDROMORPHONE HYDROCHLORIDE 1 MILLIGRAM(S): 2 INJECTION INTRAMUSCULAR; INTRAVENOUS; SUBCUTANEOUS at 02:01

## 2020-05-21 RX ADMIN — Medication 1 PATCH: at 02:09

## 2020-05-21 RX ADMIN — Medication 325 MILLIGRAM(S): at 18:03

## 2020-05-21 RX ADMIN — Medication 400 MILLIGRAM(S): at 15:18

## 2020-05-21 RX ADMIN — GABAPENTIN 400 MILLIGRAM(S): 400 CAPSULE ORAL at 15:42

## 2020-05-21 RX ADMIN — OXYCODONE HYDROCHLORIDE 45 MILLIGRAM(S): 5 TABLET ORAL at 01:05

## 2020-05-21 RX ADMIN — Medication 500 MILLIGRAM(S): at 18:54

## 2020-05-21 RX ADMIN — OXYCODONE HYDROCHLORIDE 80 MILLIGRAM(S): 5 TABLET ORAL at 15:01

## 2020-05-21 RX ADMIN — HYDROMORPHONE HYDROCHLORIDE 1.5 MILLIGRAM(S): 2 INJECTION INTRAMUSCULAR; INTRAVENOUS; SUBCUTANEOUS at 14:34

## 2020-05-21 RX ADMIN — Medication 500 MILLIGRAM(S): at 05:14

## 2020-05-21 RX ADMIN — HYDROMORPHONE HYDROCHLORIDE 1 MILLIGRAM(S): 2 INJECTION INTRAMUSCULAR; INTRAVENOUS; SUBCUTANEOUS at 06:07

## 2020-05-21 NOTE — PROGRESS NOTE ADULT - PROBLEM SELECTOR PLAN 1
-h/o recurrent L hip dislocations, denies fall or trauma to  hip   -scheduled to undergo revision KONG/SIVA on 5/21 per ortho  -pt is cleared by cardiology for OR without further cardiac workup  -TTE (11/5/19) normal LV function   -RCRI 1, Optimized for OR   -EP to interrogate AICD, check EKG.

## 2020-05-21 NOTE — PROGRESS NOTE ADULT - SUBJECTIVE AND OBJECTIVE BOX
Patient is a 54y old  Female who presents with a chief complaint of dislocated left hip (21 May 2020 02:34)      SUBJECTIVE / OVERNIGHT EVENTS:  Patient has no new complaints. Denies cp, SOB, abdominal pain, N/V/D     MEDICATIONS  (STANDING):  acetaminophen   Tablet .. 975 milliGRAM(s) Oral every 8 hours  ascorbic acid 500 milliGRAM(s) Oral two times a day  folic acid 1 milliGRAM(s) Oral daily  gabapentin 400 milliGRAM(s) Oral every 8 hours  multivitamin 1 Tablet(s) Oral daily  nicotine - 21 mG/24Hr(s) Patch 1 patch Transdermal daily  oxyCODONE  ER Tablet 80 milliGRAM(s) Oral every 8 hours  pantoprazole    Tablet 40 milliGRAM(s) Oral before breakfast  polyethylene glycol 3350 17 Gram(s) Oral daily  ropivacaine 0.2% in 0.9% Sodium Chloride PCEA 200 milliLiter(s) Epidural PCA Continuous  sodium chloride 0.9%. 1000 milliLiter(s) (75 mL/Hr) IV Continuous <Continuous>  traZODone 50 milliGRAM(s) Oral at bedtime    MEDICATIONS  (PRN):  HYDROmorphone  Injectable 1 milliGRAM(s) IV Push every 4 hours PRN Severe breakthrough Pain (7 - 10)  oxyCODONE    IR 45 milliGRAM(s) Oral every 4 hours PRN Severe Pain (7 - 10)  tiZANidine 4 milliGRAM(s) Oral every 8 hours PRN muscle spasms      Vital Signs Last 24 Hrs  T(C): 36.8 (21 May 2020 07:45), Max: 36.8 (20 May 2020 22:00)  T(F): 98.3 (21 May 2020 07:45), Max: 98.3 (20 May 2020 22:00)  HR: 89 (21 May 2020 07:45) (81 - 93)  BP: 169/94 (21 May 2020 07:45) (128/85 - 169/94)  BP(mean): --  RR: 20 (21 May 2020 07:45) (17 - 21)  SpO2: 94% (21 May 2020 07:45) (94% - 100%)  CAPILLARY BLOOD GLUCOSE        I&O's Summary    20 May 2020 07:01  -  21 May 2020 07:00  --------------------------------------------------------  IN: 0 mL / OUT: 620 mL / NET: -620 mL        PHYSICAL EXAM:  GENERAL: NAD, well-developed  HEAD:  Atraumatic, Normocephalic  EYES: EOMI, PERRLA, conjunctiva and sclera clear  NECK: Supple, No JVD  CHEST/LUNG: Clear to auscultation bilaterally; No wheeze  HEART: Regular rate and rhythm; No murmurs, rubs, or gallops  ABDOMEN: Soft, Nontender, Nondistended; Bowel sounds present  EXTREMITIES:  2+ Peripheral Pulses, No clubbing, cyanosis, or edema  PSYCH: AAOx3  NEUROLOGY: non-focal  SKIN: No rashes or lesions    LABS:                        12.2   9.57  )-----------( 203      ( 21 May 2020 05:21 )             38.9     05-21    140  |  105  |  11  ----------------------------<  96  4.0   |  24  |  1.08    Ca    9.4      21 May 2020 05:21    TPro  8.8<H>  /  Alb  4.3  /  TBili  < 0.2<L>  /  DBili  x   /  AST  29  /  ALT  11  /  AlkPhos  111  05-20    PT/INR - ( 20 May 2020 08:21 )   PT: 12.0 SEC;   INR: 1.04                    RADIOLOGY & ADDITIONAL TESTS:    Imaging Personally Reviewed:    Consultant(s) Notes Reviewed:      Care Discussed with Consultants/Other Providers:

## 2020-05-21 NOTE — PROGRESS NOTE ADULT - SUBJECTIVE AND OBJECTIVE BOX
Patient seen and evaluated this AM.  Still having considerable pain.  Is otherwise ready for OR this AM.    ORTHO POST OP CHECK    S: Pt seen and examined. Doing well postoperatively. Pain well controlled. Denies N/V/CP/SOB.       O:   PE:  MSK:   Exam limited due to pain          +EHL/FHL/TA/Gas/SOl          +DP/SP/Opal/Saph           2+DP  LLE short and ER                         13.6   16.65 )-----------( 238      ( 20 May 2020 08:19 )             42.0     05-20    139  |  106  |  12  ----------------------------<  99  4.2   |  24  |  0.91    Ca    9.1      20 May 2020 11:15    TPro  8.8<H>  /  Alb  4.3  /  TBili  < 0.2<L>  /  DBili  x   /  AST  29  /  ALT  11  /  AlkPhos  111  05-20      Vital Signs Last 24 Hrs  T(C): 36.8 (20 May 2020 22:00), Max: 37.1 (20 May 2020 12:11)  T(F): 98.3 (20 May 2020 22:00), Max: 98.7 (20 May 2020 12:11)  HR: 81 (20 May 2020 22:00) (76 - 116)  BP: 152/83 (20 May 2020 22:00) (129/91 - 188/125)  BP(mean): --  RR: 20 (20 May 2020 22:00) (16 - 24)  SpO2: 100% (20 May 2020 22:00) (98% - 100%)  I&O's Summary    20 May 2020 07:01  -  21 May 2020 02:36  --------------------------------------------------------  IN: 0 mL / OUT: 360 mL / NET: -360 mL        A/P    -Neuro: Multimodal pain control  -Resp: IS  -GI: reg diet  -MSK: OOB, WBAT, PT/OT  -Heme: DVT PPx    Ortho Patient seen and evaluated this AM.  Still having considerable pain.  Is otherwise ready for OR this AM.      S: Pt seen and examined. Doing well.Pain well controlled. Denies N/V/CP/SOB.       O:   PE:  MSK:   Exam limited due to pain          +EHL/FHL/TA/Gas/SOl          +DP/SP/Opal/Saph           2+DP  LLE short and ER                         13.6   16.65 )-----------( 238      ( 20 May 2020 08:19 )             42.0     05-20    139  |  106  |  12  ----------------------------<  99  4.2   |  24  |  0.91    Ca    9.1      20 May 2020 11:15    TPro  8.8<H>  /  Alb  4.3  /  TBili  < 0.2<L>  /  DBili  x   /  AST  29  /  ALT  11  /  AlkPhos  111  05-20      Vital Signs Last 24 Hrs  T(C): 36.8 (20 May 2020 22:00), Max: 37.1 (20 May 2020 12:11)  T(F): 98.3 (20 May 2020 22:00), Max: 98.7 (20 May 2020 12:11)  HR: 81 (20 May 2020 22:00) (76 - 116)  BP: 152/83 (20 May 2020 22:00) (129/91 - 188/125)  BP(mean): --  RR: 20 (20 May 2020 22:00) (16 - 24)  SpO2: 100% (20 May 2020 22:00) (98% - 100%)  I&O's Summary    20 May 2020 07:01  -  21 May 2020 02:36  --------------------------------------------------------  IN: 0 mL / OUT: 360 mL / NET: -360 mL        A/P    -Neuro: Multimodal pain control  -Resp: IS  -GI: reg diet  -MSK: OOB, WBAT, PT/OT  -Heme: DVT PPx    Ortho Patient seen and evaluated this AM.  Still having considerable pain.  Is otherwise ready for OR this AM.      S: Pt seen and examined. Doing well.Pain well controlled. Denies N/V/CP/SOB.       O:   PE:  MSK:   Exam limited due to pain          +EHL/FHL/TA/Gas/SOl          +DP/SP/Opal/Saph           2+DP  LLE short and ER                         13.6   16.65 )-----------( 238      ( 20 May 2020 08:19 )             42.0     05-20    139  |  106  |  12  ----------------------------<  99  4.2   |  24  |  0.91    Ca    9.1      20 May 2020 11:15    TPro  8.8<H>  /  Alb  4.3  /  TBili  < 0.2<L>  /  DBili  x   /  AST  29  /  ALT  11  /  AlkPhos  111  05-20      Vital Signs Last 24 Hrs  T(C): 36.8 (20 May 2020 22:00), Max: 37.1 (20 May 2020 12:11)  T(F): 98.3 (20 May 2020 22:00), Max: 98.7 (20 May 2020 12:11)  HR: 81 (20 May 2020 22:00) (76 - 116)  BP: 152/83 (20 May 2020 22:00) (129/91 - 188/125)  BP(mean): --  RR: 20 (20 May 2020 22:00) (16 - 24)  SpO2: 100% (20 May 2020 22:00) (98% - 100%)  I&O's Summary    20 May 2020 07:01  -  21 May 2020 02:36  --------------------------------------------------------  IN: 0 mL / OUT: 360 mL / NET: -360 mL

## 2020-05-21 NOTE — PROGRESS NOTE ADULT - ASSESSMENT
55 yo female with h/o femur fx ~ 30 years ago c/b frequent prosthetic dislocations and fractures, recent revision left hip arthroplasty on 11/6 followed by hip dislocation requiring open reduction on 12/4/19 and again on 1/16/20, p/w dislocated KONG, admitted for revision KONG/SIVA today 5/21.

## 2020-05-21 NOTE — PROVIDER CONTACT NOTE (OTHER) - ASSESSMENT
Patient is complaining that her pain is not being relieved with the standing and PRN pain medications. Patient is currently on 45mg Oxycodone, 1mg Dilaudid IVP for breakthrough, and Oxycotin 80mg standing. Patient is currently on bedrest and uses the overhead trapeze to move herself in bed.

## 2020-05-21 NOTE — PROGRESS NOTE ADULT - PROBLEM SELECTOR PLAN 3
pain management consult as pt is on a heavy dose of narcotics, states she takes oxycontin 80 mg q8h and oxyIR 45 mg q4h prn  bowel regimen, pain regimen per pain management.

## 2020-05-21 NOTE — PROVIDER CONTACT NOTE (OTHER) - SITUATION
Patient is complaining that her pain is not being relieved with the standing and PRN pain medications.

## 2020-05-21 NOTE — PRE-OP CHECKLIST - 1.
9:49 am Pt's dentures (full upper and lower) and 2 metal rings picked up from OR Holding area by Ema from [address] [place] [address]

## 2020-05-21 NOTE — PACU DISCHARGE NOTE - COMMENTS
Pt currently calm, cooperative, reports pain much improved and under control. Pt expressed that her pain is well control now but she is concerned about pain control when she is on the floors.  Discussed with patient in depth that ketamine treatments will not be feasible on the floors, however the neuromodulating effects of ketamine are long lasting. Discussed that patient's pain medication regimen will be at the discretion of the acute pain service, who is following her. Patient expresses understanding. Pt currently calm, cooperative, reports pain much improved and under control. Pt expressed that her pain is well control now but she is concerned about pain control when she is on the floors.  Discussed with patient in depth that ketamine treatments will not be feasible on the floors, however the neuromodulating effects of ketamine are long lasting. Discussed that patient's pain medication regimen will be at the discretion of the acute pain service, who is following her. Patient expresses understanding.    Pt should remain on continuous pulse ox monitoring. Pt should be considered a fall risk.

## 2020-05-21 NOTE — PROGRESS NOTE ADULT - SUBJECTIVE AND OBJECTIVE BOX
Anesthesia Pain Management Service: Day __ of Epidural    SUBJECTIVE: Patient doing well with PCEA and no problems.  Pain Scale Score:   Refer to charted pain scores    THERAPY:  [x ] Epidural Bupivacaine 0.0625% and Hydromorphone  		[ X] 10 micrograms/mL	[ ] 5 micrograms/mL  [ ] Epidural Bupivacaine 0.0625% and Fentanyl - 2 micrograms/mL  [ ] Epidural Ropivacaine 0.1% plain – 1 mg/mL  [ ] Patient Controlled Regional Anesthesia (PCRA) Ropivacaine  		[ ] 0.2%			[ ] 0.1%    Demand dose __3_ lockout __15_ (minutes) Continuous Rate ___ Total: ____ ml used (in past 24 hours)      MEDICATIONS  (STANDING):  acetaminophen   Tablet .. 975 milliGRAM(s) Oral every 8 hours  ascorbic acid 500 milliGRAM(s) Oral two times a day  ascorbic acid 500 milliGRAM(s) Oral two times a day  calcium carbonate 1250 mG  + Vitamin D (OsCal 500 + D) 1 Tablet(s) Oral three times a day  ceFAZolin   IVPB 2000 milliGRAM(s) IV Intermittent every 8 hours  ferrous    sulfate 325 milliGRAM(s) Oral three times a day with meals  folic acid 1 milliGRAM(s) Oral daily  folic acid 1 milliGRAM(s) Oral daily  gabapentin 400 milliGRAM(s) Oral every 8 hours  HYDROmorphone  Injectable 0.5 milliGRAM(s) IV Push once  ketamine Injectable 5 milliGRAM(s) IV Push once  lactated ringers. 1000 milliLiter(s) (75 mL/Hr) IV Continuous <Continuous>  multivitamin 1 Tablet(s) Oral daily  multivitamin 1 Tablet(s) Oral daily  nicotine - 21 mG/24Hr(s) Patch 1 patch Transdermal daily  oxyCODONE  ER Tablet 80 milliGRAM(s) Oral every 8 hours  pantoprazole    Tablet 40 milliGRAM(s) Oral before breakfast  pantoprazole    Tablet 40 milliGRAM(s) Oral before breakfast  polyethylene glycol 3350 17 Gram(s) Oral daily  polyethylene glycol 3350 17 Gram(s) Oral daily  ropivacaine 0.2% in 0.9% Sodium Chloride PCEA 200 milliLiter(s) Epidural PCA Continuous  sodium chloride 0.9%. 1000 milliLiter(s) (75 mL/Hr) IV Continuous <Continuous>  traZODone 50 milliGRAM(s) Oral at bedtime    MEDICATIONS  (PRN):  acetaminophen   Tablet .. 650 milliGRAM(s) Oral every 6 hours PRN Temp greater or equal to 38C (100.4F), Mild Pain (1 - 3)  aluminum hydroxide/magnesium hydroxide/simethicone Suspension 30 milliLiter(s) Oral four times a day PRN Indigestion  diphenhydrAMINE 25 milliGRAM(s) Oral at bedtime PRN Insomnia  HYDROmorphone  Injectable 1.5 milliGRAM(s) IV Push every 4 hours PRN Severe breakthrough Pain (7 - 10)  magnesium hydroxide Suspension 30 milliLiter(s) Oral daily PRN Constipation  ondansetron Injectable 4 milliGRAM(s) IV Push every 6 hours PRN Nausea and/or Vomiting  oxyCODONE    IR 45 milliGRAM(s) Oral every 4 hours PRN Severe Pain (7 - 10)  senna 2 Tablet(s) Oral at bedtime PRN Constipation  tiZANidine 4 milliGRAM(s) Oral every 8 hours PRN muscle spasms      OBJECTIVE:    Assessment of Catheter Site:	[ ] Left	[ ] Right  [x ] Epidural 	[ ] Femoral	      [ ] Saphenous   [ ] Supraclavicular   [ ] Other:    [x ] Dressing intact	[x ] Site non-tender	[ x] Site without erythema, discharge, edema  [x ] Epidural tubing and connection checked	[x] Gross neurological exam within normal limits  [ ] Catheter removed – tip intact		[ ] Afebrile  	[ ] Febrile: ___   [ X] see Temp under VS below)    PT/INR - ( 20 May 2020 08:21 )   PT: 12.0 SEC;   INR: 1.04                                12.2   9.57  )-----------( 203      ( 21 May 2020 05:21 )             38.9     Vital Signs Last 24 Hrs  T(C): 36.8 (05-21-20 @ 07:45), Max: 36.8 (05-20-20 @ 22:00)  T(F): 98.3 (05-21-20 @ 07:45), Max: 98.3 (05-20-20 @ 22:00)  HR: 87 (05-21-20 @ 14:50) (81 - 93)  BP: 119/70 (05-21-20 @ 14:50) (119/70 - 169/94)  BP(mean): 81 (05-21-20 @ 14:50) (81 - 127)  RR: 17 (05-21-20 @ 14:50) (17 - 27)  SpO2: 92% (05-21-20 @ 14:50) (92% - 100%)      Sedation Score:	[x ] Alert	[ ] Drowsy	[ ] Arousable	[ ] Asleep	[ ] Unresponsive    Side Effects:	[x ] None	[ ] Nausea	[ ] Vomiting	[ ] Pruritus  		[ ] Weakness		[ ] Numbness	[ ] Other:    ASSESSMENT/ PLAN:    Therapy to  be:	[x ] Continue   [ ] Discontinued   [ ] Change to prn Analgesics    Documentation and Verification of current medications:  [ X ] Done	[ ] Not done, not eligible, reason:    Comments: patient complaining of pain 10/10 post op, history of chronic opioid use. Epidural in place running ropi 0.2% at 6ml/hr. Discussed case with PACU attending and resident, plan is to continue PCEA and current pain regimen. May contact pain service #[phone]

## 2020-05-21 NOTE — PROGRESS NOTE ADULT - ASSESSMENT
53 yo F with dislocated prosthetic L hip.    Plan:  OR this AM 5/21  [place]PO from [place]  IVF while NPO  Appreciate pain mgmt med recs  Appreciate medical / cardiac clearances  Pain control as needed  Hold chemical dvt ppx  Please draw CBC/BMP/T+S/U HcG/ Coags/ at 5am for OR

## 2020-05-21 NOTE — PROGRESS NOTE ADULT - SUBJECTIVE AND OBJECTIVE BOX
ORTHO POC      Patient resting comfortably without complaint s/p revision left KONG today     Vital Signs Last 24 Hrs  T(C): 36.8 (21 May 2020 07:45), Max: 36.8 (20 May 2020 22:00)  T(F): 98.3 (21 May 2020 07:45), Max: 98.3 (20 May 2020 22:00)  HR: 84 (21 May 2020 15:45) (81 - 106)  BP: 179/154 (21 May 2020 15:45) (119/70 - 179/154)  BP(mean): 160 (21 May 2020 15:45) (81 - 160)  RR: 18 (21 May 2020 15:45) (13 - 27)  SpO2: 96% (21 May 2020 15:45) (79% - 100%)    PE : left hip dressing clean/dry/ intact   HV/Abd pillow in place           LE:  EHL/GC/TA 5/5                  sensory intact                   DP pulse 2+    Labs :                      10.6   19.58 )-----------( 169      ( 21 May 2020 14:50 )             33.0                 05-21    139  |  106  |  12  ----------------------------<  148<H>  4.5   |  20<L>  |  0.96    Ca    8.4      21 May 2020 14:50    TPro  8.8<H>  /  Alb  4.3  /  TBili  < 0.2<L>  /  DBili  x   /  AST  29  /  ALT  11  /  AlkPhos  111  05-20      U/O:     A/P: Stable postop            PT- WBAT            DVT prophylaxis- venodynes/ SQH           Pain Control            Incentive Spirometer            Antibiotics x 24 hr            Follow up AM labs/ HV output            DC PCEA per anesthesia

## 2020-05-21 NOTE — PROVIDER CONTACT NOTE (OTHER) - ACTION/TREATMENT ORDERED:
Pain management suggests to explain to patient that tylenol combined with Xanaflex and gabapentin will help combat the pain together. Will con't to encourage patient.

## 2020-05-21 NOTE — PROVIDER CONTACT NOTE (OTHER) - RECOMMENDATIONS
Notify pain management. Pain management contacted and suggested to maybe start the patient on toradol and to encourage her to take the tylenol she is refusing.

## 2020-05-22 DIAGNOSIS — F43.29 ADJUSTMENT DISORDER WITH OTHER SYMPTOMS: ICD-10-CM

## 2020-05-22 LAB
ANION GAP SERPL CALC-SCNC: 10 MMO/L — SIGNIFICANT CHANGE UP (ref 7–14)
APTT BLD: 25.4 SEC — LOW (ref 27.5–36.3)
BUN SERPL-MCNC: 20 MG/DL — SIGNIFICANT CHANGE UP (ref 7–23)
CALCIUM SERPL-MCNC: 9 MG/DL — SIGNIFICANT CHANGE UP (ref 8.4–10.5)
CHLORIDE SERPL-SCNC: 101 MMOL/L — SIGNIFICANT CHANGE UP (ref 98–107)
CO2 SERPL-SCNC: 25 MMOL/L — SIGNIFICANT CHANGE UP (ref 22–31)
CREAT SERPL-MCNC: 1.34 MG/DL — HIGH (ref 0.5–1.3)
GLUCOSE SERPL-MCNC: 147 MG/DL — HIGH (ref 70–99)
GRAM STN FLD: SIGNIFICANT CHANGE UP
HCT VFR BLD CALC: 26.3 % — LOW (ref 34.5–45)
HGB BLD-MCNC: 8.5 G/DL — LOW (ref 11.5–15.5)
INR BLD: 1.05 — SIGNIFICANT CHANGE UP (ref 0.88–1.17)
MCHC RBC-ENTMCNC: 28.7 PG — SIGNIFICANT CHANGE UP (ref 27–34)
MCHC RBC-ENTMCNC: 32.3 % — SIGNIFICANT CHANGE UP (ref 32–36)
MCV RBC AUTO: 88.9 FL — SIGNIFICANT CHANGE UP (ref 80–100)
NRBC # FLD: 0 K/UL — SIGNIFICANT CHANGE UP (ref 0–0)
PLATELET # BLD AUTO: 165 K/UL — SIGNIFICANT CHANGE UP (ref 150–400)
PMV BLD: 12.4 FL — SIGNIFICANT CHANGE UP (ref 7–13)
POTASSIUM SERPL-MCNC: 4.4 MMOL/L — SIGNIFICANT CHANGE UP (ref 3.5–5.3)
POTASSIUM SERPL-SCNC: 4.4 MMOL/L — SIGNIFICANT CHANGE UP (ref 3.5–5.3)
PROTHROM AB SERPL-ACNC: 12.1 SEC — SIGNIFICANT CHANGE UP (ref 9.8–13.1)
RBC # BLD: 2.96 M/UL — LOW (ref 3.8–5.2)
RBC # FLD: 17.1 % — HIGH (ref 10.3–14.5)
SODIUM SERPL-SCNC: 136 MMOL/L — SIGNIFICANT CHANGE UP (ref 135–145)
SPECIMEN SOURCE: SIGNIFICANT CHANGE UP
WBC # BLD: 15.74 K/UL — HIGH (ref 3.8–10.5)
WBC # FLD AUTO: 15.74 K/UL — HIGH (ref 3.8–10.5)

## 2020-05-22 PROCEDURE — 90792 PSYCH DIAG EVAL W/MED SRVCS: CPT

## 2020-05-22 PROCEDURE — 99233 SBSQ HOSP IP/OBS HIGH 50: CPT

## 2020-05-22 RX ORDER — QUETIAPINE FUMARATE 200 MG/1
25 TABLET, FILM COATED ORAL EVERY 6 HOURS
Refills: 0 | Status: DISCONTINUED | OUTPATIENT
Start: 2020-05-22 | End: 2020-05-26

## 2020-05-22 RX ORDER — HYDROMORPHONE HYDROCHLORIDE 2 MG/ML
1.5 INJECTION INTRAMUSCULAR; INTRAVENOUS; SUBCUTANEOUS
Refills: 0 | Status: DISCONTINUED | OUTPATIENT
Start: 2020-05-22 | End: 2020-05-22

## 2020-05-22 RX ORDER — HYDROMORPHONE HYDROCHLORIDE 2 MG/ML
30 INJECTION INTRAMUSCULAR; INTRAVENOUS; SUBCUTANEOUS
Refills: 0 | Status: DISCONTINUED | OUTPATIENT
Start: 2020-05-22 | End: 2020-05-23

## 2020-05-22 RX ORDER — HYDROMORPHONE HYDROCHLORIDE 2 MG/ML
2 INJECTION INTRAMUSCULAR; INTRAVENOUS; SUBCUTANEOUS EVERY 4 HOURS
Refills: 0 | Status: DISCONTINUED | OUTPATIENT
Start: 2020-05-22 | End: 2020-05-22

## 2020-05-22 RX ORDER — HYDROMORPHONE HYDROCHLORIDE 2 MG/ML
2 INJECTION INTRAMUSCULAR; INTRAVENOUS; SUBCUTANEOUS
Refills: 0 | Status: DISCONTINUED | OUTPATIENT
Start: 2020-05-22 | End: 2020-05-22

## 2020-05-22 RX ORDER — ONDANSETRON 8 MG/1
4 TABLET, FILM COATED ORAL EVERY 6 HOURS
Refills: 0 | Status: DISCONTINUED | OUTPATIENT
Start: 2020-05-22 | End: 2020-05-24

## 2020-05-22 RX ORDER — EDOXABAN TOSYLATE 60 MG/1
TABLET, FILM COATED ORAL
Refills: 0 | Status: ACTIVE | COMMUNITY

## 2020-05-22 RX ORDER — HYDROMORPHONE HYDROCHLORIDE 2 MG/ML
1 INJECTION INTRAMUSCULAR; INTRAVENOUS; SUBCUTANEOUS
Refills: 0 | Status: DISCONTINUED | OUTPATIENT
Start: 2020-05-22 | End: 2020-05-23

## 2020-05-22 RX ORDER — NALOXONE HYDROCHLORIDE 4 MG/.1ML
0.1 SPRAY NASAL
Refills: 0 | Status: DISCONTINUED | OUTPATIENT
Start: 2020-05-22 | End: 2020-05-26

## 2020-05-22 RX ORDER — MIRTAZAPINE 45 MG/1
7.5 TABLET, ORALLY DISINTEGRATING ORAL AT BEDTIME
Refills: 0 | Status: DISCONTINUED | OUTPATIENT
Start: 2020-05-22 | End: 2020-05-26

## 2020-05-22 RX ORDER — OXYCODONE HYDROCHLORIDE 5 MG/1
40 TABLET ORAL ONCE
Refills: 0 | Status: DISCONTINUED | OUTPATIENT
Start: 2020-05-22 | End: 2020-05-23

## 2020-05-22 RX ORDER — HYDROMORPHONE HYDROCHLORIDE 2 MG/ML
0.5 INJECTION INTRAMUSCULAR; INTRAVENOUS; SUBCUTANEOUS ONCE
Refills: 0 | Status: DISCONTINUED | OUTPATIENT
Start: 2020-05-22 | End: 2020-05-22

## 2020-05-22 RX ADMIN — GABAPENTIN 400 MILLIGRAM(S): 400 CAPSULE ORAL at 05:37

## 2020-05-22 RX ADMIN — Medication 500 MILLIGRAM(S): at 18:00

## 2020-05-22 RX ADMIN — Medication 200 MILLILITER(S): at 09:00

## 2020-05-22 RX ADMIN — Medication 200 MILLILITER(S): at 20:17

## 2020-05-22 RX ADMIN — Medication 200 MILLILITER(S): at 13:42

## 2020-05-22 RX ADMIN — Medication 30 MILLIGRAM(S): at 13:34

## 2020-05-22 RX ADMIN — Medication 1 PATCH: at 02:25

## 2020-05-22 RX ADMIN — HYDROMORPHONE HYDROCHLORIDE 0.5 MILLIGRAM(S): 2 INJECTION INTRAMUSCULAR; INTRAVENOUS; SUBCUTANEOUS at 13:19

## 2020-05-22 RX ADMIN — Medication 30 MILLIGRAM(S): at 01:15

## 2020-05-22 RX ADMIN — Medication 325 MILLIGRAM(S): at 19:05

## 2020-05-22 RX ADMIN — OXYCODONE HYDROCHLORIDE 45 MILLIGRAM(S): 5 TABLET ORAL at 19:05

## 2020-05-22 RX ADMIN — SENNA PLUS 2 TABLET(S): 8.6 TABLET ORAL at 02:15

## 2020-05-22 RX ADMIN — Medication 30 MILLIGRAM(S): at 05:37

## 2020-05-22 RX ADMIN — Medication 500 MILLIGRAM(S): at 05:37

## 2020-05-22 RX ADMIN — Medication 1 TABLET(S): at 13:21

## 2020-05-22 RX ADMIN — OXYCODONE HYDROCHLORIDE 80 MILLIGRAM(S): 5 TABLET ORAL at 05:37

## 2020-05-22 RX ADMIN — Medication 100 MILLIGRAM(S): at 01:15

## 2020-05-22 RX ADMIN — HYDROMORPHONE HYDROCHLORIDE 1.5 MILLIGRAM(S): 2 INJECTION INTRAMUSCULAR; INTRAVENOUS; SUBCUTANEOUS at 08:22

## 2020-05-22 RX ADMIN — Medication 1 PATCH: at 13:21

## 2020-05-22 RX ADMIN — OXYCODONE HYDROCHLORIDE 45 MILLIGRAM(S): 5 TABLET ORAL at 07:10

## 2020-05-22 RX ADMIN — OXYCODONE HYDROCHLORIDE 45 MILLIGRAM(S): 5 TABLET ORAL at 11:08

## 2020-05-22 RX ADMIN — Medication 200 MILLILITER(S): at 10:59

## 2020-05-22 RX ADMIN — HYDROMORPHONE HYDROCHLORIDE 1.5 MILLIGRAM(S): 2 INJECTION INTRAMUSCULAR; INTRAVENOUS; SUBCUTANEOUS at 12:15

## 2020-05-22 RX ADMIN — HEPARIN SODIUM 5000 UNIT(S): 5000 INJECTION INTRAVENOUS; SUBCUTANEOUS at 05:36

## 2020-05-22 RX ADMIN — Medication 1 TABLET(S): at 13:34

## 2020-05-22 RX ADMIN — ONDANSETRON 4 MILLIGRAM(S): 8 TABLET, FILM COATED ORAL at 02:15

## 2020-05-22 RX ADMIN — OXYCODONE HYDROCHLORIDE 45 MILLIGRAM(S): 5 TABLET ORAL at 02:49

## 2020-05-22 RX ADMIN — PANTOPRAZOLE SODIUM 40 MILLIGRAM(S): 20 TABLET, DELAYED RELEASE ORAL at 05:38

## 2020-05-22 RX ADMIN — TIZANIDINE 4 MILLIGRAM(S): 4 TABLET ORAL at 01:18

## 2020-05-22 RX ADMIN — OXYCODONE HYDROCHLORIDE 45 MILLIGRAM(S): 5 TABLET ORAL at 15:03

## 2020-05-22 RX ADMIN — Medication 1 PATCH: at 18:00

## 2020-05-22 RX ADMIN — Medication 1 MILLIGRAM(S): at 13:22

## 2020-05-22 RX ADMIN — HYDROMORPHONE HYDROCHLORIDE 1.5 MILLIGRAM(S): 2 INJECTION INTRAMUSCULAR; INTRAVENOUS; SUBCUTANEOUS at 03:56

## 2020-05-22 RX ADMIN — HYDROMORPHONE HYDROCHLORIDE 2 MILLIGRAM(S): 2 INJECTION INTRAMUSCULAR; INTRAVENOUS; SUBCUTANEOUS at 16:21

## 2020-05-22 RX ADMIN — HEPARIN SODIUM 5000 UNIT(S): 5000 INJECTION INTRAVENOUS; SUBCUTANEOUS at 13:22

## 2020-05-22 RX ADMIN — HYDROMORPHONE HYDROCHLORIDE 2 MILLIGRAM(S): 2 INJECTION INTRAMUSCULAR; INTRAVENOUS; SUBCUTANEOUS at 20:26

## 2020-05-22 RX ADMIN — TIZANIDINE 4 MILLIGRAM(S): 4 TABLET ORAL at 13:21

## 2020-05-22 RX ADMIN — GABAPENTIN 400 MILLIGRAM(S): 400 CAPSULE ORAL at 13:22

## 2020-05-22 NOTE — PROVIDER CONTACT NOTE (OTHER) - ASSESSMENT
Patient's 2pm V/S, BP is 133/106. HR is 83, RR is 18, and on room air. Patient's oxygen saturation is 97%. Patient is also on continuous pulse ox. Patient's pain is unrelieved by multiple pain meds and is c/o severe pain.

## 2020-05-22 NOTE — BEHAVIORAL HEALTH ASSESSMENT NOTE - NSBHCHARTREVIEWVS_PSY_A_CORE FT
Vital Signs Last 24 Hrs  T(C): 36.6 (22 May 2020 13:25), Max: 37.4 (22 May 2020 09:56)  T(F): 97.9 (22 May 2020 13:25), Max: 99.4 (22 May 2020 09:56)  HR: 86 (22 May 2020 13:25) (70 - 103)  BP: 114/74 (22 May 2020 13:25) (86/61 - 151/125)  BP(mean): 62 (21 May 2020 21:00) (59 - 100)  RR: 20 (22 May 2020 13:25) (9 - 24)  SpO2: 93% (22 May 2020 13:25) (91% - 100%)

## 2020-05-22 NOTE — BEHAVIORAL HEALTH ASSESSMENT NOTE - PAST PSYCHOTROPIC MEDICATION
tried Prozac x2 months, did not work. Did not tolerate Ativan or Klonopin, Xanax helped.  reported trialing ambien - caused vivid nightmares  Trazodone did not help for insomnia

## 2020-05-22 NOTE — PROGRESS NOTE ADULT - SUBJECTIVE AND OBJECTIVE BOX
Patient seen and evaluated this AM.  Pain still not controlled.  Denies any other issues.     ICU Vital Signs Last 24 Hrs  T(C): 37.1 (22 May 2020 01:28), Max: 37.1 (22 May 2020 01:28)  T(F): 98.8 (22 May 2020 01:28), Max: 98.8 (22 May 2020 01:28)  HR: 83 (22 May 2020 01:28) (70 - 106)  BP: 133/106 (22 May 2020 01:28) (86/61 - 179/154)  BP(mean): 62 (21 May 2020 21:00) (59 - 160)  ABP: 129/77 (21 May 2020 20:15) (85/72 - 212/101)  ABP(mean): 98 (21 May 2020 20:15) (75 - 166)  RR: 18 (22 May 2020 01:28) (9 - 27)  SpO2: 97% (22 May 2020 01:28) (79% - 100%)      PE  Physical Exam:  Gen: Laying in bed, NAD, alert and oriented.   Resp: Unlabored breathing  Ext: EHL/FHL/TA/Sol intact          + SILT DP/SP/BAILEY/Sa/Tib          +DP, extremity WWP

## 2020-05-22 NOTE — PROGRESS NOTE ADULT - SUBJECTIVE AND OBJECTIVE BOX
Patient is a 54y old  Female who presents with a chief complaint of dislocated left hip (22 May 2020 11:00)      SUBJECTIVE / OVERNIGHT EVENTS:  Patient has no new complaints. Still c/o intense hip pain. She c/o that hospital bed is not ortho bed but checked with nurse management and they confirmed that bed is working and is a ortho bed. Denies cp, SOB, abdominal pain, N/V/D     MEDICATIONS  (STANDING):  acetaminophen  IVPB .. 1000 milliGRAM(s) IV Intermittent once  ascorbic acid 500 milliGRAM(s) Oral two times a day  ascorbic acid 500 milliGRAM(s) Oral two times a day  calcium carbonate 1250 mG  + Vitamin D (OsCal 500 + D) 1 Tablet(s) Oral three times a day  ferrous    sulfate 325 milliGRAM(s) Oral three times a day with meals  folic acid 1 milliGRAM(s) Oral daily  folic acid 1 milliGRAM(s) Oral daily  gabapentin 400 milliGRAM(s) Oral every 8 hours  heparin   Injectable 5000 Unit(s) SubCutaneous every 8 hours  ketorolac   Injectable 30 milliGRAM(s) IV Push every 6 hours  lactated ringers. 1000 milliLiter(s) (75 mL/Hr) IV Continuous <Continuous>  multivitamin 1 Tablet(s) Oral daily  multivitamin 1 Tablet(s) Oral daily  nicotine - 21 mG/24Hr(s) Patch 1 patch Transdermal daily  pantoprazole    Tablet 40 milliGRAM(s) Oral before breakfast  pantoprazole    Tablet 40 milliGRAM(s) Oral before breakfast  polyethylene glycol 3350 17 Gram(s) Oral daily  polyethylene glycol 3350 17 Gram(s) Oral daily  ropivacaine 0.2% in 0.9% Sodium Chloride PCEA 200 milliLiter(s) Epidural PCA Continuous  sodium chloride 0.9%. 1000 milliLiter(s) (75 mL/Hr) IV Continuous <Continuous>  traZODone 50 milliGRAM(s) Oral at bedtime  traZODone 50 milliGRAM(s) Oral at bedtime    MEDICATIONS  (PRN):  aluminum hydroxide/magnesium hydroxide/simethicone Suspension 30 milliLiter(s) Oral four times a day PRN Indigestion  benzocaine 15 mG/menthol 3.6 mG (Sugar-Free) Lozenge 1 Lozenge Oral every 3 hours PRN Sore Throat  diphenhydrAMINE 25 milliGRAM(s) Oral at bedtime PRN Insomnia  HYDROmorphone  Injectable 1.5 milliGRAM(s) IV Push every 3 hours PRN Severe breakthrough Pain (7 - 10)  LORazepam   Injectable 1 milliGRAM(s) IV Push once PRN Agitation  magnesium hydroxide Suspension 30 milliLiter(s) Oral daily PRN Constipation  naloxone Injectable 0.1 milliGRAM(s) IV Push every 3 minutes PRN For ANY of the following changes in patient status:  A. RR LESS THAN 10 breaths per minute, B. Oxygen saturation LESS THAN 90%, C. Sedation score of 6  ondansetron Injectable 4 milliGRAM(s) IV Push every 6 hours PRN Nausea  ondansetron Injectable 4 milliGRAM(s) IV Push every 6 hours PRN Nausea and/or Vomiting  oxyCODONE    IR 45 milliGRAM(s) Oral every 4 hours PRN Severe Pain (7 - 10)  senna 2 Tablet(s) Oral at bedtime PRN Constipation  tiZANidine 4 milliGRAM(s) Oral every 8 hours PRN muscle spasms      Vital Signs Last 24 Hrs  T(C): 37.4 (22 May 2020 09:56), Max: 37.4 (22 May 2020 09:56)  T(F): 99.4 (22 May 2020 09:56), Max: 99.4 (22 May 2020 09:56)  HR: 103 (22 May 2020 09:56) (70 - 106)  BP: 151/125 (22 May 2020 09:56) (86/61 - 179/154)  BP(mean): 62 (21 May 2020 21:00) (59 - 160)  RR: 21 (22 May 2020 09:56) (9 - 27)  SpO2: 91% (22 May 2020 09:56) (79% - 100%)  CAPILLARY BLOOD GLUCOSE        I&O's Summary    21 May 2020 07:01  -  22 May 2020 07:00  --------------------------------------------------------  IN: 890 mL / OUT: 1407.5 mL / NET: -517.5 mL    22 May 2020 07:01  -  22 May 2020 12:34  --------------------------------------------------------  IN: 810 mL / OUT: 240 mL / NET: 570 mL        PHYSICAL EXAM:  GENERAL: NAD, well-developed  HEAD:  Atraumatic, Normocephalic  EYES: EOMI, PERRLA, conjunctiva and sclera clear  NECK: Supple, No JVD  CHEST/LUNG: Clear to auscultation bilaterally; No wheeze  HEART: Regular rate and rhythm; No murmurs, rubs, or gallops  ABDOMEN: Soft, Nontender, Nondistended; Bowel sounds present  EXTREMITIES:  2+ Peripheral Pulses, No clubbing, cyanosis, or edema  PSYCH: AAOx3  NEUROLOGY: non-focal  SKIN: No rashes or lesions    LABS:                        8.5    15.74 )-----------( 165      ( 22 May 2020 08:04 )             26.3     05-22    136  |  101  |  20  ----------------------------<  147<H>  4.4   |  25  |  1.34<H>    Ca    9.0      22 May 2020 08:04      PT/INR - ( 22 May 2020 08:04 )   PT: 12.1 SEC;   INR: 1.05          PTT - ( 22 May 2020 08:04 )  PTT:25.4 SEC          RADIOLOGY & ADDITIONAL TESTS:    Imaging Personally Reviewed:    Consultant(s) Notes Reviewed:      Care Discussed with Consultants/Other Providers:

## 2020-05-22 NOTE — BEHAVIORAL HEALTH ASSESSMENT NOTE - RISK ASSESSMENT
Low Acute Suicide Risk Patient appears to be a low risk of harm to self or others at this time.    Risk factors: anxiety, severe pain,  impulsivity, substance misuse, functional decline, poor reactivity to stressors    Protective factors: denies any active suicidal ideation/intent/plan, no hx of prior attempts, no self-harm behaviors, identifies reasons for living, future oriented, supportive social network, no access to firearms, no legal issues, engaged in treatment    Not at acute risk of harm to self or others at this time

## 2020-05-22 NOTE — PROGRESS NOTE ADULT - SUBJECTIVE AND OBJECTIVE BOX
Anesthesia Pain Management Service    SUBJECTIVE: Pt doing well with PCEA without problems reported.    Therapy:	  [ ] IV PCA	   [ X] Epidural           [ ] s/p Spinal Opoid              [ ] Postpartum infusion	  [ ] Patient controlled regional anesthesia (PCRA)    [ ] prn Analgesics    Allergies    No Known Allergies    Intolerances      MEDICATIONS  (STANDING):  acetaminophen  IVPB .. 1000 milliGRAM(s) IV Intermittent once  ascorbic acid 500 milliGRAM(s) Oral two times a day  ascorbic acid 500 milliGRAM(s) Oral two times a day  calcium carbonate 1250 mG  + Vitamin D (OsCal 500 + D) 1 Tablet(s) Oral three times a day  ferrous    sulfate 325 milliGRAM(s) Oral three times a day with meals  folic acid 1 milliGRAM(s) Oral daily  folic acid 1 milliGRAM(s) Oral daily  gabapentin 400 milliGRAM(s) Oral every 8 hours  heparin   Injectable 5000 Unit(s) SubCutaneous every 8 hours  ketorolac   Injectable 30 milliGRAM(s) IV Push every 6 hours  lactated ringers. 1000 milliLiter(s) (75 mL/Hr) IV Continuous <Continuous>  multivitamin 1 Tablet(s) Oral daily  multivitamin 1 Tablet(s) Oral daily  nicotine - 21 mG/24Hr(s) Patch 1 patch Transdermal daily  pantoprazole    Tablet 40 milliGRAM(s) Oral before breakfast  pantoprazole    Tablet 40 milliGRAM(s) Oral before breakfast  polyethylene glycol 3350 17 Gram(s) Oral daily  polyethylene glycol 3350 17 Gram(s) Oral daily  ropivacaine 0.2% in 0.9% Sodium Chloride PCEA 200 milliLiter(s) Epidural PCA Continuous  sodium chloride 0.9%. 1000 milliLiter(s) (75 mL/Hr) IV Continuous <Continuous>  traZODone 50 milliGRAM(s) Oral at bedtime  traZODone 50 milliGRAM(s) Oral at bedtime    MEDICATIONS  (PRN):  aluminum hydroxide/magnesium hydroxide/simethicone Suspension 30 milliLiter(s) Oral four times a day PRN Indigestion  benzocaine 15 mG/menthol 3.6 mG (Sugar-Free) Lozenge 1 Lozenge Oral every 3 hours PRN Sore Throat  diphenhydrAMINE 25 milliGRAM(s) Oral at bedtime PRN Insomnia  HYDROmorphone  Injectable 1.5 milliGRAM(s) IV Push every 4 hours PRN Severe breakthrough Pain (7 - 10)  LORazepam   Injectable 1 milliGRAM(s) IV Push once PRN Agitation  magnesium hydroxide Suspension 30 milliLiter(s) Oral daily PRN Constipation  naloxone Injectable 0.1 milliGRAM(s) IV Push every 3 minutes PRN For ANY of the following changes in patient status:  A. RR LESS THAN 10 breaths per minute, B. Oxygen saturation LESS THAN 90%, C. Sedation score of 6  ondansetron Injectable 4 milliGRAM(s) IV Push every 6 hours PRN Nausea  ondansetron Injectable 4 milliGRAM(s) IV Push every 6 hours PRN Nausea and/or Vomiting  oxyCODONE    IR 45 milliGRAM(s) Oral every 4 hours PRN Severe Pain (7 - 10)  senna 2 Tablet(s) Oral at bedtime PRN Constipation  tiZANidine 4 milliGRAM(s) Oral every 8 hours PRN muscle spasms      OBJECTIVE:   [X] No new signs     [ ] Other:    Side Effects:  [X ] None			[ ] Other:    Assessment of Catheter Site:		[ X] Intact		[ ] Other:    ASSESSMENT/PLAN  [ X] Continue current therapy    [ ] Therapy changed to:    [ ] IV PCA       [ ] Epidural     [ ] prn Analgesics     Comments: Continue current PCEA settings.

## 2020-05-22 NOTE — PROGRESS NOTE ADULT - SUBJECTIVE AND OBJECTIVE BOX
ANESTHESIA POSTOP CHECK    54y Female POSTOP DAY 1 S/P     Vital Signs Last 24 Hrs  T(C): 37.4 (22 May 2020 09:56), Max: 37.4 (22 May 2020 09:56)  T(F): 99.4 (22 May 2020 09:56), Max: 99.4 (22 May 2020 09:56)  HR: 103 (22 May 2020 09:56) (70 - 106)  BP: 151/125 (22 May 2020 09:56) (86/61 - 179/154)  BP(mean): 62 (21 May 2020 21:00) (59 - 160)  RR: 21 (22 May 2020 09:56) (9 - 27)  SpO2: 91% (22 May 2020 09:56) (79% - 100%)  I&O's Summary    21 May 2020 07:01  -  22 May 2020 07:00  --------------------------------------------------------  IN: 890 mL / OUT: 1407.5 mL / NET: -517.5 mL        [X ] NO APPARENT ANESTHESIA COMPLICATIONS      Comments:

## 2020-05-22 NOTE — PROGRESS NOTE ADULT - SUBJECTIVE AND OBJECTIVE BOX
Patient seen and examined at bedside.    Overnight Events:  still in significant pain however interval improvement.  denies CP, palpitations.      REVIEW OF SYSTEMS:  Constitutional:     [x ] negative [ ] fevers [ ] chills [ ] weight loss [ ] weight gain  HEENT:                  [x ] negative [ ] dry eyes [ ] eye irritation [ ] postnasal drip [ ] nasal congestion  CV:                         [ x] negative  [ ] chest pain [ ] orthopnea [ ] palpitations [ ] murmur  Resp:                     [ x] negative [ ] cough [ ] shortness of breath [ ] dyspnea [ ] wheezing [ ] sputum [ ]hemoptysis  GI:                          [ x] negative [ ] nausea [ ] vomiting [ ] diarrhea [ ] constipation [ ] abd pain [ ] dysphagia   :                        [ x] negative [ ] dysuria [ ] nocturia [ ] hematuria [ ] increased urinary frequency  Musculoskeletal: [ x] negative [ ] back pain [ ] myalgias [ ] arthralgias [ ] fracture  Skin:                       [ x] negative [ ] rash [ ] itch  Neurological:        [x ] negative [ ] headache [ ] dizziness [ ] syncope [ ] weakness [ ] numbness  Psychiatric:           [ x] negative [ ] anxiety [ ] depression  Endocrine:            [ x] negative [ ] diabetes [ ] thyroid problem  Heme/Lymph:      [ x] negative [ ] anemia [ ] bleeding problem  Allergic/Immune: [ x] negative [ ] itchy eyes [ ] nasal discharge [ ] hives [ ] angioedema    [ x] All other systems negative  [ ] Unable to assess ROS due to    Current Meds:  acetaminophen  IVPB .. 1000 milliGRAM(s) IV Intermittent once  aluminum hydroxide/magnesium hydroxide/simethicone Suspension 30 milliLiter(s) Oral four times a day PRN  ascorbic acid 500 milliGRAM(s) Oral two times a day  ascorbic acid 500 milliGRAM(s) Oral two times a day  benzocaine 15 mG/menthol 3.6 mG (Sugar-Free) Lozenge 1 Lozenge Oral every 3 hours PRN  calcium carbonate 1250 mG  + Vitamin D (OsCal 500 + D) 1 Tablet(s) Oral three times a day  diphenhydrAMINE 25 milliGRAM(s) Oral at bedtime PRN  ferrous    sulfate 325 milliGRAM(s) Oral three times a day with meals  folic acid 1 milliGRAM(s) Oral daily  folic acid 1 milliGRAM(s) Oral daily  gabapentin 400 milliGRAM(s) Oral every 8 hours  heparin   Injectable 5000 Unit(s) SubCutaneous every 8 hours  HYDROmorphone  Injectable 1.5 milliGRAM(s) IV Push every 4 hours PRN  ketorolac   Injectable 30 milliGRAM(s) IV Push every 6 hours  lactated ringers. 1000 milliLiter(s) IV Continuous <Continuous>  LORazepam   Injectable 1 milliGRAM(s) IV Push once PRN  magnesium hydroxide Suspension 30 milliLiter(s) Oral daily PRN  multivitamin 1 Tablet(s) Oral daily  multivitamin 1 Tablet(s) Oral daily  naloxone Injectable 0.1 milliGRAM(s) IV Push every 3 minutes PRN  nicotine - 21 mG/24Hr(s) Patch 1 patch Transdermal daily  ondansetron Injectable 4 milliGRAM(s) IV Push every 6 hours PRN  ondansetron Injectable 4 milliGRAM(s) IV Push every 6 hours PRN  oxyCODONE    IR 45 milliGRAM(s) Oral every 4 hours PRN  pantoprazole    Tablet 40 milliGRAM(s) Oral before breakfast  pantoprazole    Tablet 40 milliGRAM(s) Oral before breakfast  polyethylene glycol 3350 17 Gram(s) Oral daily  polyethylene glycol 3350 17 Gram(s) Oral daily  ropivacaine 0.2% in 0.9% Sodium Chloride PCEA 200 milliLiter(s) Epidural PCA Continuous  senna 2 Tablet(s) Oral at bedtime PRN  sodium chloride 0.9%. 1000 milliLiter(s) IV Continuous <Continuous>  tiZANidine 4 milliGRAM(s) Oral every 8 hours PRN  traZODone 50 milliGRAM(s) Oral at bedtime  traZODone 50 milliGRAM(s) Oral at bedtime      PAST MEDICAL & SURGICAL HISTORY:  No pertinent past medical history  Closed pelvic fracture      Vitals:  T(F): 99.4 (05-22), Max: 99.4 (05-22)  HR: 103 (05-22) (70 - 106)  BP: 151/125 (05-22) (86/61 - 179/154)  RR: 21 (05-22)  SpO2: 91% (05-22)  I&O's Summary    21 May 2020 07:01  -  22 May 2020 07:00  --------------------------------------------------------  IN: 890 mL / OUT: 1407.5 mL / NET: -517.5 mL        Physical Exam:  Appearance: No acute distress; well appearing  Eyes: PERRL, EOMI, pink conjunctiva  HENT: Normal oral mucosa  Cardiovascular: RRR, S1, S2, no murmurs, rubs, or gallops; no edema; no JVD  Respiratory: Clear to auscultation bilaterally  Gastrointestinal: soft, non-tender, non-distended with normal bowel sounds  Musculoskeletal: No clubbing; no joint deformity   Neurologic: Non-focal  Ext: wwp, no c/c/e                          8.5    15.74 )-----------( 165      ( 22 May 2020 08:04 )             26.3     05-22    136  |  101  |  20  ----------------------------<  147<H>  4.4   |  25  |  1.34<H>    Ca    9.0      22 May 2020 08:04      PT/INR - ( 22 May 2020 08:04 )   PT: 12.1 SEC;   INR: 1.05          PTT - ( 22 May 2020 08:04 )  PTT:25.4 SEC
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
## 2020-05-22 NOTE — BEHAVIORAL HEALTH ASSESSMENT NOTE - NSBHCHARTREVIEWLAB_PSY_A_CORE FT
8.5    15.74 )-----------( 165      ( 22 May 2020 08:04 )             26.3     05-22    136  |  101  |  20  ----------------------------<  147<H>  4.4   |  25  |  1.34<H>    Ca    9.0      22 May 2020 08:04

## 2020-05-22 NOTE — PROGRESS NOTE ADULT - PROBLEM SELECTOR PLAN 3
pain management is managing pain since pt is on a heavy dose of narcotics, states she takes oxycontin 80 mg q8h and oxyIR 45 mg q4h prn  bowel regimen, pain regimen per pain management.

## 2020-05-22 NOTE — BEHAVIORAL HEALTH ASSESSMENT NOTE - SUICIDE PROTECTIVE FACTORS
Identifies reasons for living/Supportive social network of family or friends/Responsibility to family and others/Has future plans

## 2020-05-22 NOTE — PROGRESS NOTE ADULT - SUBJECTIVE AND OBJECTIVE BOX
Anesthesia Pain Management Service: Day _2_ of Epidural    SUBJECTIVE: Patient complaining of pain, states epidural not helping as much  Pain Scale Score:   Refer to charted pain scores    THERAPY:  [ ] Epidural Bupivacaine 0.0625% and Hydromorphone  		[ ] 10 micrograms/mL	[ ] 5 micrograms/mL  [ ] Epidural Bupivacaine 0.0625% and Fentanyl - 2 micrograms/mL  [ X] Epidural Ropivacaine 0.2% plain – 1 mg/mL  [ ] Patient Controlled Regional Anesthesia (PCRA) Ropivacaine  		[ ] 0.2%			[ ] 0.1%    Demand dose __3_ lockout __15_ (minutes) Continuous Rate _6__ Total: _211___ ml used (in past 24 hours)      MEDICATIONS  (STANDING):  acetaminophen  IVPB .. 1000 milliGRAM(s) IV Intermittent once  ascorbic acid 500 milliGRAM(s) Oral two times a day  ascorbic acid 500 milliGRAM(s) Oral two times a day  calcium carbonate 1250 mG  + Vitamin D (OsCal 500 + D) 1 Tablet(s) Oral three times a day  ferrous    sulfate 325 milliGRAM(s) Oral three times a day with meals  folic acid 1 milliGRAM(s) Oral daily  folic acid 1 milliGRAM(s) Oral daily  gabapentin 400 milliGRAM(s) Oral every 8 hours  heparin   Injectable 5000 Unit(s) SubCutaneous every 8 hours  ketorolac   Injectable 30 milliGRAM(s) IV Push every 6 hours  lactated ringers. 1000 milliLiter(s) (75 mL/Hr) IV Continuous <Continuous>  multivitamin 1 Tablet(s) Oral daily  multivitamin 1 Tablet(s) Oral daily  nicotine - 21 mG/24Hr(s) Patch 1 patch Transdermal daily  pantoprazole    Tablet 40 milliGRAM(s) Oral before breakfast  pantoprazole    Tablet 40 milliGRAM(s) Oral before breakfast  polyethylene glycol 3350 17 Gram(s) Oral daily  polyethylene glycol 3350 17 Gram(s) Oral daily  ropivacaine 0.2% in 0.9% Sodium Chloride PCEA 200 milliLiter(s) Epidural PCA Continuous  sodium chloride 0.9%. 1000 milliLiter(s) (75 mL/Hr) IV Continuous <Continuous>  traZODone 50 milliGRAM(s) Oral at bedtime  traZODone 50 milliGRAM(s) Oral at bedtime    MEDICATIONS  (PRN):  aluminum hydroxide/magnesium hydroxide/simethicone Suspension 30 milliLiter(s) Oral four times a day PRN Indigestion  benzocaine 15 mG/menthol 3.6 mG (Sugar-Free) Lozenge 1 Lozenge Oral every 3 hours PRN Sore Throat  diphenhydrAMINE 25 milliGRAM(s) Oral at bedtime PRN Insomnia  HYDROmorphone  Injectable 1.5 milliGRAM(s) IV Push every 3 hours PRN Severe breakthrough Pain (7 - 10)  LORazepam   Injectable 1 milliGRAM(s) IV Push once PRN Agitation  magnesium hydroxide Suspension 30 milliLiter(s) Oral daily PRN Constipation  naloxone Injectable 0.1 milliGRAM(s) IV Push every 3 minutes PRN For ANY of the following changes in patient status:  A. RR LESS THAN 10 breaths per minute, B. Oxygen saturation LESS THAN 90%, C. Sedation score of 6  ondansetron Injectable 4 milliGRAM(s) IV Push every 6 hours PRN Nausea  ondansetron Injectable 4 milliGRAM(s) IV Push every 6 hours PRN Nausea and/or Vomiting  oxyCODONE    IR 45 milliGRAM(s) Oral every 4 hours PRN Severe Pain (7 - 10)  senna 2 Tablet(s) Oral at bedtime PRN Constipation  tiZANidine 4 milliGRAM(s) Oral every 8 hours PRN muscle spasms      OBJECTIVE: laying in bed     Assessment of Catheter Site:	[ ] Left	[ ] Right  [x ] Epidural 	[ ] Femoral	      [ ] Saphenous   [ ] Supraclavicular   [ ] Other:    [x ] Dressing intact	[x ] Site non-tender	[ x] Site without erythema, discharge, edema  [x ] Epidural tubing and connection checked	[x] Gross neurological exam within normal limits  [ ] Catheter removed – tip intact		[ ] Afebrile  	[ ] Febrile: ___   [ X] see Temp under VS below)    PT/INR - ( 22 May 2020 08:04 )   PT: 12.1 SEC;   INR: 1.05          PTT - ( 22 May 2020 08:04 )  PTT:25.4 SEC                      8.5    15.74 )-----------( 165      ( 22 May 2020 08:04 )             26.3     Vital Signs Last 24 Hrs  T(C): 37.4 (05-22-20 @ 09:56), Max: 37.4 (05-22-20 @ 09:56)  T(F): 99.4 (05-22-20 @ 09:56), Max: 99.4 (05-22-20 @ 09:56)  HR: 103 (05-22-20 @ 09:56) (70 - 106)  BP: 151/125 (05-22-20 @ 09:56) (86/61 - 179/154)  BP(mean): 62 (05-21-20 @ 21:00) (59 - 160)  RR: 21 (05-22-20 @ 09:56) (9 - 27)  SpO2: 91% (05-22-20 @ 09:56) (79% - 100%)      Sedation Score:	[x ] Alert	[ ] Drowsy	[ ] Arousable	[ ] Asleep	[ ] Unresponsive    Side Effects:	[x ] None	[ ] Nausea	[ ] Vomiting	[ ] Pruritus  		[ ] Weakness		[ ] Numbness	[ ] Other:    ASSESSMENT/ PLAN:    Therapy to  be:	[x ] Continue   [ ] Discontinued   [ ] Change to prn Analgesics    Documentation and Verification of current medications:  [ X ] Done	[ ] Not done, not eligible, reason:    Comments: increased basal rate to 8ml/hr, changed IV dilaudid break through to q3hrs, plan to continue current regimen

## 2020-05-22 NOTE — PROGRESS NOTE ADULT - ASSESSMENT
55yo F s/p revision KONG  - Pain control - appreciate pain mgmt recs  - PT/OT   - Monitor drain and vac outputs  - DVT ppx: SQH  - Fu labs

## 2020-05-22 NOTE — PROVIDER CONTACT NOTE (OTHER) - ACTION/TREATMENT ORDERED:
Provider contacted and is notified about BP. Provider states BP could be result of pain. 106 diastolic could be r/t carotid blood vessels. Will con't to monitor.
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
## 2020-05-22 NOTE — BEHAVIORAL HEALTH ASSESSMENT NOTE - NSBHCONSULTFOLLOWAFTERCARE_PSY_A_CORE FT
[facility] walk in clinic : [phone] (9AM-7PM)  [facility] Outpatient clinic: [phone]
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
## 2020-05-22 NOTE — PROGRESS NOTE ADULT - PROBLEM SELECTOR PLAN 1
-h/o recurrent L hip dislocations, denies fall or trauma to  hip   -s/p revision KONG/SIVA on 5/21 per ortho  -TTE (11/5/19) normal LV function   -encouraged incentive spirometry

## 2020-05-23 LAB
ANION GAP SERPL CALC-SCNC: 13 MMO/L — SIGNIFICANT CHANGE UP (ref 7–14)
APTT BLD: 25.3 SEC — LOW (ref 27.5–36.3)
BUN SERPL-MCNC: 15 MG/DL — SIGNIFICANT CHANGE UP (ref 7–23)
CALCIUM SERPL-MCNC: 9.2 MG/DL — SIGNIFICANT CHANGE UP (ref 8.4–10.5)
CHLORIDE SERPL-SCNC: 106 MMOL/L — SIGNIFICANT CHANGE UP (ref 98–107)
CO2 SERPL-SCNC: 24 MMOL/L — SIGNIFICANT CHANGE UP (ref 22–31)
CREAT SERPL-MCNC: 1.1 MG/DL — SIGNIFICANT CHANGE UP (ref 0.5–1.3)
GLUCOSE SERPL-MCNC: 111 MG/DL — HIGH (ref 70–99)
HCT VFR BLD CALC: 27.6 % — LOW (ref 34.5–45)
HGB BLD-MCNC: 8.8 G/DL — LOW (ref 11.5–15.5)
INR BLD: 1.02 — SIGNIFICANT CHANGE UP (ref 0.88–1.17)
MCHC RBC-ENTMCNC: 28.5 PG — SIGNIFICANT CHANGE UP (ref 27–34)
MCHC RBC-ENTMCNC: 31.9 % — LOW (ref 32–36)
MCV RBC AUTO: 89.3 FL — SIGNIFICANT CHANGE UP (ref 80–100)
NRBC # FLD: 0 K/UL — SIGNIFICANT CHANGE UP (ref 0–0)
PLATELET # BLD AUTO: 162 K/UL — SIGNIFICANT CHANGE UP (ref 150–400)
PMV BLD: 12.2 FL — SIGNIFICANT CHANGE UP (ref 7–13)
POTASSIUM SERPL-MCNC: 3.8 MMOL/L — SIGNIFICANT CHANGE UP (ref 3.5–5.3)
POTASSIUM SERPL-SCNC: 3.8 MMOL/L — SIGNIFICANT CHANGE UP (ref 3.5–5.3)
PROTHROM AB SERPL-ACNC: 11.6 SEC — SIGNIFICANT CHANGE UP (ref 9.8–13.1)
RBC # BLD: 3.09 M/UL — LOW (ref 3.8–5.2)
RBC # FLD: 17.3 % — HIGH (ref 10.3–14.5)
SODIUM SERPL-SCNC: 143 MMOL/L — SIGNIFICANT CHANGE UP (ref 135–145)
WBC # BLD: 11.04 K/UL — HIGH (ref 3.8–10.5)
WBC # FLD AUTO: 11.04 K/UL — HIGH (ref 3.8–10.5)

## 2020-05-23 PROCEDURE — 99233 SBSQ HOSP IP/OBS HIGH 50: CPT

## 2020-05-23 RX ORDER — ACETAMINOPHEN 500 MG
1000 TABLET ORAL ONCE
Refills: 0 | Status: COMPLETED | OUTPATIENT
Start: 2020-05-23 | End: 2020-05-24

## 2020-05-23 RX ORDER — RIVAROXABAN 15 MG-20MG
10 KIT ORAL DAILY
Refills: 0 | Status: COMPLETED | OUTPATIENT
Start: 2020-05-24 | End: 2020-05-23

## 2020-05-23 RX ORDER — RIVAROXABAN 15 MG-20MG
10 KIT ORAL DAILY
Refills: 0 | Status: DISCONTINUED | OUTPATIENT
Start: 2020-05-24 | End: 2020-05-26

## 2020-05-23 RX ORDER — HYDROMORPHONE HYDROCHLORIDE 2 MG/ML
2 INJECTION INTRAMUSCULAR; INTRAVENOUS; SUBCUTANEOUS
Refills: 0 | Status: DISCONTINUED | OUTPATIENT
Start: 2020-05-23 | End: 2020-05-26

## 2020-05-23 RX ORDER — ACETAMINOPHEN 500 MG
1000 TABLET ORAL ONCE
Refills: 0 | Status: COMPLETED | OUTPATIENT
Start: 2020-05-23 | End: 2020-05-23

## 2020-05-23 RX ORDER — RIVAROXABAN 15 MG-20MG
10 KIT ORAL DAILY
Refills: 0 | Status: DISCONTINUED | OUTPATIENT
Start: 2020-05-23 | End: 2020-05-23

## 2020-05-23 RX ORDER — TIZANIDINE 4 MG/1
4 TABLET ORAL EVERY 6 HOURS
Refills: 0 | Status: DISCONTINUED | OUTPATIENT
Start: 2020-05-23 | End: 2020-05-26

## 2020-05-23 RX ORDER — HEPARIN SODIUM 5000 [USP'U]/ML
5000 INJECTION INTRAVENOUS; SUBCUTANEOUS ONCE
Refills: 0 | Status: COMPLETED | OUTPATIENT
Start: 2020-05-23 | End: 2020-05-23

## 2020-05-23 RX ORDER — KETOROLAC TROMETHAMINE 30 MG/ML
30 SYRINGE (ML) INJECTION EVERY 6 HOURS
Refills: 0 | Status: DISCONTINUED | OUTPATIENT
Start: 2020-05-23 | End: 2020-05-24

## 2020-05-23 RX ORDER — OXYCODONE HYDROCHLORIDE 5 MG/1
80 TABLET ORAL EVERY 8 HOURS
Refills: 0 | Status: DISCONTINUED | OUTPATIENT
Start: 2020-05-23 | End: 2020-05-26

## 2020-05-23 RX ORDER — OXYCODONE HYDROCHLORIDE 5 MG/1
45 TABLET ORAL EVERY 4 HOURS
Refills: 0 | Status: DISCONTINUED | OUTPATIENT
Start: 2020-05-23 | End: 2020-05-26

## 2020-05-23 RX ADMIN — HYDROMORPHONE HYDROCHLORIDE 2 MILLIGRAM(S): 2 INJECTION INTRAMUSCULAR; INTRAVENOUS; SUBCUTANEOUS at 23:33

## 2020-05-23 RX ADMIN — Medication 30 MILLIGRAM(S): at 17:14

## 2020-05-23 RX ADMIN — TIZANIDINE 4 MILLIGRAM(S): 4 TABLET ORAL at 17:14

## 2020-05-23 RX ADMIN — OXYCODONE HYDROCHLORIDE 80 MILLIGRAM(S): 5 TABLET ORAL at 17:13

## 2020-05-23 RX ADMIN — Medication 1 PATCH: at 19:23

## 2020-05-23 RX ADMIN — QUETIAPINE FUMARATE 25 MILLIGRAM(S): 200 TABLET, FILM COATED ORAL at 05:34

## 2020-05-23 RX ADMIN — QUETIAPINE FUMARATE 25 MILLIGRAM(S): 200 TABLET, FILM COATED ORAL at 00:39

## 2020-05-23 RX ADMIN — HYDROMORPHONE HYDROCHLORIDE 2 MILLIGRAM(S): 2 INJECTION INTRAMUSCULAR; INTRAVENOUS; SUBCUTANEOUS at 19:40

## 2020-05-23 RX ADMIN — TIZANIDINE 4 MILLIGRAM(S): 4 TABLET ORAL at 00:40

## 2020-05-23 RX ADMIN — Medication 30 MILLIGRAM(S): at 12:08

## 2020-05-23 RX ADMIN — OXYCODONE HYDROCHLORIDE 45 MILLIGRAM(S): 5 TABLET ORAL at 14:33

## 2020-05-23 RX ADMIN — OXYCODONE HYDROCHLORIDE 40 MILLIGRAM(S): 5 TABLET ORAL at 00:39

## 2020-05-23 RX ADMIN — HYDROMORPHONE HYDROCHLORIDE 1 MILLIGRAM(S): 2 INJECTION INTRAMUSCULAR; INTRAVENOUS; SUBCUTANEOUS at 05:12

## 2020-05-23 RX ADMIN — HYDROMORPHONE HYDROCHLORIDE 1 MILLIGRAM(S): 2 INJECTION INTRAMUSCULAR; INTRAVENOUS; SUBCUTANEOUS at 08:30

## 2020-05-23 RX ADMIN — HYDROMORPHONE HYDROCHLORIDE 1 MILLIGRAM(S): 2 INJECTION INTRAMUSCULAR; INTRAVENOUS; SUBCUTANEOUS at 03:12

## 2020-05-23 RX ADMIN — MIRTAZAPINE 7.5 MILLIGRAM(S): 45 TABLET, ORALLY DISINTEGRATING ORAL at 21:28

## 2020-05-23 RX ADMIN — HEPARIN SODIUM 5000 UNIT(S): 5000 INJECTION INTRAVENOUS; SUBCUTANEOUS at 21:28

## 2020-05-23 RX ADMIN — Medication 1 PATCH: at 12:00

## 2020-05-23 RX ADMIN — Medication 400 MILLIGRAM(S): at 18:37

## 2020-05-23 RX ADMIN — TIZANIDINE 4 MILLIGRAM(S): 4 TABLET ORAL at 08:33

## 2020-05-23 RX ADMIN — GABAPENTIN 400 MILLIGRAM(S): 400 CAPSULE ORAL at 05:30

## 2020-05-23 RX ADMIN — OXYCODONE HYDROCHLORIDE 80 MILLIGRAM(S): 5 TABLET ORAL at 09:31

## 2020-05-23 RX ADMIN — GABAPENTIN 400 MILLIGRAM(S): 400 CAPSULE ORAL at 00:40

## 2020-05-23 RX ADMIN — OXYCODONE HYDROCHLORIDE 45 MILLIGRAM(S): 5 TABLET ORAL at 10:32

## 2020-05-23 RX ADMIN — OXYCODONE HYDROCHLORIDE 45 MILLIGRAM(S): 5 TABLET ORAL at 18:37

## 2020-05-23 RX ADMIN — Medication 1 PATCH: at 12:37

## 2020-05-23 RX ADMIN — Medication 1 PATCH: at 06:55

## 2020-05-23 RX ADMIN — Medication 1 TABLET(S): at 21:28

## 2020-05-23 RX ADMIN — Medication 1 TABLET(S): at 12:37

## 2020-05-23 RX ADMIN — Medication 500 MILLIGRAM(S): at 05:30

## 2020-05-23 RX ADMIN — GABAPENTIN 400 MILLIGRAM(S): 400 CAPSULE ORAL at 21:26

## 2020-05-23 RX ADMIN — Medication 500 MILLIGRAM(S): at 17:13

## 2020-05-23 RX ADMIN — HYDROMORPHONE HYDROCHLORIDE 30 MILLILITER(S): 2 INJECTION INTRAMUSCULAR; INTRAVENOUS; SUBCUTANEOUS at 08:29

## 2020-05-23 RX ADMIN — Medication 325 MILLIGRAM(S): at 17:14

## 2020-05-23 RX ADMIN — HEPARIN SODIUM 5000 UNIT(S): 5000 INJECTION INTRAVENOUS; SUBCUTANEOUS at 14:34

## 2020-05-23 RX ADMIN — GABAPENTIN 400 MILLIGRAM(S): 400 CAPSULE ORAL at 12:37

## 2020-05-23 RX ADMIN — HYDROMORPHONE HYDROCHLORIDE 2 MILLIGRAM(S): 2 INJECTION INTRAMUSCULAR; INTRAVENOUS; SUBCUTANEOUS at 15:32

## 2020-05-23 RX ADMIN — Medication 1 MILLIGRAM(S): at 12:37

## 2020-05-23 RX ADMIN — HYDROMORPHONE HYDROCHLORIDE 1 MILLIGRAM(S): 2 INJECTION INTRAMUSCULAR; INTRAVENOUS; SUBCUTANEOUS at 06:41

## 2020-05-23 RX ADMIN — HYDROMORPHONE HYDROCHLORIDE 2 MILLIGRAM(S): 2 INJECTION INTRAMUSCULAR; INTRAVENOUS; SUBCUTANEOUS at 09:31

## 2020-05-23 RX ADMIN — Medication 400 MILLIGRAM(S): at 11:15

## 2020-05-23 RX ADMIN — Medication 325 MILLIGRAM(S): at 12:37

## 2020-05-23 RX ADMIN — HYDROMORPHONE HYDROCHLORIDE 2 MILLIGRAM(S): 2 INJECTION INTRAMUSCULAR; INTRAVENOUS; SUBCUTANEOUS at 12:35

## 2020-05-23 RX ADMIN — OXYCODONE HYDROCHLORIDE 45 MILLIGRAM(S): 5 TABLET ORAL at 22:37

## 2020-05-23 RX ADMIN — HYDROMORPHONE HYDROCHLORIDE 1 MILLIGRAM(S): 2 INJECTION INTRAMUSCULAR; INTRAVENOUS; SUBCUTANEOUS at 01:30

## 2020-05-23 RX ADMIN — Medication 325 MILLIGRAM(S): at 10:32

## 2020-05-23 RX ADMIN — Medication 1 TABLET(S): at 05:35

## 2020-05-23 RX ADMIN — Medication 1 TABLET(S): at 14:34

## 2020-05-23 RX ADMIN — HYDROMORPHONE HYDROCHLORIDE 30 MILLILITER(S): 2 INJECTION INTRAMUSCULAR; INTRAVENOUS; SUBCUTANEOUS at 00:52

## 2020-05-23 NOTE — PROGRESS NOTE ADULT - SUBJECTIVE AND OBJECTIVE BOX
Anesthesia Pain Management Service    SUBJECTIVE: Patient crying in pain. Patient states her pain is not managed well with IV Dilaudid PCA. Patient states she prefers her pain regimen that she was on at home. Patient states " I can feel my leg cut open".   Pain Scale Score	At rest: ___10/10 	With Activity: ___ 	[X ] Refer to charted pain scores    THERAPY:    [ ] IV PCA Morphine		[ ] 5 mg/mL	[ ] 1 mg/mL  [X ] IV PCA Hydromorphone	[ ] 5 mg/mL	[X ] 1 mg/mL  [ ] IV PCA Fentanyl		[ ] 50 micrograms/mL    Demand dose __0.4_ lockout __6_ (minutes) Continuous Rate _0__ Total: __17.20_   mg used (in past 24 hrs)      MEDICATIONS  (STANDING):  acetaminophen  IVPB .. 1000 milliGRAM(s) IV Intermittent once  acetaminophen  IVPB .. 1000 milliGRAM(s) IV Intermittent once  ascorbic acid 500 milliGRAM(s) Oral two times a day  ascorbic acid 500 milliGRAM(s) Oral two times a day  calcium carbonate 1250 mG  + Vitamin D (OsCal 500 + D) 1 Tablet(s) Oral three times a day  ferrous    sulfate 325 milliGRAM(s) Oral three times a day with meals  folic acid 1 milliGRAM(s) Oral daily  folic acid 1 milliGRAM(s) Oral daily  gabapentin 400 milliGRAM(s) Oral every 8 hours  heparin   Injectable 5000 Unit(s) SubCutaneous every 8 hours  ketorolac   Injectable 30 milliGRAM(s) IV Push every 6 hours  lactated ringers. 1000 milliLiter(s) (75 mL/Hr) IV Continuous <Continuous>  mirtazapine 7.5 milliGRAM(s) Oral at bedtime  multivitamin 1 Tablet(s) Oral daily  multivitamin 1 Tablet(s) Oral daily  nicotine - 21 mG/24Hr(s) Patch 1 patch Transdermal daily  oxyCODONE  ER Tablet 80 milliGRAM(s) Oral every 8 hours  pantoprazole    Tablet 40 milliGRAM(s) Oral before breakfast  pantoprazole    Tablet 40 milliGRAM(s) Oral before breakfast  polyethylene glycol 3350 17 Gram(s) Oral daily  polyethylene glycol 3350 17 Gram(s) Oral daily  sodium chloride 0.9%. 1000 milliLiter(s) (75 mL/Hr) IV Continuous <Continuous>  traZODone 50 milliGRAM(s) Oral at bedtime  traZODone 50 milliGRAM(s) Oral at bedtime    MEDICATIONS  (PRN):  aluminum hydroxide/magnesium hydroxide/simethicone Suspension 30 milliLiter(s) Oral four times a day PRN Indigestion  benzocaine 15 mG/menthol 3.6 mG (Sugar-Free) Lozenge 1 Lozenge Oral every 3 hours PRN Sore Throat  bisacodyl Suppository 10 milliGRAM(s) Rectal daily PRN If no bowel movement by POD#2  diphenhydrAMINE 25 milliGRAM(s) Oral at bedtime PRN Insomnia  HYDROmorphone  Injectable 2 milliGRAM(s) IV Push every 3 hours PRN Severe Breakthrough Pain (7 - 10)  LORazepam   Injectable 1 milliGRAM(s) IV Push once PRN Agitation  magnesium hydroxide Suspension 30 milliLiter(s) Oral daily PRN Constipation  naloxone Injectable 0.1 milliGRAM(s) IV Push every 3 minutes PRN For ANY of the following changes in patient status:  A. RR LESS THAN 10 breaths per minute, B. Oxygen saturation LESS THAN 90%, C. Sedation score of 6  naloxone Injectable 0.1 milliGRAM(s) IV Push every 3 minutes PRN For ANY of the following changes in patient status:  A. RR LESS THAN 10 breaths per minute, B. Oxygen saturation LESS THAN 90%, C. Sedation score of 6  ondansetron Injectable 4 milliGRAM(s) IV Push every 6 hours PRN Nausea  ondansetron Injectable 4 milliGRAM(s) IV Push every 6 hours PRN Nausea and/or Vomiting  ondansetron Injectable 4 milliGRAM(s) IV Push every 6 hours PRN Nausea  oxyCODONE    IR 45 milliGRAM(s) Oral every 4 hours PRN Severe Pain (7 - 10)  QUEtiapine 25 milliGRAM(s) Oral every 6 hours PRN agitation  senna 2 Tablet(s) Oral at bedtime PRN Constipation  tiZANidine 4 milliGRAM(s) Oral every 6 hours PRN Muscle Spasm/pain      OBJECTIVE: Patient sitting up in bed, rubbing her left leg. Patient with Provena vac and hemovac in place. Patient crying in pain. RN at bedside.    Sedation Score:	[ X] Alert	[ ] Drowsy 	[ ] Arousable	[ ] Asleep	[ ] Unresponsive    Side Effects:	[X ] None	[ ] Nausea	[ ] Vomiting	[ ] Pruritus  		[ ] Other:    Vital Signs Last 24 Hrs  T(C): 36.9 (23 May 2020 05:45), Max: 37.4 (22 May 2020 09:56)  T(F): 98.5 (23 May 2020 05:45), Max: 99.4 (22 May 2020 09:56)  HR: 96 (23 May 2020 05:45) (86 - 103)  BP: 149/110 (23 May 2020 05:45) (104/82 - 151/125)  BP(mean): --  RR: 20 (23 May 2020 05:45) (19 - 21)  SpO2: 96% (23 May 2020 05:45) (91% - 97%)    ASSESSMENT/ PLAN    Therapy to  be:	[ ] Continue   [ X] Discontinued   [X ] Change to prn Analgesics    Documentation and Verification of current medications:   [X] Done	[ ] Not done, not elligible    Comments: IV Dilaudid PCA discontinued. Patient started on home dose of PO Oxycodone ER 80mg Q8H and PO Oxycodone IR 45mg Q4H PRN for severe pain with IV Dilaudid 2mg for severe breakthrough pain along with Adjuvant non-opioid medications. Discussed patient with primary team. Patient and primary team agrees with plan.    Progress Note written now but Patient was seen earlier.

## 2020-05-23 NOTE — PROGRESS NOTE ADULT - SUBJECTIVE AND OBJECTIVE BOX
Dr. Rossy Allen  Pager [phone]    PROGRESS NOTE:     Patient is a 54y old  Female who presents with a chief complaint of dislocated left hip (23 May 2020 09:30)      SUBJECTIVE / OVERNIGHT EVENTS: pt c/o pain not controlled, c/o her epidural fell out last night, adjusted by pain management this AM  ADDITIONAL REVIEW OF SYSTEMS: denies chest pain or sob    MEDICATIONS  (STANDING):  acetaminophen  IVPB .. 1000 milliGRAM(s) IV Intermittent once  acetaminophen  IVPB .. 1000 milliGRAM(s) IV Intermittent once  ascorbic acid 500 milliGRAM(s) Oral two times a day  ascorbic acid 500 milliGRAM(s) Oral two times a day  calcium carbonate 1250 mG  + Vitamin D (OsCal 500 + D) 1 Tablet(s) Oral three times a day  ferrous    sulfate 325 milliGRAM(s) Oral three times a day with meals  folic acid 1 milliGRAM(s) Oral daily  folic acid 1 milliGRAM(s) Oral daily  gabapentin 400 milliGRAM(s) Oral every 8 hours  heparin   Injectable 5000 Unit(s) SubCutaneous every 8 hours  ketorolac   Injectable 30 milliGRAM(s) IV Push every 6 hours  lactated ringers. 1000 milliLiter(s) (75 mL/Hr) IV Continuous <Continuous>  mirtazapine 7.5 milliGRAM(s) Oral at bedtime  multivitamin 1 Tablet(s) Oral daily  multivitamin 1 Tablet(s) Oral daily  nicotine - 21 mG/24Hr(s) Patch 1 patch Transdermal daily  oxyCODONE  ER Tablet 80 milliGRAM(s) Oral every 8 hours  pantoprazole    Tablet 40 milliGRAM(s) Oral before breakfast  pantoprazole    Tablet 40 milliGRAM(s) Oral before breakfast  polyethylene glycol 3350 17 Gram(s) Oral daily  polyethylene glycol 3350 17 Gram(s) Oral daily  sodium chloride 0.9%. 1000 milliLiter(s) (75 mL/Hr) IV Continuous <Continuous>  traZODone 50 milliGRAM(s) Oral at bedtime  traZODone 50 milliGRAM(s) Oral at bedtime    MEDICATIONS  (PRN):  aluminum hydroxide/magnesium hydroxide/simethicone Suspension 30 milliLiter(s) Oral four times a day PRN Indigestion  benzocaine 15 mG/menthol 3.6 mG (Sugar-Free) Lozenge 1 Lozenge Oral every 3 hours PRN Sore Throat  bisacodyl Suppository 10 milliGRAM(s) Rectal daily PRN If no bowel movement by POD#2  diphenhydrAMINE 25 milliGRAM(s) Oral at bedtime PRN Insomnia  HYDROmorphone  Injectable 2 milliGRAM(s) IV Push every 3 hours PRN Severe Breakthrough Pain (7 - 10)  LORazepam   Injectable 1 milliGRAM(s) IV Push once PRN Agitation  magnesium hydroxide Suspension 30 milliLiter(s) Oral daily PRN Constipation  naloxone Injectable 0.1 milliGRAM(s) IV Push every 3 minutes PRN For ANY of the following changes in patient status:  A. RR LESS THAN 10 breaths per minute, B. Oxygen saturation LESS THAN 90%, C. Sedation score of 6  naloxone Injectable 0.1 milliGRAM(s) IV Push every 3 minutes PRN For ANY of the following changes in patient status:  A. RR LESS THAN 10 breaths per minute, B. Oxygen saturation LESS THAN 90%, C. Sedation score of 6  ondansetron Injectable 4 milliGRAM(s) IV Push every 6 hours PRN Nausea  ondansetron Injectable 4 milliGRAM(s) IV Push every 6 hours PRN Nausea and/or Vomiting  ondansetron Injectable 4 milliGRAM(s) IV Push every 6 hours PRN Nausea  oxyCODONE    IR 45 milliGRAM(s) Oral every 4 hours PRN Severe Pain (7 - 10)  QUEtiapine 25 milliGRAM(s) Oral every 6 hours PRN agitation  senna 2 Tablet(s) Oral at bedtime PRN Constipation  tiZANidine 4 milliGRAM(s) Oral every 6 hours PRN Muscle Spasm/pain      CAPILLARY BLOOD GLUCOSE        I&O's Summary    22 May 2020 07:01  -  23 May 2020 07:00  --------------------------------------------------------  IN: 1335 mL / OUT: 2285 mL / NET: -950 mL    23 May 2020 07:01  -  23 May 2020 11:11  --------------------------------------------------------  IN: 0 mL / OUT: 250 mL / NET: -250 mL        PHYSICAL EXAM:  Vital Signs Last 24 Hrs  T(C): 37 (23 May 2020 09:30), Max: 37 (22 May 2020 17:21)  T(F): 98.6 (23 May 2020 09:30), Max: 98.6 (22 May 2020 17:21)  HR: 90 (23 May 2020 09:45) (86 - 101)  BP: 140/55 (23 May 2020 09:45) (104/82 - 149/110)  BP(mean): --  RR: 16 (23 May 2020 09:45) (16 - 20)  SpO2: 98% (23 May 2020 09:45) (93% - 98%)  GENERAL: NAD, well-developed  HEAD:  Atraumatic, Normocephalic  EYES: EOMI, PERRLA, conjunctiva and sclera clear  NECK: Supple, No JVD  CHEST/LUNG: Clear to auscultation bilaterally; No wheeze  HEART: Regular rate and rhythm; No murmurs, rubs, or gallops  ABDOMEN: Soft, Nontender, Nondistended; Bowel sounds present  EXTREMITIES:  2+ Peripheral Pulses, No clubbing, cyanosis, or edema  L hip wound dressing clean, hemovac ,   PSYCH: AAOx3, anxious  NEUROLOGY: non-focal  SKIN: No rashes or lesions    LABS:                        8.8    11.04 )-----------( 162      ( 23 May 2020 06:30 )             27.6     05-23    143  |  106  |  15  ----------------------------<  111<H>  3.8   |  24  |  1.10    Ca    9.2      23 May 2020 06:30      PT/INR - ( 23 May 2020 06:30 )   PT: 11.6 SEC;   INR: 1.02          PTT - ( 23 May 2020 06:30 )  PTT:25.3 SEC          Culture - Surgical Swab (collected 22 May 2020 00:49)  Source: .Surgical Swab # 2 lt hip culture  Preliminary Report (22 May 2020 20:13):    No growth    Culture - Surgical Swab (collected 22 May 2020 00:49)  Source: .Surgical Swab lt hip pseudo capsule (#3)  Gram Stain (22 May 2020 04:54):    Few polymorphonuclear leukocytes seen per low power field    No organisms seen  Preliminary Report (22 May 2020 20:00):    No growth    Culture - Surgical Swab (collected 22 May 2020 00:49)  Source: .Surgical Swab #1 lt hip culture  Preliminary Report (22 May 2020 20:12):    No growth        RADIOLOGY & ADDITIONAL TESTS:  Results Reviewed:   Imaging Personally Reviewed:  < from: Xray Tibia + Fibula 2 Views, Left (05.21.20 @ 13:42) >  IMPRESSION:  Complete left femoral replacing endoprosthesis with cup cage reconstructed left acetabulum and incompletely imaged constrained/hinged longstem tibial component present with intact and aligned hardware and no acute periprosthetic fractures in the visualized regions. Retained old screw fragments in mid left iliac wing region.    Intact and the calcaneus appearing visualized upper and of a right femoral IM doris with lack screw.    Postsurgical changes in the left thigh soft tissues with surgical drain and wound VAC suction device in place.    Correlate with intraoperative findings.      Electrocardiogram Personally Reviewed:    COORDINATION OF CARE:  Care Discussed with Consultants/Other Providers [Y/N]:  Prior or Outpatient Records Reviewed [Y/N]:

## 2020-05-23 NOTE — PROGRESS NOTE ADULT - ASSESSMENT
55yo F s/p revision L total femur 2/2 dislocation  - Pain control - appreciate pain mgmt recs. please recommend additional changes this morning as very difficult to control pain  - appreciate psych recs  - PT/OT   - WBAT  - Monitor drain and vac outputs  - DVT ppx: SQH TID, plan for restarting patient home A/C 12 hours after PCEA is dc'd by pain management

## 2020-05-23 NOTE — PROGRESS NOTE ADULT - SUBJECTIVE AND OBJECTIVE BOX
Orthopedic Surgery Progress Note     S: Patient seen and examined today. Patient complaining of pain throughout the night, She self Dc/d her cruz and PCEA. Pain management replaced PCEA and adjusted meds. Pain still not well controlled this morning.    MEDICATIONS  (STANDING):  acetaminophen  IVPB .. 1000 milliGRAM(s) IV Intermittent once  ascorbic acid 500 milliGRAM(s) Oral two times a day  ascorbic acid 500 milliGRAM(s) Oral two times a day  calcium carbonate 1250 mG  + Vitamin D (OsCal 500 + D) 1 Tablet(s) Oral three times a day  ferrous    sulfate 325 milliGRAM(s) Oral three times a day with meals  folic acid 1 milliGRAM(s) Oral daily  folic acid 1 milliGRAM(s) Oral daily  gabapentin 400 milliGRAM(s) Oral every 8 hours  heparin   Injectable 5000 Unit(s) SubCutaneous every 8 hours  HYDROmorphone PCA (1 mG/mL) 30 milliLiter(s) PCA Continuous PCA Continuous  lactated ringers. 1000 milliLiter(s) (75 mL/Hr) IV Continuous <Continuous>  mirtazapine 7.5 milliGRAM(s) Oral at bedtime  multivitamin 1 Tablet(s) Oral daily  multivitamin 1 Tablet(s) Oral daily  nicotine - 21 mG/24Hr(s) Patch 1 patch Transdermal daily  pantoprazole    Tablet 40 milliGRAM(s) Oral before breakfast  pantoprazole    Tablet 40 milliGRAM(s) Oral before breakfast  polyethylene glycol 3350 17 Gram(s) Oral daily  polyethylene glycol 3350 17 Gram(s) Oral daily  sodium chloride 0.9%. 1000 milliLiter(s) (75 mL/Hr) IV Continuous <Continuous>  traZODone 50 milliGRAM(s) Oral at bedtime  traZODone 50 milliGRAM(s) Oral at bedtime    MEDICATIONS  (PRN):  aluminum hydroxide/magnesium hydroxide/simethicone Suspension 30 milliLiter(s) Oral four times a day PRN Indigestion  benzocaine 15 mG/menthol 3.6 mG (Sugar-Free) Lozenge 1 Lozenge Oral every 3 hours PRN Sore Throat  bisacodyl Suppository 10 milliGRAM(s) Rectal daily PRN If no bowel movement by POD#2  diphenhydrAMINE 25 milliGRAM(s) Oral at bedtime PRN Insomnia  HYDROmorphone PCA (1 mG/mL) Rescue Clinician Bolus 1 milliGRAM(s) IV Push every 15 minutes PRN for Pain Scale GREATER THAN 6  LORazepam   Injectable 1 milliGRAM(s) IV Push once PRN Agitation  magnesium hydroxide Suspension 30 milliLiter(s) Oral daily PRN Constipation  naloxone Injectable 0.1 milliGRAM(s) IV Push every 3 minutes PRN For ANY of the following changes in patient status:  A. RR LESS THAN 10 breaths per minute, B. Oxygen saturation LESS THAN 90%, C. Sedation score of 6  naloxone Injectable 0.1 milliGRAM(s) IV Push every 3 minutes PRN For ANY of the following changes in patient status:  A. RR LESS THAN 10 breaths per minute, B. Oxygen saturation LESS THAN 90%, C. Sedation score of 6  ondansetron Injectable 4 milliGRAM(s) IV Push every 6 hours PRN Nausea  ondansetron Injectable 4 milliGRAM(s) IV Push every 6 hours PRN Nausea and/or Vomiting  ondansetron Injectable 4 milliGRAM(s) IV Push every 6 hours PRN Nausea  QUEtiapine 25 milliGRAM(s) Oral every 6 hours PRN agitation  senna 2 Tablet(s) Oral at bedtime PRN Constipation  tiZANidine 4 milliGRAM(s) Oral every 8 hours PRN muscle spasms      Physical Exam:    Vital Signs Last 24 Hrs  T(C): 36.9 (23 May 2020 05:45), Max: 37.4 (22 May 2020 09:56)  T(F): 98.5 (23 May 2020 05:45), Max: 99.4 (22 May 2020 09:56)  HR: 96 (23 May 2020 05:45) (86 - 103)  BP: 149/110 (23 May 2020 05:45) (104/82 - 151/125)  BP(mean): --  RR: 20 (23 May 2020 05:45) (19 - 21)  SpO2: 96% (23 May 2020 05:45) (91% - 97%)    05-21-20 @ 07:01  -  05-22-20 @ 07:00  --------------------------------------------------------  IN: 890 mL / OUT: 1407.5 mL / NET: -517.5 mL    05-22-20 @ 07:01  -  05-23-20 @ 05:57  --------------------------------------------------------  IN: 1335 mL / OUT: 1570 mL / NET: -235 mL          PE  Physical Exam:  Gen: Laying in bed, NAD, alert and oriented.   Resp: Unlabored breathing  Ext:  dressing c/d/i   EHL/FHL/TA/Sol intact          + SILT DP/SP/BAILEY/Sa/Tib          +DP, extremity WWP            LABS:                        8.5    15.74 )-----------( 165      ( 22 May 2020 08:04 )             26.3     05-22    136  |  101  |  20  ----------------------------<  147<H>  4.4   |  25  |  1.34<H>    Ca    9.0      22 May 2020 08:04 Orthopedic Surgery Progress Note     S: Patient seen and examined today. Patient complaining of pain throughout the night, She self Dc/d her cruz and PCEA. Pain management replaced PCA and adjusted meds. Pain still not well controlled this morning.    MEDICATIONS  (STANDING):  acetaminophen  IVPB .. 1000 milliGRAM(s) IV Intermittent once  ascorbic acid 500 milliGRAM(s) Oral two times a day  ascorbic acid 500 milliGRAM(s) Oral two times a day  calcium carbonate 1250 mG  + Vitamin D (OsCal 500 + D) 1 Tablet(s) Oral three times a day  ferrous    sulfate 325 milliGRAM(s) Oral three times a day with meals  folic acid 1 milliGRAM(s) Oral daily  folic acid 1 milliGRAM(s) Oral daily  gabapentin 400 milliGRAM(s) Oral every 8 hours  heparin   Injectable 5000 Unit(s) SubCutaneous every 8 hours  HYDROmorphone PCA (1 mG/mL) 30 milliLiter(s) PCA Continuous PCA Continuous  lactated ringers. 1000 milliLiter(s) (75 mL/Hr) IV Continuous <Continuous>  mirtazapine 7.5 milliGRAM(s) Oral at bedtime  multivitamin 1 Tablet(s) Oral daily  multivitamin 1 Tablet(s) Oral daily  nicotine - 21 mG/24Hr(s) Patch 1 patch Transdermal daily  pantoprazole    Tablet 40 milliGRAM(s) Oral before breakfast  pantoprazole    Tablet 40 milliGRAM(s) Oral before breakfast  polyethylene glycol 3350 17 Gram(s) Oral daily  polyethylene glycol 3350 17 Gram(s) Oral daily  sodium chloride 0.9%. 1000 milliLiter(s) (75 mL/Hr) IV Continuous <Continuous>  traZODone 50 milliGRAM(s) Oral at bedtime  traZODone 50 milliGRAM(s) Oral at bedtime    MEDICATIONS  (PRN):  aluminum hydroxide/magnesium hydroxide/simethicone Suspension 30 milliLiter(s) Oral four times a day PRN Indigestion  benzocaine 15 mG/menthol 3.6 mG (Sugar-Free) Lozenge 1 Lozenge Oral every 3 hours PRN Sore Throat  bisacodyl Suppository 10 milliGRAM(s) Rectal daily PRN If no bowel movement by POD#2  diphenhydrAMINE 25 milliGRAM(s) Oral at bedtime PRN Insomnia  HYDROmorphone PCA (1 mG/mL) Rescue Clinician Bolus 1 milliGRAM(s) IV Push every 15 minutes PRN for Pain Scale GREATER THAN 6  LORazepam   Injectable 1 milliGRAM(s) IV Push once PRN Agitation  magnesium hydroxide Suspension 30 milliLiter(s) Oral daily PRN Constipation  naloxone Injectable 0.1 milliGRAM(s) IV Push every 3 minutes PRN For ANY of the following changes in patient status:  A. RR LESS THAN 10 breaths per minute, B. Oxygen saturation LESS THAN 90%, C. Sedation score of 6  naloxone Injectable 0.1 milliGRAM(s) IV Push every 3 minutes PRN For ANY of the following changes in patient status:  A. RR LESS THAN 10 breaths per minute, B. Oxygen saturation LESS THAN 90%, C. Sedation score of 6  ondansetron Injectable 4 milliGRAM(s) IV Push every 6 hours PRN Nausea  ondansetron Injectable 4 milliGRAM(s) IV Push every 6 hours PRN Nausea and/or Vomiting  ondansetron Injectable 4 milliGRAM(s) IV Push every 6 hours PRN Nausea  QUEtiapine 25 milliGRAM(s) Oral every 6 hours PRN agitation  senna 2 Tablet(s) Oral at bedtime PRN Constipation  tiZANidine 4 milliGRAM(s) Oral every 8 hours PRN muscle spasms      Physical Exam:    Vital Signs Last 24 Hrs  T(C): 36.9 (23 May 2020 05:45), Max: 37.4 (22 May 2020 09:56)  T(F): 98.5 (23 May 2020 05:45), Max: 99.4 (22 May 2020 09:56)  HR: 96 (23 May 2020 05:45) (86 - 103)  BP: 149/110 (23 May 2020 05:45) (104/82 - 151/125)  BP(mean): --  RR: 20 (23 May 2020 05:45) (19 - 21)  SpO2: 96% (23 May 2020 05:45) (91% - 97%)    05-21-20 @ 07:01  -  05-22-20 @ 07:00  --------------------------------------------------------  IN: 890 mL / OUT: 1407.5 mL / NET: -517.5 mL    05-22-20 @ 07:01  -  05-23-20 @ 05:57  --------------------------------------------------------  IN: 1335 mL / OUT: 1570 mL / NET: -235 mL          PE  Physical Exam:  Gen: Laying in bed, NAD, alert and oriented.   Resp: Unlabored breathing  Ext:  dressing c/d/i   EHL/FHL/TA/Sol intact          + SILT DP/SP/BAILEY/Sa/Tib          +DP, extremity WWP            LABS:                        8.5    15.74 )-----------( 165      ( 22 May 2020 08:04 )             26.3     05-22    136  |  101  |  20  ----------------------------<  147<H>  4.4   |  25  |  1.34<H>    Ca    9.0      22 May 2020 08:04

## 2020-05-23 NOTE — PROGRESS NOTE ADULT - ASSESSMENT
53 yo female with h/o femur fx ~ 30 years ago c/b frequent prosthetic dislocations and fractures, recent revision left hip arthroplasty on 11/6 followed by hip dislocation requiring open reduction on 12/4/19 and again on 1/16/20, p/w dislocated KONG, now s/p revision KONG/SIVA on 5/21.

## 2020-05-23 NOTE — PROGRESS NOTE ADULT - PROBLEM SELECTOR PLAN 1
-h/o recurrent L hip dislocations, denies fall or trauma to  hip   -s/p revision L KONG on 5/21   -TTE (11/5/19) normal LV function   -encouraged incentive spirometry

## 2020-05-24 LAB
ANION GAP SERPL CALC-SCNC: 12 MMO/L — SIGNIFICANT CHANGE UP (ref 7–14)
APTT BLD: 27.6 SEC — SIGNIFICANT CHANGE UP (ref 27.5–36.3)
BUN SERPL-MCNC: 17 MG/DL — SIGNIFICANT CHANGE UP (ref 7–23)
CALCIUM SERPL-MCNC: 8.9 MG/DL — SIGNIFICANT CHANGE UP (ref 8.4–10.5)
CHLORIDE SERPL-SCNC: 104 MMOL/L — SIGNIFICANT CHANGE UP (ref 98–107)
CO2 SERPL-SCNC: 24 MMOL/L — SIGNIFICANT CHANGE UP (ref 22–31)
CREAT SERPL-MCNC: 1.08 MG/DL — SIGNIFICANT CHANGE UP (ref 0.5–1.3)
GLUCOSE SERPL-MCNC: 117 MG/DL — HIGH (ref 70–99)
HCT VFR BLD CALC: 26.4 % — LOW (ref 34.5–45)
HGB BLD-MCNC: 8.3 G/DL — LOW (ref 11.5–15.5)
INR BLD: 0.99 — SIGNIFICANT CHANGE UP (ref 0.88–1.17)
MCHC RBC-ENTMCNC: 28.3 PG — SIGNIFICANT CHANGE UP (ref 27–34)
MCHC RBC-ENTMCNC: 31.4 % — LOW (ref 32–36)
MCV RBC AUTO: 90.1 FL — SIGNIFICANT CHANGE UP (ref 80–100)
NRBC # FLD: 0 K/UL — SIGNIFICANT CHANGE UP (ref 0–0)
PLATELET # BLD AUTO: 169 K/UL — SIGNIFICANT CHANGE UP (ref 150–400)
PMV BLD: 12.7 FL — SIGNIFICANT CHANGE UP (ref 7–13)
POTASSIUM SERPL-MCNC: 4.3 MMOL/L — SIGNIFICANT CHANGE UP (ref 3.5–5.3)
POTASSIUM SERPL-SCNC: 4.3 MMOL/L — SIGNIFICANT CHANGE UP (ref 3.5–5.3)
PROTHROM AB SERPL-ACNC: 11.4 SEC — SIGNIFICANT CHANGE UP (ref 9.8–13.1)
RBC # BLD: 2.93 M/UL — LOW (ref 3.8–5.2)
RBC # FLD: 17.5 % — HIGH (ref 10.3–14.5)
SODIUM SERPL-SCNC: 140 MMOL/L — SIGNIFICANT CHANGE UP (ref 135–145)
WBC # BLD: 9.42 K/UL — SIGNIFICANT CHANGE UP (ref 3.8–10.5)
WBC # FLD AUTO: 9.42 K/UL — SIGNIFICANT CHANGE UP (ref 3.8–10.5)

## 2020-05-24 PROCEDURE — 99233 SBSQ HOSP IP/OBS HIGH 50: CPT

## 2020-05-24 RX ORDER — ACETAMINOPHEN 500 MG
975 TABLET ORAL EVERY 6 HOURS
Refills: 0 | Status: DISCONTINUED | OUTPATIENT
Start: 2020-05-24 | End: 2020-05-24

## 2020-05-24 RX ORDER — IBUPROFEN 200 MG
800 TABLET ORAL EVERY 6 HOURS
Refills: 0 | Status: DISCONTINUED | OUTPATIENT
Start: 2020-05-24 | End: 2020-05-26

## 2020-05-24 RX ORDER — IBUPROFEN 200 MG
400 TABLET ORAL EVERY 6 HOURS
Refills: 0 | Status: DISCONTINUED | OUTPATIENT
Start: 2020-05-24 | End: 2020-05-24

## 2020-05-24 RX ORDER — LIDOCAINE 4 G/100G
1 CREAM TOPICAL DAILY
Refills: 0 | Status: DISCONTINUED | OUTPATIENT
Start: 2020-05-24 | End: 2020-05-26

## 2020-05-24 RX ORDER — ACETAMINOPHEN 500 MG
975 TABLET ORAL EVERY 6 HOURS
Refills: 0 | Status: DISCONTINUED | OUTPATIENT
Start: 2020-05-24 | End: 2020-05-26

## 2020-05-24 RX ORDER — ACETAMINOPHEN 500 MG
1000 TABLET ORAL ONCE
Refills: 0 | Status: COMPLETED | OUTPATIENT
Start: 2020-05-24 | End: 2020-05-24

## 2020-05-24 RX ORDER — ACETAMINOPHEN 500 MG
650 TABLET ORAL EVERY 6 HOURS
Refills: 0 | Status: DISCONTINUED | OUTPATIENT
Start: 2020-05-24 | End: 2020-05-24

## 2020-05-24 RX ADMIN — Medication 325 MILLIGRAM(S): at 12:32

## 2020-05-24 RX ADMIN — Medication 500 MILLIGRAM(S): at 06:39

## 2020-05-24 RX ADMIN — Medication 325 MILLIGRAM(S): at 17:18

## 2020-05-24 RX ADMIN — Medication 400 MILLIGRAM(S): at 09:05

## 2020-05-24 RX ADMIN — Medication 400 MILLIGRAM(S): at 12:32

## 2020-05-24 RX ADMIN — HYDROMORPHONE HYDROCHLORIDE 2 MILLIGRAM(S): 2 INJECTION INTRAMUSCULAR; INTRAVENOUS; SUBCUTANEOUS at 15:56

## 2020-05-24 RX ADMIN — Medication 1 TABLET(S): at 06:39

## 2020-05-24 RX ADMIN — Medication 400 MILLIGRAM(S): at 03:14

## 2020-05-24 RX ADMIN — Medication 325 MILLIGRAM(S): at 07:42

## 2020-05-24 RX ADMIN — RIVAROXABAN 10 MILLIGRAM(S): KIT at 06:39

## 2020-05-24 RX ADMIN — Medication 30 MILLIGRAM(S): at 00:32

## 2020-05-24 RX ADMIN — OXYCODONE HYDROCHLORIDE 45 MILLIGRAM(S): 5 TABLET ORAL at 18:50

## 2020-05-24 RX ADMIN — Medication 1 TABLET(S): at 12:32

## 2020-05-24 RX ADMIN — OXYCODONE HYDROCHLORIDE 80 MILLIGRAM(S): 5 TABLET ORAL at 09:04

## 2020-05-24 RX ADMIN — Medication 500 MILLIGRAM(S): at 17:18

## 2020-05-24 RX ADMIN — OXYCODONE HYDROCHLORIDE 45 MILLIGRAM(S): 5 TABLET ORAL at 22:52

## 2020-05-24 RX ADMIN — Medication 1 PATCH: at 06:47

## 2020-05-24 RX ADMIN — OXYCODONE HYDROCHLORIDE 45 MILLIGRAM(S): 5 TABLET ORAL at 10:43

## 2020-05-24 RX ADMIN — Medication 1 PATCH: at 19:55

## 2020-05-24 RX ADMIN — Medication 30 MILLIGRAM(S): at 06:40

## 2020-05-24 RX ADMIN — HYDROMORPHONE HYDROCHLORIDE 2 MILLIGRAM(S): 2 INJECTION INTRAMUSCULAR; INTRAVENOUS; SUBCUTANEOUS at 07:43

## 2020-05-24 RX ADMIN — Medication 1 MILLIGRAM(S): at 12:32

## 2020-05-24 RX ADMIN — OXYCODONE HYDROCHLORIDE 80 MILLIGRAM(S): 5 TABLET ORAL at 17:18

## 2020-05-24 RX ADMIN — OXYCODONE HYDROCHLORIDE 45 MILLIGRAM(S): 5 TABLET ORAL at 14:47

## 2020-05-24 RX ADMIN — TIZANIDINE 4 MILLIGRAM(S): 4 TABLET ORAL at 12:32

## 2020-05-24 RX ADMIN — ONDANSETRON 4 MILLIGRAM(S): 8 TABLET, FILM COATED ORAL at 09:31

## 2020-05-24 RX ADMIN — Medication 1 TABLET(S): at 14:48

## 2020-05-24 RX ADMIN — GABAPENTIN 400 MILLIGRAM(S): 400 CAPSULE ORAL at 05:16

## 2020-05-24 RX ADMIN — TIZANIDINE 4 MILLIGRAM(S): 4 TABLET ORAL at 19:55

## 2020-05-24 RX ADMIN — OXYCODONE HYDROCHLORIDE 45 MILLIGRAM(S): 5 TABLET ORAL at 02:37

## 2020-05-24 RX ADMIN — HYDROMORPHONE HYDROCHLORIDE 2 MILLIGRAM(S): 2 INJECTION INTRAMUSCULAR; INTRAVENOUS; SUBCUTANEOUS at 20:58

## 2020-05-24 RX ADMIN — Medication 800 MILLIGRAM(S): at 17:18

## 2020-05-24 RX ADMIN — ONDANSETRON 4 MILLIGRAM(S): 8 TABLET, FILM COATED ORAL at 20:00

## 2020-05-24 RX ADMIN — Medication 1 PATCH: at 12:32

## 2020-05-24 RX ADMIN — PANTOPRAZOLE SODIUM 40 MILLIGRAM(S): 20 TABLET, DELAYED RELEASE ORAL at 06:39

## 2020-05-24 RX ADMIN — GABAPENTIN 400 MILLIGRAM(S): 400 CAPSULE ORAL at 21:38

## 2020-05-24 RX ADMIN — TIZANIDINE 4 MILLIGRAM(S): 4 TABLET ORAL at 04:32

## 2020-05-24 RX ADMIN — HYDROMORPHONE HYDROCHLORIDE 2 MILLIGRAM(S): 2 INJECTION INTRAMUSCULAR; INTRAVENOUS; SUBCUTANEOUS at 03:38

## 2020-05-24 RX ADMIN — Medication 1 TABLET(S): at 21:38

## 2020-05-24 RX ADMIN — HYDROMORPHONE HYDROCHLORIDE 2 MILLIGRAM(S): 2 INJECTION INTRAMUSCULAR; INTRAVENOUS; SUBCUTANEOUS at 11:42

## 2020-05-24 RX ADMIN — OXYCODONE HYDROCHLORIDE 80 MILLIGRAM(S): 5 TABLET ORAL at 01:04

## 2020-05-24 RX ADMIN — GABAPENTIN 400 MILLIGRAM(S): 400 CAPSULE ORAL at 12:32

## 2020-05-24 RX ADMIN — OXYCODONE HYDROCHLORIDE 45 MILLIGRAM(S): 5 TABLET ORAL at 06:39

## 2020-05-24 RX ADMIN — Medication 1 PATCH: at 12:21

## 2020-05-24 RX ADMIN — MIRTAZAPINE 7.5 MILLIGRAM(S): 45 TABLET, ORALLY DISINTEGRATING ORAL at 21:38

## 2020-05-24 NOTE — OCCUPATIONAL THERAPY INITIAL EVALUATION ADULT - PERTINENT HX OF CURRENT PROBLEM, REHAB EVAL
Pt is a 53 y/o female with dislocated left KONG.  Per Pt , she has been dislocated for approximately 3 months, was unable to come to hospital secondary to traveling with  and then pandemic occurred. Pt has sustained multiple dislocations and underwent multiple revisions in the past, is well known to Dr Hackett and Dr Calero.  XR reveal left KONG dislocation

## 2020-05-24 NOTE — PHYSICAL THERAPY INITIAL EVALUATION ADULT - ADDITIONAL COMMENTS
independent with crutches at baseline, patient reports she has been using crutches for 25years independent with crutches at baseline, patient reports she has been using crutches for 25years    hip precautions maintained throughout session

## 2020-05-24 NOTE — PROGRESS NOTE ADULT - PROBLEM SELECTOR PLAN 1
-h/o recurrent L hip dislocations, denies fall or trauma to  hip   -s/p revision L KONG on 5/21   -TTE (11/5/19) normal LV function   -encouraged incentive spirometry, PT/OT, DVT ppx (xarelto 10mg)

## 2020-05-24 NOTE — PROVIDER CONTACT NOTE (OTHER) - ASSESSMENT
Preveena Vac tubing became disconnected from patient foam. Foam is still in place but there is no suction connected. There has been no output from the drain when suction was in place

## 2020-05-24 NOTE — PHYSICAL THERAPY INITIAL EVALUATION ADULT - PERTINENT HX OF CURRENT PROBLEM, REHAB EVAL
Patient is a 54 year old female admitted to [facility] for left hip dislocation, s/p revision of left total femur. Patient has history of dislocations. PMH listed below

## 2020-05-24 NOTE — PHYSICAL THERAPY INITIAL EVALUATION ADULT - GENERAL OBSERVATIONS, REHAB EVAL
Patient found supine in bed head of bed elevated in NAD, agreeable to evaluation +accordion drain, wound vac, IV

## 2020-05-24 NOTE — PROGRESS NOTE ADULT - SUBJECTIVE AND OBJECTIVE BOX
Orthopedic Surgery Progress Note     S: Patient seen and examined today. Pain mgmt updated pain regimen to home meds plus additional IV/PO pain meds as needed.  Patient reports much better pain control.  Has been OOB.     PE  Physical Exam:  Gen: Laying in bed, NAD, alert and oriented.   Resp: Unlabored breathing  Ext:  Prevena to suction  Drain to suction / patent   EHL/FHL/TA/Sol intact          + SILT DP/SP/BAILEY/Sa/Tib          +DP, extremity WWP Orthopedic Surgery Progress Note     S: Patient seen and examined today. Pain mgmt updated pain regimen to home meds plus additional IV/PO pain meds as needed.  Patient reports much better pain control.  Has been OOB.     Physical Exam:  ICU Vital Signs Last 24 Hrs  T(C): 36.7 (24 May 2020 07:41), Max: 37.1 (23 May 2020 12:30)  T(F): 98 (24 May 2020 07:41), Max: 98.8 (23 May 2020 14:17)  HR: 78 (24 May 2020 07:41) (76 - 92)  BP: 141/53 (24 May 2020 07:41) (122/72 - 141/53)  BP(mean): --  ABP: --  ABP(mean): --  RR: 19 (24 May 2020 07:41) (16 - 22)  SpO2: 98% (24 May 2020 07:41) (95% - 98%)      Gen: Laying in bed, NAD, alert and oriented.   Resp: Unlabored breathing  Ext:  Prevena to suction  Drain to suction / patent   EHL/FHL/TA/Sol intact          + SILT DP/SP/BAILEY/Sa/Tib          +DP, extremity WWP

## 2020-05-24 NOTE — PHYSICAL THERAPY INITIAL EVALUATION ADULT - CRITERIA FOR SKILLED THERAPEUTIC INTERVENTIONS
functional limitations in following categories/impairments found/therapy frequency/anticipated discharge recommendation/risk reduction/prevention/rehab potential/predicted duration of therapy intervention

## 2020-05-24 NOTE — PROGRESS NOTE ADULT - SUBJECTIVE AND OBJECTIVE BOX
Dr. Rossy Allen  Pager [phone]    PROGRESS NOTE:     Patient is a 54y old  Female who presents with a chief complaint of dislocated left hip (24 May 2020 07:32)      SUBJECTIVE / OVERNIGHT EVENTS: pt reports pain better controlled on new regimen  ADDITIONAL REVIEW OF SYSTEMS: denies chest pain/sob    MEDICATIONS  (STANDING):  acetaminophen   Tablet .. 650 milliGRAM(s) Oral every 6 hours  ascorbic acid 500 milliGRAM(s) Oral two times a day  calcium carbonate 1250 mG  + Vitamin D (OsCal 500 + D) 1 Tablet(s) Oral three times a day  ferrous    sulfate 325 milliGRAM(s) Oral three times a day with meals  folic acid 1 milliGRAM(s) Oral daily  gabapentin 400 milliGRAM(s) Oral every 8 hours  ibuprofen  Tablet. 400 milliGRAM(s) Oral every 6 hours  lactated ringers. 1000 milliLiter(s) (75 mL/Hr) IV Continuous <Continuous>  mirtazapine 7.5 milliGRAM(s) Oral at bedtime  multivitamin 1 Tablet(s) Oral daily  nicotine - 21 mG/24Hr(s) Patch 1 patch Transdermal daily  oxyCODONE  ER Tablet 80 milliGRAM(s) Oral every 8 hours  pantoprazole    Tablet 40 milliGRAM(s) Oral before breakfast  polyethylene glycol 3350 17 Gram(s) Oral daily  rivaroxaban 10 milliGRAM(s) Oral daily  sodium chloride 0.9%. 1000 milliLiter(s) (75 mL/Hr) IV Continuous <Continuous>  traZODone 50 milliGRAM(s) Oral at bedtime    MEDICATIONS  (PRN):  aluminum hydroxide/magnesium hydroxide/simethicone Suspension 30 milliLiter(s) Oral four times a day PRN Indigestion  benzocaine 15 mG/menthol 3.6 mG (Sugar-Free) Lozenge 1 Lozenge Oral every 3 hours PRN Sore Throat  bisacodyl Suppository 10 milliGRAM(s) Rectal daily PRN If no bowel movement by POD#2  diphenhydrAMINE 25 milliGRAM(s) Oral at bedtime PRN Insomnia  HYDROmorphone  Injectable 2 milliGRAM(s) IV Push every 3 hours PRN Severe Breakthrough Pain (7 - 10)  LORazepam   Injectable 1 milliGRAM(s) IV Push once PRN Agitation  magnesium hydroxide Suspension 30 milliLiter(s) Oral daily PRN Constipation  naloxone Injectable 0.1 milliGRAM(s) IV Push every 3 minutes PRN For ANY of the following changes in patient status:  A. RR LESS THAN 10 breaths per minute, B. Oxygen saturation LESS THAN 90%, C. Sedation score of 6  ondansetron Injectable 4 milliGRAM(s) IV Push every 6 hours PRN Nausea and/or Vomiting  oxyCODONE    IR 45 milliGRAM(s) Oral every 4 hours PRN Severe Pain (7 - 10)  QUEtiapine 25 milliGRAM(s) Oral every 6 hours PRN agitation  senna 2 Tablet(s) Oral at bedtime PRN Constipation  tiZANidine 4 milliGRAM(s) Oral every 6 hours PRN Muscle Spasm/pain      CAPILLARY BLOOD GLUCOSE        I&O's Summary    23 May 2020 07:01  -  24 May 2020 07:00  --------------------------------------------------------  IN: 0 mL / OUT: 1165 mL / NET: -1165 mL    24 May 2020 07:01  -  24 May 2020 10:45  --------------------------------------------------------  IN: 0 mL / OUT: 17 mL / NET: -17 mL        PHYSICAL EXAM:  Vital Signs Last 24 Hrs  T(C): 36.7 (24 May 2020 07:41), Max: 37.1 (23 May 2020 12:30)  T(F): 98 (24 May 2020 07:41), Max: 98.8 (23 May 2020 14:17)  HR: 78 (24 May 2020 07:41) (76 - 84)  BP: 141/53 (24 May 2020 07:41) (122/72 - 141/53)  BP(mean): --  RR: 19 (24 May 2020 07:41) (16 - 22)  SpO2: 98% (24 May 2020 07:41) (95% - 98%)  GENERAL: NAD, well-developed  HEAD:  Atraumatic, Normocephalic  EYES: EOMI, PERRLA, conjunctiva and sclera clear  NECK: Supple, No JVD  CHEST/LUNG: Clear to auscultation bilaterally; No wheeze  HEART: Regular rate and rhythm; No murmurs, rubs, or gallops  ABDOMEN: Soft, Nontender, Nondistended; Bowel sounds present  EXTREMITIES:  2+ Peripheral Pulses, No clubbing, cyanosis, or edema  L hip wound dressing clean, hemovac , wound vac  PSYCH: AAOx3, anxious  NEUROLOGY: non-focal  SKIN: No rashes or lesions    LABS:                        8.3    9.42  )-----------( 169      ( 24 May 2020 06:30 )             26.4     05-24    140  |  104  |  17  ----------------------------<  117<H>  4.3   |  24  |  1.08    Ca    8.9      24 May 2020 06:30      PT/INR - ( 24 May 2020 06:30 )   PT: 11.4 SEC;   INR: 0.99          PTT - ( 24 May 2020 06:30 )  PTT:27.6 SEC          Culture - Surgical Swab (collected 22 May 2020 00:49)  Source: .Surgical Swab # 2 lt hip culture  Preliminary Report (22 May 2020 20:13):    No growth    Culture - Surgical Swab (collected 22 May 2020 00:49)  Source: .Surgical Swab lt hip pseudo capsule (#3)  Gram Stain (22 May 2020 04:54):    Few polymorphonuclear leukocytes seen per low power field    No organisms seen  Preliminary Report (22 May 2020 20:00):    No growth    Culture - Surgical Swab (collected 22 May 2020 00:49)  Source: .Surgical Swab #1 lt hip culture  Preliminary Report (22 May 2020 20:12):    No growth        RADIOLOGY & ADDITIONAL TESTS:  Results Reviewed:   Imaging Personally Reviewed:  Electrocardiogram Personally Reviewed:    COORDINATION OF CARE:  Care Discussed with Consultants/Other Providers [Y/N]:  Prior or Outpatient Records Reviewed [Y/N]:

## 2020-05-24 NOTE — PROGRESS NOTE ADULT - ASSESSMENT
53yo F s/p revision L total femur 2/2 dislocation    - Pain control - appreciate pain mgmt recs., new program appears to be better controlling pain  - appreciate psych recs  - PT/OT   - WBAT  - Monitor drain and vac outputs  - DVT ppx: edoxaban 60mg Qd not available, patient started on Xarelto 10mg Qd

## 2020-05-24 NOTE — PHYSICAL THERAPY INITIAL EVALUATION ADULT - RANGE OF MOTION EXAMINATION, REHAB EVAL
bilateral upper extremity ROM was WFL (within functional limits)/bilateral lower extremity ROM was WFL (within functional limits)/LLE not tested

## 2020-05-24 NOTE — PROGRESS NOTE ADULT - SUBJECTIVE AND OBJECTIVE BOX
Patient is a 54y old  Female who presents with a chief complaint of dislocated left hip, s/p surgery (24 May 2020 10:45)      HPI:  53 yo female with dislocated left KONG.  Per Pt , she has been dislocated for approximately 3 months, was unable to come to hospital secondary to traveling with  and then pandemic occurred. Pt has sustained multiple dislocations and underwent multiple revisions in the past, is well known to Dr Hackett and Dr Calero.  XR reveal left KONG dislocation (20 May 2020 11:50)      PAST MEDICAL & SURGICAL HISTORY:  No pertinent past medical history  Closed pelvic fracture      MEDICATIONS  (STANDING):  acetaminophen   Tablet .. 975 milliGRAM(s) Oral every 6 hours  ascorbic acid 500 milliGRAM(s) Oral two times a day  calcium carbonate 1250 mG  + Vitamin D (OsCal 500 + D) 1 Tablet(s) Oral three times a day  ferrous    sulfate 325 milliGRAM(s) Oral three times a day with meals  folic acid 1 milliGRAM(s) Oral daily  gabapentin 400 milliGRAM(s) Oral every 8 hours  ibuprofen  Tablet. 400 milliGRAM(s) Oral every 6 hours  lactated ringers. 1000 milliLiter(s) (75 mL/Hr) IV Continuous <Continuous>  mirtazapine 7.5 milliGRAM(s) Oral at bedtime  multivitamin 1 Tablet(s) Oral daily  nicotine - 21 mG/24Hr(s) Patch 1 patch Transdermal daily  oxyCODONE  ER Tablet 80 milliGRAM(s) Oral every 8 hours  pantoprazole    Tablet 40 milliGRAM(s) Oral before breakfast  polyethylene glycol 3350 17 Gram(s) Oral daily  rivaroxaban 10 milliGRAM(s) Oral daily  sodium chloride 0.9%. 1000 milliLiter(s) (75 mL/Hr) IV Continuous <Continuous>  traZODone 50 milliGRAM(s) Oral at bedtime    MEDICATIONS  (PRN):  aluminum hydroxide/magnesium hydroxide/simethicone Suspension 30 milliLiter(s) Oral four times a day PRN Indigestion  benzocaine 15 mG/menthol 3.6 mG (Sugar-Free) Lozenge 1 Lozenge Oral every 3 hours PRN Sore Throat  bisacodyl Suppository 10 milliGRAM(s) Rectal daily PRN If no bowel movement by POD#2  diphenhydrAMINE 25 milliGRAM(s) Oral at bedtime PRN Insomnia  HYDROmorphone  Injectable 2 milliGRAM(s) IV Push every 3 hours PRN Severe Breakthrough Pain (7 - 10)  LORazepam   Injectable 1 milliGRAM(s) IV Push once PRN Agitation  magnesium hydroxide Suspension 30 milliLiter(s) Oral daily PRN Constipation  naloxone Injectable 0.1 milliGRAM(s) IV Push every 3 minutes PRN For ANY of the following changes in patient status:  A. RR LESS THAN 10 breaths per minute, B. Oxygen saturation LESS THAN 90%, C. Sedation score of 6  ondansetron Injectable 4 milliGRAM(s) IV Push every 6 hours PRN Nausea and/or Vomiting  oxyCODONE    IR 45 milliGRAM(s) Oral every 4 hours PRN Severe Pain (7 - 10)  QUEtiapine 25 milliGRAM(s) Oral every 6 hours PRN agitation  senna 2 Tablet(s) Oral at bedtime PRN Constipation  tiZANidine 4 milliGRAM(s) Oral every 6 hours PRN Muscle Spasm/pain      ICU Vital Signs Last 24 Hrs  T(C): 36.7 (24 May 2020 09:52), Max: 37.1 (23 May 2020 12:30)  T(F): 98 (24 May 2020 09:52), Max: 98.8 (23 May 2020 14:17)  HR: 86 (24 May 2020 11:42) (76 - 86)  BP: 134/66 (24 May 2020 11:42) (122/72 - 141/53)  BP(mean): --  ABP: --  ABP(mean): --  RR: 18 (24 May 2020 11:42) (16 - 22)  SpO2: 95% (24 May 2020 11:42) (95% - 100%)      Vital Signs Last 24 Hrs  T(C): 36.7 (24 May 2020 09:52), Max: 37.1 (23 May 2020 12:30)  T(F): 98 (24 May 2020 09:52), Max: 98.8 (23 May 2020 14:17)  HR: 86 (24 May 2020 11:42) (76 - 86)  BP: 134/66 (24 May 2020 11:42) (122/72 - 141/53)  BP(mean): --  RR: 18 (24 May 2020 11:42) (16 - 22)  SpO2: 95% (24 May 2020 11:42) (95% - 100%)                          8.3    9.42  )-----------( 169      ( 24 May 2020 06:30 )             26.4           PT/INR - ( 24 May 2020 06:30 )   PT: 11.4 SEC;   INR: 0.99          PTT - ( 24 May 2020 06:30 )  PTT:27.6 SEC    SUMMARY:   Patient seen at bedside. Patient alert and orientedx4. Patient maintaining eye contact. Patient not in any apparent distress. Patient answers questions apparently. Patient states her pain is managed with the current pain regimen she is on. Patient states her pain is 8/10. Patient states her pain is located on left thigh and hip. Patient states IV Dilaudid is helping her for the breakthrough pain. Patient requested to increase Oxycodone IR from 45mg to 60mg Q4H. Explained to patient that change in Oxycodone IR dose is not warranted this time unless patient is willing to decrease the dose of IV Dilaudid for breakthrough. Patient states she prefers to stay on current pain regimen and does not want to decrease the dose of IV Dilaudid for breakthrough.   COMMENTS:     Will continue current pain regimen.

## 2020-05-24 NOTE — PROGRESS NOTE ADULT - PROBLEM SELECTOR PLAN 3
pain management is managing pain since pt is on a heavy dose of narcotics, states she takes oxycontin 80 mg q8h and oxyIR 45 mg q4h prn  bowel regimen, pain regimen per pain management, currently on home regimen + dilaudid prn

## 2020-05-25 ENCOUNTER — TRANSCRIPTION ENCOUNTER (OUTPATIENT)
Age: 55
End: 2020-05-25

## 2020-05-25 DIAGNOSIS — D62 ACUTE POSTHEMORRHAGIC ANEMIA: ICD-10-CM

## 2020-05-25 LAB
ANION GAP SERPL CALC-SCNC: 11 MMO/L — SIGNIFICANT CHANGE UP (ref 7–14)
BUN SERPL-MCNC: 12 MG/DL — SIGNIFICANT CHANGE UP (ref 7–23)
CALCIUM SERPL-MCNC: 9.1 MG/DL — SIGNIFICANT CHANGE UP (ref 8.4–10.5)
CHLORIDE SERPL-SCNC: 107 MMOL/L — SIGNIFICANT CHANGE UP (ref 98–107)
CO2 SERPL-SCNC: 25 MMOL/L — SIGNIFICANT CHANGE UP (ref 22–31)
CREAT SERPL-MCNC: 1.18 MG/DL — SIGNIFICANT CHANGE UP (ref 0.5–1.3)
GLUCOSE SERPL-MCNC: 104 MG/DL — HIGH (ref 70–99)
HCT VFR BLD CALC: 25.8 % — LOW (ref 34.5–45)
HGB BLD-MCNC: 8.2 G/DL — LOW (ref 11.5–15.5)
MCHC RBC-ENTMCNC: 28.9 PG — SIGNIFICANT CHANGE UP (ref 27–34)
MCHC RBC-ENTMCNC: 31.8 % — LOW (ref 32–36)
MCV RBC AUTO: 90.8 FL — SIGNIFICANT CHANGE UP (ref 80–100)
NRBC # FLD: 0 K/UL — SIGNIFICANT CHANGE UP (ref 0–0)
PLATELET # BLD AUTO: 163 K/UL — SIGNIFICANT CHANGE UP (ref 150–400)
PMV BLD: 11.8 FL — SIGNIFICANT CHANGE UP (ref 7–13)
POTASSIUM SERPL-MCNC: 4.1 MMOL/L — SIGNIFICANT CHANGE UP (ref 3.5–5.3)
POTASSIUM SERPL-SCNC: 4.1 MMOL/L — SIGNIFICANT CHANGE UP (ref 3.5–5.3)
RBC # BLD: 2.84 M/UL — LOW (ref 3.8–5.2)
RBC # FLD: 17.4 % — HIGH (ref 10.3–14.5)
SODIUM SERPL-SCNC: 143 MMOL/L — SIGNIFICANT CHANGE UP (ref 135–145)
WBC # BLD: 11.72 K/UL — HIGH (ref 3.8–10.5)
WBC # FLD AUTO: 11.72 K/UL — HIGH (ref 3.8–10.5)

## 2020-05-25 PROCEDURE — 99232 SBSQ HOSP IP/OBS MODERATE 35: CPT

## 2020-05-25 RX ORDER — ASCORBIC ACID 60 MG
1 TABLET,CHEWABLE ORAL
Qty: 28 | Refills: 0
Start: 2020-05-25 | End: 2020-06-07

## 2020-05-25 RX ORDER — TRAZODONE HCL 50 MG
1 TABLET ORAL
Qty: 14 | Refills: 0
Start: 2020-05-25 | End: 2020-06-07

## 2020-05-25 RX ORDER — OXYCODONE HYDROCHLORIDE 5 MG/1
1 TABLET ORAL
Qty: 42 | Refills: 0
Start: 2020-05-25 | End: 2020-06-21

## 2020-05-25 RX ORDER — HYDROMORPHONE HYDROCHLORIDE 2 MG/ML
1 INJECTION INTRAMUSCULAR; INTRAVENOUS; SUBCUTANEOUS
Qty: 84 | Refills: 0
Start: 2020-05-25 | End: 2020-06-07

## 2020-05-25 RX ORDER — RIVAROXABAN 15 MG-20MG
1 KIT ORAL
Qty: 30 | Refills: 0
Start: 2020-05-25 | End: 2020-06-23

## 2020-05-25 RX ORDER — TIZANIDINE 4 MG/1
1 TABLET ORAL
Qty: 56 | Refills: 0
Start: 2020-05-25 | End: 2020-06-21

## 2020-05-25 RX ORDER — HYDROMORPHONE HYDROCHLORIDE 2 MG/ML
1 INJECTION INTRAMUSCULAR; INTRAVENOUS; SUBCUTANEOUS
Qty: 18 | Refills: 0
Start: 2020-05-25 | End: 2020-06-10

## 2020-05-25 RX ORDER — POLYETHYLENE GLYCOL 3350 17 G/17G
17 POWDER, FOR SOLUTION ORAL
Qty: 238 | Refills: 0
Start: 2020-05-25 | End: 2020-06-07

## 2020-05-25 RX ORDER — OXYCODONE HYDROCHLORIDE 5 MG/1
1 TABLET ORAL
Qty: 84 | Refills: 0
Start: 2020-05-25 | End: 2020-06-07

## 2020-05-25 RX ORDER — SENNA PLUS 8.6 MG/1
2 TABLET ORAL
Qty: 28 | Refills: 0
Start: 2020-05-25 | End: 2020-06-07

## 2020-05-25 RX ORDER — NICOTINE POLACRILEX 2 MG
1 GUM BUCCAL
Qty: 1 | Refills: 0
Start: 2020-05-25 | End: 2020-06-07

## 2020-05-25 RX ORDER — NICOTINE POLACRILEX 2 MG
1 GUM BUCCAL
Qty: 1 | Refills: 0
Start: 2020-05-25 | End: 2020-06-21

## 2020-05-25 RX ORDER — OXYCODONE HYDROCHLORIDE 5 MG/1
1 TABLET ORAL
Qty: 42 | Refills: 0
Start: 2020-05-25 | End: 2020-06-07

## 2020-05-25 RX ORDER — GABAPENTIN 400 MG/1
1 CAPSULE ORAL
Qty: 42 | Refills: 0
Start: 2020-05-25 | End: 2020-06-07

## 2020-05-25 RX ORDER — PANTOPRAZOLE SODIUM 20 MG/1
1 TABLET, DELAYED RELEASE ORAL
Qty: 14 | Refills: 0
Start: 2020-05-25 | End: 2020-06-07

## 2020-05-25 RX ORDER — TIZANIDINE 4 MG/1
1 TABLET ORAL
Qty: 56 | Refills: 0
Start: 2020-05-25 | End: 2020-06-07

## 2020-05-25 RX ADMIN — OXYCODONE HYDROCHLORIDE 45 MILLIGRAM(S): 5 TABLET ORAL at 10:59

## 2020-05-25 RX ADMIN — Medication 325 MILLIGRAM(S): at 07:54

## 2020-05-25 RX ADMIN — Medication 1 TABLET(S): at 11:02

## 2020-05-25 RX ADMIN — Medication 500 MILLIGRAM(S): at 17:03

## 2020-05-25 RX ADMIN — HYDROMORPHONE HYDROCHLORIDE 2 MILLIGRAM(S): 2 INJECTION INTRAMUSCULAR; INTRAVENOUS; SUBCUTANEOUS at 21:09

## 2020-05-25 RX ADMIN — Medication 800 MILLIGRAM(S): at 17:03

## 2020-05-25 RX ADMIN — GABAPENTIN 400 MILLIGRAM(S): 400 CAPSULE ORAL at 06:16

## 2020-05-25 RX ADMIN — HYDROMORPHONE HYDROCHLORIDE 2 MILLIGRAM(S): 2 INJECTION INTRAMUSCULAR; INTRAVENOUS; SUBCUTANEOUS at 00:59

## 2020-05-25 RX ADMIN — HYDROMORPHONE HYDROCHLORIDE 2 MILLIGRAM(S): 2 INJECTION INTRAMUSCULAR; INTRAVENOUS; SUBCUTANEOUS at 16:56

## 2020-05-25 RX ADMIN — Medication 1 PATCH: at 07:21

## 2020-05-25 RX ADMIN — GABAPENTIN 400 MILLIGRAM(S): 400 CAPSULE ORAL at 21:09

## 2020-05-25 RX ADMIN — LIDOCAINE 1 PATCH: 4 CREAM TOPICAL at 17:06

## 2020-05-25 RX ADMIN — TIZANIDINE 4 MILLIGRAM(S): 4 TABLET ORAL at 14:05

## 2020-05-25 RX ADMIN — RIVAROXABAN 10 MILLIGRAM(S): KIT at 06:14

## 2020-05-25 RX ADMIN — Medication 1 PATCH: at 17:04

## 2020-05-25 RX ADMIN — OXYCODONE HYDROCHLORIDE 45 MILLIGRAM(S): 5 TABLET ORAL at 02:53

## 2020-05-25 RX ADMIN — TIZANIDINE 4 MILLIGRAM(S): 4 TABLET ORAL at 07:55

## 2020-05-25 RX ADMIN — Medication 1 TABLET(S): at 11:03

## 2020-05-25 RX ADMIN — HYDROMORPHONE HYDROCHLORIDE 2 MILLIGRAM(S): 2 INJECTION INTRAMUSCULAR; INTRAVENOUS; SUBCUTANEOUS at 12:53

## 2020-05-25 RX ADMIN — Medication 500 MILLIGRAM(S): at 06:15

## 2020-05-25 RX ADMIN — TIZANIDINE 4 MILLIGRAM(S): 4 TABLET ORAL at 20:16

## 2020-05-25 RX ADMIN — Medication 1 MILLIGRAM(S): at 11:02

## 2020-05-25 RX ADMIN — ONDANSETRON 4 MILLIGRAM(S): 8 TABLET, FILM COATED ORAL at 09:42

## 2020-05-25 RX ADMIN — OXYCODONE HYDROCHLORIDE 80 MILLIGRAM(S): 5 TABLET ORAL at 17:03

## 2020-05-25 RX ADMIN — Medication 1 PATCH: at 11:02

## 2020-05-25 RX ADMIN — Medication 1 TABLET(S): at 06:14

## 2020-05-25 RX ADMIN — PANTOPRAZOLE SODIUM 40 MILLIGRAM(S): 20 TABLET, DELAYED RELEASE ORAL at 06:14

## 2020-05-25 RX ADMIN — Medication 800 MILLIGRAM(S): at 06:14

## 2020-05-25 RX ADMIN — Medication 800 MILLIGRAM(S): at 11:02

## 2020-05-25 RX ADMIN — OXYCODONE HYDROCHLORIDE 45 MILLIGRAM(S): 5 TABLET ORAL at 18:59

## 2020-05-25 RX ADMIN — Medication 1 TABLET(S): at 21:08

## 2020-05-25 RX ADMIN — OXYCODONE HYDROCHLORIDE 80 MILLIGRAM(S): 5 TABLET ORAL at 09:03

## 2020-05-25 RX ADMIN — TIZANIDINE 4 MILLIGRAM(S): 4 TABLET ORAL at 01:58

## 2020-05-25 RX ADMIN — Medication 325 MILLIGRAM(S): at 16:57

## 2020-05-25 RX ADMIN — MIRTAZAPINE 7.5 MILLIGRAM(S): 45 TABLET, ORALLY DISINTEGRATING ORAL at 21:09

## 2020-05-25 RX ADMIN — HYDROMORPHONE HYDROCHLORIDE 2 MILLIGRAM(S): 2 INJECTION INTRAMUSCULAR; INTRAVENOUS; SUBCUTANEOUS at 08:57

## 2020-05-25 RX ADMIN — GABAPENTIN 400 MILLIGRAM(S): 400 CAPSULE ORAL at 12:54

## 2020-05-25 RX ADMIN — Medication 975 MILLIGRAM(S): at 02:51

## 2020-05-25 RX ADMIN — POLYETHYLENE GLYCOL 3350 17 GRAM(S): 17 POWDER, FOR SOLUTION ORAL at 11:02

## 2020-05-25 RX ADMIN — Medication 800 MILLIGRAM(S): at 23:00

## 2020-05-25 RX ADMIN — OXYCODONE HYDROCHLORIDE 45 MILLIGRAM(S): 5 TABLET ORAL at 06:54

## 2020-05-25 RX ADMIN — Medication 325 MILLIGRAM(S): at 11:03

## 2020-05-25 RX ADMIN — Medication 800 MILLIGRAM(S): at 00:03

## 2020-05-25 RX ADMIN — HYDROMORPHONE HYDROCHLORIDE 2 MILLIGRAM(S): 2 INJECTION INTRAMUSCULAR; INTRAVENOUS; SUBCUTANEOUS at 05:01

## 2020-05-25 RX ADMIN — Medication 1 PATCH: at 11:33

## 2020-05-25 RX ADMIN — OXYCODONE HYDROCHLORIDE 45 MILLIGRAM(S): 5 TABLET ORAL at 23:00

## 2020-05-25 RX ADMIN — OXYCODONE HYDROCHLORIDE 80 MILLIGRAM(S): 5 TABLET ORAL at 00:59

## 2020-05-25 RX ADMIN — OXYCODONE HYDROCHLORIDE 45 MILLIGRAM(S): 5 TABLET ORAL at 14:58

## 2020-05-25 NOTE — PROGRESS NOTE ADULT - SUBJECTIVE AND OBJECTIVE BOX
PROGRESS NOTE:     Patient is a 54y old  Female who presents with a chief complaint of dislocated left hip (25 May 2020 10:04)      SUBJECTIVE / OVERNIGHT EVENTS: Pt still c/o pain in L hip, but says it is better.     ADDITIONAL REVIEW OF SYSTEMS:    MEDICATIONS  (STANDING):  acetaminophen    Suspension .. 975 milliGRAM(s) Oral every 6 hours  ascorbic acid 500 milliGRAM(s) Oral two times a day  calcium carbonate 1250 mG  + Vitamin D (OsCal 500 + D) 1 Tablet(s) Oral three times a day  ferrous    sulfate 325 milliGRAM(s) Oral three times a day with meals  folic acid 1 milliGRAM(s) Oral daily  gabapentin 400 milliGRAM(s) Oral every 8 hours  ibuprofen  Tablet. 800 milliGRAM(s) Oral every 6 hours  lactated ringers. 1000 milliLiter(s) (75 mL/Hr) IV Continuous <Continuous>  lidocaine   Patch 1 Patch Transdermal daily  mirtazapine 7.5 milliGRAM(s) Oral at bedtime  multivitamin 1 Tablet(s) Oral daily  nicotine - 21 mG/24Hr(s) Patch 1 patch Transdermal daily  oxyCODONE  ER Tablet 80 milliGRAM(s) Oral every 8 hours  pantoprazole    Tablet 40 milliGRAM(s) Oral before breakfast  polyethylene glycol 3350 17 Gram(s) Oral daily  rivaroxaban 10 milliGRAM(s) Oral daily  sodium chloride 0.9%. 1000 milliLiter(s) (75 mL/Hr) IV Continuous <Continuous>  traZODone 50 milliGRAM(s) Oral at bedtime    MEDICATIONS  (PRN):  aluminum hydroxide/magnesium hydroxide/simethicone Suspension 30 milliLiter(s) Oral four times a day PRN Indigestion  benzocaine 15 mG/menthol 3.6 mG (Sugar-Free) Lozenge 1 Lozenge Oral every 3 hours PRN Sore Throat  bisacodyl Suppository 10 milliGRAM(s) Rectal daily PRN If no bowel movement by POD#2  diphenhydrAMINE 25 milliGRAM(s) Oral at bedtime PRN Insomnia  HYDROmorphone  Injectable 2 milliGRAM(s) IV Push every 3 hours PRN Severe Breakthrough Pain (7 - 10)  LORazepam   Injectable 1 milliGRAM(s) IV Push once PRN Agitation  magnesium hydroxide Suspension 30 milliLiter(s) Oral daily PRN Constipation  naloxone Injectable 0.1 milliGRAM(s) IV Push every 3 minutes PRN For ANY of the following changes in patient status:  A. RR LESS THAN 10 breaths per minute, B. Oxygen saturation LESS THAN 90%, C. Sedation score of 6  ondansetron Injectable 4 milliGRAM(s) IV Push every 6 hours PRN Nausea and/or Vomiting  oxyCODONE    IR 45 milliGRAM(s) Oral every 4 hours PRN Severe Pain (7 - 10)  QUEtiapine 25 milliGRAM(s) Oral every 6 hours PRN agitation  senna 2 Tablet(s) Oral at bedtime PRN Constipation  tiZANidine 4 milliGRAM(s) Oral every 6 hours PRN Muscle Spasm/pain      CAPILLARY BLOOD GLUCOSE        I&O's Summary    24 May 2020 07:01  -  25 May 2020 07:00  --------------------------------------------------------  IN: 0 mL / OUT: 967 mL / NET: -967 mL    25 May 2020 07:01  -  25 May 2020 10:57  --------------------------------------------------------  IN: 360 mL / OUT: 220 mL / NET: 140 mL        PHYSICAL EXAM:  Vital Signs Last 24 Hrs  T(C): 37.1 (25 May 2020 08:56), Max: 37.1 (25 May 2020 08:56)  T(F): 98.7 (25 May 2020 08:56), Max: 98.7 (25 May 2020 08:56)  HR: 86 (25 May 2020 08:56) (77 - 86)  BP: 111/94 (25 May 2020 08:56) (111/94 - 146/93)  BP(mean): --  RR: 18 (25 May 2020 08:56) (17 - 18)  SpO2: 97% (25 May 2020 08:56) (95% - 98%)    GENERAL: NAD, well-developed  EYES: EOMI, PERRLA, conjunctiva and sclera clear  NECK: Supple, No JVD  CHEST/LUNG: Clear to auscultation bilaterally; No wheeze  HEART: Regular rate and rhythm; No murmurs, rubs, or gallops  ABDOMEN: Soft, Nontender, Nondistended; Bowel sounds present  EXTREMITIES:  2+ Peripheral Pulses, No clubbing, cyanosis, or edema  L hip wound dressing clean  PSYCH: AAOx3, anxious  NEUROLOGY: non-focal  SKIN: No rashes or lesions    LABS:                        8.2    11.72 )-----------( 163      ( 25 May 2020 06:10 )             25.8     05-25    143  |  107  |  12  ----------------------------<  104<H>  4.1   |  25  |  1.18    Ca    9.1      25 May 2020 06:10      PT/INR - ( 24 May 2020 06:30 )   PT: 11.4 SEC;   INR: 0.99          PTT - ( 24 May 2020 06:30 )  PTT:27.6 SEC            RADIOLOGY & ADDITIONAL TESTS:  Results Reviewed:   Imaging Personally Reviewed:  Electrocardiogram Personally Reviewed:    COORDINATION OF CARE:  Care Discussed with Consultants/Other Providers [Y/N]:  Prior or Outpatient Records Reviewed [Y/N]:

## 2020-05-25 NOTE — DISCHARGE NOTE PROVIDER - NSDCFUSCHEDAPPT_GEN_ALL_CORE_FT
[facility] ; 08/06/2020 ; [facility] [facility]U [facility]AMPOS ; 08/06/2020 ; [facility] [facility]U CAMPOS ; 08/06/2020 ; [facility] NEERU CAMPOS ; 08/06/2020 ; [facility] [facility]U [facility]AMPOS ; 08/06/2020 ; [facility] NE[facility]U CAMPOS ; 08/06/2020 ; [facility]

## 2020-05-25 NOTE — PROGRESS NOTE ADULT - PROBLEM SELECTOR PLAN 2
Pt denies any ICD discharges, no chest pain/sob/dizziness  -TTE (11/5/19) normal LV function   -EP interrogated ICD, normal fxn

## 2020-05-25 NOTE — DISCHARGE NOTE PROVIDER - CARE PROVIDER_API CALL
Tyler Hackett)  [facility]  [address] [place] [address]  Phone: [phone]  Fax: [phone]  Follow Up Time:

## 2020-05-25 NOTE — DISCHARGE NOTE PROVIDER - NSDCMRMEDTOKEN_GEN_ALL_CORE_FT
acetaminophen 325 mg oral tablet: 3 tab(s) orally every 8 hours  Aspirin Enteric Coated 325 mg oral delayed release tablet: 1 tab(s) orally 2 times a day MDD:2  gabapentin 300 mg oral capsule: 1 cap(s) orally every 8 hours MDD:3  nicotine 21 mg/24 hr transdermal film, extended release:  transdermal   oxyCODONE 15 mg oral tablet: 2-3 tab(s) orally every 4-6 hours, As Needed -moderate-Severe breakthrough Pain  MDD:8  oxyCODONE 80 mg oral tablet, extended release: 1 tab(s) orally every 8 hours MDD:3  pantoprazole 40 mg oral delayed release tablet: 1 tab(s) orally once a day for stomach protectant  polyethylene glycol 3350 oral powder for reconstitution: 17 gram(s) orally once a day  senna oral tablet: 2 tab(s) orally once a day (at bedtime) MDD:2  tiZANidine 4 mg oral tablet: 1 tab(s) orally every 6 hours, As needed, Muscle Spasm  traZODone 50 mg oral tablet: 1 tab(s) orally once a day (at bedtime) gabapentin 300 mg oral capsule: 1 cap(s) orally every 8 hours MDD:3  HYDROmorphone 4 mg oral tablet: 1 tab(s) orally every 4-6 hours, As Needed MDD:6  nicotine 21 mg/24 hr transdermal film, extended release:  transdermal   oxyCODONE 15 mg oral tablet: 1 tab(s) orally every 4 hours, As Needed MDD:6 for pain control   OxyCONTIN 80 mg oral tablet, extended release: 1 tab(s) orally every 8 hours MDD:3  pantoprazole 40 mg oral delayed release tablet: 1 tab(s) orally once a day for stomach protectant  polyethylene glycol 3350 oral powder for reconstitution: 17 gram(s) orally once a day  rivaroxaban 10 mg oral tablet: 1 tab(s) orally once a day to prevent DVT MDD:1  senna oral tablet: 2 tab(s) orally once a day (at bedtime) MDD:2  tiZANidine 4 mg oral capsule: 1 cap(s) orally every 6 hours MDD:4  traZODone 50 mg oral tablet: 1 tab(s) orally once a day (at bedtime) ascorbic acid 500 mg oral tablet: 1 tab(s) orally 2 times a day MDD:2  gabapentin 300 mg oral capsule: 1 cap(s) orally every 8 hours MDD:3  HYDROmorphone 4 mg oral tablet: 1 tab(s) orally every 4-6 hours, As Needed MDD:6  Multiple Vitamins oral tablet: 1 tab(s) orally once a day MDD:1  nicotine 21 mg/24 hr transdermal film, extended release: 1 application transdermal once a day MDD:1   oxyCODONE 15 mg oral tablet: 1 tab(s) orally every 4 hours, As Needed MDD:6 for pain control   OxyCONTIN 80 mg oral tablet, extended release: 1 tab(s) orally every 8 hours MDD:3  pantoprazole 40 mg oral delayed release tablet: 1 tab(s) orally once a day for stomach protectant MDD:1  polyethylene glycol 3350 oral powder for reconstitution: 17 gram(s) orally once a day MDD:1  rivaroxaban 10 mg oral tablet: 1 tab(s) orally once a day to prevent DVT MDD:1  senna oral tablet: 2 tab(s) orally once a day (at bedtime) MDD:2  tiZANidine 4 mg oral capsule: 1 cap(s) orally every 6 hours MDD:4  traZODone 50 mg oral tablet: 1 tab(s) orally once a day (at bedtime) MDD:1

## 2020-05-25 NOTE — DISCHARGE NOTE PROVIDER - NSDCCPTREATMENT_GEN_ALL_CORE_FT
PRINCIPAL PROCEDURE  Procedure: Total revision of hip replacement  Findings and Treatment: PRINCIPAL PROCEDURE  Procedure: Total revision of hip replacement  Findings and Treatment: revision left total femur  continue dislocation precautions  continue anticoagulant to prevent blood clot

## 2020-05-25 NOTE — PROGRESS NOTE ADULT - PROBLEM SELECTOR PLAN 3
Acute on chronic pain of L hip   Pain regimen as per pain management, currently on Oxycodone ER/IR and Dilaudid IV prn breakthrough

## 2020-05-25 NOTE — PROGRESS NOTE ADULT - PROBLEM SELECTOR PLAN 1
-h/o recurrent L hip dislocations, denies fall or trauma to  hip   -s/p revision L KONG on 5/21   -pain control   -PT/OT

## 2020-05-25 NOTE — PROGRESS NOTE ADULT - SUBJECTIVE AND OBJECTIVE BOX
Orthopedic Surgery Progress Note     S: Patient seen and examined today. No acute events overnight. Denies f/c, chest pain, shortness of breath, dizziness. States pain is much better controlled. dressing changed yesterday and HV dc'd    MEDICATIONS  (STANDING):  acetaminophen    Suspension .. 975 milliGRAM(s) Oral every 6 hours  ascorbic acid 500 milliGRAM(s) Oral two times a day  calcium carbonate 1250 mG  + Vitamin D (OsCal 500 + D) 1 Tablet(s) Oral three times a day  ferrous    sulfate 325 milliGRAM(s) Oral three times a day with meals  folic acid 1 milliGRAM(s) Oral daily  gabapentin 400 milliGRAM(s) Oral every 8 hours  ibuprofen  Tablet. 800 milliGRAM(s) Oral every 6 hours  lactated ringers. 1000 milliLiter(s) (75 mL/Hr) IV Continuous <Continuous>  lidocaine   Patch 1 Patch Transdermal daily  mirtazapine 7.5 milliGRAM(s) Oral at bedtime  multivitamin 1 Tablet(s) Oral daily  nicotine - 21 mG/24Hr(s) Patch 1 patch Transdermal daily  oxyCODONE  ER Tablet 80 milliGRAM(s) Oral every 8 hours  pantoprazole    Tablet 40 milliGRAM(s) Oral before breakfast  polyethylene glycol 3350 17 Gram(s) Oral daily  rivaroxaban 10 milliGRAM(s) Oral daily  sodium chloride 0.9%. 1000 milliLiter(s) (75 mL/Hr) IV Continuous <Continuous>  traZODone 50 milliGRAM(s) Oral at bedtime    MEDICATIONS  (PRN):  aluminum hydroxide/magnesium hydroxide/simethicone Suspension 30 milliLiter(s) Oral four times a day PRN Indigestion  benzocaine 15 mG/menthol 3.6 mG (Sugar-Free) Lozenge 1 Lozenge Oral every 3 hours PRN Sore Throat  bisacodyl Suppository 10 milliGRAM(s) Rectal daily PRN If no bowel movement by POD#2  diphenhydrAMINE 25 milliGRAM(s) Oral at bedtime PRN Insomnia  HYDROmorphone  Injectable 2 milliGRAM(s) IV Push every 3 hours PRN Severe Breakthrough Pain (7 - 10)  LORazepam   Injectable 1 milliGRAM(s) IV Push once PRN Agitation  magnesium hydroxide Suspension 30 milliLiter(s) Oral daily PRN Constipation  naloxone Injectable 0.1 milliGRAM(s) IV Push every 3 minutes PRN For ANY of the following changes in patient status:  A. RR LESS THAN 10 breaths per minute, B. Oxygen saturation LESS THAN 90%, C. Sedation score of 6  ondansetron Injectable 4 milliGRAM(s) IV Push every 6 hours PRN Nausea and/or Vomiting  oxyCODONE    IR 45 milliGRAM(s) Oral every 4 hours PRN Severe Pain (7 - 10)  QUEtiapine 25 milliGRAM(s) Oral every 6 hours PRN agitation  senna 2 Tablet(s) Oral at bedtime PRN Constipation  tiZANidine 4 milliGRAM(s) Oral every 6 hours PRN Muscle Spasm/pain      Physical Exam:    Vital Signs Last 24 Hrs  T(C): 36.7 (24 May 2020 20:56), Max: 37 (24 May 2020 16:01)  T(F): 98 (24 May 2020 20:56), Max: 98.6 (24 May 2020 16:01)  HR: 83 (24 May 2020 20:56) (76 - 86)  BP: 134/66 (24 May 2020 20:56) (134/66 - 146/93)  BP(mean): --  RR: 18 (24 May 2020 20:56) (18 - 19)  SpO2: 98% (24 May 2020 20:56) (95% - 100%)    05-23-20 @ 07:01  -  05-24-20 @ 07:00  --------------------------------------------------------  IN: 0 mL / OUT: 1165 mL / NET: -1165 mL    05-24-20 @ 07:01  -  05-25-20 @ 00:09  --------------------------------------------------------  IN: 0 mL / OUT: 967 mL / NET: -967 mL            Gen: Laying in bed, NAD, alert and oriented.   Resp: Unlabored breathing  Ext:   EHL/FHL/TA/Sol intact          + SILT DP/SP/BAILEY/Sa/Tib          +DP, extremity WWP        LABS:                        8.3    9.42  )-----------( 169      ( 24 May 2020 06:30 )             26.4     05-24    140  |  104  |  17  ----------------------------<  117<H>  4.3   |  24  |  1.08    Ca    8.9      24 May 2020 06:30

## 2020-05-25 NOTE — PROGRESS NOTE ADULT - ASSESSMENT
53yo F s/p revision L total femur 2/2 dislocation    - Pain control - appreciate pain mgmt recs., new program appears to be better controlling pain  - appreciate psych recs  - PT/OT   - WBAT  - DVT ppx: edoxaban 60mg Qd not available, patient started on Xarelto 10mg Qd  - dispo planning

## 2020-05-25 NOTE — DISCHARGE NOTE PROVIDER - HOSPITAL COURSE
Patient was admitted to undergo revision of left total femur replacement after a dislocation. The patient tolerated the procedure well and was transferred to the floor in stable condition. Postoperatively, the patient's pain was well controlled with IV pain medications and later transitioned to PO pain meds, with adequate pain control. The patient participated well with PT starting POD1. Patient is stable for discharge at this time. Patient in agreement with discharge plan.                 Please follow up with Dr ___ in 1-2 weeks. Call office to make an appointment. Please follow up with your PMD for continuity of care and management. Please keep _____ dressing in place until post op day # 14. Any sutures/staples to be removed on post op day # 14.  Please take ____ for dvt prophylaxis / blood thinner. Weight bearing as tolerated. Call orthopaedics with any questions.        WOUND CARE:  Please keep incisions clean and dry. Please do not Scrub or rub incisions. Do not use lotion or powder on incisions.     BATHING: You may shower and/or sponge bathe. You may use warm soapy water in the shower and rinse, pat dry.    ACTIVITY: No heavy lifting or straining. Otherwise, you may return to your usual level of physical activity. If you are taking narcotic pain medication DO NOT drive a car, operate machinery or make important decisions.    DIET: Return to your usual diet.    NOTIFY YOUR SURGEON IF: You have any bleeding that does not stop, any pus draining from your wound(s), any fever (over 100.4 F) persistent nausea/vomiting, or if your pain is not controlled on your discharge pain medications, unable to urinate.    Please follow up with your primary care physician in one week regarding your hospitalization, bring copies of your discharge paperwork.    Please follow up with your surgeon,  Patient was admitted to undergo revision of left total femur replacement after a dislocation. The patient tolerated the procedure well and was transferred to the floor in stable condition. Postoperatively, the patient's pain was controlled with IV/PO pain medications per chronic pain manage ent, with adequate pain control. The patient participated well with PT starting POD1. Patient is stable for discharge at this time. Patient in agreement with discharge plan.                 Please follow up with Dr Hackett in 2 weeks. Call office to make an appointment. Please follow up with your PMD for continuity of care and management. Please keep dressing in place until post op day # 14. Any sutures/staples to be removed on post op day # 14.  Please take Xarelto for dvt prophylaxis / blood thinner. Weight bearing as tolerated. Call orthopaedics with any questions.        WOUND CARE:  Please keep incisions clean and dry. Please do not Scrub or rub incisions. Do not use lotion or powder on incisions.     BATHING: You may shower and/or sponge bathe. You may use warm soapy water in the shower and rinse, pat dry.    ACTIVITY: No heavy lifting or straining. Otherwise, you may return to your usual level of physical activity. If you are taking narcotic pain medication DO NOT drive a car, operate machinery or make important decisions.    DIET: Return to your usual diet.    NOTIFY YOUR SURGEON IF: You have any bleeding that does not stop, any pus draining from your wound(s), any fever (over 100.4 F) persistent nausea/vomiting, or if your pain is not controlled on your discharge pain medications, unable to urinate.    Please follow up with your primary care physician in one week regarding your hospitalization, bring copies of your discharge paperwork.    Please follow up with your surgeon, Dr. Hackett Patient was admitted to undergo revision of left total femur replacement after a dislocation. The patient tolerated the procedure well and was transferred to the floor in stable condition. Postoperatively, the patient's pain was controlled with IV/PO pain medications per chronic pain management, with adequate pain control. The patient participated well with PT starting POD1. Patient is stable for discharge at this time. Patient in agreement with discharge plan.                 Please follow up with Dr Hackett in 2 weeks. Call office to make an appointment. Please follow up with your PMD for continuity of care and management. Please keep dressing in place until post op day # 14. Any sutures/staples to be removed on post op day # 14.  Please take Xarelto for dvt prophylaxis / blood thinner. Weight bearing as tolerated. Call orthopaedics with any questions.        WOUND CARE:  Please keep incisions clean and dry. Please do not Scrub or rub incisions. Do not use lotion or powder on incisions.     BATHING: You may shower and/or sponge bathe. You may use warm soapy water in the shower and rinse, pat dry.    ACTIVITY: No heavy lifting or straining. Be careful when rising from a low chair or toilet to decrease risk fo hip dislocation. Otherwise, you may return to your usual level of physical activity. If you are taking narcotic pain medication DO NOT drive a car, operate machinery or make important decisions.    DIET: Return to your usual diet.    NOTIFY YOUR SURGEON IF: You have any bleeding that does not stop, any pus draining from your wound(s), any fever (over 100.4 F) persistent nausea/vomiting, or if your pain is not controlled on your discharge pain medications, unable to urinate.    Please follow up with your primary care physician in one week regarding your hospitalization, bring copies of your discharge paperwork.    Please follow up with your surgeon, Dr. Hackett

## 2020-05-26 ENCOUNTER — TRANSCRIPTION ENCOUNTER (OUTPATIENT)
Age: 55
End: 2020-05-26

## 2020-05-26 VITALS
RESPIRATION RATE: 17 BRPM | SYSTOLIC BLOOD PRESSURE: 104 MMHG | HEART RATE: 68 BPM | OXYGEN SATURATION: 98 % | DIASTOLIC BLOOD PRESSURE: 68 MMHG

## 2020-05-26 LAB
CULTURE RESULTS: NO GROWTH — SIGNIFICANT CHANGE UP
CULTURE RESULTS: NO GROWTH — SIGNIFICANT CHANGE UP
SPECIMEN SOURCE: SIGNIFICANT CHANGE UP
SPECIMEN SOURCE: SIGNIFICANT CHANGE UP
SURGICAL PATHOLOGY STUDY: SIGNIFICANT CHANGE UP

## 2020-05-26 RX ORDER — QUETIAPINE FUMARATE 200 MG/1
1 TABLET, FILM COATED ORAL
Qty: 30 | Refills: 0
Start: 2020-05-26 | End: 2020-06-24

## 2020-05-26 RX ORDER — EDOXABAN TOSYLATE 30 MG/1
1 TABLET, FILM COATED ORAL
Qty: 42 | Refills: 0
Start: 2020-05-26 | End: 2020-07-06

## 2020-05-26 RX ADMIN — OXYCODONE HYDROCHLORIDE 45 MILLIGRAM(S): 5 TABLET ORAL at 07:03

## 2020-05-26 RX ADMIN — OXYCODONE HYDROCHLORIDE 80 MILLIGRAM(S): 5 TABLET ORAL at 10:23

## 2020-05-26 RX ADMIN — Medication 800 MILLIGRAM(S): at 05:37

## 2020-05-26 RX ADMIN — Medication 325 MILLIGRAM(S): at 09:18

## 2020-05-26 RX ADMIN — GABAPENTIN 400 MILLIGRAM(S): 400 CAPSULE ORAL at 05:38

## 2020-05-26 RX ADMIN — TIZANIDINE 4 MILLIGRAM(S): 4 TABLET ORAL at 02:26

## 2020-05-26 RX ADMIN — HYDROMORPHONE HYDROCHLORIDE 2 MILLIGRAM(S): 2 INJECTION INTRAMUSCULAR; INTRAVENOUS; SUBCUTANEOUS at 04:01

## 2020-05-26 RX ADMIN — HYDROMORPHONE HYDROCHLORIDE 2 MILLIGRAM(S): 2 INJECTION INTRAMUSCULAR; INTRAVENOUS; SUBCUTANEOUS at 09:15

## 2020-05-26 RX ADMIN — LIDOCAINE 1 PATCH: 4 CREAM TOPICAL at 04:53

## 2020-05-26 RX ADMIN — HYDROMORPHONE HYDROCHLORIDE 2 MILLIGRAM(S): 2 INJECTION INTRAMUSCULAR; INTRAVENOUS; SUBCUTANEOUS at 00:12

## 2020-05-26 RX ADMIN — OXYCODONE HYDROCHLORIDE 45 MILLIGRAM(S): 5 TABLET ORAL at 03:04

## 2020-05-26 RX ADMIN — Medication 500 MILLIGRAM(S): at 05:38

## 2020-05-26 RX ADMIN — Medication 1 TABLET(S): at 05:43

## 2020-05-26 RX ADMIN — OXYCODONE HYDROCHLORIDE 80 MILLIGRAM(S): 5 TABLET ORAL at 01:32

## 2020-05-26 RX ADMIN — PANTOPRAZOLE SODIUM 40 MILLIGRAM(S): 20 TABLET, DELAYED RELEASE ORAL at 05:38

## 2020-05-26 RX ADMIN — RIVAROXABAN 10 MILLIGRAM(S): KIT at 05:38

## 2020-05-26 RX ADMIN — Medication 1 PATCH: at 04:54

## 2020-05-26 NOTE — PROGRESS NOTE ADULT - ASSESSMENT
53yo F s/p revision L total femur 2/2 dislocation    - Pain control - appreciate pain mgmt recs., new program appears to be better controlling pain  - appreciate psych recs  - PT/OT   - WBAT  - DVT ppx: edoxaban 60mg Qd not available, patient started on Xarelto 10mg Qd  - Home today

## 2020-05-26 NOTE — DISCHARGE NOTE NURSING/CASE MANAGEMENT/SOCIAL WORK - PATIENT PORTAL LINK FT
You can access the FollowMyHealth Patient Portal offered by [facility] by registering at the following website: [email or web address]. By joining [facility]’s FollowMyHealth portal, you will also be able to view your health information using other applications (apps) compatible with our system.

## 2020-05-26 NOTE — PROGRESS NOTE ADULT - SUBJECTIVE AND OBJECTIVE BOX
Orthopedic Surgery Progress Note     S: Patient seen and examined today. No acute events overnight. Pain controlled. Wants to go home today     Physical Exam:      Vital Signs Last 24 Hrs  T(C): 37.1 (25 May 2020 18:58), Max: 37.1 (25 May 2020 08:56)  T(F): 98.8 (25 May 2020 18:58), Max: 98.8 (25 May 2020 18:58)  HR: 70 (26 May 2020 00:26) (70 - 98)  BP: 103/68 (26 May 2020 00:26) (103/58 - 116/69)  BP(mean): --  RR: 18 (25 May 2020 18:58) (18 - 18)  SpO2: 85% (25 May 2020 18:58) (85% - 97%)    Gen: Laying in bed, NAD, alert and oriented.   Resp: Unlabored breathing  Ext:   EHL/FHL/TA/Sol intact          + SILT DP/SP/BAILEY/Sa/Tib          +DP, extremity WWP        LABS:                                    8.2    11.72 )-----------( 163      ( 25 May 2020 06:10 )             25.8   05-25    143  |  107  |  12  ----------------------------<  104<H>  4.1   |  25  |  1.18    Ca    9.1      25 May 2020 06:10

## 2020-05-26 NOTE — PROGRESS NOTE ADULT - REASON FOR ADMISSION
dislocated left hip
dislocated left hip, s/p surgery
dislocated left hip

## 2020-05-26 NOTE — DISCHARGE NOTE NURSING/CASE MANAGEMENT/SOCIAL WORK - NSDCPNINST_GEN_ALL_CORE
Call MD for any c/o numbness, tingling, swelling, fever, pain not relieved after medicated and a return appointment.  Take over the counter stool softener to prevent constipation that can be a side effect from taking pain medication.

## 2020-05-26 NOTE — PROGRESS NOTE ADULT - ATTENDING COMMENTS
Agree with above.     Pt seen and examined. She says she is in extreme pain and feels as though she is sitting on a "metal" bed. I have rec'd placing gel pad cushions for comfort and will try to obtain a pressure sore (Klinitron) mattress instead. Her dressing is CDI and she is NVI. Continue SQH TID and epidural, per pain service.
Agree with above. Awaiting final recs from chronic pain management team. I have discussed her discharge planning and logistics and given that she does not have a strong support system at home I have recommended a  to see what can be done to facilitate and ease her life at home, in particular with obtaining a chronic pain management specialist.
Agree with above. Patient seen and examined. She is doing very well, currently not having any pain, sitting comfortably at the edge of bed. NVI. Dressing CDI. She will be discharged home today. FU in 2 weeks for stitch removal.
Agree with above.  I have consented the patient for revision left hip arthroplasty. This includes revising both the acetabular components and the femoral components. We discussed risks, benefits and alternatives. Rationale of care was reviewed and all questions were answered. Surgical risks include but are not limited to infection, bleeding, nerve damage, chronic pain that is worse than she already has, probable significant limb shortening, malrotation, re-dislocation, VTE, loss of life/limb, and need for further surgical procedures.  The patient understood all of this and would like to proceed.
Agree with above. Patient called and overnight events reviewed.    Per patient, the PCEA "fell out". In addition, she says she removed the cruz herself, by first deflating the balloon with a syringe. Since her surgery, she has continuously complained of severe pain. The pain management team have been making frequent modifications to her pain regimen and are really at their limit as far as the level of medications, which is multimodal. She says she feels as though she is laying on a "metal" bed. We have tried various gel cushions and supports without relief. We have tried a special "sand" bed which also did not help. The whole staff- nurses, PA's, residents, and MD's have been supporting her to their highest ability, as have I. She has demonstrated that she is not cooperative with medical orders and recommendations. She has treated the staff poorly with verbal vulgarities. She has threatened to leave AMA as well. Still, we will continue to offer her the best care we can. A psych consult was also placed. I have tried to reassure the patient and that her pain will get better each day. All questions were answered. Continue drain and iVac. FU pain management.

## 2020-05-26 NOTE — DISCHARGE NOTE NURSING/CASE MANAGEMENT/SOCIAL WORK - NSDCPNDISPN_GEN_ALL_CORE
Side effects of pain management treatment/Activities of daily living, including home environment that might     exacerbate pain or reduce effectiveness of the pain management plan of care as well as strategies to address these issues/Education provided on the pain management plan of care Education provided on the pain management plan of care/Side effects of pain management treatment/Safe use, storage and disposal of opioids when prescribed/Activities of daily living, including home environment that might     exacerbate pain or reduce effectiveness of the pain management plan of care as well as strategies to address these issues

## 2020-06-02 RX ORDER — OXYCODONE HYDROCHLORIDE 5 MG/1
3 TABLET ORAL
Qty: 63 | Refills: 0
Start: 2020-06-02 | End: 2020-06-14

## 2020-06-02 RX ORDER — OXYCODONE HYDROCHLORIDE 5 MG/1
3 TABLET ORAL
Qty: 84 | Refills: 0
Start: 2020-06-02 | End: 2020-06-08

## 2020-06-04 ENCOUNTER — APPOINTMENT (OUTPATIENT)
Dept: ORTHOPEDIC SURGERY | Facility: CLINIC | Age: 55
End: 2020-06-04

## 2020-06-04 LAB
CULTURE RESULTS: SIGNIFICANT CHANGE UP
SPECIMEN SOURCE: SIGNIFICANT CHANGE UP

## 2020-06-13 RX ORDER — OXYCODONE HYDROCHLORIDE 15 MG/1
15 TABLET ORAL EVERY 8 HOURS
Qty: 63 | Refills: 0 | Status: ACTIVE | COMMUNITY
Start: 2020-03-10 | End: 1900-01-01

## 2020-06-16 ENCOUNTER — INPATIENT (INPATIENT)
Facility: HOSPITAL | Age: 55
LOS: 1 days | Discharge: HOME | End: 2020-06-18
Attending: ORTHOPAEDIC SURGERY | Admitting: ORTHOPAEDIC SURGERY
Payer: MEDICAID

## 2020-06-16 ENCOUNTER — TRANSCRIPTION ENCOUNTER (OUTPATIENT)
Age: 55
End: 2020-06-16

## 2020-06-16 VITALS
DIASTOLIC BLOOD PRESSURE: 87 MMHG | RESPIRATION RATE: 18 BRPM | SYSTOLIC BLOOD PRESSURE: 112 MMHG | HEART RATE: 81 BPM | TEMPERATURE: 98 F

## 2020-06-16 DIAGNOSIS — S32.9XXA FRACTURE OF UNSPECIFIED PARTS OF LUMBOSACRAL SPINE AND PELVIS, INITIAL ENCOUNTER FOR CLOSED FRACTURE: Chronic | ICD-10-CM

## 2020-06-16 DIAGNOSIS — S73.005D UNSPECIFIED DISLOCATION OF LEFT HIP, SUBSEQUENT ENCOUNTER: ICD-10-CM

## 2020-06-16 LAB
ALBUMIN SERPL ELPH-MCNC: 3.9 G/DL — SIGNIFICANT CHANGE UP (ref 3.3–5)
ALP SERPL-CCNC: 99 U/L — SIGNIFICANT CHANGE UP (ref 40–120)
ALT FLD-CCNC: 9 U/L — SIGNIFICANT CHANGE UP (ref 4–33)
ANION GAP SERPL CALC-SCNC: 17 MMO/L — HIGH (ref 7–14)
APTT BLD: 27.1 SEC — LOW (ref 27.5–36.3)
AST SERPL-CCNC: 32 U/L — SIGNIFICANT CHANGE UP (ref 4–32)
BILIRUB SERPL-MCNC: 0.2 MG/DL — SIGNIFICANT CHANGE UP (ref 0.2–1.2)
BUN SERPL-MCNC: 16 MG/DL — SIGNIFICANT CHANGE UP (ref 7–23)
CALCIUM SERPL-MCNC: 9.5 MG/DL — SIGNIFICANT CHANGE UP (ref 8.4–10.5)
CHLORIDE SERPL-SCNC: 99 MMOL/L — SIGNIFICANT CHANGE UP (ref 98–107)
CO2 SERPL-SCNC: 21 MMOL/L — LOW (ref 22–31)
CREAT SERPL-MCNC: 0.88 MG/DL — SIGNIFICANT CHANGE UP (ref 0.5–1.3)
GLUCOSE SERPL-MCNC: 146 MG/DL — HIGH (ref 70–99)
HCT VFR BLD CALC: 34.2 % — LOW (ref 34.5–45)
HGB BLD-MCNC: 11 G/DL — LOW (ref 11.5–15.5)
INR BLD: 1.1 — SIGNIFICANT CHANGE UP (ref 0.88–1.17)
MAGNESIUM SERPL-MCNC: 1.7 MG/DL — SIGNIFICANT CHANGE UP (ref 1.6–2.6)
MCHC RBC-ENTMCNC: 28.5 PG — SIGNIFICANT CHANGE UP (ref 27–34)
MCHC RBC-ENTMCNC: 32.2 % — SIGNIFICANT CHANGE UP (ref 32–36)
MCV RBC AUTO: 88.6 FL — SIGNIFICANT CHANGE UP (ref 80–100)
NRBC # FLD: 0 K/UL — SIGNIFICANT CHANGE UP (ref 0–0)
PHOSPHATE SERPL-MCNC: 2.8 MG/DL — SIGNIFICANT CHANGE UP (ref 2.5–4.5)
PLATELET # BLD AUTO: 342 K/UL — SIGNIFICANT CHANGE UP (ref 150–400)
PMV BLD: 11.9 FL — SIGNIFICANT CHANGE UP (ref 7–13)
POTASSIUM SERPL-MCNC: 4.3 MMOL/L — SIGNIFICANT CHANGE UP (ref 3.5–5.3)
POTASSIUM SERPL-SCNC: 4.3 MMOL/L — SIGNIFICANT CHANGE UP (ref 3.5–5.3)
PROT SERPL-MCNC: 8.3 G/DL — SIGNIFICANT CHANGE UP (ref 6–8.3)
PROTHROM AB SERPL-ACNC: 12.6 SEC — SIGNIFICANT CHANGE UP (ref 9.8–13.1)
RBC # BLD: 3.86 M/UL — SIGNIFICANT CHANGE UP (ref 3.8–5.2)
RBC # FLD: 16.7 % — HIGH (ref 10.3–14.5)
SODIUM SERPL-SCNC: 137 MMOL/L — SIGNIFICANT CHANGE UP (ref 135–145)
WBC # BLD: 17.82 K/UL — HIGH (ref 3.8–10.5)
WBC # FLD AUTO: 17.82 K/UL — HIGH (ref 3.8–10.5)

## 2020-06-16 PROCEDURE — 71045 X-RAY EXAM CHEST 1 VIEW: CPT | Mod: 26

## 2020-06-16 PROCEDURE — 99223 1ST HOSP IP/OBS HIGH 75: CPT

## 2020-06-16 PROCEDURE — 73562 X-RAY EXAM OF KNEE 3: CPT | Mod: 26,LT

## 2020-06-16 PROCEDURE — 73552 X-RAY EXAM OF FEMUR 2/>: CPT | Mod: 26,LT

## 2020-06-16 PROCEDURE — 99284 EMERGENCY DEPT VISIT MOD MDM: CPT

## 2020-06-16 PROCEDURE — 73502 X-RAY EXAM HIP UNI 2-3 VIEWS: CPT | Mod: 26,LT

## 2020-06-16 RX ORDER — OXYCODONE HYDROCHLORIDE 5 MG/1
15 TABLET ORAL EVERY 8 HOURS
Refills: 0 | Status: DISCONTINUED | OUTPATIENT
Start: 2020-06-16 | End: 2020-06-16

## 2020-06-16 RX ORDER — SENNA PLUS 8.6 MG/1
2 TABLET ORAL AT BEDTIME
Refills: 0 | Status: DISCONTINUED | OUTPATIENT
Start: 2020-06-16 | End: 2020-06-18

## 2020-06-16 RX ORDER — ASCORBIC ACID 60 MG
500 TABLET,CHEWABLE ORAL
Refills: 0 | Status: DISCONTINUED | OUTPATIENT
Start: 2020-06-16 | End: 2020-06-16

## 2020-06-16 RX ORDER — PANTOPRAZOLE SODIUM 20 MG/1
40 TABLET, DELAYED RELEASE ORAL
Refills: 0 | Status: DISCONTINUED | OUTPATIENT
Start: 2020-06-16 | End: 2020-06-18

## 2020-06-16 RX ORDER — HYDROMORPHONE HYDROCHLORIDE 2 MG/ML
2 INJECTION INTRAMUSCULAR; INTRAVENOUS; SUBCUTANEOUS ONCE
Refills: 0 | Status: DISCONTINUED | OUTPATIENT
Start: 2020-06-16 | End: 2020-06-16

## 2020-06-16 RX ORDER — OXYCODONE HYDROCHLORIDE 5 MG/1
45 TABLET ORAL EVERY 8 HOURS
Refills: 0 | Status: DISCONTINUED | OUTPATIENT
Start: 2020-06-16 | End: 2020-06-17

## 2020-06-16 RX ORDER — ASCORBIC ACID 60 MG
500 TABLET,CHEWABLE ORAL
Refills: 0 | Status: DISCONTINUED | OUTPATIENT
Start: 2020-06-16 | End: 2020-06-18

## 2020-06-16 RX ORDER — SODIUM CHLORIDE 9 MG/ML
1000 INJECTION INTRAMUSCULAR; INTRAVENOUS; SUBCUTANEOUS
Refills: 0 | Status: DISCONTINUED | OUTPATIENT
Start: 2020-06-16 | End: 2020-06-18

## 2020-06-16 RX ORDER — TRAZODONE HCL 50 MG
50 TABLET ORAL AT BEDTIME
Refills: 0 | Status: DISCONTINUED | OUTPATIENT
Start: 2020-06-16 | End: 2020-06-16

## 2020-06-16 RX ORDER — GABAPENTIN 400 MG/1
300 CAPSULE ORAL EVERY 8 HOURS
Refills: 0 | Status: DISCONTINUED | OUTPATIENT
Start: 2020-06-16 | End: 2020-06-18

## 2020-06-16 RX ORDER — HYDROMORPHONE HYDROCHLORIDE 2 MG/ML
2 INJECTION INTRAMUSCULAR; INTRAVENOUS; SUBCUTANEOUS ONCE
Refills: 0 | Status: DISCONTINUED | OUTPATIENT
Start: 2020-06-16 | End: 2020-06-17

## 2020-06-16 RX ORDER — HYDROMORPHONE HYDROCHLORIDE 2 MG/ML
4 INJECTION INTRAMUSCULAR; INTRAVENOUS; SUBCUTANEOUS EVERY 4 HOURS
Refills: 0 | Status: DISCONTINUED | OUTPATIENT
Start: 2020-06-16 | End: 2020-06-17

## 2020-06-16 RX ORDER — QUETIAPINE FUMARATE 200 MG/1
50 TABLET, FILM COATED ORAL AT BEDTIME
Refills: 0 | Status: DISCONTINUED | OUTPATIENT
Start: 2020-06-16 | End: 2020-06-18

## 2020-06-16 RX ORDER — ACETAMINOPHEN 500 MG
975 TABLET ORAL EVERY 8 HOURS
Refills: 0 | Status: DISCONTINUED | OUTPATIENT
Start: 2020-06-16 | End: 2020-06-18

## 2020-06-16 RX ADMIN — HYDROMORPHONE HYDROCHLORIDE 2 MILLIGRAM(S): 2 INJECTION INTRAMUSCULAR; INTRAVENOUS; SUBCUTANEOUS at 20:56

## 2020-06-16 RX ADMIN — HYDROMORPHONE HYDROCHLORIDE 2 MILLIGRAM(S): 2 INJECTION INTRAMUSCULAR; INTRAVENOUS; SUBCUTANEOUS at 21:33

## 2020-06-16 NOTE — ED PROVIDER NOTE - NS ED ROS FT
CONSTITUTIONAL: No weakness, fevers or chills  EYES: No visual changes; No blurry vision  ENT:  No pain or stiffness; No vertigo or throat pain  RESPIRATORY: No cough, wheezing, hemoptysis; No shortness of breath  CARDIOVASCULAR: No chest pain or palpitations  GASTROINTESTINAL: No abdominal or epigastric pain. No nausea, vomiting, or hematemesis; No diarrhea or constipation. No melena or hematochezia.  GENITOURINARY: No dysuria, frequency or hematuria  NEUROLOGICAL: No numbness, No HA  SKIN: No itching, rashes  MSK: +joint pain, no muscle pain

## 2020-06-16 NOTE — ED PROVIDER NOTE - CLINICAL SUMMARY MEDICAL DECISION MAKING FREE TEXT BOX
54 year old woman with femur fx ~ 30 years ago c/b frequent prosthetic dislocations and fractures, recent revision left hip arthroplasty on 11/6 followed by hip dislocation requiring open reduction on 12/4/19 and again on 1/16/20, opioid use disorder pw L hip pain most likely 2/2 to dislocation. Pain control. Xrays of Left Lower Extremity. Ortho consult. Admit.

## 2020-06-16 NOTE — ED PROVIDER NOTE - OBJECTIVE STATEMENT
54 year old woman with femur fx ~ 30 years ago c/b frequent prosthetic dislocations and fractures, recent revision left hip arthroplasty on 11/6 followed by hip dislocation requiring open reduction on 12/4/19 and again on 1/16/20, opioid use disorder pw L hip pain. Pt states she has hx of dislocations, thus knows when she dislocates her hip and this feels like it. Pt states pain is 10/10 located to her Left Lower Extremity only with no associated fevers, rashes, discharge, trauma, or falls. Pt mentions recently having dislocation surgery last month.

## 2020-06-16 NOTE — CONSULT NOTE ADULT - SUBJECTIVE AND OBJECTIVE BOX
53 yo f h/o recurrently left prosthetic hip dislocation most recent revision performed last month, now presenting with left hip dislocation. Not precipitated by trauma. Pt with h/o cardiac arrest (unclear circumstances) s/p AICD/PPM. Denies aicd discharge, chest pain, sob, palpitations, lightheadedness, syncope. Pt on extensive opoid med regimen for pain control. On NOAC since last month for dvt ppx post surgery;  reports aspirin, and plavix have  been restated by outpt cardiology since discharge     Review of Systems:   CONSTITUTIONAL: No fever, weight loss, or fatigue  EYES: No eye pain, visual disturbances, or discharge  ENMT:  No difficulty hearing, tinnitus, vertigo; No sinus or throat pain  NECK: No pain or stiffness  RESPIRATORY: No cough, wheezing, chills or hemoptysis; No shortness of breath  CARDIOVASCULAR: No chest pain, palpitations, dizziness, or leg swelling  GASTROINTESTINAL: No abdominal or epigastric pain. No nausea, vomiting, or hematemesis; No diarrhea or constipation. No melena or hematochezia.  GENITOURINARY: No dysuria, frequency, hematuria, or incontinence  NEUROLOGICAL: No headaches, memory loss, loss of strength, numbness, or tremors  SKIN: No itching, burning, rashes, or lesions   LYMPH NODES: No enlarged glands  ENDOCRINE: No heat or cold intolerance; No hair loss  MUSCULOSKELETAL: left hip[ pain      Home Medications:  MEDICATIONS  (STANDING):  acetaminophen   Tablet .. 975 milliGRAM(s) Oral every 8 hours  ascorbic acid 500 milliGRAM(s) Oral two times a day  gabapentin 300 milliGRAM(s) Oral every 8 hours  HYDROmorphone   Tablet 4 milliGRAM(s) Oral every 4 hours  multivitamin 1 Tablet(s) Oral daily  oxyCODONE    IR 15 milliGRAM(s) Oral every 8 hours  pantoprazole    Tablet 40 milliGRAM(s) Oral before breakfast  QUEtiapine 50 milliGRAM(s) Oral at bedtime  senna 2 Tablet(s) Oral at bedtime  sodium chloride 0.9%. 1000 milliLiter(s) (125 mL/Hr) IV Continuous <Continuous>          Social History:  smoker    PAST MEDICAL & SURGICAL HISTORY:  No pertinent past medical history  Closed pelvic fracture    PHYSICAL EXAM:      Constitutional: NAD, well-groomed, well-developed  HEENT: EOMI, Normal Hearing  Neck: No LAD, No JVD  Back: Normal spine flexure, No CVA tenderness  Respiratory: CTAB  Cardiovascular: S1 and S2  Gastrointestinal: BS+, soft, NT/ND  Extremities: No peripheral edema  Vascular: 2+ peripheral pulses  Neurological: A/O x 3  Psychiatric: Normal mood, normal affect  Musculoskeletal: 5/5 strength b/l upper and lower extremities save for left lower extremity 2/2 pain  Skin: No rashes    Vital Signs Last 24 Hrs  T(C): 36.8 (16 Jun 2020 19:49), Max: 36.8 (16 Jun 2020 19:49)  T(F): 98.3 (16 Jun 2020 19:49), Max: 98.3 (16 Jun 2020 19:49)  HR: 91 (16 Jun 2020 20:56) (81 - 91)  BP: 156/81 (16 Jun 2020 21:33) (112/87 - 156/81)  BP(mean): --  RR: 18 (16 Jun 2020 20:56) (18 - 18)  SpO2: 100% (16 Jun 2020 20:56) (100% - 100%)                              11.0   17.82 )-----------( 342      ( 16 Jun 2020 20:52 )             34.2     06-16    137  |  99  |  16  ----------------------------<  146<H>  4.3   |  21<L>  |  0.88    Ca    9.5      16 Jun 2020 20:52  Phos  2.8     06-16  Mg     1.7     06-16    TPro  8.3  /  Alb  3.9  /  TBili  0.2  /  DBili  x   /  AST  32  /  ALT  9   /  AlkPhos  99  06-16    CAPILLARY BLOOD GLUCOSE        PT/INR - ( 16 Jun 2020 20:52 )   PT: 12.6 SEC;   INR: 1.10          PTT - ( 16 Jun 2020 20:52 )  PTT:27.1 SEC

## 2020-06-16 NOTE — ED ADULT TRIAGE NOTE - CHIEF COMPLAINT QUOTE
Pt had lt hip surgery 2 weeks ago and has a hip prosthesis and had it fixed and pins placed. Pt has a dislocated hip--pt has shortening of leg and severe pain

## 2020-06-16 NOTE — CONSULT NOTE ADULT - PROBLEM SELECTOR RECOMMENDATION 9
-preop eval will include cardiology eval given ppm/aicd; eval pending (interrogated last admission  per pt)  -based on aha/acc guidelines pt appropriate to proceed to surgery  -continue home opioid/bowel regimen if acceptable to primary team; would benefit from pain management input   -NOAC being held prior to surgery  -can resume aspirin  and plavix following outpt  cards followup -preop eval will include cardiology input given ppm/aicd; eval pending (interrogated last admission  per pt)  -based on aha/acc guidelines, pt appropriate to proceed to surgery  -continue home opioid/bowel regimen if acceptable to primary team; would benefit from pain management input   -NOAC being held prior to surgery  -can resume aspirin  and plavix following outpt  cards followup

## 2020-06-16 NOTE — ED PROVIDER NOTE - ATTENDING CONTRIBUTION TO CARE
DR. BLOCH, ATTENDING MD-  I performed a face to face bedside interview with patient regarding history of present illness, review of symptoms and past medical history. I completed an independent physical exam.  I have discussed patient's plan of care with the resident.  patient examined, left leg shortened, sensory intact, pulses present. heart sounds nml lungs clear, abd soft nontender. Patient with recurrent left hip dislocation, needing open reduction, long hx of chronic pain, using street methadone, daily marijuana. hx of anxiety and insomnia.

## 2020-06-16 NOTE — H&P ADULT - HISTORY OF PRESENT ILLNESS
54yFemale with a history of recurrent L prosthetic hip dislocations c/o L hip pain for the past week. She says she thinks her hip is dislocated. No trauma to the hip, no falls. Patient complains of severe L hip pain. Patient denies numbness or tingling in the LLE. Patient denies any other injuries. Patient presents to the ED in order to be set up for the operating room for tomorrow.    ROS: 10 point review of systems otherwise negative unless noted in HPI    PMH:  No pertinent past medical history    PSH:  Closed pelvic fracture  Total femur replacement  Total hip replacement  Total knee replacement    AH:    Meds: See med rec    T(C): 36.8 (06-16-20 @ 19:49)  HR: 91 (06-16-20 @ 20:56)  BP: 156/81 (06-16-20 @ 21:33)  RR: 18 (06-16-20 @ 20:56)  SpO2: 100% (06-16-20 @ 20:56)  Wt(kg): --                        11.0   17.82 )-----------( 342      ( 16 Jun 2020 20:52 )             34.2     06-16    137  |  99  |  16  ----------------------------<  146<H>  4.3   |  21<L>  |  0.88    Ca    9.5      16 Jun 2020 20:52  Phos  2.8     06-16  Mg     1.7     06-16    TPro  8.3  /  Alb  3.9  /  TBili  0.2  /  DBili  x   /  AST  32  /  ALT  9   /  AlkPhos  99  06-16    PT/INR - ( 16 Jun 2020 20:52 )   PT: 12.6 SEC;   INR: 1.10          PTT - ( 16 Jun 2020 20:52 )  PTT:27.1 SEC      PE  Gen: NAD, alert and oriented  Resp: Unlabored breathing  LLE: Skin intact, no ecchymosis, LLE is significantly shortened and externally rotated       SILT DP/SP/ Opal/Saph,        +EHL/FHL/TA/Gastroc,        hip ROM limited 2/2 pain,       DP+,        soft compartments, no calf ttp,             Secondary:  No TTP over bony landmarks, SILT BL, ROM intact BL, distal pulses palpable.    Imaging:  XR demonstrating L hip periprosthetic posterior dislocation      A/P: 54F s/p recurrent L prosthetic hip dislocations presents with a posterior L hip dislocation, planning for OR tomorrow morning  - Pain control  - NWB LLE, bedrest  - DVT ppx: venodynes  - Regular diet  - NPOpMN/IVF  - FU preop labs  - FU COVID test  - Continue home meds  - FU medical clearance  - FU cardiac clearance  - Will consult pain services tomorrow morning  - Plan for OR tomorrow for resection arthroplasty of L hip with Dr. Calero    Orthopedic Surgery

## 2020-06-16 NOTE — ED PROVIDER NOTE - PHYSICAL EXAMINATION
GENERAL: NAD, well-groomed, well-developed  HEAD:  Atraumatic, Normocephalic  EYES: EOMI, PERRLA, conjunctiva and sclera clear  ENMT: No tonsillar erythema, exudates, or enlargement; Moist mucous membranes, Good dentition, No lesions  NECK: Supple, No JVD, Normal thyroid  CHEST/LUNG: Clear to ausculation bilaterally; No rales, rhonchi, wheezing, or rubs  HEART: Regular rate and rhythm; No murmurs, rubs, or gallops  ABDOMEN: Soft, Nontender, Nondistended; Bowel sounds present  EXTREMITIES:  2+ Peripheral Pulses, No clubbing, cyanosis, or edema. External rotated Left Lower Extremity with pain upon palpation. No erythema or warmth appreciated  LYMPH: No lymphadenopathy noted  SKIN: No rashes or lesions  NERVOUS SYSTEM:  Alert & Oriented X3, Good concentration; Motor Strength 5/5 B/L upper and lower extremities; DTRs 2+ intact and symmetric

## 2020-06-17 ENCOUNTER — APPOINTMENT (OUTPATIENT)
Dept: ORTHOPEDIC SURGERY | Facility: HOSPITAL | Age: 55
End: 2020-06-17

## 2020-06-17 ENCOUNTER — RESULT REVIEW (OUTPATIENT)
Age: 55
End: 2020-06-17

## 2020-06-17 DIAGNOSIS — F11.20 OPIOID DEPENDENCE, UNCOMPLICATED: ICD-10-CM

## 2020-06-17 DIAGNOSIS — G89.29 OTHER CHRONIC PAIN: ICD-10-CM

## 2020-06-17 DIAGNOSIS — Z29.9 ENCOUNTER FOR PROPHYLACTIC MEASURES, UNSPECIFIED: ICD-10-CM

## 2020-06-17 DIAGNOSIS — I25.10 ATHEROSCLEROTIC HEART DISEASE OF NATIVE CORONARY ARTERY WITHOUT ANGINA PECTORIS: ICD-10-CM

## 2020-06-17 DIAGNOSIS — Z95.810 PRESENCE OF AUTOMATIC (IMPLANTABLE) CARDIAC DEFIBRILLATOR: ICD-10-CM

## 2020-06-17 LAB
ANION GAP SERPL CALC-SCNC: 13 MMO/L — SIGNIFICANT CHANGE UP (ref 7–14)
APAP SERPL-MCNC: < 15 UG/ML — LOW (ref 15–25)
APTT BLD: 26.1 SEC — LOW (ref 27.5–36.3)
BLD GP AB SCN SERPL QL: NEGATIVE — SIGNIFICANT CHANGE UP
BLD GP AB SCN SERPL QL: NEGATIVE — SIGNIFICANT CHANGE UP
BUN SERPL-MCNC: 12 MG/DL — SIGNIFICANT CHANGE UP (ref 7–23)
CALCIUM SERPL-MCNC: 8.9 MG/DL — SIGNIFICANT CHANGE UP (ref 8.4–10.5)
CHLORIDE SERPL-SCNC: 103 MMOL/L — SIGNIFICANT CHANGE UP (ref 98–107)
CO2 SERPL-SCNC: 24 MMOL/L — SIGNIFICANT CHANGE UP (ref 22–31)
CREAT SERPL-MCNC: 0.77 MG/DL — SIGNIFICANT CHANGE UP (ref 0.5–1.3)
ETHANOL BLD-MCNC: < 10 MG/DL — SIGNIFICANT CHANGE UP
GLUCOSE SERPL-MCNC: 100 MG/DL — HIGH (ref 70–99)
HCT VFR BLD CALC: 31.9 % — LOW (ref 34.5–45)
HGB BLD-MCNC: 10 G/DL — LOW (ref 11.5–15.5)
INR BLD: 1.13 — SIGNIFICANT CHANGE UP (ref 0.88–1.17)
MCHC RBC-ENTMCNC: 27.9 PG — SIGNIFICANT CHANGE UP (ref 27–34)
MCHC RBC-ENTMCNC: 31.3 % — LOW (ref 32–36)
MCV RBC AUTO: 88.9 FL — SIGNIFICANT CHANGE UP (ref 80–100)
NRBC # FLD: 0 K/UL — SIGNIFICANT CHANGE UP (ref 0–0)
PLATELET # BLD AUTO: 280 K/UL — SIGNIFICANT CHANGE UP (ref 150–400)
PMV BLD: 12.2 FL — SIGNIFICANT CHANGE UP (ref 7–13)
POTASSIUM SERPL-MCNC: 3.6 MMOL/L — SIGNIFICANT CHANGE UP (ref 3.5–5.3)
POTASSIUM SERPL-SCNC: 3.6 MMOL/L — SIGNIFICANT CHANGE UP (ref 3.5–5.3)
PROTHROM AB SERPL-ACNC: 13 SEC — SIGNIFICANT CHANGE UP (ref 9.8–13.1)
RBC # BLD: 3.59 M/UL — LOW (ref 3.8–5.2)
RBC # FLD: 16.6 % — HIGH (ref 10.3–14.5)
RH IG SCN BLD-IMP: NEGATIVE — SIGNIFICANT CHANGE UP
SALICYLATES SERPL-MCNC: < 5 MG/DL — LOW (ref 15–30)
SARS-COV-2 RNA SPEC QL NAA+PROBE: SIGNIFICANT CHANGE UP
SODIUM SERPL-SCNC: 140 MMOL/L — SIGNIFICANT CHANGE UP (ref 135–145)
WBC # BLD: 10.83 K/UL — HIGH (ref 3.8–10.5)
WBC # FLD AUTO: 10.83 K/UL — HIGH (ref 3.8–10.5)

## 2020-06-17 PROCEDURE — 93284 PRGRMG EVAL IMPLANTABLE DFB: CPT | Mod: 26

## 2020-06-17 PROCEDURE — 88300 SURGICAL PATH GROSS: CPT | Mod: 26

## 2020-06-17 PROCEDURE — 99223 1ST HOSP IP/OBS HIGH 75: CPT

## 2020-06-17 PROCEDURE — 20680 REMOVAL OF IMPLANT DEEP: CPT | Mod: 78,LT

## 2020-06-17 PROCEDURE — 99233 SBSQ HOSP IP/OBS HIGH 50: CPT

## 2020-06-17 RX ORDER — HYDROMORPHONE HYDROCHLORIDE 2 MG/ML
1 INJECTION INTRAMUSCULAR; INTRAVENOUS; SUBCUTANEOUS
Refills: 0 | Status: DISCONTINUED | OUTPATIENT
Start: 2020-06-17 | End: 2020-06-18

## 2020-06-17 RX ORDER — CHLORHEXIDINE GLUCONATE 213 G/1000ML
1 SOLUTION TOPICAL DAILY
Refills: 0 | Status: DISCONTINUED | OUTPATIENT
Start: 2020-06-17 | End: 2020-06-18

## 2020-06-17 RX ORDER — ROPIVACAINE HCL/PF 5 MG/ML
200 AMPUL (ML) INJECTION
Refills: 0 | Status: DISCONTINUED | OUTPATIENT
Start: 2020-06-17 | End: 2020-06-18

## 2020-06-17 RX ORDER — OXYCODONE HYDROCHLORIDE 5 MG/1
80 TABLET ORAL EVERY 8 HOURS
Refills: 0 | Status: DISCONTINUED | OUTPATIENT
Start: 2020-06-17 | End: 2020-06-18

## 2020-06-17 RX ORDER — NALOXONE HYDROCHLORIDE 4 MG/.1ML
0.1 SPRAY NASAL
Refills: 0 | Status: DISCONTINUED | OUTPATIENT
Start: 2020-06-17 | End: 2020-06-17

## 2020-06-17 RX ORDER — HYDROMORPHONE HYDROCHLORIDE 2 MG/ML
2 INJECTION INTRAMUSCULAR; INTRAVENOUS; SUBCUTANEOUS ONCE
Refills: 0 | Status: DISCONTINUED | OUTPATIENT
Start: 2020-06-17 | End: 2020-06-17

## 2020-06-17 RX ORDER — OXYCODONE HYDROCHLORIDE 5 MG/1
10 TABLET ORAL
Refills: 0 | Status: DISCONTINUED | OUTPATIENT
Start: 2020-06-17 | End: 2020-06-18

## 2020-06-17 RX ORDER — HYDROMORPHONE HYDROCHLORIDE 2 MG/ML
2 INJECTION INTRAMUSCULAR; INTRAVENOUS; SUBCUTANEOUS
Refills: 0 | Status: DISCONTINUED | OUTPATIENT
Start: 2020-06-17 | End: 2020-06-18

## 2020-06-17 RX ORDER — HALOPERIDOL DECANOATE 100 MG/ML
5 INJECTION INTRAMUSCULAR ONCE
Refills: 0 | Status: DISCONTINUED | OUTPATIENT
Start: 2020-06-17 | End: 2020-06-18

## 2020-06-17 RX ORDER — OXYCODONE HYDROCHLORIDE 5 MG/1
45 TABLET ORAL ONCE
Refills: 0 | Status: DISCONTINUED | OUTPATIENT
Start: 2020-06-17 | End: 2020-06-17

## 2020-06-17 RX ORDER — MORPHINE SULFATE 50 MG/1
0.2 CAPSULE, EXTENDED RELEASE ORAL ONCE
Refills: 0 | Status: DISCONTINUED | OUTPATIENT
Start: 2020-06-17 | End: 2020-06-18

## 2020-06-17 RX ORDER — NALOXONE HYDROCHLORIDE 4 MG/.1ML
0.1 SPRAY NASAL
Refills: 0 | Status: DISCONTINUED | OUTPATIENT
Start: 2020-06-17 | End: 2020-06-18

## 2020-06-17 RX ORDER — ONDANSETRON 8 MG/1
4 TABLET, FILM COATED ORAL EVERY 6 HOURS
Refills: 0 | Status: DISCONTINUED | OUTPATIENT
Start: 2020-06-17 | End: 2020-06-17

## 2020-06-17 RX ORDER — OXYCODONE HYDROCHLORIDE 5 MG/1
5 TABLET ORAL
Refills: 0 | Status: DISCONTINUED | OUTPATIENT
Start: 2020-06-17 | End: 2020-06-18

## 2020-06-17 RX ORDER — ONDANSETRON 8 MG/1
4 TABLET, FILM COATED ORAL EVERY 6 HOURS
Refills: 0 | Status: DISCONTINUED | OUTPATIENT
Start: 2020-06-17 | End: 2020-06-18

## 2020-06-17 RX ORDER — SODIUM CHLORIDE 9 MG/ML
1000 INJECTION, SOLUTION INTRAVENOUS
Refills: 0 | Status: DISCONTINUED | OUTPATIENT
Start: 2020-06-17 | End: 2020-06-18

## 2020-06-17 RX ORDER — HYDROMORPHONE HYDROCHLORIDE 2 MG/ML
0.5 INJECTION INTRAMUSCULAR; INTRAVENOUS; SUBCUTANEOUS
Refills: 0 | Status: DISCONTINUED | OUTPATIENT
Start: 2020-06-17 | End: 2020-06-18

## 2020-06-17 RX ADMIN — HYDROMORPHONE HYDROCHLORIDE 2 MILLIGRAM(S): 2 INJECTION INTRAMUSCULAR; INTRAVENOUS; SUBCUTANEOUS at 03:09

## 2020-06-17 RX ADMIN — PANTOPRAZOLE SODIUM 40 MILLIGRAM(S): 20 TABLET, DELAYED RELEASE ORAL at 06:21

## 2020-06-17 RX ADMIN — Medication 2 MILLIGRAM(S): at 16:56

## 2020-06-17 RX ADMIN — OXYCODONE HYDROCHLORIDE 45 MILLIGRAM(S): 5 TABLET ORAL at 13:59

## 2020-06-17 RX ADMIN — HYDROMORPHONE HYDROCHLORIDE 2 MILLIGRAM(S): 2 INJECTION INTRAMUSCULAR; INTRAVENOUS; SUBCUTANEOUS at 03:27

## 2020-06-17 RX ADMIN — HYDROMORPHONE HYDROCHLORIDE 2 MILLIGRAM(S): 2 INJECTION INTRAMUSCULAR; INTRAVENOUS; SUBCUTANEOUS at 09:26

## 2020-06-17 RX ADMIN — SODIUM CHLORIDE 75 MILLILITER(S): 9 INJECTION, SOLUTION INTRAVENOUS at 20:15

## 2020-06-17 RX ADMIN — CHLORHEXIDINE GLUCONATE 1 APPLICATION(S): 213 SOLUTION TOPICAL at 12:34

## 2020-06-17 RX ADMIN — Medication 1 MILLIGRAM(S): at 10:33

## 2020-06-17 RX ADMIN — OXYCODONE HYDROCHLORIDE 80 MILLIGRAM(S): 5 TABLET ORAL at 01:04

## 2020-06-17 RX ADMIN — OXYCODONE HYDROCHLORIDE 80 MILLIGRAM(S): 5 TABLET ORAL at 09:27

## 2020-06-17 RX ADMIN — HYDROMORPHONE HYDROCHLORIDE 2 MILLIGRAM(S): 2 INJECTION INTRAMUSCULAR; INTRAVENOUS; SUBCUTANEOUS at 00:03

## 2020-06-17 RX ADMIN — HYDROMORPHONE HYDROCHLORIDE 2 MILLIGRAM(S): 2 INJECTION INTRAMUSCULAR; INTRAVENOUS; SUBCUTANEOUS at 06:09

## 2020-06-17 RX ADMIN — HYDROMORPHONE HYDROCHLORIDE 2 MILLIGRAM(S): 2 INJECTION INTRAMUSCULAR; INTRAVENOUS; SUBCUTANEOUS at 06:10

## 2020-06-17 RX ADMIN — Medication 200 MILLILITER(S): at 22:40

## 2020-06-17 RX ADMIN — HYDROMORPHONE HYDROCHLORIDE 2 MILLIGRAM(S): 2 INJECTION INTRAMUSCULAR; INTRAVENOUS; SUBCUTANEOUS at 12:34

## 2020-06-17 RX ADMIN — SODIUM CHLORIDE 125 MILLILITER(S): 9 INJECTION INTRAMUSCULAR; INTRAVENOUS; SUBCUTANEOUS at 01:04

## 2020-06-17 RX ADMIN — Medication 500 MILLIGRAM(S): at 06:21

## 2020-06-17 RX ADMIN — Medication 1 MILLIGRAM(S): at 13:06

## 2020-06-17 RX ADMIN — SODIUM CHLORIDE 125 MILLILITER(S): 9 INJECTION INTRAMUSCULAR; INTRAVENOUS; SUBCUTANEOUS at 09:32

## 2020-06-17 RX ADMIN — OXYCODONE HYDROCHLORIDE 45 MILLIGRAM(S): 5 TABLET ORAL at 10:33

## 2020-06-17 NOTE — CONSULT NOTE ADULT - ATTENDING COMMENTS
Cardiac history not entirely clear, though pt reports MI 1.5ya, hx of cardiac arrest now with Bi-V ICD and on interrogation is pacemaker dependent, Bi-V paced 99.9% of the time.    On asa 81mg daily at home, last dose approx 4 days ago and is being held for anticipated spinal anesthesia. Unclear if she is on GDMT for HFrecEF; if she is on a betablocker it should be continued in the perioperative period. Otherwise no cardiac contraindications to proceeding with planned procedure.

## 2020-06-17 NOTE — PACU DISCHARGE NOTE - COMMENTS
PCEA tested with 1.5% lidocaine with 1:200,000 epinephrine prior to connecting. Negative for signs of intravascular and intrathecal catheter placement. PCEA connected and pump started.  Continuous pulse ox mandatory.

## 2020-06-17 NOTE — PROGRESS NOTE ADULT - PROBLEM SELECTOR PLAN 1
-h/o recurrent L hip dislocations, denies fall or trauma to  hip   -most recent revision L KONG on 5/21 , plan for revision arthroplasty left hip tomorrow per ortho  -pain control   -PT/OT postop

## 2020-06-17 NOTE — PROGRESS NOTE ADULT - SUBJECTIVE AND OBJECTIVE BOX
ORTHO PREOP NOTE     Diagnosis: recurrent left hip dislocations    Surgeon: Goodman BRODERICK, Lew BRODERICK     Procedure: Revision arthroplasty left hip       Labs:                          11.0   17.82 )-----------( 342      ( 16 Jun 2020 20:52 )             34.2         06-16    137  |  99  |  16  ----------------------------<  146<H>  4.3   |  21<L>  |  0.88    Ca    9.5      16 Jun 2020 20:52  Phos  2.8     06-16  Mg     1.7     06-16    TPro  8.3  /  Alb  3.9  /  TBili  0.2  /  DBili  x   /  AST  32  /  ALT  9   /  AlkPhos  99  06-16        PT/INR - ( 16 Jun 2020 20:52 )   PT: 12.6 SEC;   INR: 1.10          PTT - ( 16 Jun 2020 20:52 )  PTT:27.1 SEC            CXR: done    Type and cross x 2- ordered    Medical  Clearance: in chart, cardiology clearance pending     Consent: in chart    NPO: yes

## 2020-06-17 NOTE — PRE-OP CHECKLIST - BLOOD AVAILABLE
There are no preventive care reminders to display for this patient.    Patient is up to date, no discussion needed.             n/a

## 2020-06-17 NOTE — PROGRESS NOTE ADULT - PROBLEM SELECTOR PLAN 4
unclear history, remote MI?  cardiology consult appreciated, obtain outside records if able  ASA 81 mg qd if okay with orthopedics

## 2020-06-17 NOTE — CONSULT NOTE ADULT - SUBJECTIVE AND OBJECTIVE BOX
Patient is a 54y old  Female who presents with a chief complaint of recurrent prosthetic hip dislocation (16 Jun 2020 22:55)      HPI:  54yFemale with a history of recurrent L prosthetic hip dislocations c/o L hip pain for the past week. She says she thinks her hip is dislocated. No trauma to the hip, no falls. Patient complains of severe L hip pain. Patient denies numbness or tingling in the LLE. Patient denies any other injuries. Patient presents to the ED in order to be set up for the operating room for tomorrow.    ROS: 10 point review of systems otherwise negative unless noted in HPI    PMH:  No pertinent past medical history    PSH:  Closed pelvic fracture  Total femur replacement  Total hip replacement  Total knee replacement    AH:    Meds: See med rec    T(C): 36.8 (06-16-20 @ 19:49)  HR: 91 (06-16-20 @ 20:56)  BP: 156/81 (06-16-20 @ 21:33)  RR: 18 (06-16-20 @ 20:56)  SpO2: 100% (06-16-20 @ 20:56)  Wt(kg): --                        11.0   17.82 )-----------( 342      ( 16 Jun 2020 20:52 )             34.2     06-16    137  |  99  |  16  ----------------------------<  146<H>  4.3   |  21<L>  |  0.88    Ca    9.5      16 Jun 2020 20:52  Phos  2.8     06-16  Mg     1.7     06-16    TPro  8.3  /  Alb  3.9  /  TBili  0.2  /  DBili  x   /  AST  32  /  ALT  9   /  AlkPhos  99  06-16    PT/INR - ( 16 Jun 2020 20:52 )   PT: 12.6 SEC;   INR: 1.10          PTT - ( 16 Jun 2020 20:52 )  PTT:27.1 SEC      PE  Gen: NAD, alert and oriented  Resp: Unlabored breathing  LLE: Skin intact, no ecchymosis, LLE is significantly shortened and externally rotated       SILT DP/SP/ Opal/Saph,        +EHL/FHL/TA/Gastroc,        hip ROM limited 2/2 pain,       DP+,        soft compartments, no calf ttp,             Secondary:  No TTP over bony landmarks, SILT BL, ROM intact BL, distal pulses palpable.    Imaging:  XR demonstrating L hip periprosthetic posterior dislocation      A/P: 54F s/p recurrent L prosthetic hip dislocations presents with a posterior L hip dislocation, planning for OR tomorrow morning  - Pain control  - NWB LLE, bedrest  - DVT ppx: venodynes  - Regular diet  - NPOpMN/IVF  - FU preop labs  - FU COVID test  - Continue home meds  - FU medical clearance  - FU cardiac clearance  - Will consult pain services tomorrow morning  - Plan for OR tomorrow for resection arthroplasty of L hip with Dr. Calero    Orthopedic Surgery (16 Jun 2020 22:45)      PAST MEDICAL & SURGICAL HISTORY:  No pertinent past medical history  Closed pelvic fracture      MEDICATIONS  (STANDING):  acetaminophen   Tablet .. 975 milliGRAM(s) Oral every 8 hours  ascorbic acid 500 milliGRAM(s) Oral two times a day  chlorhexidine 2% Cloths 1 Application(s) Topical daily  gabapentin 300 milliGRAM(s) Oral every 8 hours  multivitamin 1 Tablet(s) Oral daily  oxyCODONE  ER Tablet 80 milliGRAM(s) Oral every 8 hours  pantoprazole    Tablet 40 milliGRAM(s) Oral before breakfast  QUEtiapine 50 milliGRAM(s) Oral at bedtime  senna 2 Tablet(s) Oral at bedtime  sodium chloride 0.9%. 1000 milliLiter(s) (125 mL/Hr) IV Continuous <Continuous>    MEDICATIONS  (PRN):  HYDROmorphone  Injectable 2 milliGRAM(s) IV Push every 3 hours PRN Severe Pain (7 - 10)      ICU Vital Signs Last 24 Hrs  T(C): 36.4 (17 Jun 2020 09:20), Max: 36.8 (16 Jun 2020 19:49)  T(F): 97.6 (17 Jun 2020 09:20), Max: 98.3 (16 Jun 2020 19:49)  HR: 72 (17 Jun 2020 09:20) (69 - 102)  BP: 145/75 (17 Jun 2020 09:20) (112/87 - 168/76)  BP(mean): --  ABP: --  ABP(mean): --  RR: 17 (17 Jun 2020 09:20) (16 - 18)  SpO2: 99% (17 Jun 2020 09:20) (99% - 100%)      Vital Signs Last 24 Hrs  T(C): 36.4 (17 Jun 2020 09:20), Max: 36.8 (16 Jun 2020 19:49)  T(F): 97.6 (17 Jun 2020 09:20), Max: 98.3 (16 Jun 2020 19:49)  HR: 72 (17 Jun 2020 09:20) (69 - 102)  BP: 145/75 (17 Jun 2020 09:20) (112/87 - 168/76)  BP(mean): --  RR: 17 (17 Jun 2020 09:20) (16 - 18)  SpO2: 99% (17 Jun 2020 09:20) (99% - 100%)                          10.0   10.83 )-----------( 280      ( 17 Jun 2020 06:46 )             31.9       LIVER FUNCTIONS - ( 16 Jun 2020 20:52 )  Alb: 3.9 g/dL / Pro: 8.3 g/dL / ALK PHOS: 99 u/L / ALT: 9 u/L / AST: 32 u/L / GGT: x             PT/INR - ( 17 Jun 2020 06:46 )   PT: 13.0 SEC;   INR: 1.13          PTT - ( 17 Jun 2020 06:46 )  PTT:26.1 SEC      COMMENTS/PLAN: Pt. here for left hip surgery on chronic opioids. Will discuss with team pain plan post op. will give one time dose of oxycodone 45mg and continue current regimen until surgery.
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
## 2020-06-17 NOTE — PROGRESS NOTE ADULT - SUBJECTIVE AND OBJECTIVE BOX
Dr. Rossy Allen  Pager [phone]    PROGRESS NOTE:     Patient is a 54y old  Female who presents with a chief complaint of Hip dislocation (17 Jun 2020 11:43)      SUBJECTIVE / OVERNIGHT EVENTS: pt c/o recurrent L hip dislocation, pains  ADDITIONAL REVIEW OF SYSTEMS: no chest pain or sob     MEDICATIONS  (STANDING):  acetaminophen   Tablet .. 975 milliGRAM(s) Oral every 8 hours  ascorbic acid 500 milliGRAM(s) Oral two times a day  chlorhexidine 2% Cloths 1 Application(s) Topical daily  gabapentin 300 milliGRAM(s) Oral every 8 hours  multivitamin 1 Tablet(s) Oral daily  oxyCODONE  ER Tablet 80 milliGRAM(s) Oral every 8 hours  pantoprazole    Tablet 40 milliGRAM(s) Oral before breakfast  QUEtiapine 50 milliGRAM(s) Oral at bedtime  senna 2 Tablet(s) Oral at bedtime  sodium chloride 0.9%. 1000 milliLiter(s) (125 mL/Hr) IV Continuous <Continuous>    MEDICATIONS  (PRN):  haloperidol    Injectable 5 milliGRAM(s) IV Push once PRN agitation  HYDROmorphone  Injectable 2 milliGRAM(s) IV Push every 3 hours PRN Severe Pain (7 - 10)      CAPILLARY BLOOD GLUCOSE        I&O's Summary    17 Jun 2020 07:01  -  17 Jun 2020 17:12  --------------------------------------------------------  IN: 0 mL / OUT: 0 mL / NET: 0 mL        PHYSICAL EXAM:  Vital Signs Last 24 Hrs  T(C): 36.6 (17 Jun 2020 15:10), Max: 36.8 (16 Jun 2020 19:49)  T(F): 97.9 (17 Jun 2020 15:10), Max: 98.3 (16 Jun 2020 19:49)  HR: 71 (17 Jun 2020 15:10) (69 - 102)  BP: 135/62 (17 Jun 2020 15:10) (107/62 - 168/76)  BP(mean): --  RR: 16 (17 Jun 2020 15:10) (16 - 18)  SpO2: 100% (17 Jun 2020 15:10) (99% - 100%)  CONSTITUTIONAL: NAD, well-developed  RESPIRATORY: Normal respiratory effort; lungs are clear to auscultation bilaterally  CARDIOVASCULAR: Regular rate and rhythm, normal S1 and S2, no murmur/rub/gallop; No lower extremity edema; Peripheral pulses are 2+ bilaterally  ABDOMEN: Nontender to palpation, normoactive bowel sounds, no rebound/guarding; No hepatosplenomegaly  MUSCULOSKELETAL: no clubbing or cyanosis of digits; no joint swelling or tenderness to palpation, left hip dislocated with LLE externally rotated and shortened   PSYCH: A+O to person, place, and time; affect appropriate    LABS:                        10.0   10.83 )-----------( 280      ( 17 Jun 2020 06:46 )             31.9     06-17    140  |  103  |  12  ----------------------------<  100<H>  3.6   |  24  |  0.77    Ca    8.9      17 Jun 2020 06:46  Phos  2.8     06-16  Mg     1.7     06-16    TPro  8.3  /  Alb  3.9  /  TBili  0.2  /  DBili  x   /  AST  32  /  ALT  9   /  AlkPhos  99  06-16    PT/INR - ( 17 Jun 2020 06:46 )   PT: 13.0 SEC;   INR: 1.13          PTT - ( 17 Jun 2020 06:46 )  PTT:26.1 SEC      RADIOLOGY & ADDITIONAL TESTS:  Results Reviewed:   Imaging Personally Reviewed:  < from: Xray Chest 1 View AP/PA (06.16.20 @ 21:50) >  IMPRESSION:   Clear lungs.    Electrocardiogram Personally Reviewed:    COORDINATION OF CARE:  Care Discussed with Consultants/Other Providers [Y/N]: ortho PA, plan for hip revision tomorrow, pain medicine consult   Prior or Outpatient Records Reviewed [Y/N]:

## 2020-06-17 NOTE — PRE-OP CHECKLIST - INTERNAL PROSTHESES
yes(specify)/hardware Rt ankle & Rt forearm, hardware left ankle, AICD/PPM, ?coronary stent yes(specify)/hardware Rt ankle & Rt forearm, hardware left ankle, hip, AICD/PPM, ?coronary stent

## 2020-06-17 NOTE — PRE-OP CHECKLIST - 1.
as per NP in EP lab, call [phone] (as this person done the interrogation this am) when Pt in OR Room as per NP in EP lab, call [phone] (as this person done the interrogation this am) when Pt in OR Room- MD Mueller (Anesthesiologist) made aware

## 2020-06-17 NOTE — PROGRESS NOTE ADULT - SUBJECTIVE AND OBJECTIVE BOX
Called to OR 20 to reprogram patient's BiV ICD for procedure with anticipated cautery use. Patient's device was evaluated preprocedure and she was noted to be dependent. External defib pads were placed anterior and posterior appropriately with patient being monitored with external defibrillator by ACLS personnel. Tachy detection reprogrammed off and pacing mode changed from DDD to DOO 70. RV output was changed from 1.5V to 2V and RA output was changed from 1V to 2V. Please call post procedure to reprogram tachy detection on and restore original parameters.

## 2020-06-17 NOTE — PROGRESS NOTE ADULT - PROBLEM SELECTOR PLAN 2
- Pt denies any ICD discharges, no chest pain/sob/dizziness  -TTE (11/5/19) normal LV function   -seen by cardiology and EP, ICD interrogated today, normal BiV ICD function. PAT detected 6/16.

## 2020-06-17 NOTE — PROGRESS NOTE ADULT - SUBJECTIVE AND OBJECTIVE BOX
Called to OR 20 to reprogram patient's BiV ICD post procedure and reactivate tachy detection. Tachy detection reprogrammed on and all original pacing parameters restored. Post procedure interrogation revealed:    [employer]onic Amplia MRI Quad CRT-D [id number]  Serial #: [id number]  Battery Status: OK with est longevity 4.1 years  RA: 1.3mV, 399ohms, 0.75V @ 0.4ms  RV: paced, 361ohms, 0.75V @ 0.4ms  LV: 399ohms, 1.25V @ 0.6ms  Parameters: DDD 70-140bpm                    VF     214bpm     ATP during charging, 35J x 6                      VT    182bpm      Burst (2), 25J, 35J x 4                    VT    Monitor at 143bpm    No new arrhythmias recorded since interrogation earlier today  Normal BiV ICD function with tachy left active and all preprocedure lissette parameters restored  Patient to follow up as per routine for device checks with her device following physician in [place]

## 2020-06-17 NOTE — PROGRESS NOTE ADULT - PROBLEM SELECTOR PLAN 3
Acute on chronic pain of L hip   Pain regimen as per pain management, currently on Oxycodone ER/IR and Dilaudid IV prn breakthrough. Acute on chronic pain of L hip   Pain regimen as per pain management, currently on Oxycodone ER and Dilaudid IV prn breakthrough.

## 2020-06-17 NOTE — PROCEDURE NOTE - ADDITIONAL PROCEDURE DETAILS
Normal BiV ICD function with tachy left active.  Patient has routine device checks with her following physician in [place] where she resides.   Multiple episodes recorded in V sensing list on 5/21/2020 and appear consistent with external noise. Rates appear nonphysiologic and pt reports she believes she had a procedure that day. Chart review reveals patient underwent procedure that day and likely from cautery use. Patient IS dependent on today's interrogation. Please call [phone] for device reprogramming for procedure given dependency.

## 2020-06-18 ENCOUNTER — TRANSCRIPTION ENCOUNTER (OUTPATIENT)
Age: 55
End: 2020-06-18

## 2020-06-18 VITALS
DIASTOLIC BLOOD PRESSURE: 56 MMHG | OXYGEN SATURATION: 99 % | HEART RATE: 87 BPM | SYSTOLIC BLOOD PRESSURE: 105 MMHG | TEMPERATURE: 97 F | RESPIRATION RATE: 17 BRPM

## 2020-06-18 PROBLEM — F11.20 NARCOTIC DEPENDENCE: Status: ACTIVE | Noted: 2017-06-05

## 2020-06-18 PROCEDURE — 99238 HOSP IP/OBS DSCHRG MGMT 30/<: CPT

## 2020-06-18 RX ORDER — GABAPENTIN 400 MG/1
1 CAPSULE ORAL
Qty: 42 | Refills: 0
Start: 2020-06-18 | End: 2020-07-01

## 2020-06-18 RX ORDER — TIZANIDINE 4 MG/1
1 TABLET ORAL
Qty: 56 | Refills: 0
Start: 2020-06-18 | End: 2020-07-01

## 2020-06-18 RX ORDER — OXYCODONE HYDROCHLORIDE 5 MG/1
45 TABLET ORAL EVERY 4 HOURS
Refills: 0 | Status: DISCONTINUED | OUTPATIENT
Start: 2020-06-18 | End: 2020-06-18

## 2020-06-18 RX ORDER — POLYETHYLENE GLYCOL 3350 17 G/17G
17 POWDER, FOR SOLUTION ORAL DAILY
Refills: 0 | Status: DISCONTINUED | OUTPATIENT
Start: 2020-06-18 | End: 2020-06-18

## 2020-06-18 RX ORDER — TIZANIDINE 4 MG/1
2 TABLET ORAL ONCE
Refills: 0 | Status: COMPLETED | OUTPATIENT
Start: 2020-06-18 | End: 2020-06-18

## 2020-06-18 RX ORDER — OXYCODONE HYDROCHLORIDE 5 MG/1
30 TABLET ORAL EVERY 4 HOURS
Refills: 0 | Status: DISCONTINUED | OUTPATIENT
Start: 2020-06-18 | End: 2020-06-18

## 2020-06-18 RX ORDER — OXYCODONE HYDROCHLORIDE 80 MG/1
80 TABLET, FILM COATED, EXTENDED RELEASE ORAL EVERY 8 HOURS
Qty: 90 | Refills: 0 | Status: ACTIVE | COMMUNITY
Start: 2020-06-18 | End: 1900-01-01

## 2020-06-18 RX ORDER — APIXABAN 2.5 MG/1
1 TABLET, FILM COATED ORAL
Qty: 42 | Refills: 0
Start: 2020-06-18 | End: 2020-07-29

## 2020-06-18 RX ORDER — OXYCODONE HYDROCHLORIDE 5 MG/1
30 TABLET ORAL
Refills: 0 | Status: DISCONTINUED | OUTPATIENT
Start: 2020-06-18 | End: 2020-06-18

## 2020-06-18 RX ORDER — RIVAROXABAN 15 MG-20MG
1 KIT ORAL
Qty: 42 | Refills: 0
Start: 2020-06-18 | End: 2020-07-29

## 2020-06-18 RX ORDER — TRAZODONE HCL 50 MG
50 TABLET ORAL AT BEDTIME
Refills: 0 | Status: DISCONTINUED | OUTPATIENT
Start: 2020-06-18 | End: 2020-06-18

## 2020-06-18 RX ORDER — NICOTINE POLACRILEX 2 MG
1 GUM BUCCAL DAILY
Refills: 0 | Status: DISCONTINUED | OUTPATIENT
Start: 2020-06-18 | End: 2020-06-18

## 2020-06-18 RX ORDER — TRAZODONE HCL 50 MG
1 TABLET ORAL
Qty: 0 | Refills: 0 | DISCHARGE

## 2020-06-18 RX ORDER — HYDROMORPHONE HYDROCHLORIDE 2 MG/ML
2 INJECTION INTRAMUSCULAR; INTRAVENOUS; SUBCUTANEOUS EVERY 4 HOURS
Refills: 0 | Status: DISCONTINUED | OUTPATIENT
Start: 2020-06-18 | End: 2020-06-18

## 2020-06-18 RX ORDER — OXYCODONE HYDROCHLORIDE 15 MG/1
15 TABLET ORAL EVERY 6 HOURS
Qty: 120 | Refills: 0 | Status: ACTIVE | COMMUNITY
Start: 2020-06-18 | End: 1900-01-01

## 2020-06-18 RX ORDER — OXYCODONE HYDROCHLORIDE 5 MG/1
45 TABLET ORAL
Refills: 0 | Status: DISCONTINUED | OUTPATIENT
Start: 2020-06-18 | End: 2020-06-18

## 2020-06-18 RX ORDER — RIVAROXABAN 15 MG-20MG
10 KIT ORAL DAILY
Refills: 0 | Status: DISCONTINUED | OUTPATIENT
Start: 2020-06-18 | End: 2020-06-18

## 2020-06-18 RX ORDER — ROPIVACAINE HCL/PF 5 MG/ML
200 AMPUL (ML) INJECTION
Refills: 0 | Status: DISCONTINUED | OUTPATIENT
Start: 2020-06-18 | End: 2020-06-18

## 2020-06-18 RX ORDER — ACETAMINOPHEN 500 MG
3 TABLET ORAL
Qty: 0 | Refills: 0 | DISCHARGE
Start: 2020-06-18

## 2020-06-18 RX ORDER — HYDROMORPHONE HYDROCHLORIDE 2 MG/ML
2 INJECTION INTRAMUSCULAR; INTRAVENOUS; SUBCUTANEOUS
Refills: 0 | Status: DISCONTINUED | OUTPATIENT
Start: 2020-06-18 | End: 2020-06-18

## 2020-06-18 RX ADMIN — HYDROMORPHONE HYDROCHLORIDE 1 MILLIGRAM(S): 2 INJECTION INTRAMUSCULAR; INTRAVENOUS; SUBCUTANEOUS at 03:35

## 2020-06-18 RX ADMIN — POLYETHYLENE GLYCOL 3350 17 GRAM(S): 17 POWDER, FOR SOLUTION ORAL at 13:14

## 2020-06-18 RX ADMIN — OXYCODONE HYDROCHLORIDE 45 MILLIGRAM(S): 5 TABLET ORAL at 13:02

## 2020-06-18 RX ADMIN — OXYCODONE HYDROCHLORIDE 80 MILLIGRAM(S): 5 TABLET ORAL at 02:47

## 2020-06-18 RX ADMIN — HYDROMORPHONE HYDROCHLORIDE 2 MILLIGRAM(S): 2 INJECTION INTRAMUSCULAR; INTRAVENOUS; SUBCUTANEOUS at 15:28

## 2020-06-18 RX ADMIN — OXYCODONE HYDROCHLORIDE 45 MILLIGRAM(S): 5 TABLET ORAL at 10:05

## 2020-06-18 RX ADMIN — Medication 500 MILLIGRAM(S): at 06:57

## 2020-06-18 RX ADMIN — OXYCODONE HYDROCHLORIDE 45 MILLIGRAM(S): 5 TABLET ORAL at 06:58

## 2020-06-18 RX ADMIN — HYDROMORPHONE HYDROCHLORIDE 2 MILLIGRAM(S): 2 INJECTION INTRAMUSCULAR; INTRAVENOUS; SUBCUTANEOUS at 07:36

## 2020-06-18 RX ADMIN — GABAPENTIN 300 MILLIGRAM(S): 400 CAPSULE ORAL at 06:57

## 2020-06-18 RX ADMIN — GABAPENTIN 300 MILLIGRAM(S): 400 CAPSULE ORAL at 13:14

## 2020-06-18 RX ADMIN — Medication 975 MILLIGRAM(S): at 13:14

## 2020-06-18 RX ADMIN — TIZANIDINE 2 MILLIGRAM(S): 4 TABLET ORAL at 15:47

## 2020-06-18 RX ADMIN — Medication 1 TABLET(S): at 13:14

## 2020-06-18 RX ADMIN — Medication 200 MILLILITER(S): at 08:18

## 2020-06-18 RX ADMIN — OXYCODONE HYDROCHLORIDE 80 MILLIGRAM(S): 5 TABLET ORAL at 17:20

## 2020-06-18 RX ADMIN — OXYCODONE HYDROCHLORIDE 80 MILLIGRAM(S): 5 TABLET ORAL at 10:05

## 2020-06-18 RX ADMIN — OXYCODONE HYDROCHLORIDE 45 MILLIGRAM(S): 5 TABLET ORAL at 16:24

## 2020-06-18 RX ADMIN — Medication 975 MILLIGRAM(S): at 06:57

## 2020-06-18 RX ADMIN — Medication 200 MILLILITER(S): at 01:19

## 2020-06-18 RX ADMIN — SODIUM CHLORIDE 75 MILLILITER(S): 9 INJECTION, SOLUTION INTRAVENOUS at 01:17

## 2020-06-18 RX ADMIN — RIVAROXABAN 10 MILLIGRAM(S): KIT at 17:20

## 2020-06-18 RX ADMIN — PANTOPRAZOLE SODIUM 40 MILLIGRAM(S): 20 TABLET, DELAYED RELEASE ORAL at 06:57

## 2020-06-18 RX ADMIN — Medication 200 MILLILITER(S): at 04:33

## 2020-06-18 NOTE — PROVIDER CONTACT NOTE (OTHER) - REASON
Patient complaining of inadequate pain relief despite administration of oxycodone 80mg, Dilaudid 1mg, and PCEA pump.

## 2020-06-18 NOTE — PROGRESS NOTE ADULT - SUBJECTIVE AND OBJECTIVE BOX
Ortho Progress Note    S: Patient seen and examined. No acute events overnight. Endorses significant pain down left leg. Patient tearful.     O:  Physical Exam:  Gen: Laying in bed in pain, alert and oriented.   Resp: Unlabored breathing  Ext: EHL/FHL/TA/Sol intact          + SILT DP/SP/BAILEY/Sa/T          +DP, extremity WWP  Dressing c/d/i  HV SS    Vital Signs Last 24 Hrs  T(C): 36.4 (18 Jun 2020 01:12), Max: 36.6 (17 Jun 2020 14:40)  T(F): 97.5 (18 Jun 2020 01:12), Max: 97.9 (17 Jun 2020 14:40)  HR: 80 (18 Jun 2020 03:23) (70 - 80)  BP: 110/70 (18 Jun 2020 03:23) (102/65 - 145/75)  BP(mean): 81 (17 Jun 2020 23:45) (70 - 87)  RR: 18 (18 Jun 2020 01:12) (16 - 21)  SpO2: 100% (18 Jun 2020 01:12) (94% - 100%)                          10.0   10.83 )-----------( 280      ( 17 Jun 2020 06:46 )             31.9                         11.0   17.82 )-----------( 342      ( 16 Jun 2020 20:52 )             34.2       06-17    140  |  103  |  12  ----------------------------<  100<H>  3.6   |  24  |  0.77        PT/INR - ( 17 Jun 2020 06:46 )   PT: 13.0 SEC;   INR: 1.13          PTT - ( 17 Jun 2020 06:46 )  PTT:26.1 SEC

## 2020-06-18 NOTE — PROVIDER CONTACT NOTE (OTHER) - BACKGROUND
Patient had lift hip revision as well as femur and knee replacement. History of CAD, and chronic pain.

## 2020-06-18 NOTE — DISCHARGE NOTE PROVIDER - HOSPITAL COURSE
54 Pt is s/p  resection arthroplasty left hip without any intraoperative complications.  Pt is doing well and stable for discharge.  Pt is tolerating physical therapy, non weight bearing left lower extremity  with walker,  gait training.  Leave dressing on until post op office visit(POD#14).  Have sutures/staples removed POD#14 if present.  Pt is on Xarelto 10 mg po daily for DVT prophylaxis take for 6 weeks unless otherwise directed by surgeon.  follow up with Dr. Calero in two weeks.  Follow up with primary care doctor in 1-2 weeks for continuity of care.    The patient had no post-operative complications and clinically progressed faster than anticipated. The following medical conditions were actively treated during the hospital stay-     chronic pain requiring narcotics, anxiety .     The patient met criteria for discharge before the 2nd night of stay. The patient was safely and appropriately discharged home.

## 2020-06-18 NOTE — PROGRESS NOTE ADULT - PROVIDER SPECIALTY LIST ADULT
Anesthesia
Electrophysiology
Electrophysiology
Hospitalist
Orthopedics
Orthopedics
Pain Medicine
Orthopedics

## 2020-06-18 NOTE — DISCHARGE NOTE PROVIDER - NSDCCPCAREPLAN_GEN_ALL_CORE_FT
PRINCIPAL DISCHARGE DIAGNOSIS  Diagnosis: Closed dislocation of left hip, subsequent encounter  Assessment and Plan of Treatment:

## 2020-06-18 NOTE — OCCUPATIONAL THERAPY INITIAL EVALUATION ADULT - LEVEL OF INDEPENDENCE: DRESS UPPER BODY, OT EVAL
Not assessed. Despite education & encouragement, Pt deferred to participate secondary to "being comfortable". LOU Holguin made aware and acknowledged. Will attempt to follow-up with pt. at later date/time if and when safe and appropriate.

## 2020-06-18 NOTE — OCCUPATIONAL THERAPY INITIAL EVALUATION ADULT - MD ORDER
Occupational Therapy (OT) to evaluate and treat. Occupational Therapy (OT) to evaluate and treat. Per LOU Holguin, pt is okay to participate in OT evaluation and perform activity as tolerated.

## 2020-06-18 NOTE — DISCHARGE NOTE PROVIDER - NSDCMRMEDTOKEN_GEN_ALL_CORE_FT
acetaminophen 325 mg oral tablet: 3 tab(s) orally every 8 hours  ascorbic acid 500 mg oral tablet: 1 tab(s) orally 2 times a day MDD:2  gabapentin 300 mg oral capsule: 1 cap(s) orally every 8 hours MDD:3  Multiple Vitamins oral tablet: 1 tab(s) orally once a day MDD:1  nicotine 21 mg/24 hr transdermal film, extended release: 1 application transdermal once a day MDD:1   pantoprazole 40 mg oral delayed release tablet: 1 tab(s) orally once a day for stomach protectant MDD:1  QUEtiapine 25 mg oral tablet: 1 tab(s) orally once a day   rivaroxaban 10 mg oral tablet: 1 tab(s) orally once a day  senna oral tablet: 2 tab(s) orally once a day (at bedtime) MDD:2  tiZANidine 4 mg oral capsule: 1 cap(s) orally every 6 hours MDD:4  traZODone 50 mg oral tablet: 1 tab(s) orally once a day (at bedtime)

## 2020-06-18 NOTE — CHART NOTE - NSCHARTNOTEFT_GEN_A_CORE
ORTHO POST-OP CHECK    Patient seen and examined s/p L hip resection arthroplasty. No acute events since surgery. Pain well controlled with PCEA and PO pain medications. Tolerating diet. Denies N/V/D/F/C/CP/SOB.    ICU Vital Signs Last 24 Hrs  T(C): 36.4 (18 Jun 2020 01:12), Max: 36.6 (17 Jun 2020 14:40)  T(F): 97.5 (18 Jun 2020 01:12), Max: 97.9 (17 Jun 2020 14:40)  HR: 70 (18 Jun 2020 01:12) (69 - 79)  BP: 116/74 (18 Jun 2020 01:12) (102/65 - 145/75)  BP(mean): 81 (17 Jun 2020 23:45) (70 - 87)  ABP: 126/67 (17 Jun 2020 23:45) (92/51 - 144/78)  ABP(mean): 90 (17 Jun 2020 23:45) (68 - 106)  RR: 18 (18 Jun 2020 01:12) (16 - 21)  SpO2: 100% (18 Jun 2020 01:12) (94% - 100%)    Exam:   Gen: NAD, alert and oriented  Resp: Unlabored breathing  LLE: Skin intact, no ecchymosis, shortened and externally rotated       SILT DP/SP/Opal/Saph/tib       +EHL/FHL/TA/Gastroc,        DP+,        soft compartments, no calf ttp,                              10.0   10.83 )-----------( 280      ( 17 Jun 2020 06:46 )             31.9     06-17    140  |  103  |  12  ----------------------------<  100<H>  3.6   |  24  |  0.77    Ca    8.9      17 Jun 2020 06:46  Phos  2.8     06-16  Mg     1.7     06-16    TPro  8.3  /  Alb  3.9  /  TBili  0.2  /  DBili  x   /  AST  32  /  ALT  9   /  AlkPhos  99  06-16            A/P: 54F POD0 s/p L hip resection arthroplasty, recovering on the floor    - Pain control: PCEA  - DVT ppx: venodynes, will restart Xarelto when PCEA is d/c'd  - Regular diet  - PT  - OOBTC  - Monitor HV output  - FU AM labs    Ortho

## 2020-06-18 NOTE — DISCHARGE NOTE PROVIDER - CARE PROVIDER_API CALL
Kevin Calero J  ORTHOPAEDIC SURGERY  [address] [place] [id number]  Phone: [phone]  Fax: [phone]  Follow Up Time:

## 2020-06-18 NOTE — DISCHARGE NOTE NURSING/CASE MANAGEMENT/SOCIAL WORK - PATIENT PORTAL LINK FT
You can access the FollowMyHealth Patient Portal offered by [facility] by registering at the following website: [email or web address]. By joining [facility]’s FollowMyHealth portal, you will also be able to view your health information using other applications (apps) compatible with our system.

## 2020-06-18 NOTE — OCCUPATIONAL THERAPY INITIAL EVALUATION ADULT - PLANNED THERAPY INTERVENTIONS, OT EVAL
neuromuscular re-education/transfer training/balance training/ADL retraining/bed mobility training/ROM/strengthening

## 2020-06-18 NOTE — PROVIDER CONTACT NOTE (OTHER) - ACTION/TREATMENT ORDERED:
Ordered to administer Dilaudid 2mg, continue to monitor. Phyllis Agrawal, made aware Ordered to administer Dilaudid 2mg, continue to monitor.

## 2020-06-18 NOTE — PROGRESS NOTE ADULT - SUBJECTIVE AND OBJECTIVE BOX
Procedure:  Ketamine IV for Pain Control  Indication: Poorly controlled pain  Anesthesia:  none    Informed & written consent was obtained from the patient.  Time out performed. Patient transported from floor to PACU Area and full monitoring placed including EKG, Pulse  Oximetry and NIBP. While patient in bed, Ketamine 10mg IVP was given x 2 doses 15min apart. Tolerated very well without problems.  VSS throughout . Pt reports feeling more comfortable with less pain following procedure. Pain score by NVAS prior to procedure was 10/10 and post procedure reported to be 6/10 and patient is content with result. Patient observed for 20 min and transferred back to floor. Procedure:  Ketamine IV for Pain Control  Indication: Poorly controlled pain  Anesthesia:  none    Informed & written consent was obtained from the patient.  Time out performed. Patient transported from floor to PACU Area and full monitoring placed including EKG, Pulse  Oximetry and NIBP. While patient in bed, Ketamine 10mg IVP was given x 2 doses 15min apart. Tolerated very well without problems.  VSS throughout . Pt reports feeling more comfortable with less pain following procedure. Pain score by NVAS prior to procedure was 10/10 and post procedure reported to be 6/10 and patient is content with result. Patient observed for 20 min and transferred back to floor.    Pre procedure vitals:  HR 98 /84 O2 Sat 96% RR 14    Post procedure vitals:  HR 72 /55 O2 Sat 95% RR 17

## 2020-06-18 NOTE — OCCUPATIONAL THERAPY INITIAL EVALUATION ADULT - PERTINENT HX OF CURRENT PROBLEM, REHAB EVAL
Pt is a 54 year old female with hx of recurrent Left prosthetic hip dislocations, who presented to [facility] on 6/16/2020 with Left Hip Pain. Xray of Left Femur on 6/16/2020 displayed Left hip dislocation. Pt is now s/p Major revision of left total hip replacement on 6/17/2020.

## 2020-06-18 NOTE — PROVIDER CONTACT NOTE (OTHER) - REASON
Patient still complaining of pain with no relief with Oxycodone 45mg, patient requesting Dilaudid 2mg IV push, /57

## 2020-06-18 NOTE — PROGRESS NOTE ADULT - SUBJECTIVE AND OBJECTIVE BOX
Pt is now day 1 s/p left hip resection arthroplasty. Prior to surgery, Dr. Calero and I have explained all the risks and benefits and rationale for the surgery. She has undergone multiple attempts at hip preservation and has repeatedly dislocated. Further attempts at joint reconstruction have increasing risks of complications, including dislocation and infection. In addition, she has repeatedly demonstrated a lack of cooperation with regards to medical recommendations. She has failed to seek a second opinion as instructed. She has previously toured the [place] for 2-3 months instead of returning for urgent revision surgery. She has failed to make any attempts at realistically seeing outpatient pain management consultations, even though information for multiple potential providers were offered. Prior to surgery, it was also noted that the patient removed her nylon sutures on her own, against medical advice. Several retained sutures were subsequently removed during surgery. She otherwise tolerated the procedure well. She is to be NWB on the LLE with an abduction pillow when in bed and an abduction brace when out of bed.

## 2020-06-18 NOTE — PROGRESS NOTE ADULT - SUBJECTIVE AND OBJECTIVE BOX
Anesthesia Pain Management Service: Day 2__ of Epidural    SUBJECTIVE: Patient see  Pain Scale Score:   Refer to charted pain scores    THERAPY:  [x ] Epidural Bupivacaine 0.0625% and Hydromorphone  		[X ] 10 micrograms/mL	[ ] 5 micrograms/mL  [ ] Epidural Bupivacaine 0.0625% and Fentanyl - 2 micrograms/mL  [ ] Epidural Ropivacaine 0.1% plain – 1 mg/mL  [ ] Patient Controlled Regional Anesthesia (PCRA) Ropivacaine  		[ ] 0.2%			[ ] 0.1%    Demand dose __3_ lockout __15_ (minutes) Continuous Rate ___ Total: ___ Daily      MEDICATIONS  (STANDING):  acetaminophen   Tablet .. 975 milliGRAM(s) Oral every 8 hours  ascorbic acid 500 milliGRAM(s) Oral two times a day  chlorhexidine 2% Cloths 1 Application(s) Topical daily  gabapentin 300 milliGRAM(s) Oral every 8 hours  multivitamin 1 Tablet(s) Oral daily  oxyCODONE  ER Tablet 80 milliGRAM(s) Oral every 8 hours  pantoprazole    Tablet 40 milliGRAM(s) Oral before breakfast  QUEtiapine 50 milliGRAM(s) Oral at bedtime  senna 2 Tablet(s) Oral at bedtime    MEDICATIONS  (PRN):  haloperidol    Injectable 5 milliGRAM(s) IV Push once PRN agitation  HYDROmorphone  Injectable 2 milliGRAM(s) IV Push every 3 hours PRN severe breakthrough pain not relieved by PO meds  oxyCODONE    IR 30 milliGRAM(s) Oral every 3 hours PRN Moderate Pain (4 - 6)  oxyCODONE    IR 45 milliGRAM(s) Oral every 3 hours PRN Severe Pain (7 - 10)      OBJECTIVE:    Assessment of Catheter Site:	[ ] Left	[ ] Right  [x ] Epidural 	[ ] Femoral	      [ ] Saphenous   [ ] Supraclavicular   [ ] Other:    [x ] Dressing intact	[x ] Site non-tender	[ x] Site without erythema, discharge, edema  [x ] Epidural tubing and connection checked	[x] Gross neurological exam within normal limits  [X ] Catheter removed – tip intact		[ ] Afebrile	  [ ] Febrile: ___       [ X] see Temp under VS below)    PT/INR - ( 17 Jun 2020 06:46 )   PT: 13.0 SEC;   INR: 1.13          PTT - ( 17 Jun 2020 06:46 )  PTT:26.1 SEC                      10.0   10.83 )-----------( 280      ( 17 Jun 2020 06:46 )             31.9     Vital Signs Last 24 Hrs  T(C): 36.8 (06-18-20 @ 10:04), Max: 36.8 (06-18-20 @ 10:04)  T(F): 98.2 (06-18-20 @ 10:04), Max: 98.2 (06-18-20 @ 10:04)  HR: 100 (06-18-20 @ 10:04) (70 - 100)  BP: 104/71 (06-18-20 @ 10:04) (100/57 - 139/69)  BP(mean): 81 (06-17-20 @ 23:45) (70 - 87)  RR: 17 (06-18-20 @ 10:04) (16 - 21)  SpO2: 100% (06-18-20 @ 10:04) (94% - 100%)      Sedation Score:	[x ] Alert	[ ] Drowsy	[ ] Arousable	[ ] Asleep	[ ] Unresponsive    Side Effects:	[x ] None	[ ] Nausea	[ ] Vomiting	[ ] Pruritus  		[ ] Weakness		[ ] Numbness	[ ] Other:    ASSESSMENT/ PLAN:    Therapy to  be:	[ ] Continue   [ X] Discontinued   [ X] Change to prn Analgesics    Documentation and Verification of current medications:  [ X ] Done	[ ] Not done, not eligible, reason:    Comments: Changed to IV/oral opioid and/or non-opioid Adjuvant analgesics to be used at this point.    Progress Note written now but Patient was seen earlier. Anesthesia Pain Management Service: Day 2__ of Epidural    SUBJECTIVE: Patient seen at bedside. Patient crying in pain. Patient states the epidural catheter came out. Patient states she 10/10 pain on her left hip. Patient denies headache, numbness and tingling, able to move extremities without difficulty. Patient denies pain on the epidural site. Patient refusing blood works.  Pain Scale Score:  10/10 Refer to charted pain scores    THERAPY:  [x ] Epidural Bupivacaine 0.0625% and Hydromorphone  		[X ] 10 micrograms/mL	[ ] 5 micrograms/mL  [ ] Epidural Bupivacaine 0.0625% and Fentanyl - 2 micrograms/mL  [ ] Epidural Ropivacaine 0.1% plain – 1 mg/mL  [ ] Patient Controlled Regional Anesthesia (PCRA) Ropivacaine  		[ ] 0.2%			[ ] 0.1%    Demand dose __3_ lockout __15_ (minutes) Continuous Rate __8_ Total: 99.8___ Daily      MEDICATIONS  (STANDING):  acetaminophen   Tablet .. 975 milliGRAM(s) Oral every 8 hours  ascorbic acid 500 milliGRAM(s) Oral two times a day  chlorhexidine 2% Cloths 1 Application(s) Topical daily  gabapentin 300 milliGRAM(s) Oral every 8 hours  multivitamin 1 Tablet(s) Oral daily  oxyCODONE  ER Tablet 80 milliGRAM(s) Oral every 8 hours  pantoprazole    Tablet 40 milliGRAM(s) Oral before breakfast  QUEtiapine 50 milliGRAM(s) Oral at bedtime  senna 2 Tablet(s) Oral at bedtime    MEDICATIONS  (PRN):  haloperidol    Injectable 5 milliGRAM(s) IV Push once PRN agitation  HYDROmorphone  Injectable 2 milliGRAM(s) IV Push every 3 hours PRN severe breakthrough pain not relieved by PO meds  oxyCODONE    IR 30 milliGRAM(s) Oral every 3 hours PRN Moderate Pain (4 - 6)  oxyCODONE    IR 45 milliGRAM(s) Oral every 3 hours PRN Severe Pain (7 - 10)      OBJECTIVE: Patient sitting up in bed holding her hip. Patient states she does not remember when the epidural catheter came out. Neuro assessment negative. No tenderness and bleeding noted on the epidural site.    Assessment of Catheter Site:	[ ] Left	[ ] Right  [x ] Epidural 	[ ] Femoral	      [ ] Saphenous   [ ] Supraclavicular   [ ] Other:    [x ] Dressing intact	[x ] Site non-tender	[ x] Site without erythema, discharge, edema  [x ] Epidural tubing and connection checked	[x] Gross neurological exam within normal limits  [X ] Catheter removed – tip intact		[ ] Afebrile	  [ ] Febrile: ___       [ X] see Temp under VS below)    PT/INR - ( 17 Jun 2020 06:46 )   PT: 13.0 SEC;   INR: 1.13          PTT - ( 17 Jun 2020 06:46 )  PTT:26.1 SEC                      10.0   10.83 )-----------( 280      ( 17 Jun 2020 06:46 )             31.9     Vital Signs Last 24 Hrs  T(C): 36.8 (06-18-20 @ 10:04), Max: 36.8 (06-18-20 @ 10:04)  T(F): 98.2 (06-18-20 @ 10:04), Max: 98.2 (06-18-20 @ 10:04)  HR: 100 (06-18-20 @ 10:04) (70 - 100)  BP: 104/71 (06-18-20 @ 10:04) (100/57 - 139/69)  BP(mean): 81 (06-17-20 @ 23:45) (70 - 87)  RR: 17 (06-18-20 @ 10:04) (16 - 21)  SpO2: 100% (06-18-20 @ 10:04) (94% - 100%)      Sedation Score:	[x ] Alert	[ ] Drowsy	[ ] Arousable	[ ] Asleep	[ ] Unresponsive    Side Effects:	[x ] None	[ ] Nausea	[ ] Vomiting	[ ] Pruritus  		[ ] Weakness		[ ] Numbness	[ ] Other:    ASSESSMENT/ PLAN:    Therapy to  be:	[ ] Continue   [ X] Discontinued   [ X] Change to prn Analgesics    Documentation and Verification of current medications:  [ X ] Done	[ ] Not done, not eligible, reason:    Comments: Epidural catheter dislodged as per patient. PO Oxycodone frequency changed to Q3H PRN. Advised patient the risks of epidural coming out. Advised patient about the importance of getting blood works done, patient continued to refuse. Changed to IV/oral opioid and/or non-opioid Adjuvant analgesics to be used at this point.    Progress Note written now but Patient was seen earlier.

## 2020-06-18 NOTE — PROVIDER CONTACT NOTE (OTHER) - ACTION/TREATMENT ORDERED:
MD Abel pain management made aware, ordered increase in PCEA continuos rate to 8ml/hr as well as Oxycodone IR 45mg PRN, instructed to administer dose of Oxycodone IR 45mg.

## 2020-06-18 NOTE — PROGRESS NOTE ADULT - SUBJECTIVE AND OBJECTIVE BOX
Day _2__ of Anesthesia Pain Management Service    SUBJECTIVE: patient pulled out epidural catheter yesterday, c/o pain    Therapy:	  [ ] IV PCA	   [x ] Epidural           [ ] s/p Spinal Opoid              [ ] Postpartum infusion	  [ ] Patient controlled regional anesthesia (PCRA)    [ ] prn Analgesics    OBJECTIVE:   [ ] No new signs     [ ] Other:    Side Effects:  [ ] None			[ ] Other:    Assessment of Catheter Site:		[x ] Intact		[ ] Other:    ASSESSMENT/PLAN  [ ] Continue current therapy    [ x] Therapy changed to:    [ ] IV PCA       [ ] Epidural     [ x] prn Analgesics     Comments: PO pain meds reviewed, Keamine IV Tx today

## 2020-06-18 NOTE — PROGRESS NOTE ADULT - SUBJECTIVE AND OBJECTIVE BOX
ANESTHESIA POSTOP CHECK    54y Female POSTOP DAY 1 S/P   [x ] General Anesthesia  [x ] Jake Anesthesia  [ ] MAC    Vital Signs Last 24 Hrs  T(C): 36.8 (18 Jun 2020 10:04), Max: 36.8 (18 Jun 2020 10:04)  T(F): 98.2 (18 Jun 2020 10:04), Max: 98.2 (18 Jun 2020 10:04)  HR: 100 (18 Jun 2020 10:04) (70 - 100)  BP: 104/71 (18 Jun 2020 10:04) (100/57 - 139/69)  BP(mean): 81 (17 Jun 2020 23:45) (70 - 87)  RR: 17 (18 Jun 2020 10:04) (16 - 21)  SpO2: 100% (18 Jun 2020 10:04) (94% - 100%)  I&O's Summary    17 Jun 2020 07:01  -  18 Jun 2020 07:00  --------------------------------------------------------  IN: 420 mL / OUT: 1267.5 mL / NET: -847.5 mL        [x ] NO APPARENT ANESTHESIA COMPLICATIONS      Comments:

## 2020-06-18 NOTE — DISCHARGE NOTE NURSING/CASE MANAGEMENT/SOCIAL WORK - NSDCPNINST_GEN_ALL_CORE
Call MD with any problems or question. notify MD with fever, inability to tolerate food or liquids or increased drainage from incision

## 2020-06-18 NOTE — PROGRESS NOTE ADULT - ASSESSMENT
A/P: 54yoF s/p Revision L total femur with removal of proximal femoral component    Neuro: Pain control, PCEA, FU acute pain service recs  Resp: IS  GI: Regular diet, bowel reg  MSK: ANA LLE, PT/OT  Heme: DVT PPX: Venodynes, 10mg Xar to start when patient off PCEA  FU OR Cx
53 yo female with h/o femur fx ~ 30 years ago c/b frequent prosthetic dislocations and fractures, recurrent L hip dislocations, multiple surgeries, most recent revision KONG/SIVA on 5/21, now admitted for recurrent dislocation again, for OR 6/18

## 2020-06-29 DIAGNOSIS — Z96.649 PRESENCE OF UNSPECIFIED ARTIFICIAL HIP JOINT: ICD-10-CM

## 2020-06-29 RX ORDER — OXYCODONE HYDROCHLORIDE 15 MG/1
15 TABLET ORAL
Qty: 120 | Refills: 0 | Status: ACTIVE | COMMUNITY
Start: 2020-06-29 | End: 1900-01-01

## 2020-06-29 RX ORDER — OXYCODONE HYDROCHLORIDE 15 MG/1
15 TABLET ORAL EVERY 8 HOURS
Qty: 90 | Refills: 0 | Status: DISCONTINUED | COMMUNITY
Start: 2020-06-29 | End: 2020-06-29

## 2020-07-06 ENCOUNTER — APPOINTMENT (OUTPATIENT)
Dept: ORTHOPEDIC SURGERY | Facility: CLINIC | Age: 55
End: 2020-07-06

## 2020-07-07 RX ORDER — OXYCODONE HYDROCHLORIDE 15 MG/1
15 TABLET ORAL
Qty: 120 | Refills: 0 | Status: ACTIVE | COMMUNITY
Start: 2020-07-07 | End: 1900-01-01

## 2020-07-13 ENCOUNTER — APPOINTMENT (OUTPATIENT)
Dept: ORTHOPEDIC SURGERY | Facility: CLINIC | Age: 55
End: 2020-07-13
Payer: MEDICAID

## 2020-07-13 VITALS — TEMPERATURE: 98 F

## 2020-07-13 DIAGNOSIS — T84.021D: ICD-10-CM

## 2020-07-13 PROCEDURE — 99024 POSTOP FOLLOW-UP VISIT: CPT
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
## 2020-07-28 NOTE — BRIEF OPERATIVE NOTE - ELECTIVE PROCEDURE
No [FreeTextEntry1] : AYLA is here today because Mom has noticed that the child, since they started walking, walk with their feet turning in. The child's legs turn in, are curved. Also, the parents have noticed that the ankles protrude to the inside, when standing. Lastly, the child's gait is fast and unsteady. Mom is concerned as they tend to fall a lot and seem off balance. He receives PT services and the physical therapist suggested an orthopaedic evaluation due to his gait. \par \par denies any history of pain and fever, any history of numbness and history of tingling and history of change in bladder or bowel function and history of weakness and history of bug or tick bites or rashes\par \par No family history of club foot or tibial torsion.\par \par See below for past medical and surgical history.\par

## 2020-08-06 ENCOUNTER — APPOINTMENT (OUTPATIENT)
Dept: ORTHOPEDIC SURGERY | Facility: CLINIC | Age: 55
End: 2020-08-06

## 2020-09-17 NOTE — ED ADULT NURSE NOTE - NS ED NURSE RECORD ANOTHER HT AND WT
Called and spoke with patient at this time  Advised as Dr Malgorzata Pradhan retired and [facility] office is closed, his upcoming appointment was cancelled  He reports he did not get letter about this  I apologized and advised we tried to send out to all patients, some must have unfortunately gotten missed  Advised our providers have reviewed his chart, and he is ok to follow up with urology on an as needed basis  Patient agreeable to plan  Appointment was cancelled  [facility] Urology main number provided for patient in case he would need to make appointment in the future 
No

## 2020-09-18 NOTE — PATIENT PROFILE ADULT - IS PATIENT POST-MENOPAUSAL?
FUTURE VISIT INFORMATION      FUTURE VISIT INFORMATION:    Date: 11/25/20Referring provider:  Jose Angel Meyer MD     Referring providers clinic:  [facility]    Reason for visit/diagnosis  excess skin removal consult     RECORDS REQUESTED FROM:         Clinic name Comments Records Status Imaging Status   [facility] OV/referral  6/26/20 EPIC                 Time: 10:00am    Location: [facility]  REFERRAL INFORMATION:      
yes

## 2020-09-19 ENCOUNTER — RX RENEWAL (OUTPATIENT)
Age: 55
End: 2020-09-19

## 2020-09-19 RX ORDER — QUETIAPINE FUMARATE 25 MG/1
25 TABLET ORAL
Qty: 30 | Refills: 0 | Status: ACTIVE | COMMUNITY
Start: 2020-09-19 | End: 1900-01-01

## 2020-10-09 NOTE — H&P ADULT - HISTORY OF PRESENT ILLNESS
OFFICE VISIT      Patient: Maximilian Gar   : 1950 MRN: [id number]    SUBJECTIVE:  Chief Complaint   Patient presents with   • Office Visit   • Ear Problem     Bilateral ear noise x5days       A 70 year old male is here with complaints of ear congestion.    HISTORY OF PRESENT ILLNESS:    His PCP is Dr. Dunham.    Ear congestion:   Report pain in the ears left more than right for 5-6 days. Has pressure in the ears.  Thinks whether he has Q -tips in his ear. Usually wash ears while taking shower. Has oily skin. Denies any congestion in the nose.  Occasionally experiences minimal pain in the both sinuses. Thinks ear issue is could be due to allergy. Occasionally he needs to clear his throat. Denies using nasal sprays.     Additional comments:  Had recent follow up with a cardiologist on 2020. He informed that everything is stable.  Goes out in the restaurants once a week.    See ROS for rest of history.    PAST MEDICAL HISTORY:  Past Medical History:   Diagnosis Date   • Allergic reaction    • Melanosis coli    • Near syncope    • Pterygium eye, left      MEDICATIONS:  Current Outpatient Medications   Medication Sig   • ALPRAZolam (XANAX) 0.25 MG tablet Take 1 tablet by mouth 3 times daily as needed for Sleep or Anxiety.   • escitalopram (LEXAPRO) 10 MG tablet Take 1 tablet by mouth daily.   • fluticasone (FLONASE) 50 MCG/ACT nasal spray Spray 1 spray in each nostril daily.     No current facility-administered medications for this visit.      ALLERGIES:  ALLERGIES:  No Known Allergies  PAST SURGICAL HISTORY:  Past Surgical History:   Procedure Laterality Date   • Appendectomy     • Cataract extrac w/ intraocular lens imp&ant vit,bilaterl Left    • Colonoscopy  12/15/2015   • Knee surgery Bilateral      FAMILY HISTORY:  Family History   Problem Relation Age of Onset   • Diabetes Brother    • Dementia/Alzheimers Mother    • Other Father         d/c 94yo     SOCIAL HISTORY:  Social History     Tobacco Use    Smoking Status Never Smoker   Smokeless Tobacco Never Used     Social History     Substance and Sexual Activity   Alcohol Use Yes   • Alcohol/week: 2.0 standard drinks   • Types: 2 Standard drinks or equivalent per week    Comment: OCCASIONAL       Review of Systems  Constitutional: Negative for activity change, appetite change and chills.   HENT: Per HPI.   Eyes: Negative for photophobia, pain, discharge, redness and itching.   Respiratory: Negative for apnea, choking and chest tightness.    Cardiovascular: Negative for chest pain and leg swelling.   Gastrointestinal: Negative.    Endocrine: Negative.    Genitourinary: Negative for difficulty urinating and dysuria.   Skin: Negative for color change and wound.   Neurological: Negative for dizziness.   Psychiatric/Behavioral: Negative.    OBJECTIVE:  Vitals:    10/08/20 1419   BP: 126/80   Temp: 97 °F (36.1 °C)   Weight: 70.8 kg (156 lb)   Height: 5' 8\" (1.727 m)   PainSc:  0       Physical Exam  Constitutional: Alert, in no acute distress and current vital signs reviewed.   Head and Face: Atraumatic and normocephalic.   Eyes: No discharge, no eyelid swelling and the sclerae were normal.   ENT: Minimal cerumen in the bilateral ear noted, Otherwise normal exam.   Neck: Normal appearing neck and supple neck.   Pulmonary: Breath sounds clear to auscultation bilaterally, but no respiratory distress and normal respiratory rate and effort.   Cardiovascular: Normal rate, regular rhythm, normal S1, normal S2 and edema was not present in the lower extremities.   Abdomen: Soft and non tender.   Psychiatric: Alert and awake, interactive and mood/affect were appropriate.   Skin, Hair, Nails: Normal skin color and pigmentation.     DIAGNOSTIC STUDIES:  LAB RESULTS:  No visits with results within 1 Month(s) from this visit.   Latest known visit with results is:   Office Visit on 07/09/2020   Component Date Value Ref Range Status   • POCT Color 07/09/2020 Yellow   Final   • POCT  53y Female community ambulatory presents c/o L hip pain and inability to ambulate after fall on 11/29. 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 Patient recently underwent revision left hip arthroplasty after she dislocated her total femur and had failure of her acetabular component. She was doing well post-op until her recent fall on 11/29. Patient stunted fall with right knee, which is bruised and painful. Denies HS/LOC. Denies numbness/tingling. Denies fever/chills. Denies pain/injury elsewhere.    No pertinent family history in first degree relatives  Handoff  No pertinent past medical history  Hip dislocation, left  Closed pelvic fracture  HIP DISLOCATION  23    PAST MEDICAL & SURGICAL HISTORY:  No pertinent past medical history  Closed pelvic fracture    MEDICATIONS  (STANDING):    Allergies    No Known Allergies    Intolerances                            10.2   12.82 )-----------( 312      ( 04 Dec 2019 06:49 )             33.7     04 Dec 2019 06:49    134    |  96     |  13     ----------------------------<  118    3.4     |  26     |  0.95     Ca    9.5        04 Dec 2019 06:49    TPro  8.4    /  Alb  3.9    /  TBili  0.3    /  DBili  x      /  AST  14     /  ALT  9      /  AlkPhos  109    04 Dec 2019 06:49    PT/INR - ( 04 Dec 2019 06:49 )   PT: 12.7 SEC;   INR: 1.11          PTT - ( 04 Dec 2019 06:49 )  PTT:25.0 SEC  Vital Signs Last 24 Hrs  T(C): 36.7 (12-04-19 @ 08:51), Max: 36.8 (12-04-19 @ 06:26)  T(F): 98.1 (12-04-19 @ 08:51), Max: 98.2 (12-04-19 @ 06:26)  HR: 72 (12-04-19 @ 08:51) (72 - 118)  BP: 125/71 (12-04-19 @ 08:51) (125/71 - 142/87)  BP(mean): --  RR: 18 (12-04-19 @ 08:51) (18 - 20)  SpO2: 100% (12-04-19 @ 08:51) (100% - 100%)    Imaging: Superior dislocation total femur component, acetabulum intact    Physical Exam  Gen: NAD  LLE: skin intact, no palpable protruding prosthetic, unable to SLR, + log roll, +ttp hip/groin, no ttp elsewhere, +ehl/fhl/ta/gs function, no calf ttp, dp/pt pulse intact, compartments soft  Secondary survey: benign, nv intact, able to SLR contralateral leg, negative log roll contralateral leg, no bony ttp elsewhere, bilateral upper extremity skin intact with no gross deformity, non tender to palpation over bony prominences, neurovascularly intact    RLE: Skin intact, no TTP at hip, TTP at knee with visible bruising from fall, full AROM of hip/knee/ankle    A/P: 53y Female with Left total femur and known prosthetic hip instability, now with Left total femur dislocation  - NPO, IVF  - Consent'd  - Clearance within 30 days for prior hip procedure  - OR today for closed reduction, possible open reduction  - discussed with attending Appearance 07/09/2020 Clear   Final   • POCT Glucose Urine 07/09/2020 Negative  Negative Final   • POCT Bilirubin 07/09/2020 Negative  Negative Final   • POCT Ketones 07/09/2020 Negative  Negative Final   • POCT Specific Gravity 07/09/2020 1.015  1.005 - 1.03 Final   • POCT Occult Blood 07/09/2020 Negative  Negative Final   • POCT pH 07/09/2020 7.0  5 - 7 Final   • POCT Protein 07/09/2020 Negative  Negative Final   • POCT Urobilinogen 07/09/2020 0.2  0 - 1 mg/dL Final   • Urine Nitrite 07/09/2020 Negative  Negative Final   • WBC (Leukocyte) Esterase POC 07/09/2020 Negative  Negative Final   • Glucose Blood, POC 07/09/2020 160* 65 - 99 mg/dL Final   • FASTING STATUS, POC 07/09/2020 no   Final   • POCT Hemoglobin 07/09/2020 13.2  g/dL Final       ASSESSMENT AND PLAN:  This is a 70 year old male who presents with :  No diagnosis found.    Orders Placed This Encounter   • fluticasone (FLONASE) 50 MCG/ACT nasal spray       Plan:  Ear congestion:  Secondary to sinus.  Prescribed Fluticasone nasal spray. Refer to orders.  Instructed to call for further evaluation, if no relief.   Informed allergy medication has side effects.   Educated on Fluticasone nasal spray.  Assured no infection, fluid or Q-tips noted in the ear.    Additional plan:  Advised to avoid going to the restaurants secondary to COVID-19.    Refer to orders.  Medical compliance with plan discussed and risks of non-compliance reviewed.  Patient education completed on disease process, etiology & prognosis.  Proper usage and side effects of medications reviewed & discussed.  Patient understands and agrees with the plan.  Return to clinic as clinically indicated as discussed with patient who verbalized understanding of the plan and is in agreement with the plan.    Entered by Dr. Edgar Ramires acting as scribe for Dr. Kalpesh Washington DO

## 2021-02-15 NOTE — OCCUPATIONAL THERAPY INITIAL EVALUATION ADULT - PERTINENT HX OF CURRENT PROBLEM, REHAB EVAL
Reason for Disposition   [1] HIGH RISK patient (e.g., age > 59 years, diabetes, heart or lung disease, weak immune system) AND [2] new or worsening symptoms    Answer Assessment - Initial Assessment Questions  1. COVID-19 DIAGNOSIS: \"Who made your Coronavirus (COVID-19) diagnosis? \" \"Was it confirmed by a positive lab test?\" If not diagnosed by a HCP, ask \"Are there lots of cases (community spread) where you live? \" (See public health department website, if unsure)  No diagnosis    2. COVID-19 EXPOSURE: \"Was there any known exposure to COVID before the symptoms began? \" [facility] Definition of close contact: within 6 feet (2 meters) for a total of 15 minutes or more over a 24-hour period. No/Denies    3. ONSET: \"When did the COVID-19 symptoms start? \"    Yesterday     4. WORST SYMPTOM: \"What is your worst symptom? \" (e.g., cough, fever, shortness of breath, muscle aches)     Headache- 7/10    5. COUGH: \"Do you have a cough? \" If so, ask: \"How bad is the cough? \"            6. FEVER: \"Do you have a fever? \" If so, ask: \"What is your temperature, how was it measured, and when did it start? \"   99.8F    7. RESPIRATORY STATUS: \"Describe your breathing? \" (e.g., shortness of breath, wheezing, unable to speak)         8. BETTER-SAME-WORSE: James Staples you getting better, staying the same or getting worse compared to yesterday? \"  If getting worse, ask, \"In what way? \"    Worse    9. HIGH RISK DISEASE: \"Do you have any chronic medical problems? \" (e.g., asthma, heart or lung disease, weak immune system, obesity, etc.)      Low thyroid function    10. PREGNANCY: \"Is there any chance you are pregnant? \" \"When was your last menstrual period? \"      No. 1/20/21    11. OTHER SYMPTOMS: \"Do you have any other symptoms? \"  (e.g., chills, fatigue, headache, loss of smell or taste, muscle pain, sore throat; new loss of smell or taste especially support the diagnosis of COVID-19)  Vomiting;Severe headache;Runny nose    Protocols used: CORONAVIRUS (COVID-19) DIAGNOSED OR SUSPECTED-ADULT-AH  Patient called [facility] with red flag complaint. Call received from patient. Brief description of triage: See above. Triage indicates for patient to see PCP Now. Care advice provided, patient verbalizes understanding; denies any other questions or concerns; instructed to call back for any new or worsening symptoms. Writer provided warm transfer to [facility] for appointment scheduling. Attention Provider: Thank you for allowing me to participate in the care of your patient. The patient was connected to triage in response to information from calling the [facility]. Please do not respond through this encounter as the response is not directed to a shared pool. Pt is a 54 year old female with hx of Left total femur replacement complicated by recurrent dislocations last one 12/4/19 requiring open reduction in OR. Pt reports taking brace off to use the bathroom & felt her hip dislocate, c/o Left hip pain. Pt then drove from [place] to [facility] to be evaluated. Xray of Left Hip on 1/15/2020 displayed Left hip dislocation with superior migration of the femoral endoprosthesis. Pt is now s/p Left hip total femur replacement and open reduction on 1/17/2020.

## 2021-05-13 NOTE — PROGRESS NOTE BEHAVIORAL HEALTH - NS ED [facility] MED ROS MUSCULOSKELETAL
Detail Level: Detailed Quality 130: Documentation Of Current Medications In The Medical Record: Current Medications Documented Yes

## 2021-08-20 NOTE — PROGRESS NOTE ADULT - PROBLEM SELECTOR PLAN 3
-Per prior documentation, pt with reports h/o MI and cardiac arrest in 2018 requiring AICD placement  -Per patient she was taken off of Plavix/metoprolol/lisinopril by cardiologist and to be restarted in future once her hip issues resolve  -Denies any chest pain or sob at this time  -TTE (11/5/19) normal LV function   -Patient appears euvolemic on exam  -No further intervention is needed at this time. 59 y/o female with PMHx of Depression, HTN, and Hypothyroidism who presented to the ED from group home for SI and difficulty sleeping. Pt states she has been out of her Seroquel and unable to sleep. Pt notes she has also been out of her Synthroid and BP meds. Pt unsure of dosages. Pt admits to SI. Pt denies any headache, fever, chills, nausea, vomiting, SOB, chest pain, or abd pain. Denies any HI or hallucinations.

## 2021-09-13 NOTE — ED ADULT NURSE NOTE - NSFALLRSKOUTCOME_ED_ALL_ED
Universal Safety Interventions Trilobed Flap Text: The defect edges were debeveled with a #15 scalpel blade.  Given the location of the defect and the proximity to free margins a trilobed flap was deemed most appropriate.  Using a sterile surgical marker, an appropriate trilobed flap drawn around the defect.    The area thus outlined was incised deep to adipose tissue with a #15 scalpel blade.  The skin margins were undermined to an appropriate distance in all directions utilizing iris scissors.

## 2022-01-16 NOTE — PRE-OP CHECKLIST - IDENTIFICATION BAND VERIFIED
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  - Not on blood thinners per son, no ASA or Plavix  - Monitor for changes in upper extremities    C/w LMWH for DVT PPx  Awaiting isolation bed available at [facility], likely to happen on Monday 1/17  Repeat COVID swab on Sunday-1/16 (last neg on 1/14)

## 2022-04-04 NOTE — PATIENT PROFILE ADULT. - AS SC BRADEN NUTRITION
Detail Level: Zone (3) adequate Plan: This patient has been treated today with image guided superficial radiation therapy for non-melanoma skin cancer. Written informed consent has been previously obtained from this patient for this treatment. This consent is documented in the patient’s chart. The patient gave verbal consent to continue treatment today. The patient was treated with a specific radiation dose and setup as prescribed by the provider listed on this visit note. A Radiation Therapist performed administration of radiation under supervision of provider. The treatment parameters and cumulative dose are indicated above. Prior to administering the radiation, the patient underwent a verification therapeutic radiology simulation-aided field setting defining relevant normal and abnormal target anatomy and acquiring images with high frequency ultrasound in addition to data necessary developing optimal radiation treatment process for the patient. This process includes verification of the treatment port(s) and proper treatment positioning. All treatment ports were photographed within electronic medical record. The patient’s customized lead blocking along with gross tumor volume and margin was confirmed. Considering superficial radiotherapy is clinical in setup, this requires physician and radiation therapist to clarify location interest being treated against initial images, pathology, and patient anatomy. Care was taken ensuring fields treated were geometrically accurate and properly positioned using therapeutic radiology simulation-aided field setting verification per fraction. This process is also utilized to determine if any prescription or setup changes are necessary. These steps are therefore medically necessary ensuring safe and effective administration of radiation. Ongoing therapeutic radiology simulation-aided field setting verification is ordered throughout course of therapy.\\nA high frequency ultrasound image was acquired today for two-dimensional evaluation of the tumor volume and response to treatment, in addition to geometric accuracy of field placement. Ultrasound depth is 1.93 mm, which is 0.46 mm in difference from previous imaging. The field placement and ultrasound imaging, per fraction, is separate and distinct from the initial simulation, and is an important task in providing safe administration of superficial radiation therapy. Physician evaluation of the ultrasound tumor depth will be ongoing throughout the course of treatment and is deemed medically necessary in order to ensure the efficacy of treatment and any necessary changes. Today’s image was evaluated for determination of continuation of treatment with the current plan or with necessary changes as appropriate. According to provider review of verification therapeutic radiology simulation-aided field setting and imaging, no change is required.  Additionally, the use of ultrasound visualization and targeted assessment allows the patient to be able to see their cancer(s) progress, encouraging patient to complete and maintain compliance through full course of radiotherapy. Per Dr. Polanco, continued ultrasound guidance and therapeutic radiology simulation-aided field setting verification per fraction is required for field placement, measurement of tumor depth, progress, and acute effect monitoring. \\n\\n\\n

## 2022-05-08 NOTE — PROGRESS NOTE ADULT - PROVIDER SPECIALTY LIST ADULT
Anesthesia
Cardiology
Cardiology
Hospitalist
Orthopedics
Pain Medicine
Intact

## 2022-07-13 NOTE — CHART NOTE - NSCHARTNOTEFT_GEN_A_CORE
Patient seen at the bedside for follow up after pain regimen changed this morning.  Patient was seen sitting up in bed, wearing makeup and feeling much better.  Patient admits there is still pain, but better controlled. Continue current regimen as ordered.
no

## 2022-07-18 NOTE — H&P ADULT - BIRTH SEX
Implemented All Universal Safety Interventions:  [place] to call system. Call bell, personal items and telephone within reach. Instruct patient to call for assistance. Room bathroom lighting operational. Non-slip footwear when patient is off stretcher. Physically safe environment: no spills, clutter or unnecessary equipment. Stretcher in lowest position, wheels locked, appropriate side rails in place.
Female

## 2022-12-13 NOTE — OCCUPATIONAL THERAPY INITIAL EVALUATION ADULT - PERSONAL SAFETY AND JUDGMENT, REHAB EVAL
If you receive a survey via e-mail or mail, please take the time to complete and return as your feedback is very helpful to our practice.      If your neurological testing is not going to be scheduled today our Pre-Service Department will contact you to schedule within one to two days. If you would like to call and schedule when it is convenient for you or if you need to reschedule your appointment please call the Pre-Service Department at [phone].    Your tests will be reviewed by the Benefits Department and if there are any concerns regarding prior authorization or financial obligation they will contact you. We recommend you still contact your insurance company to see if the test, provider, and location of service are covered, and if so, will there be any out of pocket expenses.     If you should have any concerns regarding the financial obligation of the tests please contact our Financial Advocates at [phone] and they will be happy to assist you.                 Thank you for letting us care for you today.      In order to make sure we are meeting our patients' needs, we require a 36 hour notice from you prior to picking up your medications. Attached is a table that will help you determine when your prescriptions will be ready for pick-up.                      If the refill is requested between when the clinic opens and 12 pm on  You can  your prescription at the pharmacy after 6 PM on   Monday Tuesday Tuesday Wednesday Wednesday Thursday Thursday Friday Friday Monday     If the refill is requested between 12 pm and after the clinic closes on You can  your prescription at the pharmacy after 12 PM on   Monday Wednesday Tuesday Thursday Wednesday Friday Thursday Monday Friday Tuesday      intact

## 2023-03-07 NOTE — ED PROVIDER NOTE - DISPOSITION TYPE
Continue medications for headaches as prescribed. Agree with physical therapy recommendation.     Ok to try tylenol, advil/aleve, excedrin during the day as needed    Recommend keeping log daily of headaches and possible triggers. Also recommend checking blood pressure daily in the evening and keeping a log.     Recommend daily aspirin 81 mg due to MRI finding of small vessel stroke.     Recommend rechecking lipid panel and discussing starting anti-cholesterol medication.     Your MRI is showing an arachnoid cyst, which is a benign CSF (cerebrospinal fluid) collection. This is stable from first imaging in 2012. This is unlikely related to your headaches. Recommend follow up CT head in 1 year for surveillance.     Follow up with Neurology as planned.     Please call our office with any questions or concerns, [phone]     ADMIT

## 2023-07-12 NOTE — ED PROVIDER NOTE - BIRTH SEX
Patient is a current inpatient.  Will address with discharge.  
The patient will need a hospital follow up to Urology.  A referral is required.    Would Dr. Salinas send a Urology referral.        
Female

## 2023-12-26 NOTE — PATIENT PROFILE ADULT - NSPROHMSYMPCOND_GEN_A_NUR
[FreeTextEntry1] : 38 y.o. F with lupus, history of transverse myelitis and urinary retention who presents for hydronephrosis, stones, urinary symptoms  #Mild hydronephrosis (R), LUTS, history of transverse myelitis - PVR 31 mL today - UA, UCx - Ultrasound personally reviewed.  Right lower pole stone.  Mild fullness of bilateral collecting systems, left resolved after voiding.  Minimal PVR - Discussed mild hydronephrosis may be a result of reflux, or obstruction.  Would favor reflux given history of transverse myelitis, urinary retention. Recommended CTU to further evaluate anatomy / identify any source of obstruction - Return for VUDS and cystoscopy to assess for reflux, bladder pressures w/ Dr Pedro - Discussed could consider anti-cholinergic based on UDS results, however, this could increase risk of retention  #Nephrolithiasis - CTU - will assess stone burden further - Discussed observation vs URS vs ESWL. Will await CT results to make final decision  musculoskeletal

## 2024-01-02 NOTE — PATIENT PROFILE ADULT - DISASTER - NSPROIMPLANTSMEDDEV_GEN_A_NUR
pacemaker Detail Level: Detailed Biopsy Photograph Reviewed: Yes Number Of Curettages: 3 Size Of Lesion In Cm: 1.2 Size Of Lesion After Curettage: 1.8 Add Intralesional Injection: No Total Volume (Ccs): 1 Anesthesia Type: 1% lidocaine with epinephrine Cautery Type: electrodesiccation What Was Performed First?: Curettage Final Size Statement: The size of the lesion after curettage was Additional Information: (Optional): The wound was cleaned, and a pressure dressing was applied.  The patient received detailed post-op instructions. Consent was obtained from the patient. The risks, benefits and alternatives to therapy were discussed in detail. Specifically, the risks of infection, scarring, bleeding, prolonged wound healing, nerve injury, incomplete removal, allergy to anesthesia and recurrence were addressed. Alternatives to ED&C, such as: surgical removal and XRT were also discussed.  Prior to the procedure, the treatment site was clearly identified and confirmed by the patient. All components of Universal Protocol/PAUSE Rule completed. Post-Care Instructions: I reviewed with the patient in detail post-care instructions. Patient is to keep the area dry for 48 hours, and not to engage in any swimming until the area is healed. Should the patient develop any fevers, chills, bleeding, severe pain patient will contact the office immediately. Bill As A Line Item Or As Units: Line Item

## 2024-02-19 NOTE — PROGRESS NOTE BEHAVIORAL HEALTH - THOUGHT ASSOCIATIONS
Health Maintenance Due   Topic Date Due    Shingles Vaccine (1 of 2) 12/22/2014       Patient is due for topics as listed above but is not proceeding with Immunization(s) Shingles at this time.      Normal

## 2024-04-17 NOTE — PATIENT PROFILE ADULT - LAST BOWEL MOVEMENT DATE
04/17/24 10:06 AM     [employer] SmartForm used to document caregap status.    Jie Cleaning MA   
15-León-2020

## 2024-07-08 NOTE — PROCEDURE NOTE - NSTIMEOUT_GEN_A_CORE
Please see the attached refill request.  
Patient's first and last name, , procedure, and correct site confirmed prior to the start of procedure.

## 2024-08-05 NOTE — ED PROVIDER NOTE - LOCATION
No
obvious deformity to posterior hip w palpable stepoff; difficult to assess other joints focally as pt unable to sit still but no other obvious deformities; full sensation and pulses to extremity

## 2024-08-20 NOTE — PHYSICAL THERAPY INITIAL EVALUATION ADULT - FOLLOWS COMMANDS/ANSWERS QUESTIONS, REHAB EVAL
100% of the time/able to follow multistep instructions

## 2024-10-31 NOTE — PATIENT PROFILE ADULT - NSASFUNCLEVELADLAMBULATE_GEN_A_NUR
Hematology/Oncology Inpatient Consultation    Patient name: Felisha Mukherjee  : 1965  MRN: [id number]  Referring Provider: Dr. Smith  Reason for Consultation: History of ovarian cancer    Chief complaint: Fell at the nursing home.  Was very weak    History of present illness:    Felisha Mukherjee is a 59 y.o. female who presented to [facility] on 10/30/2024 with complaints of     10/31/24  Hematology/Oncology was consulted     2024: Ms. Hwang was referred for the investigation and treatment of anemia.  Heme in 2024 for blood count revealed a hemoglobin of 7.1 g/dL with microcytic red cells.  There was clear evidence of iron deficiency with a total iron binding capacity saturation of 2.3%.  She was started on oral iron and was in early 2024 identified as having endometrial adenocarcinoma of the endometrioid type with a FIGO grade 1.  The iron deficiency was attributed to menorrhagia that was felt to be the result of the malignancy.  With persistent anemia a decision was made to treat her with intravenous iron.     9/10/2024: For the first time at the [place] office with the above.  She has yet to receive intravenous iron but is scheduled to do so in the near future.  She is also being followed by Dr. Matthews at the [facility] gynecology/oncology program.  Apparently surgery is not in the near future plans.  Generally she feels well at this time although she continues to be fatigued and weak at times.  She describes a good appetite for the most part and eats frequently during the day.  Her weight is stable.  She has not experienced any chest pains, cough or dyspnea.  She has intermittent abdominal pain and describes abnormal defecations that happen every 2 or 3 days but several times during that day at times with soft stools but never liquid stools.  She has some dysuria.  She has continued to have vaginal bleeding but it has not been as intense as before.   She has no peripheral edema.  On exam she seems chronically ill.  She is not in distress.  She is conversant and oriented.  No jaundice.  The lungs are diminished bilaterally and the heart regular.  The abdomen is soft nontender and there is no edema.  Laboratory exams reviewed.  She has yet to receive intravenous iron.  I have asked her to see me again after completing the intravenous iron with new laboratory exams.     10/10/2024: Ms. Mukherjee is a patient of mine. She was referred for iron deficiency that seemed to be the result of postmenopausal vaginal bleeding. This second was the result of endometrial carcinoma, which was being staged by Dr. Matthews at the [facility]. At the time of this admission she had been determined to have evidence of metastatic disease. She was seen by Dr. Garzon who sent her to the hospital with cellulitis of the skin of the lower abdomen. Today, she reports she feels better and has had less pain at the place of the cellulitis. She has not been very active and she is weak. She has been afebrile. She has not had any dyspnea and has been eating well. On exam she is oriented. No jaundice and no oral lesions. The lungs are clear and the heart regular. The abdomen is protuberant and soft. There is indeed a large band of erythematous skin on the lover abdomen with areas of breakdown that is very tender. I have reviewed the imaging studies and discussed with Dr. Garzon. I believe that histologic confirmation of metastatic disease with an image guided biopsy is necessary. Will allow for the cellulitis to improve prior to the biopsy. I will continue to follow her.      10/17/2024: Received treatment with antibiotics with slow but sure improvement. She had a biopsy of the omentum. The final report of pathology reported metastatic moderately differentiated adenocarcinoma. The tumor was positive for cytokeratin 7 and patchy positivity for PAX8. The tumor was negative for  cytokeratin 20, GATA3, TTF-1, Napsin A, estrogen receptor, progesterone receptor, CDX2 and CA 19.9. This was not felt to be entirely consistent with endometrioid endometrial carcinoma, given the negativity for estrogen and progesterone receptors. She was discharged to continue recovery and start palliative treatment with radiation therapy.      10/28/2024: In the office for follow up. She has completed palliative radiation to the pelvis.  She has been feeling somewhat better, but she's still extremely weak and still can walk a few steps at a time. She has been intermittently incontinent of stool. She has been eating some. Spends the majority of the time in bed; her sister is clear in that at this point she would not be able to return home. On exam pale, ill appearing but in no distress. Transported in a wheel chair. No jaundice. Lungs diminished. Heart regular. Abdomen protuberant.There is edema. Reviewed the laboratory exams. Discussed the report of the biopsy with her and her sister. Her poor performance status is concerning. She might not benefit from antineoplastic treatment. She is unsure but would like to at least try. On this basis she is to receive a port. Next generation sequencing was requested. She is to see me in 2 weeks. For now continue physical therapy.     10/31/2024: I recently saw Ms. Tai event in follow-up after her discharge from the hospital.  The infection on the skin of her abdomen had nearly completely resolved and she was feeling better from this point of view.  However, she was transported in a wheelchair and felt that she could not walk more than a few steps at a time.  She had been very weak.  We had a discussion regarding the report of the biopsy done on during the hospitalization, that had confirmed metastatic adenocarcinoma.  I was concerned that her performance status suggest that she would not benefit from treatment with chemotherapy but she was interested in pursuing treatment,  if at all possible.  Plans to continue with physical therapy and to insert a port were made.  She was admitted, however, after she had a fall at the nursing home where she is currently residing.  She could not get up on her own.  She had some trauma to the head.  She seemed septic.  She was admitted for continued treatment.  At this point she is prostrated.  She is able to answer some of my questions but her speech is slurred and difficult to understand.  She seems oriented.  She is not in distress.  She is not pale or jaundiced.  She is tachycardic but the lungs are clear.  The abdomen is protuberant but not tender.  She has some edema.  Whereas at the time of the last visit to the office I was concerned that she may not benefit from antineoplastic treatment, at this time I am convinced that she would not.  Antineoplastic treatment would result probably only in side effects.  I do not believe that she will be strong enough following these recent events to even come to the office.  Discussions regarding hospice have been had with her and her family.  She has decided to go with hospice.  I believe this is an adequate decision given her circumstances.    He/She  has a past medical history of Diabetes mellitus, Hypertension, and Uterine mass.    PCP: Hanh Granados APRN    History:  Past Medical History:   Diagnosis Date    Diabetes mellitus     Hypertension     Uterine mass    ,   Past Surgical History:   Procedure Laterality Date    CHOLECYSTECTOMY     ,   Family History   Problem Relation Age of Onset    Diabetes Mother    ,   Social History     Tobacco Use    Smoking status: Never   Vaping Use    Vaping status: Never Used   Substance Use Topics    Alcohol use: Never    Drug use: Never   ,   Medications Prior to Admission   Medication Sig Dispense Refill Last Dose/Taking    amLODIPine (NORVASC) 5 MG tablet Take 1 tablet by mouth Daily. 30 tablet 0     Josr Carb-Mag Hydrox-Simeth 1200-270-80 MG/10ML  suspension Take 10 mL by mouth Every 6 (Six) Hours As Needed.       docusate sodium 100 MG capsule Take 1 capsule by mouth Daily.       Farxiga 10 MG tablet Take 10 mg by mouth Daily.       ferrous sulfate 325 (65 FE) MG tablet Take 1 tablet by mouth 5 (Five) Times a Week. Monday, Tuesday, Wednesday, Thursday and Friday       hydrocortisone-bacitracin-zinc oxide-nystatin (MAGIC BARRIER) Apply 1 Application topically to the appropriate area as directed 4 (Four) Times a Day. 100 g 0     hydrOXYzine (ATARAX) 50 MG tablet Take 1 tablet by mouth Every Night.       lisinopril (PRINIVIL,ZESTRIL) 40 MG tablet Take 1 tablet by mouth Daily.       metFORMIN (GLUCOPHAGE) 500 MG tablet Take 1 tablet by mouth 2 (Two) Times a Day.       metoprolol succinate XL (TOPROL-XL) 50 MG 24 hr tablet Take 1 tablet by mouth every night at bedtime.       montelukast (SINGULAIR) 10 MG tablet Take 1 tablet by mouth Every Night.       multivitamin (Thera) tablet tablet Take 1 tablet by mouth Daily. 30 tablet 0     nystatin (MYCOSTATIN) 458498 UNIT/GM powder Apply  topically to the appropriate area as directed Every 12 (Twelve) Hours. 60 g 6     PARoxetine (PAXIL) 30 MG tablet Take 1 tablet by mouth Daily.       risperiDONE (risperDAL) 2 MG tablet Take 2 tablets by mouth Every Evening.       traMADol (ULTRAM) 50 MG tablet Take 1 tablet by mouth Every 6 (Six) Hours As Needed for Moderate Pain.       vitamin D (ERGOCALCIFEROL) 1.25 MG (41268 UT) capsule capsule Take 1 capsule by mouth Every 7 (Seven) Days. Wednesdays       zinc sulfate (ZINCATE) 220 (50 Zn) MG capsule Take 1 capsule by mouth Daily. 30 capsule 0    , Scheduled Meds:  [Held by provider] amLODIPine, 5 mg, Oral, Q24H  aspirin, 81 mg, Oral, Daily   Or  aspirin, 300 mg, Rectal, Daily  atorvastatin, 80 mg, Oral, Nightly  cefepime, 2,000 mg, Intravenous, Q24H  heparin (porcine), 5,000 Units, Subcutaneous, Q12H  [Held by provider] hydrOXYzine, 50 mg, Oral, Nightly  insulin lispro, 2-9  "Units, Subcutaneous, Q6H  [Held by provider] lisinopril, 40 mg, Oral, Daily  [Held by provider] metoprolol succinate XL, 50 mg, Oral, Q24H  montelukast, 10 mg, Oral, Nightly  multivitamin, 1 tablet, Oral, Daily  mupirocin, 1 Application, Each Nare, BID  PARoxetine, 30 mg, Oral, Daily  risperiDONE, 2 mg, Oral, Q PM  senna-docusate sodium, 2 tablet, Oral, BID  sodium bicarbonate, 100 mEq, Intravenous, Once  sodium chloride, 10 mL, Intravenous, Q12H  sodium chloride, 10 mL, Intravenous, Q12H  sodium chloride, 10 mL, Intravenous, Q12H  sodium chloride, 10 mL, Intravenous, Q12H  vitamin D, 50,000 Units, Oral, Q7 Days  zinc sulfate, 220 mg, Oral, Daily    , Continuous Infusions:  norepinephrine, 0.05-0.5 mcg/kg/min, Last Rate: 0.3 mcg/kg/min (10/31/24 1721)  phenylephrine, 0.5-6 mcg/kg/min, Last Rate: 5 mcg/kg/min (10/31/24 1330)  sodium bicarbonate 8.4 % 150 mEq in dextrose (D5W) 5 % 1,000 mL infusion (greater than 100 mEq), 150 mEq, Last Rate: 150 mEq (10/31/24 1456)  vasopressin, 0.03 Units/min, Last Rate: 0.03 Units/min (10/31/24 0851)    , PRN Meds:    acetaminophen **OR** acetaminophen    aluminum-magnesium hydroxide-simethicone    senna-docusate sodium **AND** polyethylene glycol **AND** bisacodyl **AND** bisacodyl    dextrose    dextrose    glucagon (human recombinant)    nitroglycerin    ondansetron ODT **OR** ondansetron    sodium chloride    sodium chloride    sodium chloride    sodium chloride    sodium chloride    sodium chloride    sodium chloride   Allergies:  Patient has no known allergies.    Subjective     ROS:  Review of Systems   Unable to perform ROS: Other        Objective   Vital Signs:   /54   Pulse (!) 122   Temp 98.7 °F (37.1 °C) (Axillary)   Resp (!) 33   Ht 160 cm (63\")   Wt 116 kg (255 lb 15.3 oz)   SpO2 93%   BMI 45.34 kg/m²     Physical Exam: (performed by MD)  Physical Exam  Constitutional:       General: She is not in acute distress.     Appearance: She is ill-appearing. She " is not toxic-appearing or diaphoretic.      Comments: Alert and responsive.  Cooperative and in no distress.  Neither jaundiced nor pale.  Ill-appearing.   HENT:      Head: Normocephalic and atraumatic.      Right Ear: External ear normal.      Left Ear: External ear normal.      Nose: Nose normal.      Mouth/Throat:      Mouth: Mucous membranes are moist.      Pharynx: Oropharynx is clear. No oropharyngeal exudate or posterior oropharyngeal erythema.   Eyes:      General: No scleral icterus.        Right eye: No discharge.         Left eye: No discharge.      Conjunctiva/sclera: Conjunctivae normal.      Pupils: Pupils are equal, round, and reactive to light.   Cardiovascular:      Rate and Rhythm: Normal rate and regular rhythm. Tachycardia present.      Pulses: Normal pulses.      Heart sounds: No murmur heard.     No friction rub. No gallop.   Pulmonary:      Effort: No respiratory distress.      Breath sounds: No stridor. No wheezing, rhonchi or rales.   Abdominal:      General: Bowel sounds are normal. There is no distension.      Palpations: Abdomen is soft. There is no mass.      Tenderness: There is no abdominal tenderness. There is no right CVA tenderness, left CVA tenderness, guarding or rebound.      Hernia: No hernia is present.      Comments: Protuberant, soft and nontender.  No palpable tumors.   Musculoskeletal:         General: No tenderness, deformity or signs of injury.      Cervical back: No rigidity.      Right lower leg: Edema present.      Left lower leg: Edema present.   Lymphadenopathy:      Cervical: No cervical adenopathy.   Skin:     Coloration: Skin is not jaundiced or pale.      Findings: No bruising, lesion or rash.   Neurological:      General: No focal deficit present.      Mental Status: She is alert and oriented to person, place, and time.      Cranial Nerves: No cranial nerve deficit.   Psychiatric:         Behavior: Behavior normal.     SHAN Del Real MD performed the physical  "exam on 10/31/2024 as documented above.    Results Review:  Lab Results (last 48 hours)       Procedure Component Value Units Date/Time    POC Glucose 4x Daily Before Meals & at Bedtime [[id number]]  (Abnormal) Collected: 10/31/24 1713    Specimen: Blood Updated: 10/31/24 1715     Glucose 197 mg/dL      Comment: Serial Number: [id number]:  [id number]       Procalcitonin [[id number]]  (Abnormal) Collected: 10/31/24 1348    Specimen: Blood Updated: 10/31/24 1424     Procalcitonin 5.10 ng/mL     Narrative:      As a Marker for Sepsis (Non-Neonates):    1. <0.5 ng/mL represents a low risk of severe sepsis and/or septic shock.  2. >2 ng/mL represents a high risk of severe sepsis and/or septic shock.    As a Marker for Lower Respiratory Tract Infections that require antibiotic therapy:    PCT on Admission    Antibiotic Therapy       6-12 Hrs later    >0.5                Strongly Recommended  >0.25 - <0.5        Recommended   0.1 - 0.25          Discouraged              Remeasure/reassess PCT  <0.1                Strongly Discouraged     Remeasure/reassess PCT    As 28 day mortality risk marker: \"Change in Procalcitonin Result\" (>80% or <=80%) if Day 0 (or Day 1) and Day 4 values are available. Refer to [email or web address]    Change in PCT <=80%  A decrease of PCT levels below or equal to 80% defines a positive change in PCT test result representing a higher risk for 28-day all-cause mortality of patients diagnosed with severe sepsis for septic shock.    Change in PCT >80%  A decrease of PCT levels of more than 80% defines a negative change in PCT result representing a lower risk for 28-day all-cause mortality of patients diagnosed with severe sepsis or septic shock.       POC Glucose Once [[id number]]  (Abnormal) Collected: 10/31/24 1322    Specimen: Blood Updated: 10/31/24 1324     Glucose 222 mg/dL      Comment: Serial Number: [id number]:  [id number]       Renal Function Panel [[id number]]  " (Abnormal) Collected: 10/31/24 0827    Specimen: Blood Updated: 10/31/24 0912     Glucose 228 mg/dL      BUN 68 mg/dL      Creatinine 7.65 mg/dL      Sodium 132 mmol/L      Potassium 3.7 mmol/L      Chloride 93 mmol/L      CO2 9.1 mmol/L      Calcium 7.8 mg/dL      Albumin 2.3 g/dL      Phosphorus 6.9 mg/dL      Anion Gap 29.9 mmol/L      BUN/Creatinine Ratio 8.9     eGFR 5.6 mL/min/1.73      Comment: <15 Indicative of kidney failure       Narrative:      GFR Normal >60  Chronic Kidney Disease <60  Kidney Failure <15      Magnesium [[id number]]  (Normal) Collected: 10/31/24 0827    Specimen: Blood Updated: 10/31/24 0909     Magnesium 1.8 mg/dL     Calcium, Ionized [[id number]]  (Abnormal) Collected: 10/31/24 0827    Specimen: Blood Updated: 10/31/24 0832     Ionized Calcium 0.94 mmol/L     Blood Culture - Blood, Hand, Left [[id number]]  (Normal) Collected: 10/30/24 0733    Specimen: Blood from Hand, Left Updated: 10/31/24 0802     Blood Culture No growth at 24 hours    Narrative:      Less than seven (7) mL's of blood was collected.  Insufficient quantity may yield false negative results.    STAT Lactic Acid, Reflex [[id number]]  (Abnormal) Collected: 10/31/24 0642    Specimen: Blood Updated: 10/31/24 0719     Lactate 10.0 mmol/L     Basic Metabolic Panel [[id number]]  (Abnormal) Collected: 10/31/24 0642    Specimen: Blood Updated: 10/31/24 0712     Glucose 223 mg/dL      BUN 67 mg/dL      Creatinine 7.56 mg/dL      Sodium 133 mmol/L      Potassium 3.6 mmol/L      Chloride 93 mmol/L      CO2 10.0 mmol/L      Calcium 7.8 mg/dL      BUN/Creatinine Ratio 8.9     Anion Gap 30.0 mmol/L      eGFR 5.7 mL/min/1.73      Comment: <15 Indicative of kidney failure       Narrative:      GFR Normal >60  Chronic Kidney Disease <60  Kidney Failure <15      Blood Culture - Blood, Hand, Left [[id number]]  (Normal) Collected: 10/30/24 0626    Specimen: Blood from Hand, Left Updated: 10/31/24 0700     Blood Culture No growth at 24 hours     CBC & Differential [[id number]]  (Abnormal) Collected: 10/30/24 2359    Specimen: Blood Updated: 10/31/24 0156    Narrative:      The following orders were created for panel order CBC & Differential.  Procedure                               Abnormality         Status                     ---------                               -----------         ------                     CBC Auto Differential[[id number]]        Abnormal            Final result               Scan Slide[[id number]]                                       Final result                 Please view results for these tests on the individual orders.    CBC Auto Differential [[id number]]  (Abnormal) Collected: 10/30/24 2359    Specimen: Blood Updated: 10/31/24 0156     WBC 22.19 10*3/mm3      RBC 4.76 10*6/mm3      Hemoglobin 11.4 g/dL      Hematocrit 37.4 %      MCV 78.6 fL      MCH 23.9 pg      MCHC 30.5 g/dL      RDW 21.6 %      RDW-SD 60.8 fl      MPV 10.5 fL      Platelets 195 10*3/mm3     Scan Slide [[id number]] Collected: 10/30/24 2359    Specimen: Blood Updated: 10/31/24 0156     Scan Slide --    Manual Differential [[id number]]  (Abnormal) Collected: 10/30/24 2359    Specimen: Blood Updated: 10/31/24 0156     Neutrophil % 86.0 %      Lymphocyte % 2.0 %      Monocyte % 3.0 %      Bands %  8.0 %      Myelocyte % 1.0 %      Neutrophils Absolute 20.86 10*3/mm3      Lymphocytes Absolute 0.44 10*3/mm3      Monocytes Absolute 0.67 10*3/mm3      nRBC 1.0 /100 WBC      Anisocytosis Slight/1+     WBC Morphology Normal     Large Platelets Slight/1+     Giant Platelets Slight/1+    Comprehensive Metabolic Panel [[id number]]  (Abnormal) Collected: 10/30/24 2359    Specimen: Blood Updated: 10/31/24 0046     Glucose 225 mg/dL      BUN 67 mg/dL      Creatinine 7.35 mg/dL      Sodium 129 mmol/L      Potassium 4.1 mmol/L      Comment: Slight hemolysis detected by analyzer. Result may be falsely elevated.        Chloride 93 mmol/L      CO2 11.9 mmol/L      Calcium 7.8 mg/dL       Total Protein 4.6 g/dL      Albumin 2.4 g/dL      ALT (SGPT) 13 U/L      AST (SGOT) 23 U/L      Alkaline Phosphatase 67 U/L      Total Bilirubin 0.2 mg/dL      Globulin 2.2 gm/dL      A/G Ratio 1.1 g/dL      BUN/Creatinine Ratio 9.1     Anion Gap 24.1 mmol/L      eGFR 5.9 mL/min/1.73      Comment: <15 Indicative of kidney failure       Narrative:      GFR Normal >60  Chronic Kidney Disease <60  Kidney Failure <15      Magnesium [[id number]]  (Normal) Collected: 10/30/24 2359    Specimen: Blood Updated: 10/31/24 0046     Magnesium 1.8 mg/dL     High Sensitivity Troponin T [[id number]]  (Abnormal) Collected: 10/30/24 2359    Specimen: Blood Updated: 10/31/24 0046     HS Troponin T 151 ng/L     Narrative:      High Sensitive Troponin T Reference Range:  <14.0 ng/L- Negative Female for AMI  <22.0 ng/L- Negative Male for AMI  >=14 - Abnormal Female indicating possible myocardial injury.  >=22 - Abnormal Male indicating possible myocardial injury.   Clinicians would have to utilize clinical acumen, EKG, Troponin, and serial changes to determine if it is an Acute Myocardial Infarction or myocardial injury due to an underlying chronic condition.         STAT Lactic Acid, Reflex [[id number]]  (Abnormal) Collected: 10/30/24 2359    Specimen: Blood Updated: 10/31/24 0046     Lactate 6.2 mmol/L     Lipid Panel [[id number]]  (Abnormal) Collected: 10/30/24 2359    Specimen: Blood Updated: 10/31/24 0044     Total Cholesterol 66 mg/dL      Triglycerides 150 mg/dL      HDL Cholesterol 12 mg/dL      LDL Cholesterol  28 mg/dL      VLDL Cholesterol 26 mg/dL      LDL/HDL Ratio 2.00    Narrative:      Cholesterol Reference Ranges  (U.S. Department of Health and Human Services ATP III Classifications)    Desirable          <200 mg/dL  Borderline High    200-239 mg/dL  High Risk          >240 mg/dL      Triglyceride Reference Ranges  (U.S. Department of Health and Human Services ATP III Classifications)    Normal           <150  mg/dL  Borderline High  150-199 mg/dL  High             200-499 mg/dL  Very High        >500 mg/dL    HDL Reference Ranges  (U.S. Department of Health and Human Services ATP III Classifications)    Low     <40 mg/dl (major risk factor for CHD)  High    >60 mg/dl ('negative' risk factor for CHD)        LDL Reference Ranges  (U.S. Department of Health and Human Services ATP III Classifications)    Optimal          <100 mg/dL  Near Optimal     100-129 mg/dL  Borderline High  130-159 mg/dL  High             160-189 mg/dL  Very High        >189 mg/dL    Phosphorus [[id number]]  (Abnormal) Collected: 10/30/24 2359    Specimen: Blood Updated: 10/31/24 0044     Phosphorus 7.0 mg/dL     Digoxin Level [[id number]]  (Abnormal) Collected: 10/30/24 2359    Specimen: Blood Updated: 10/31/24 0044     Digoxin 0.57 ng/mL     POC Glucose 4x Daily Before Meals & at Bedtime [[id number]]  (Abnormal) Collected: 10/30/24 2310    Specimen: Blood Updated: 10/30/24 2311     Glucose 218 mg/dL      Comment: Serial Number: [id number]:  [id number]       POC Glucose Once [[id number]]  (Abnormal) Collected: 10/30/24 1839    Specimen: Blood Updated: 10/30/24 1841     Glucose 191 mg/dL      Comment: Serial Number: [id number]:  [id number]       STAT Lactic Acid, Reflex [[id number]]  (Abnormal) Collected: 10/30/24 1720    Specimen: Blood Updated: 10/30/24 1758     Lactate 2.9 mmol/L     Basic Metabolic Panel [[id number]]  (Abnormal) Collected: 10/30/24 1720    Specimen: Blood Updated: 10/30/24 1756     Glucose 163 mg/dL      BUN 59 mg/dL      Creatinine 6.43 mg/dL      Sodium 132 mmol/L      Potassium 3.6 mmol/L      Chloride 100 mmol/L      CO2 12.0 mmol/L      Calcium 7.1 mg/dL      BUN/Creatinine Ratio 9.2     Anion Gap 20.0 mmol/L      eGFR 6.9 mL/min/1.73      Comment: <15 Indicative of kidney failure       Narrative:      GFR Normal >60  Chronic Kidney Disease <60  Kidney Failure <15      Basic Metabolic Panel [[id number]] Collected:  10/30/24 1605    Specimen: Blood Updated: 10/30/24 1656     Glucose --     Comment: Specimen contaminated, Redraw requested  Corrected result. Previous result was 633 mg/dL on 10/30/2024 at 1643 EDT.        BUN --     Comment: Specimen contaminated, Redraw requested  Corrected result. Previous result was 51 mg/dL on 10/30/2024 at 1643 EDT.        Creatinine --     Comment: Specimen contaminated, Redraw requested  Corrected result. Previous result was 5.29 mg/dL on 10/30/2024 at 1643 EDT.        Sodium --     Comment: Specimen contaminated, Redraw requested  Corrected result. Previous result was 134 mmol/L on 10/30/2024 at 1643 EDT.        Potassium --     Comment: Specimen contaminated, Redraw requested  Corrected result. Previous result was 3.0 mmol/L on 10/30/2024 at 1643 EDT.        Chloride --     Comment: Specimen contaminated, Redraw requested  Corrected result. Previous result was 92 mmol/L on 10/30/2024 at 1643 EDT.        CO2 --     Comment: Specimen contaminated, Redraw requested  Corrected result. Previous result was 29.3 mmol/L on 10/30/2024 at 1643 EDT.        Calcium --     Comment: Specimen contaminated, Redraw requested  Corrected result. Previous result was 5.9 mg/dL on 10/30/2024 at 1643 EDT.        BUN/Creatinine Ratio --     Comment: Specimen contaminated, Redraw requested  Corrected result. Previous result was 9.6 on 10/30/2024 at 1643 EDT.        Anion Gap --     Comment: Specimen contaminated, Redraw requested  Corrected result. Previous result was 12.7 mmol/L on 10/30/2024 at 1643 EDT.        eGFR --     Comment: <15 Indicative of kidney failure  Corrected result. Previous result was 8.8 mL/min/1.73 on 10/30/2024 at 1643 EDT.       Narrative:      GFR Normal >60  Chronic Kidney Disease <60  Kidney Failure <15      POC Glucose Once [[id number]]  (Abnormal) Collected: 10/30/24 1644    Specimen: Blood Updated: 10/30/24 1646     Glucose 179 mg/dL      Comment: Serial Number: [id number]:   [id number]       STAT Lactic Acid, Reflex [[id number]]  (Abnormal) Collected: 10/30/24 1437    Specimen: Blood Updated: 10/30/24 1520     Lactate 2.6 mmol/L     Uric Acid [[id number]]  (Abnormal) Collected: 10/30/24 1011    Specimen: Blood Updated: 10/30/24 1158     Uric Acid 9.4 mg/dL     High Sensitivity Troponin T 2Hr [[id number]]  (Abnormal) Collected: 10/30/24 1011    Specimen: Blood Updated: 10/30/24 1107     HS Troponin T 129 ng/L      Troponin T Delta -40 ng/L     Narrative:      High Sensitive Troponin T Reference Range:  <14.0 ng/L- Negative Female for AMI  <22.0 ng/L- Negative Male for AMI  >=14 - Abnormal Female indicating possible myocardial injury.  >=22 - Abnormal Male indicating possible myocardial injury.   Clinicians would have to utilize clinical acumen, EKG, Troponin, and serial changes to determine if it is an Acute Myocardial Infarction or myocardial injury due to an underlying chronic condition.         STAT Lactic Acid, Reflex [[id number]]  (Abnormal) Collected: 10/30/24 1011    Specimen: Blood Updated: 10/30/24 1107     Lactate 4.2 mmol/L     CK [[id number]]  (Normal) Collected: 10/30/24 0626    Specimen: Blood Updated: 10/30/24 1014     Creatine Kinase 35 U/L     POC Glucose Once [[id number]]  (Abnormal) Collected: 10/30/24 1012    Specimen: Blood Updated: 10/30/24 1014     Glucose 128 mg/dL      Comment: Serial Number: [id number]:  [id number]       Respiratory Panel PCR w/COVID-19(SARS-CoV-2) DARIA/MAX/VANESSA/PAD/COR/DELONTE In-House, NP Swab in UTM/VTM, 2 HR TAT - Swab, Nasopharynx [[id number]]  (Normal) Collected: 10/30/24 0555    Specimen: Swab from Nasopharynx Updated: 10/30/24 0737     ADENOVIRUS, PCR Not Detected     Coronavirus 229E Not Detected     Coronavirus HKU1 Not Detected     Coronavirus NL63 Not Detected     Coronavirus OC43 Not Detected     COVID19 Not Detected     Human Metapneumovirus Not Detected     Human Rhinovirus/Enterovirus Not Detected     Influenza A PCR Not Detected      Influenza B PCR Not Detected     Parainfluenza Virus 1 Not Detected     Parainfluenza Virus 2 Not Detected     Parainfluenza Virus 3 Not Detected     Parainfluenza Virus 4 Not Detected     RSV, PCR Not Detected     Bordetella pertussis pcr Not Detected     Bordetella parapertussis PCR Not Detected     Chlamydophila pneumoniae PCR Not Detected     Mycoplasma pneumo by PCR Not Detected    Narrative:      In the setting of a positive respiratory panel with a viral infection PLUS a negative procalcitonin without other underlying concern for bacterial infection, consider observing off antibiotics or discontinuation of antibiotics and continue supportive care. If the respiratory panel is positive for atypical bacterial infection (Bordetella pertussis, Chlamydophila pneumoniae, or Mycoplasma pneumoniae), consider antibiotic de-escalation to target atypical bacterial infection.    Comprehensive Metabolic Panel [[id number]]  (Abnormal) Collected: 10/30/24 0626    Specimen: Blood Updated: 10/30/24 0729     Glucose 132 mg/dL      BUN 59 mg/dL      Creatinine 7.47 mg/dL      Sodium 126 mmol/L      Potassium 4.1 mmol/L      Chloride 92 mmol/L      CO2 9.9 mmol/L      Calcium 9.0 mg/dL      Total Protein 5.0 g/dL      Albumin 2.3 g/dL      ALT (SGPT) 18 U/L      AST (SGOT) 24 U/L      Alkaline Phosphatase 82 U/L      Total Bilirubin 0.2 mg/dL      Globulin 2.7 gm/dL      A/G Ratio 0.9 g/dL      BUN/Creatinine Ratio 7.9     Anion Gap 24.1 mmol/L      eGFR 5.8 mL/min/1.73      Comment: <15 Indicative of kidney failure       Narrative:      GFR Normal >60  Chronic Kidney Disease <60  Kidney Failure <15      Magnesium [[id number]]  (Normal) Collected: 10/30/24 0626    Specimen: Blood Updated: 10/30/24 0729     Magnesium 2.1 mg/dL     Single High Sensitivity Troponin T [[id number]]  (Abnormal) Collected: 10/30/24 0626    Specimen: Blood Updated: 10/30/24 0729     HS Troponin T 169 ng/L     Narrative:      High Sensitive Troponin T  Reference Range:  <14.0 ng/L- Negative Female for AMI  <22.0 ng/L- Negative Male for AMI  >=14 - Abnormal Female indicating possible myocardial injury.  >=22 - Abnormal Male indicating possible myocardial injury.   Clinicians would have to utilize clinical acumen, EKG, Troponin, and serial changes to determine if it is an Acute Myocardial Infarction or myocardial injury due to an underlying chronic condition.         CBC & Differential [[id number]]  (Abnormal) Collected: 10/30/24 0626    Specimen: Blood Updated: 10/30/24 0720    Narrative:      The following orders were created for panel order CBC & Differential.  Procedure                               Abnormality         Status                     ---------                               -----------         ------                     CBC Auto Differential[[id number]]        Abnormal            Final result               Scan Slide[[id number]]                                       Final result                 Please view results for these tests on the individual orders.    Scan Slide [[id number]] Collected: 10/30/24 0626    Specimen: Blood Updated: 10/30/24 0720     Scan Slide --     Comment: See Manual Differential Results       Manual Differential [[id number]]  (Abnormal) Collected: 10/30/24 0626    Specimen: Blood Updated: 10/30/24 0720     Neutrophil % 71.0 %      Lymphocyte % 4.0 %      Monocyte % 8.0 %      Bands %  12.0 %      Metamyelocyte % 5.0 %      Neutrophils Absolute 18.52 10*3/mm3      Lymphocytes Absolute 0.89 10*3/mm3      Monocytes Absolute 1.78 10*3/mm3      Crenated RBC's Large/3+     Microcytes Slight/1+     Poikilocytes Slight/1+     Toxic Granulation Slight/1+     Vacuolated Neutrophils Mod/2+     Platelet Morphology Normal    CBC Auto Differential [[id number]]  (Abnormal) Collected: 10/30/24 0626    Specimen: Blood Updated: 10/30/24 0720     WBC 22.31 10*3/mm3      RBC 5.10 10*6/mm3      Hemoglobin 12.5 g/dL      Hematocrit 40.1 %      MCV 78.6 fL   "    MCH 24.5 pg      MCHC 31.2 g/dL      RDW 21.4 %      RDW-SD 59.4 fl      MPV 9.8 fL      Platelets 192 10*3/mm3     Narrative:      The previously reported component NRBC is no longer being reported. Previous result was 0.0 /100 WBC (Reference Range: 0.0-0.2 /100 WBC) on 10/30/2024 at 0647 EDT.    Ketone Bodies, Serum (Not performed at [facility]) [[id number]]  (Normal) Collected: 10/30/24 0626    Specimen: Blood Updated: 10/30/24 0717    Narrative:      The following orders were created for panel order Ketone Bodies, Serum (Not performed at [facility]).  Procedure                               Abnormality         Status                     ---------                               -----------         ------                     Acetone[[id number]]                      Normal              Final result                 Please view results for these tests on the individual orders.    Acetone [[id number]]  (Normal) Collected: 10/30/24 0626    Specimen: Blood Updated: 10/30/24 0717     Acetone Negative    Procalcitonin [[id number]]  (Abnormal) Collected: 10/30/24 0626    Specimen: Blood Updated: 10/30/24 0708     Procalcitonin 4.82 ng/mL     Narrative:      As a Marker for Sepsis (Non-Neonates):    1. <0.5 ng/mL represents a low risk of severe sepsis and/or septic shock.  2. >2 ng/mL represents a high risk of severe sepsis and/or septic shock.    As a Marker for Lower Respiratory Tract Infections that require antibiotic therapy:    PCT on Admission    Antibiotic Therapy       6-12 Hrs later    >0.5                Strongly Recommended  >0.25 - <0.5        Recommended   0.1 - 0.25          Discouraged              Remeasure/reassess PCT  <0.1                Strongly Discouraged     Remeasure/reassess PCT    As 28 day mortality risk marker: \"Change in Procalcitonin Result\" (>80% or <=80%) if Day 0 (or Day 1) and Day 4 values are available. Refer to http://www.[employer]-pct-calculator.com    Change in PCT <=80%  A decrease of PCT " levels below or equal to 80% defines a positive change in PCT test result representing a higher risk for 28-day all-cause mortality of patients diagnosed with severe sepsis for septic shock.    Change in PCT >80%  A decrease of PCT levels of more than 80% defines a negative change in PCT result representing a lower risk for 28-day all-cause mortality of patients diagnosed with severe sepsis or septic shock.       BNP [[id number]]  (Abnormal) Collected: 10/30/24 0626    Specimen: Blood Updated: 10/30/24 0708     proBNP 1,560.0 pg/mL     Narrative:      This assay is used as an aid in the diagnosis of individuals suspected of having heart failure. It can be used as an aid in the diagnosis of acute decompensated heart failure (ADHF) in patients presenting with signs and symptoms of ADHF to the emergency department (ED). In addition, NT-proBNP of <300 pg/mL indicates ADHF is not likely.    Age Range Result Interpretation  NT-proBNP Concentration (pg/mL:      <50             Positive            >450                   Gray                 300-450                    Negative             <300    50-75           Positive            >900                  Gray                300-900                  Negative            <300      >75             Positive            >1800                  Gray                300-1800                  Negative            <300    TSH [[id number]]  (Abnormal) Collected: 10/30/24 0626    Specimen: Blood Updated: 10/30/24 0708     TSH 4.500 uIU/mL     [facility] Draw [[id number]] Collected: 10/30/24 0626    Specimen: Blood Updated: 10/30/24 0646    Narrative:      The following orders were created for panel order [facility] Draw.  Procedure                               Abnormality         Status                     ---------                               -----------         ------                     Green Top (Gel)[[id number]]                                  Final result               Lavender  Top[[id number]]                                     Final result               Gold Top - SST[[id number]]                                   Final result               Light Blue Top[[id number]]                                   Final result                 Please view results for these tests on the individual orders.    Green Top (Gel) [[id number]] Collected: 10/30/24 0626    Specimen: Blood Updated: 10/30/24 0646     Extra Tube Hold for add-ons.     Comment: Auto resulted.       Lavender Top [[id number]] Collected: 10/30/24 0626    Specimen: Blood Updated: 10/30/24 0646     Extra Tube hold for add-on     Comment: Auto resulted       Gold Top - SST [[id number]] Collected: 10/30/24 0626    Specimen: Blood Updated: 10/30/24 0646     Extra Tube Hold for add-ons.     Comment: Auto resulted.       Light Blue Top [[id number]] Collected: 10/30/24 0626    Specimen: Blood Updated: 10/30/24 0646     Extra Tube Hold for add-ons.     Comment: Auto resulted       POC CHEM 8 [[id number]]  (Abnormal) Collected: 10/30/24 0630    Specimen: Venous Blood Updated: 10/30/24 0642     Glucose 127 mg/dL      BUN 49 mg/dL      Creatinine 8.30 mg/dL      Sodium 125 mmol/L      POC Potassium 3.8 mmol/L      Chloride 99 mmol/L      Total CO2 11 mmol/L      Anion Gap 20.0 mmol/L      Comment: Serial Number: [id number]:  [id number]        Hemoglobin 13.6 g/dL      Hematocrit 40 %      Ionized Calcium 1.08 mmol/L      eGFR 5.1 mL/min/1.73     POC Lactate [[id number]]  (Abnormal) Collected: 10/30/24 0629    Specimen: Blood Updated: 10/30/24 0631     Lactate 4.7 mmol/L      Comment: Serial Number: [id number]:  [id number]       Blood Gas, Arterial - [[id number]]  (Abnormal) Collected: 10/30/24 0544    Specimen: Arterial Blood Updated: 10/30/24 0555     Site Right Radial     Joe's Test Positive     pH, Arterial 7.318 pH units      pCO2, Arterial 17.7 mm Hg      pO2, Arterial 76.3 mm Hg      HCO3, Arterial 9.1 mmol/L      Base Excess, Arterial -14.7  mmol/L      Comment: Serial Number: [id number]:  [id number]        O2 Saturation, Arterial 94.5 %      CO2 Content 9.7 mmol/L      Barometric Pressure for Blood Gas --     Comment: N/A        Modality Cannula     FIO2 44 %      Notified Who --     Read Back --     Notified Time --     Hemodilution No     PO2/FIO2 173          Imaging Reviewed:   MRI Brain Without Contrast    Result Date: 10/30/2024  Impression: 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 Unremarkable MRA of the head and neck without abrupt cut off/large vessel occlusion, flow-limiting stenosis, dissection, or aneurysm. Findings discussed with ordering provider Dr. Brothers by Dr. John Ordonez via telephone at 10:30 p.m. 10/30/2024. 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 Unremarkable MRA of the head and neck without abrupt cut off/large vessel occlusion, flow-limiting stenosis, dissection, or aneurysm. Findings discussed with ordering provider Dr. Brothers by Dr. John Ordonez via telephone at 10:30 p.m. 10/30/2024. 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 Unremarkable MRA of the head and neck without abrupt cut off/large vessel occlusion, flow-limiting stenosis, dissection, or aneurysm. Findings discussed with ordering provider Dr. Brothers by Dr. John Ordonez via telephone at 10:30 p.m. 10/30/2024. Electronically Signed: John Ordonez MD  10/30/2024  10:34 PM EDT  Workstation ID: [id number]    CT Head Without Contrast Stroke Protocol    Result Date: 10/30/2024  Impression: Small vague hypodensity in the right occipital lobe; this may be new from today's earlier head CT. This could reflect a small acute infarct. This could be further evaluated with brain MRI. No acute intracranial hemorrhage. No definite evidence of acute large territory infarct. Electronically Signed: John Ordonez MD  10/30/2024 8:35 PM EDT  Workstation ID: [id number]    US Renal Bilateral    Result Date: 10/30/2024  Impression: Lobulated nodularity in the posterior urinary bladder wall has a similar appearance to 10/16/2024.. Malignant involvement of the urinary bladder cannot be excluded. Normal sonographic appearance of the right kidney. Left kidney could not be visualized. Electronically Signed: Jeanette Huang MD  10/30/2024 3:06 PM EDT  Workstation ID: [id number]    CT Abdomen Pelvis Without Contrast    Result Date: 10/30/2024  Impression: 1.No acute trauma identified within chest/abdomen/pelvis. 2.Overall progression of disease. Numerous pulmonary metastasis have progressed, mediastinal and retroperitoneal lymphadenopathy has progressed, and hepatic metastasis have progressed. Omental carcinomatosis appears unchanged, and the uterus appears smaller than on prior exam. Soft tissue lesions identified along urinary bladder not well seen, which could be secondary to interval West catheterization. 3.Trace right pleural effusion. 4.Cardiomegaly. 5.Mild scattered ascites. 6.Wall thickening of colon, which could be reactive versus acute colitis. Electronically Signed: Robinson Heredia MD  10/30/2024 10:25 AM EDT  Workstation ID: [id number]    CT Chest Without Contrast Diagnostic    Result Date: 10/30/2024  Impression: 1.No acute trauma identified within chest/abdomen/pelvis. 2.Overall progression of disease. Numerous pulmonary metastasis have progressed, mediastinal and retroperitoneal lymphadenopathy  has progressed, and hepatic metastasis have progressed. Omental carcinomatosis appears unchanged, and the uterus appears smaller than on prior exam. Soft tissue lesions identified along urinary bladder not well seen, which could be secondary to interval West catheterization. 3.Trace right pleural effusion. 4.Cardiomegaly. 5.Mild scattered ascites. 6.Wall thickening of colon, which could be reactive versus acute colitis. Electronically Signed: Robinson Heredia MD  10/30/2024 10:25 AM EDT  Workstation ID: [id number]    CT Head Without Contrast    Result Date: 10/30/2024  Impression: No acute intracranial abnormality Electronically Signed: Danny Julio MD  10/30/2024 9:31 AM EDT  Workstation ID: [id number]    XR Chest 1 View    Result Date: 10/30/2024  Impression: Interval development of patchy airspace disease throughout the lungs bilaterally, likely related to pneumonia. Follow-up to resolution recommended.. Electronically Signed: Kia Patino MD  10/30/2024 6:21 AM EDT  Workstation ID: [id number]     Assessment & Plan   ASSESSMENT  Metastatic endometrial carcinoma.  Received palliative radiation to the pelvis that resulted in cessation of bleeding.  Systemic treatment was being considered but there was concern that she may not benefit from it given her poor performance status.  These recent events confirm that additional antineoplastic treatment is not warranted and that supportive and symptomatic treatment is appropriate.  This can be delivered via hospice.  I suspect that her lifespan at this point can potentially be measured in days to a few weeks.  I discussed with her family.  Ms. Hwang is clear on her decisions and understands the situation.    PLAN  As above.    Jacinto Del Real MD on 10/31/2024 at 1810.    Electronically signed by Jacinto Del Real MD, 10/31/24, 6:02 PM EDT.           1 = assistive equipment

## 2025-03-12 NOTE — ED PROVIDER NOTE - PSH
Current patient location: [address] [place] [id number]    Is the patient currently in the state of [place]? YES    Visit mode: VIDEO    If the visit is dropped, the patient can be reconnected by:VIDEO VISIT: Send to e-mail at: [email or web address]    Will anyone else be joining the visit? NO  (If patient encounters technical issues they should call [phone]    Are changes needed to the allergy or medication list? No    Are refills needed on medications prescribed by this physician? NO    Rooming Documentation:  Unable to complete questionnaire(s) due to time    Reason for visit: CHELE RIVAS      Closed pelvic fracture

## 2025-05-12 NOTE — BEHAVIORAL HEALTH ASSESSMENT NOTE - NSBHHPIREASONCL_PSY_A_CORE
Surgery Date and Time:     5/20/25 @ 10:30 am  Arrival Time:    8:30 am          __X__Do not eat or drink anything after Midnight the night before the surgery. NO gum, mints, candy or ice chips the day of surgery.       Only take the following medications with a small sip of water the morning of surgery:    M.S. Contin        Aspirin, Ibuprofen, Advil, Naproxen, Vitamin E and other Anti-inflammatory products and supplements should be stopped       for 5-7days before surgery or as directed by your physician/surgeon.     - Do not smoke or vape, and do not drink any alcoholic beverages 24 hours prior to surgery, this includes NA Beer. Refrain from using     any recreational drugs, including non-prescribed prescription drugs.     -You may brush your teeth and gargle the morning of surgery.  DO NOT SWALLOW WATER.    -You MUST plan for a responsible adult to stay on site while you are here and take you home after your surgery. You will not be allowed                to leave alone or drive yourself home. It is requested someone stay with you the first 24 hrs. Your surgery will be cancelled if you do not                have a ride home with a responsible adult.    -Please wear simple, loose-fitting clothing to the hospital. Do not bring valuables (money, credit cards, checkbooks, etc.)                Do not wear any makeup (including no eye makeup) and no nail polish if applicable or requested to remove.             - DO NOT wear any jewelry or body piercings day of surgery.  All body piercing jewelry must be removed.             - If you have dentures they will be removed before going to the OR; we will provide a container.  If you wear contact lenses                or glasses they will be removed, bring a case for them or wear glasses day of surgery.             - Please see your family doctor/pediatrician for a Preop History & Physical and/or concerning medications within 30               days of the surgery date.  [facility] - MAIN ENTRANCE   [address], [facility]   [id number]          Email sent:  5/12/25                                                                                                                                                                                                                                        anxiety/other substance/depression